# Patient Record
Sex: FEMALE | Race: WHITE | NOT HISPANIC OR LATINO | Employment: UNEMPLOYED | URBAN - NONMETROPOLITAN AREA
[De-identification: names, ages, dates, MRNs, and addresses within clinical notes are randomized per-mention and may not be internally consistent; named-entity substitution may affect disease eponyms.]

---

## 2018-03-21 ENCOUNTER — OFFICE VISIT CONVERTED (OUTPATIENT)
Dept: FAMILY MEDICINE CLINIC | Age: 33
End: 2018-03-21
Attending: NURSE PRACTITIONER

## 2018-08-24 ENCOUNTER — OFFICE VISIT CONVERTED (OUTPATIENT)
Dept: FAMILY MEDICINE CLINIC | Age: 33
End: 2018-08-24
Attending: NURSE PRACTITIONER

## 2018-10-01 ENCOUNTER — OFFICE VISIT CONVERTED (OUTPATIENT)
Dept: FAMILY MEDICINE CLINIC | Age: 33
End: 2018-10-01
Attending: NURSE PRACTITIONER

## 2019-01-18 ENCOUNTER — HOSPITAL ENCOUNTER (OUTPATIENT)
Dept: OTHER | Facility: HOSPITAL | Age: 34
Discharge: HOME | End: 2019-01-18
Attending: NURSE PRACTITIONER

## 2019-01-18 ENCOUNTER — OFFICE VISIT CONVERTED (OUTPATIENT)
Dept: FAMILY MEDICINE CLINIC | Age: 34
End: 2019-01-18
Attending: NURSE PRACTITIONER

## 2019-01-18 LAB
ALBUMIN SERPL-MCNC: 4.3 G/DL (ref 3.5–5)
ALBUMIN/GLOB SERPL: 1.4 {RATIO} (ref 1.4–2.6)
ALP SERPL-CCNC: 71 U/L (ref 42–98)
ALT SERPL-CCNC: 13 U/L (ref 10–40)
ANION GAP SERPL CALC-SCNC: 17 MMOL/L (ref 8–19)
APPEARANCE UR: ABNORMAL
AST SERPL-CCNC: 13 U/L (ref 15–50)
BACTERIA UR CULT: NORMAL
BACTERIA UR QL AUTO: ABNORMAL
BASOPHILS # BLD MANUAL: 0.03 10*3/UL (ref 0–0.2)
BASOPHILS NFR BLD MANUAL: 0.5 % (ref 0–3)
BILIRUB SERPL-MCNC: 0.37 MG/DL (ref 0.2–1.3)
BILIRUB UR QL: NEGATIVE
BUN SERPL-MCNC: 9 MG/DL (ref 5–25)
BUN/CREAT SERPL: 13 {RATIO} (ref 6–20)
CALCIUM SERPL-MCNC: 9.4 MG/DL (ref 8.7–10.4)
CASTS URNS QL MICRO: ABNORMAL /[LPF]
CHLORIDE SERPL-SCNC: 101 MMOL/L (ref 99–111)
CHOLEST SERPL-MCNC: 138 MG/DL (ref 107–200)
CHOLEST/HDLC SERPL: 4.3 {RATIO} (ref 3–6)
COLOR UR: YELLOW
CONV CO2: 24 MMOL/L (ref 22–32)
CONV LEUKOCYTE ESTERASE: ABNORMAL
CONV TOTAL PROTEIN: 7.3 G/DL (ref 6.3–8.2)
CONV UROBILINOGEN IN URINE BY AUTOMATED TEST STRIP: 0.2 {EHRLICHU}/DL (ref 0.1–1)
CREAT UR-MCNC: 0.72 MG/DL (ref 0.5–0.9)
DEPRECATED RDW RBC AUTO: 41.4 FL
EOSINOPHIL # BLD MANUAL: 0.09 10*3/UL (ref 0–0.7)
EOSINOPHIL NFR BLD MANUAL: 1.5 % (ref 0–7)
EPI CELLS #/AREA URNS HPF: ABNORMAL /[HPF]
ERYTHROCYTE [DISTWIDTH] IN BLOOD BY AUTOMATED COUNT: 12.5 % (ref 11.5–14.5)
GFR SERPLBLD BASED ON 1.73 SQ M-ARVRAT: >60 ML/MIN/{1.73_M2}
GLOBULIN UR ELPH-MCNC: 3 G/DL (ref 2–3.5)
GLUCOSE 24H UR-MCNC: NEGATIVE MG/DL
GLUCOSE SERPL-MCNC: 86 MG/DL (ref 65–99)
GRANS (ABSOLUTE): 3.98 10*3/UL (ref 2–8)
GRANS: 64.4 % (ref 30–85)
HBA1C MFR BLD: 13.5 G/DL (ref 12–16)
HCT VFR BLD AUTO: 39.8 % (ref 37–47)
HDLC SERPL-MCNC: 32 MG/DL (ref 40–60)
HGB UR QL STRIP: NEGATIVE
IMM GRANULOCYTES # BLD: 0.01 10*3/UL (ref 0–0.54)
IMM GRANULOCYTES NFR BLD: 0.2 % (ref 0–0.43)
IRON SATN MFR SERPL: 29 % (ref 20–55)
IRON SERPL-MCNC: 92 UG/DL (ref 60–170)
KETONES UR QL STRIP: NEGATIVE MG/DL
LDLC SERPL CALC-MCNC: 67 MG/DL (ref 70–100)
LYMPHOCYTES # BLD MANUAL: 1.68 10*3/UL (ref 1–5)
LYMPHOCYTES NFR BLD MANUAL: 6.2 % (ref 3–10)
MCH RBC QN AUTO: 30.5 PG (ref 27–31)
MCHC RBC AUTO-ENTMCNC: 33.9 G/DL (ref 33–37)
MCV RBC AUTO: 89.8 FL (ref 81–99)
MONOCYTES # BLD AUTO: 0.38 10*3/UL (ref 0.2–1.2)
MUCOUS THREADS URNS QL MICRO: ABNORMAL
NITRITE UR-MCNC: NEGATIVE MG/ML
OSMOLALITY SERPL CALC.SUM OF ELEC: 284 MOSM/KG (ref 273–304)
PH UR STRIP.AUTO: 5 [PH] (ref 5–8)
PLATELET # BLD AUTO: 251 10*3/UL (ref 130–400)
PMV BLD AUTO: 9.6 FL (ref 7.4–10.4)
POTASSIUM SERPL-SCNC: 4.1 MMOL/L (ref 3.5–5.3)
PROT UR-MCNC: NEGATIVE MG/DL
RBC # BLD AUTO: 4.43 10*6/UL (ref 4.2–5.4)
RBC # BLD AUTO: ABNORMAL /[HPF]
SODIUM SERPL-SCNC: 138 MMOL/L (ref 135–147)
SP GR UR STRIP: >=1.03 (ref 1–1.03)
SPECIMEN SOURCE: ABNORMAL
TIBC SERPL-MCNC: 316 UG/DL (ref 245–450)
TRANSFERRIN SERPL-MCNC: 221 MG/DL (ref 250–380)
TRIGL SERPL-MCNC: 194 MG/DL (ref 40–150)
UNIDENT CRYS URNS QL MICRO: ABNORMAL /[HPF]
VARIANT LYMPHS NFR BLD MANUAL: 27.2 % (ref 20–45)
VLDLC SERPL-MCNC: 39 MG/DL (ref 5–37)
WBC # BLD AUTO: 6.17 10*3/UL (ref 4.8–10.8)
WBC #/AREA URNS HPF: ABNORMAL /[HPF]

## 2019-01-20 LAB — BACTERIA UR CULT: NORMAL

## 2019-01-21 LAB — FERRITIN SERPL-MCNC: 60 NG/ML (ref 10–200)

## 2019-03-06 ENCOUNTER — OFFICE VISIT CONVERTED (OUTPATIENT)
Dept: FAMILY MEDICINE CLINIC | Age: 34
End: 2019-03-06
Attending: NURSE PRACTITIONER

## 2019-06-25 ENCOUNTER — OFFICE VISIT CONVERTED (OUTPATIENT)
Dept: FAMILY MEDICINE CLINIC | Age: 34
End: 2019-06-25
Attending: NURSE PRACTITIONER

## 2019-06-25 ENCOUNTER — HOSPITAL ENCOUNTER (OUTPATIENT)
Dept: OTHER | Facility: HOSPITAL | Age: 34
Discharge: HOME | End: 2019-06-25
Attending: NURSE PRACTITIONER

## 2019-06-27 LAB
ALP SERPL-CCNC: 84 U/L (ref 42–98)
ASO AB SERPL-ACNC: 239 [IU]/ML (ref 0–200)
CALCIUM SERPL-MCNC: 10 MG/DL (ref 8.7–10.4)
CONV ANTI NUCLEAR ANTIBODY WITH REFLEX: NEGATIVE
CONV RHEUMATOID FACTOR IGM: <10 [IU]/ML (ref 0–14)
CRP SERPL-MCNC: 0.3 MG/L (ref 0–5)
ERYTHROCYTE [SEDIMENTATION RATE] IN BLOOD: 13 MM/H (ref 0–20)
PHOSPHATE SERPL-MCNC: 4.2 MG/DL (ref 2.4–4.5)
URATE SERPL-MCNC: 3.4 MG/DL (ref 2.5–6.2)

## 2019-07-01 ENCOUNTER — HOSPITAL ENCOUNTER (OUTPATIENT)
Dept: OTHER | Facility: HOSPITAL | Age: 34
Discharge: HOME | End: 2019-07-01
Attending: INTERNAL MEDICINE

## 2019-07-01 LAB
ANION GAP SERPL CALC-SCNC: 18 MMOL/L (ref 8–19)
BUN SERPL-MCNC: 12 MG/DL (ref 5–25)
BUN/CREAT SERPL: 17 {RATIO} (ref 6–20)
CALCIUM SERPL-MCNC: 9.1 MG/DL (ref 8.7–10.4)
CHLORIDE SERPL-SCNC: 102 MMOL/L (ref 99–111)
CONV CO2: 25 MMOL/L (ref 22–32)
CORTIS SERPL-MCNC: 19.2 UG/DL (ref 4.3–22.4)
CREAT UR-MCNC: 0.69 MG/DL (ref 0.5–0.9)
FSH SERPL-ACNC: 6.5 M[IU]/ML
GFR SERPLBLD BASED ON 1.73 SQ M-ARVRAT: >60 ML/MIN/{1.73_M2}
GLUCOSE SERPL-MCNC: 84 MG/DL (ref 65–99)
LH SERPL-ACNC: 12.7 M[IU]/ML
OSMOLALITY SERPL CALC.SUM OF ELEC: 291 MOSM/KG (ref 273–304)
POTASSIUM SERPL-SCNC: 4.1 MMOL/L (ref 3.5–5.3)
PROLACTIN SERPL-MCNC: 16.48 NG/ML
SODIUM SERPL-SCNC: 141 MMOL/L (ref 135–147)
T4 FREE SERPL-MCNC: 1.2 NG/DL (ref 0.9–1.8)
TSH SERPL-ACNC: 3.54 M[IU]/L (ref 0.27–4.2)

## 2019-07-03 LAB — IGF-I SERPL-MCNC: 243 NG/ML (ref 73–243)

## 2019-09-20 ENCOUNTER — HOSPITAL ENCOUNTER (OUTPATIENT)
Dept: OTHER | Facility: HOSPITAL | Age: 34
Discharge: HOME | End: 2019-09-20
Attending: NURSE PRACTITIONER

## 2019-09-20 ENCOUNTER — OFFICE VISIT CONVERTED (OUTPATIENT)
Dept: FAMILY MEDICINE CLINIC | Age: 34
End: 2019-09-20
Attending: NURSE PRACTITIONER

## 2019-09-20 LAB
ERYTHROCYTE [DISTWIDTH] IN BLOOD BY AUTOMATED COUNT: 12 % (ref 11.5–14.5)
HBA1C MFR BLD: 12.6 G/DL (ref 12–16)
HCT VFR BLD AUTO: 37.6 % (ref 37–47)
MCH RBC QN AUTO: 29.9 PG (ref 27–31)
MCHC RBC AUTO-ENTMCNC: 33.5 G/DL (ref 33–37)
MCV RBC AUTO: 89.1 FL (ref 81–99)
PLATELET # BLD AUTO: 239 10*3/UL (ref 130–400)
PMV BLD AUTO: 9.7 FL (ref 7.4–10.4)
RBC # BLD AUTO: 4.22 10*6/UL (ref 4.2–5.4)
WBC # BLD AUTO: 5.93 10*3/UL (ref 4.8–10.8)

## 2019-10-09 ENCOUNTER — OFFICE VISIT CONVERTED (OUTPATIENT)
Dept: FAMILY MEDICINE CLINIC | Age: 34
End: 2019-10-09
Attending: FAMILY MEDICINE

## 2019-10-29 ENCOUNTER — OFFICE VISIT CONVERTED (OUTPATIENT)
Dept: NEUROLOGY | Facility: CLINIC | Age: 34
End: 2019-10-29
Attending: PSYCHIATRY & NEUROLOGY

## 2019-11-14 ENCOUNTER — OFFICE VISIT CONVERTED (OUTPATIENT)
Dept: FAMILY MEDICINE CLINIC | Age: 34
End: 2019-11-14
Attending: FAMILY MEDICINE

## 2019-12-16 ENCOUNTER — OFFICE VISIT CONVERTED (OUTPATIENT)
Dept: FAMILY MEDICINE CLINIC | Age: 34
End: 2019-12-16
Attending: FAMILY MEDICINE

## 2020-02-18 ENCOUNTER — OFFICE VISIT CONVERTED (OUTPATIENT)
Dept: FAMILY MEDICINE CLINIC | Age: 35
End: 2020-02-18
Attending: FAMILY MEDICINE

## 2020-04-02 ENCOUNTER — OFFICE VISIT CONVERTED (OUTPATIENT)
Dept: FAMILY MEDICINE CLINIC | Age: 35
End: 2020-04-02
Attending: FAMILY MEDICINE

## 2020-04-08 ENCOUNTER — TELEMEDICINE CONVERTED (OUTPATIENT)
Dept: GASTROENTEROLOGY | Facility: CLINIC | Age: 35
End: 2020-04-08
Attending: PHYSICIAN ASSISTANT

## 2020-04-13 ENCOUNTER — HOSPITAL ENCOUNTER (OUTPATIENT)
Dept: GASTROENTEROLOGY | Facility: HOSPITAL | Age: 35
Setting detail: HOSPITAL OUTPATIENT SURGERY
Discharge: HOME | End: 2020-04-13
Attending: INTERNAL MEDICINE

## 2020-04-13 LAB
BASOPHILS # BLD AUTO: 0.04 10*3/UL (ref 0–0.2)
BASOPHILS NFR BLD AUTO: 0.9 % (ref 0–3)
CONV ABS IMM GRAN: 0.01 10*3/UL (ref 0–0.2)
CONV IMMATURE GRAN: 0.2 % (ref 0–1.8)
DEPRECATED RDW RBC AUTO: 39.9 FL (ref 36.4–46.3)
EOSINOPHIL # BLD AUTO: 0.1 10*3/UL (ref 0–0.7)
EOSINOPHIL # BLD AUTO: 2.2 % (ref 0–7)
ERYTHROCYTE [DISTWIDTH] IN BLOOD BY AUTOMATED COUNT: 12.1 % (ref 11.7–14.4)
HCT VFR BLD AUTO: 37.3 % (ref 37–47)
HGB BLD-MCNC: 12.5 G/DL (ref 12–16)
LYMPHOCYTES # BLD AUTO: 1.46 10*3/UL (ref 1–5)
LYMPHOCYTES NFR BLD AUTO: 32 % (ref 20–45)
MCH RBC QN AUTO: 30.2 PG (ref 27–31)
MCHC RBC AUTO-ENTMCNC: 33.5 G/DL (ref 33–37)
MCV RBC AUTO: 90.1 FL (ref 81–99)
MONOCYTES # BLD AUTO: 0.36 10*3/UL (ref 0.2–1.2)
MONOCYTES NFR BLD AUTO: 7.9 % (ref 3–10)
NEUTROPHILS # BLD AUTO: 2.59 10*3/UL (ref 2–8)
NEUTROPHILS NFR BLD AUTO: 56.8 % (ref 30–85)
NRBC CBCN: 0 % (ref 0–0.7)
PLATELET # BLD AUTO: 218 10*3/UL (ref 130–400)
PMV BLD AUTO: 9.8 FL (ref 9.4–12.3)
RBC # BLD AUTO: 4.14 10*6/UL (ref 4.2–5.4)
WBC # BLD AUTO: 4.56 10*3/UL (ref 4.8–10.8)

## 2020-04-14 ENCOUNTER — OFFICE VISIT CONVERTED (OUTPATIENT)
Dept: FAMILY MEDICINE CLINIC | Age: 35
End: 2020-04-14
Attending: FAMILY MEDICINE

## 2020-04-28 ENCOUNTER — OFFICE VISIT CONVERTED (OUTPATIENT)
Dept: FAMILY MEDICINE CLINIC | Age: 35
End: 2020-04-28
Attending: FAMILY MEDICINE

## 2020-08-11 ENCOUNTER — OFFICE VISIT CONVERTED (OUTPATIENT)
Dept: FAMILY MEDICINE CLINIC | Age: 35
End: 2020-08-11
Attending: FAMILY MEDICINE

## 2020-08-11 ENCOUNTER — HOSPITAL ENCOUNTER (OUTPATIENT)
Dept: OTHER | Facility: HOSPITAL | Age: 35
Discharge: HOME | End: 2020-08-11
Attending: FAMILY MEDICINE

## 2020-08-13 LAB — BACTERIA UR CULT: NORMAL

## 2020-09-04 ENCOUNTER — OFFICE VISIT CONVERTED (OUTPATIENT)
Dept: GASTROENTEROLOGY | Facility: CLINIC | Age: 35
End: 2020-09-04
Attending: NURSE PRACTITIONER

## 2020-09-04 ENCOUNTER — CONVERSION ENCOUNTER (OUTPATIENT)
Dept: OTHER | Facility: HOSPITAL | Age: 35
End: 2020-09-04

## 2020-09-11 ENCOUNTER — OFFICE VISIT CONVERTED (OUTPATIENT)
Dept: FAMILY MEDICINE CLINIC | Age: 35
End: 2020-09-11
Attending: FAMILY MEDICINE

## 2020-09-18 ENCOUNTER — HOSPITAL ENCOUNTER (OUTPATIENT)
Dept: OTHER | Facility: HOSPITAL | Age: 35
Discharge: HOME | End: 2020-09-18

## 2020-12-08 ENCOUNTER — OFFICE VISIT CONVERTED (OUTPATIENT)
Dept: FAMILY MEDICINE CLINIC | Age: 35
End: 2020-12-08
Attending: FAMILY MEDICINE

## 2020-12-10 ENCOUNTER — HOSPITAL ENCOUNTER (OUTPATIENT)
Dept: OTHER | Facility: HOSPITAL | Age: 35
Discharge: HOME | End: 2020-12-10
Attending: FAMILY MEDICINE

## 2020-12-11 LAB
25(OH)D3 SERPL-MCNC: 26.4 NG/ML (ref 30–100)
ALBUMIN SERPL-MCNC: 4.4 G/DL (ref 3.5–5)
ALBUMIN/GLOB SERPL: 1.6 {RATIO} (ref 1.4–2.6)
ALP SERPL-CCNC: 76 U/L (ref 42–98)
ALT SERPL-CCNC: 19 U/L (ref 10–40)
ANION GAP SERPL CALC-SCNC: 15 MMOL/L (ref 8–19)
AST SERPL-CCNC: 17 U/L (ref 15–50)
BASOPHILS # BLD MANUAL: 0.04 10*3/UL (ref 0–0.2)
BASOPHILS NFR BLD MANUAL: 0.5 % (ref 0–3)
BILIRUB SERPL-MCNC: 0.29 MG/DL (ref 0.2–1.3)
BUN SERPL-MCNC: 13 MG/DL (ref 5–25)
BUN/CREAT SERPL: 21 {RATIO} (ref 6–20)
CALCIUM SERPL-MCNC: 9.6 MG/DL (ref 8.7–10.4)
CHLORIDE SERPL-SCNC: 101 MMOL/L (ref 99–111)
CONV CO2: 25 MMOL/L (ref 22–32)
CONV TOTAL PROTEIN: 7.1 G/DL (ref 6.3–8.2)
CREAT UR-MCNC: 0.63 MG/DL (ref 0.5–0.9)
DEPRECATED RDW RBC AUTO: 41.6 FL
EOSINOPHIL # BLD MANUAL: 0.08 10*3/UL (ref 0–0.7)
EOSINOPHIL NFR BLD MANUAL: 1.1 % (ref 0–7)
ERYTHROCYTE [DISTWIDTH] IN BLOOD BY AUTOMATED COUNT: 12.8 % (ref 11.5–14.5)
GFR SERPLBLD BASED ON 1.73 SQ M-ARVRAT: >60 ML/MIN/{1.73_M2}
GLOBULIN UR ELPH-MCNC: 2.7 G/DL (ref 2–3.5)
GLUCOSE SERPL-MCNC: 87 MG/DL (ref 65–99)
GRANS (ABSOLUTE): 5.02 10*3/UL (ref 2–8)
GRANS: 66.5 % (ref 30–85)
HBA1C MFR BLD: 13.4 G/DL (ref 12–16)
HCT VFR BLD AUTO: 39.1 % (ref 37–47)
IMM GRANULOCYTES # BLD: 0.01 10*3/UL (ref 0–0.54)
IMM GRANULOCYTES NFR BLD: 0.1 % (ref 0–0.43)
LYMPHOCYTES # BLD MANUAL: 1.92 10*3/UL (ref 1–5)
LYMPHOCYTES NFR BLD MANUAL: 6.4 % (ref 3–10)
MCH RBC QN AUTO: 30.2 PG (ref 27–31)
MCHC RBC AUTO-ENTMCNC: 34.3 G/DL (ref 33–37)
MCV RBC AUTO: 88.3 FL (ref 81–99)
MONOCYTES # BLD AUTO: 0.48 10*3/UL (ref 0.2–1.2)
OSMOLALITY SERPL CALC.SUM OF ELEC: 281 MOSM/KG (ref 273–304)
PLATELET # BLD AUTO: 276 10*3/UL (ref 130–400)
PMV BLD AUTO: 9.1 FL (ref 7.4–10.4)
POTASSIUM SERPL-SCNC: 4.6 MMOL/L (ref 3.5–5.3)
RBC # BLD AUTO: 4.43 10*6/UL (ref 4.2–5.4)
SODIUM SERPL-SCNC: 136 MMOL/L (ref 135–147)
TSH SERPL-ACNC: 1.41 M[IU]/L (ref 0.27–4.2)
VARIANT LYMPHS NFR BLD MANUAL: 25.4 % (ref 20–45)
VIT B12 SERPL-MCNC: 395 PG/ML (ref 211–911)
WBC # BLD AUTO: 7.55 10*3/UL (ref 4.8–10.8)

## 2020-12-18 ENCOUNTER — TELEMEDICINE CONVERTED (OUTPATIENT)
Dept: GASTROENTEROLOGY | Facility: CLINIC | Age: 35
End: 2020-12-18
Attending: NURSE PRACTITIONER

## 2021-02-26 ENCOUNTER — OFFICE VISIT CONVERTED (OUTPATIENT)
Dept: FAMILY MEDICINE CLINIC | Age: 36
End: 2021-02-26
Attending: FAMILY MEDICINE

## 2021-03-01 ENCOUNTER — HOSPITAL ENCOUNTER (OUTPATIENT)
Dept: OTHER | Facility: HOSPITAL | Age: 36
Discharge: HOME | End: 2021-03-01
Attending: FAMILY MEDICINE

## 2021-03-01 LAB
25(OH)D3 SERPL-MCNC: 27.2 NG/ML (ref 30–100)
CORTIS AM PEAK SERPL-MCNC: 15.7 UG/DL (ref 6–18.4)

## 2021-05-12 NOTE — PROGRESS NOTES
Quick Note      Patient Name: Gin Alexandra   Patient ID: [id number]   Sex: Female   YOB: 1985    Primary Care Provider: Prakash Rivera MD   Referring Provider: Prakash Rivera MD    Visit Date: April 8, 2020    Provider: Nabil Gonzalez PA-C   Location: [facility]   Location Address: [address] [place] [address]  [id number]   Location Phone: [phone]          History Of Present Illness  TELEHEALTH VISIT  Chief Complaint: Blood in stool   Gin Alexandra is a 34 year old female who is presenting for evaluation via telehealth visit. Verbal consent obtained before beginning visit.   Provider spent 12 minutes with the patient during telehealth visit.   The following staff were present during this visit: Dean Becerra MA   Past Medical History/Overview of Patient Symptoms     34-year-old female agrees to a telephone visit for blood in her stool, constipation, and generalized abdominal pain.  The pain is described as severe after she eats in the upper area, but also in the lower area.  She also has a bowel movement once a week and with blood always in her stool for the last 3 months.  She has never had a colonoscopy or EGD.  She also reports 3 episodes of melena.           Assessment  · Blood in stool     578.1/K92.1  · Abdominal pain     789.00/R10.9  · Melena     578.1/K92.1  · Constipation     564.00/K59.00      Plan  · Orders  o CBC with Auto Diff [facility] (17687) - 578.1/K92.1, 789.00/R10.9, 564.00/K59.00 - 04/08/2020  o Consent for Colonoscopy with Possible Biopsy - Possible risks/complications, benefits, and alternatives to surgical or invasive procedure have been explained to patient and/or legal guardian. -Patient has been evaluated and can tolerate anesthesia and/or sedation. Risks, benefits, and alternatives to anesthesia and sedation have been explained to patient and/or legal guardian. (16142) - 578.1/K92.1, 789.00/R10.9, 564.00/K59.00 - 04/08/2020  o Consent for  Esophagogastroduodenoscopy (EGD) with biopsy - Possible risks/complications, benefits, and alternatives to surgical or invasive procedure have been explained to patient and/or legal guardian. - Patient has been evaluated and can tolerate anesthesia and/or sedation. Risks, benefits, and alternatives to anesthesia and sedation have been explained to patient and/or legal guardian. (94367) - 789.00/R10.9, 578.1/K92.1 - 04/08/2020  · Medications  o Medications have been Reconciled  o Transition of Care or Provider Policy  · Instructions  o Plan Of Care:   o Chronic conditions reviewed and taken into consideration for today's treatment plan.  o Patient instructed to seek medical attention urgently for new or worsening symptoms.  o Patient was educated/instructed on their diagnosis, treatment and medications prior to discharge from the clinic today.  o 34-year-old female with constipation, melena, blood in stool, and abdominal pain. Given the symptoms at outside these is elective procedures therefore I will schedule her for an EGD/colonoscopy. I will add a trial of Linzess 72 mcg daily. She has tried her mom's Linzess 290 mcg, but this causes diarrhea.            Electronically Signed by: FERNANDO Wheeler-AMANDA -Author on April 8, 2020 09:28:40 AM  Electronically Co-signed by: Kumar Turner MD -Reviewer on April 9, 2020 04:21:08 PM

## 2021-05-13 NOTE — PROGRESS NOTES
Progress Note      Patient Name: Gin Alexandra   Patient ID: [id number]   Sex: Female   YOB: 1985    Primary Care Provider: Prakash Rivera MD   Referring Provider: Prakash Rivera MD    Visit Date: September 4, 2020    Provider: LUCIA Willis   Location: [facility] [place] [place] [facility]   Location Address: [address] [place] [address]  [address] [place] [address]  [id number]          Chief Complaint  · Follow up of EGD/Colonoscopy      History Of Present Illness     34-year-old female, with a history of fibromyalgia, returns for follow-up after having an EGD/colonoscopy on 04/13/2020 by Dr. Turner for melena, epigastric pain, and constipation.  EGD/colonoscopy was unremarkable.  Stomach antrum biopsy revealed chronic inactive gastritis.  She is taking Linzess 72 mcg.  She reports Linzess worked at first, but is not as effective as it once was.  She reports she was having a daily bowel movement, but states that she is having bowel movements less frequently.  She reports that she does not take the Linzess, she will go anywhere from 10 to 14 days between bowel movements.  She reports that Linzess is very expensive for her.  She was given a trial of Bentyl to see if that would help her abdominal pain.  She reports the Bentyl does help ease her pain.  She reports she still has right upper quadrant and left upper quadrant abdominal pain.  The pain is described as sharp and is intermittent.  The pain only lasts for a few seconds.  She does not take the dicyclomine, the pain will return.  She reports she had a cholecystectomy when she was around 20 or 21 years old.  She is taking omeprazole 40 mg, which is controlling her reflux.       Past Medical History  Anxiety; GERD         Past Surgical History  Cholecystectomy; Colonoscopy; EGD; LEEP; Tubal ligation         Medication List  buspirone 5 mg oral tablet; Claritin 10 mg oral tablet; Cymbalta 60 mg oral capsule,delayed release(/EC); diclofenac sodium 75 mg  "oral tablet,delayed release (DR/EC); omeprazole 40 mg oral capsule,delayed release(DR/EC); phentermine 15 mg oral capsule; tramadol 50 mg oral tablet         Allergy List  Codiene       Allergies Reconciled  Family Medical History  No family history of colorectal cancer         Social History  Tobacco (Never)         Review of Systems  · Constitutional  o Denies  o : chills, fever  · Cardiovascular  o Denies  o : chest pain  · Respiratory  o Denies  o : shortness of breath  · Gastrointestinal  o Denies  o : nausea, vomiting, dysphagia  · Endocrine  o Denies  o : weight gain, weight loss      Vitals  Date Time BP Position Site L\R Cuff Size HR RR TEMP (F) WT  HT  BMI kg/m2 BSA m2 O2 Sat HC       09/04/2020 12:03 /77 Sitting    83 - R 16  182lbs 0oz 5'  2\" 33.29 1.9 99 %          Physical Examination  · Constitutional  o Appearance  o : Healthy-appearing, awake and alert in no acute distress  · Head and Face  o Head  o : Normocephalic with no worriesome skin lesions  · Eyes  o Vision  o :   § Visual Fields  § : eyes move symmetrical in all directions  o Sclerae  o : sclerae anicteric  · Neck  o Inspection/Palpation  o : Trachea is midline, no adenopathy  · Respiratory  o Respiratory Effort  o : Breathing is unlabored.  o Inspection of Chest  o : normal appearance  o Auscultation of Lungs  o : Chest is clear to auscultation bilaterally.  · Cardiovascular  o Heart  o :   § Auscultation of Heart  § : no murmurs, rubs, or gallops, RRR  o Peripheral Vascular System  o :   § Extremities  § : no cyanosis, clubbing or edema;   · Gastrointestinal  o Abdominal Examination  o : Abdomen is soft, nontender to palpation, with normal active bowel sounds, no appreciable hepatosplenomegaly.          Assessment  · Abdominal Pain, RUQ     789.01/R10.11  · Abdominal Pain, LUQ     789.02/R10.12  · Irritable bowel syndrome with constipation     564.1/K58.1      Plan  · Medications  o Trulance 3 mg oral tablet   SIG: take 1 tablet (3 " mg) by oral route once daily for 30 days   DISP: (30) tablets with 3 refills  Prescribed on 09/04/2020     o Linzess 72 mcg oral capsule   SIG: take 1 capsule by oral route once daily on an empty stomach at least 30 minutes before 1st meal   DISP: (30) capsules with 5 refills  Discontinued on 09/04/2020     · Instructions  o 34-year-old female returns for follow-up after having EGD/colonoscopy in April by Dr. Turner. I have discussed the results of her procedure. I have recommended to continue to take the dicyclomine to see if it helps with the abdominal pain. If the dicyclomine does not improve the abdominal pain, I will consider an abdominal ultrasound. She reports Linzess is not as effective as it once was, so I sent in a prescription of Trulance 3 mg for her to try. I am going to have her follow-up in 3 months time.            Electronically Signed by: LUCIA Willis -Author on September 4, 2020 12:24:18 PM  Electronically Co-signed by: Kumar Turner MD -Reviewer on September 8, 2020 08:49:44 AM

## 2021-05-14 VITALS
HEART RATE: 83 BPM | BODY MASS INDEX: 33.49 KG/M2 | DIASTOLIC BLOOD PRESSURE: 77 MMHG | WEIGHT: 182 LBS | RESPIRATION RATE: 16 BRPM | SYSTOLIC BLOOD PRESSURE: 116 MMHG | OXYGEN SATURATION: 99 % | HEIGHT: 62 IN

## 2021-05-14 NOTE — PROGRESS NOTES
Progress Note      Patient Name: Gin Alexandra   Patient ID: [id number]   Sex: Female   YOB: 1985    Primary Care Provider: Prakash Rivera MD   Referring Provider: Prakash Rivera MD    Visit Date: December 18, 2020    Provider: LUCIA Willis   [facility]   Location Address: [address] [place] [address]  [address]  [place], [place]  [id number]          Chief Complaint  · IBS-C      History Of Present Illness  Video Conferencing Visit  Gin Alexandra is a 35 year old /White female who is presenting for evaluation via video conferencing via [facility]. Verbal consent obtained before beginning visit.   The following staff were present during this visit: Lilia Hernandez MA      35-year-old female with a history of right upper quadrant and left upper quadrant pain and IBS-C returns for follow-up.  At last office visit, she reported that Linzess was not as effective as it used to be and it was too expensive for her, so she was given a trial of Trulance 3 mg to see if that would help improve her symptoms.  She was unable to  the medication because she did not have the money for it.  She would report to be sent in again, so she can try the medication.  She still having right upper quadrant and left upper quadrant pain.  The pain is intermittent and she has not found anything to trigger these pain attacks.  She does report that it is tender to touch in those regions.  When the pain occurs, it is described as crushing.  The pain tends to occur early in the morning.  The pain is relieved with a heating pad.  She does have occasional nausea.  She does not vomit.  She had a cholecystectomy when she was 21 years old.  Review of her EGD/colonoscopy 04/2020 by Dr. Turner was unremarkable.         Past Medical History  Anxiety; GERD; IBS (irritable bowel syndrome)         Past Surgical History  Cholecystectomy; Colonoscopy; EGD; LEEP; Tubal ligation         Medication  List  buspirone 5 mg oral tablet; Claritin 10 mg oral tablet; Cymbalta 60 mg oral capsule,delayed release(DR/EC); diclofenac sodium 75 mg oral tablet,delayed release (DR/EC); omeprazole 40 mg oral capsule,delayed release(DR/EC); phentermine 37.5 mg oral tablet; tramadol 50 mg oral tablet; Trulance 3 mg oral tablet         Allergy List  Codiene       Allergies Reconciled  Family Medical History  No family history of colorectal cancer         Social History  Tobacco (Never)         Review of Systems  · Constitutional  o Denies  o : chills, fever  · Cardiovascular  o Denies  o : chest pain  · Respiratory  o Denies  o : shortness of breath  · Gastrointestinal  o Admits  o : nausea  o Denies  o : vomiting, dysphagia  · Endocrine  o Denies  o : weight gain, weight loss      Physical Examination  · Constitutional  o Appearance  o : Healthy-appearing, awake and alert in no acute distress  · Head and Face  o Head  o : Normocephalic with no worriesome skin lesions  · Eyes  o Vision  o :   § Visual Fields  § : eyes move symmetrical in all directions  o Sclerae  o : sclerae anicteric  · Neck  o Inspection/Palpation  o : Trachea is midline, no adenopathy  · Respiratory  o Respiratory Effort  o : Breathing is unlabored.  o Inspection of Chest  o : normal appearance          Assessment  · Abdominal Pain, RUQ     789.01/R10.11  · Abdominal Pain, LUQ     789.02/R10.12  · Irritable bowel syndrome with constipation     564.1/K58.1  · Nausea     787.02/R11.0      Plan  · Medications  o dicyclomine 20 mg oral tablet   SIG: take 1 tablet (20 mg) by oral route 3 times per day for 30 days   DISP: (90) Tablet with 3 refills  Prescribed on 12/18/2020     o Trulance 3 mg oral tablet   SIG: take 1 tablet (3 mg) by oral route once daily for 30 days   DISP: (30) Tablet with 3 refills  Refilled on 12/18/2020     o Medications have been Reconciled  o Transition of Care or Provider Policy  · Instructions  o 35-year-old female agrees to [employer]  visit for the evaluation of constipation and right upper quadrant and left upper quadrant pain. She was unable to get her Trulance filled, so resent the prescription to see if that will help improve her bowel movements. I have also sent in a prescription dicyclomine 20 mg 3 times daily as needed to see if that will help improve her abdominal pain. I have recommended to the patient that she only take dicyclomine as needed, as it can exacerbate constipation. The patient reports that the pain is not bothering her, but I have informed the patient that if her pain does worsen, we can order a CT abdomen/pelvis with labs to further investigate. She is going to follow-up in 3 months.            Electronically Signed by: LUCIA Willis -Author on December 18, 2020 10:30:37 AM

## 2021-05-18 NOTE — PROGRESS NOTES
Gni Alexandra 1985     Preventive Medicine Services    Visit Date: Fri, Jan 18, 2019 12:57 pm    Provider: Alexia Mendez N.P. (Assistant: Yonatan Peraza)    Location: [facility]        Electronically signed by Alexia Mendez N.P. on  01/19/2019 11:52:11 PM                             SUBJECTIVE:        CC:     Ms. Alexandra is a 33 year old White female.  well check and follow up; (NOT TAKING MECLIZINE OR NAPROXEN)         HPI:         Health checkup noted.  She cannot recall when she last had a physical exam.  Her last menstrual period was 12/18/18.  Her current method of contraception is a tubal ligation.  She performs breast self-exams monthly.    Her last Pap smear was in 10/17 and was normal.   She has never had a mammogram. Preventative Health updated today.  She is current with her influenza immunization.  Ms. Alexandra has had an abnormal Pap smear in the past.  Tobacco: Past history of cigarette smoking, but has quit.          PHQ-9 Depression Screening: Completed form scanned and in chart; Total Score 13 Alcohol Consumption Screening: Completed form scanned and in chart; Total Score 0     ROS:     CONSTITUTIONAL:  Positive for fatigue.   Negative for chills or fever.      CARDIOVASCULAR:  Negative for chest pain and pedal edema.      RESPIRATORY:  Negative for recent cough and dyspnea.      GASTROINTESTINAL:  Negative for abdominal pain, constipation, diarrhea, heartburn, nausea and vomiting.      GENITOURINARY:  Negative for dysuria and change in urine stream.      MUSCULOSKELETAL:  Negative for arthralgias and myalgias.      INTEGUMENTARY/BREAST:  Negative for pruritis and rash.      NEUROLOGICAL:  Negative for dizziness, fainting, headaches and paresthesias.      ENDOCRINE:  Negative for hair loss, polydipsia and polyphagia.      ALLERGIC/IMMUNOLOGIC:  Negative for seasonal allergies.      PSYCHIATRIC:  Negative for anxiety, depression and suicidal thoughts.          PMH/FMH/SH:     Last  Reviewed on 1/18/2019 01:07 PM by Alexia Mendez    Past Medical History:             PAST MEDICAL HISTORY     UNREMARKABLE Hospitalizations: Never         GYNECOLOGICAL HISTORY:    No pregnancy-related problems.    Problems with menstrual cycles include irregular frequency/duration.      Menarche occurred at age 14.    Last Pap was 10/2017  [facility] (last pap normal); (+) hx of abnormal Pap ( cervical dysplasia; she has undergone LEEP )    Has never had a mammogram.          Surgical History:         Cholecystectomy: at age age 21; details/complications?;      LEEP procedure 2012;         Family History:     Unknown         Social History:     Occupation:. Homemaker     Marital Status:      Children: 2 children and 2 step-children         Tobacco/Alcohol/Supplements:     Last Reviewed on 1/18/2019 01:07 PM by Alexia Mendez    Tobacco: She has a past history of cigarette smoking; quit date:  feb 2016.  Non-drinker     Caffeine:  She admits to consuming caffeine via soda.          Substance Abuse History:     Last Reviewed on 1/18/2019 01:07 PM by Alexia Mendez    None         Mental Health History:     Last Reviewed on 1/18/2019 01:07 PM by Alexia Mendez        mild depression         Communicable Diseases (eg STDs):     Last Reviewed on 1/18/2019 01:07 PM by Alexia Mendez    Reportable health conditions; NEGATIVE             Current Problems:     Last Reviewed on 1/18/2019 01:07 PM by Alexia Mendez    Fatigue     Sinus headaches     Anxiety with depression     Screening for depression         Immunizations:     Fluzone Quadrivalent (3+ years) 9/25/2018         Allergies:     Last Reviewed on 1/18/2019 01:07 PM by Alexia Mendez    Codeine Sulfate:        Current Medications:     Last Reviewed on 1/18/2019 01:07 PM by Alexia Mendez    Buspirone HCl 5mg Tablet one tablet q hs prn anxiety     Citalopram Hydrobromide 40mg Tablet 1 po qd         OBJECTIVE:         Vitals:         Historical:     10/01/2018  Wt:   174lbs    03/21/2018  Wt:   185.1lbs        Current: 1/18/2019 1:03:23 PM    Ht:  5 ft, 3 in;  Wt: 179.4 lbs;  BMI: 31.8    T: 98.9 F (oral);  BP: 106/73 mm Hg (right arm, sitting);  P: 74 bpm (right arm (BP Cuff), sitting);  sCr: 0.58 mg/dL;  GFR: 147.74        Exams:     PHYSICAL EXAM:     GENERAL: vital signs recorded - well developed, well nourished;  no apparent distress;     EYES: extraocular movements intact; PERRL;     E/N/T: EARS: external auditory canal normal;  bilateral TMs are normal;  NOSE:  normal nasal mucosa, septum, turbinates, and sinuses; OROPHARYNX:  normal mucosa, dentition, gingiva, and posterior pharynx;     NECK: range of motion is normal;     RESPIRATORY: normal appearance and symmetric expansion of chest wall; normal respiratory rate and pattern with no distress; normal breath sounds with no rales, rhonchi, wheezes or rubs;     CARDIOVASCULAR: normal rate; rhythm is regular;  no edema;     GASTROINTESTINAL: nontender; normal bowel sounds; no organomegaly;     LYMPHATIC: no enlargement of cervical or facial nodes; no supraclavicular nodes;     MUSCULOSKELETAL: normal gait; normal range of motion of all major muscle groups; no limb or joint pain with range of motion;     NEUROLOGIC: mental status: alert and oriented x 3;     PSYCHIATRIC: appropriate affect and demeanor; normal speech pattern; normal thought and perception;         ASSESSMENT           V70.0   Z00.00  Health checkup              DDx:     V79.0   Z13.89  Screening for depression              DDx:     780.79   R53.83  Fatigue              DDx:     300.4   F41.3   F34.9  Anxiety with depression              DDx:         ORDERS:         Meds Prescribed:       Refill of: Buspirone HCl 5mg Tablet one tablet q hs prn anxiety  #30 (Thirty) tablet(s) Refills: 5       Refill of: Citalopram Hydrobromide 40mg Tablet 1 po qd  #30 (Thirty) tablet(s) Refills: 5         Lab Orders:        [id number]  [id number] [facility] CBC without diff  (Send-Out)         [id number]  [id number] [facility] Comp Metabolic Panel (14)  (Send-Out)         40251  [id number] [facility] Lipid Panel (Excludes LDL/HDL ratio)  (Send-Out)         [id number]  [id number] [facility] Urinalysis complete, automated, with micro  (Send-Out)         [id number]  [id number] [facility] Iron and TIBC  (Send-Out)           Other Orders:         Negative EtOH screen  (In-House)           Calculated BMI above the upper parameter and a follow-up plan was documented in the medical record  (In-House)                   PLAN:          Health checkup     LABORATORY:  Labs ordered to be performed today at [facility] include CBC W/O DIFF.  CMP Lipid panel Urine with micro MIPS Vaccines Flu and Pneumonia updated in Shot record Negative alcohol screen     BMI Elevated - Follow-Up Plan: She was provided education on weight loss strategies           Orders:       [id number]  [id number] [facility] CBC without diff  (Send-Out)         [id number]  [id number] [facility] Comp Metabolic Panel (14)  (Send-Out)         49331  [id number] [facility] Lipid Panel (Excludes LDL/HDL ratio)  (Send-Out)         03057  [id number] [facility] Urinalysis complete, automated, with micro  (Send-Out)           Negative EtOH screen  (In-House)           Calculated BMI above the upper parameter and a follow-up plan was documented in the medical record  (In-House)            Screening for depression     MIPS PHQ-9 Depression Screening Completed form scanned and in chart; Total Score 13          Fatigue     LABORATORY:  Labs ordered to be performed today at [facility] include Iron, TIBC.            Orders:       [id number]  [id number] [facility] Iron and TIBC  (Send-Out)            Anxiety with depression           Prescriptions:       Refill of: Buspirone HCl 5mg Tablet one tablet q hs prn anxiety  #30 (Thirty) tablet(s) Refills: 5       Refill of: Citalopram Hydrobromide 40mg Tablet 1 po qd  #30 (Thirty) tablet(s) Refills: 5             CHARGE CAPTURE            **Please note: ICD descriptions below are intended for billing purposes only and may not represent clinical diagnoses**        Primary Diagnosis:         V70.0 Health checkup            Z00.00    Encounter for general adult medical examination without abnormal findings              Orders:          [id number]   Preventive medicine, established patient, age 18-39 years  (In-House)                Negative EtOH screen  (In-House)                Calculated BMI above the upper parameter and a follow-up plan was documented in the medical record  (In-House)           V79.0 Screening for depression            Z13.89    Encounter for screening for other disorder              Orders:          51249 -25  Office/outpatient visit; established patient, level 4  (In-House)           780.79 Fatigue            R53.83    Other fatigue    300.4 Anxiety with depression            F41.3    Other mixed anxiety disorders           F34.9    Persistent mood [affective] disorder, unspecified

## 2021-05-18 NOTE — PROGRESS NOTES
"Gin Alexandra  1985     Office/Outpatient Visit    Visit Date: Tue, Apr 14, 2020 01:09 pm    Provider: Prakash Rivera MD (Assistant: Spurling, Sarah C, MA)    Location: [facility]        Electronically signed by Prakash Rivera MD on  04/14/2020 01:43:49 PM                             Subjective:        CC: Ms. Alexandra is a 34 year old White female.  This is a follow-up visit.  routine follow up.; .    TELEMEDICINE VISIT:    - Patient consented to this telemedicine visit. Consent obtained by Sarah Spurling, MA and Prakash Rivera MD.    - Persons present during the telemedicine consultation include:  Patient, Dr. Rivera    - This visit is being conducted via telephone.            HPI:       Pertaining to anxiety, patient reports that her symptoms have been relatively stable but are persistent nonetheless. At last visit, Celexa was discontinued and Cymbalta 60 mg daily was started in hopes that it could treat both her anxiety and some chronic pain associated with her diagnosis of fibromyalgia.  She says she has found Cymbalta helpful but that it is difficult to tell just how helpful given the stress of the current coronavirus pandemic.  She says she feels like she be doing a lot better if she can leave her house on a regular basis and stay active. She says her mood is stable.  No SI or HI.      At last visit, patient was started on omeprazole 40 mg daily for complaints of heartburn and reflux.  She says that this medication has helped with the symptoms while her other gastrointestinal symptoms such as bloating, cramping and frequent constipation have recurred/persisted.  She was previously referred to gastroenterology and yesterday had both endoscopy and colonoscopy performed.  She says that she was told both of the scopes \"looked normal.\"  Pathology is pending.  She denies nausea, vomiting, melena or hematochezia.  No fever or chills.  She says her gastroenterologist provided her with Bentyl for her " intermittent abdominal cramping.      Pertaining to fibromyalgia, patient reports that she seems to have more energy on Cymbalta than she previously had.  However, she still notes that several days a week she will feel fatigued and diffusely sore.  She continues to follow with rheumatology on an as-needed basis.  She is trying to stay active, despite intermittent muscle soreness and fatigue, but notes that this is very difficult when she has to stay in her home as much as we are required to do with the current pandemic.    ROS:     CONSTITUTIONAL:  Positive for fatigue.   Negative for chills or fever.      CARDIOVASCULAR:  Negative for chest pain, dizziness, palpitations and edema.      RESPIRATORY:  Negative for dyspnea and cough.      GASTROINTESTINAL:  Positive for acid reflux symptoms, heartburn and nausea.   Negative for abdominal pain, diarrhea or vomiting.      MUSCULOSKELETAL:  Positive for arthralgias and (diffuse) back pain ( chronic ).      INTEGUMENTARY/BREAST:  Negative for rash.      NEUROLOGICAL:  Positive for headaches, paresthesias and weakness.      ENDOCRINE:  Negative for hair loss, heat/cold intolerance, polydipsia, and polyphagia.      PSYCHIATRIC:  Positive for anxiety.   Negative for depression, sleep disturbance or suicidal thoughts.          Past Medical History / Family History / Social History:         Last Reviewed on 4/14/2020 01:43 PM by Prakash Rivera    Past Medical History:             PAST MEDICAL HISTORY     UNREMARKABLE Hospitalizations: Never         GYNECOLOGICAL HISTORY:    No pregnancy-related problems.    Problems with menstrual cycles include irregular frequency/duration.      Menarche occurred at age 14.    Last Pap was 10/2017  [facility] (last pap normal); (+) hx of abnormal Pap ( cervical dysplasia; she has undergone LEEP )    Has never had a mammogram.          Surgical History:         Cholecystectomy: at age age 21; details/complications?;     LEEP  procedure 2012;         Family History:     Unknown         Social History:     Occupation:. Homemaker     Marital Status:      Children: 2 children and 2 step-children         Tobacco/Alcohol/Supplements:     Last Reviewed on 4/14/2020 01:43 PM by Prakash Rivera    Tobacco: She has a past history of cigarette smoking; quit date:  feb 2016.  Non-drinker     Caffeine:  She admits to consuming caffeine via soda.          Substance Abuse History:     Last Reviewed on 4/14/2020 01:43 PM by Prakash Rivera    None         Mental Health History:     Last Reviewed on 4/14/2020 01:43 PM by Prakash Rivera        mild depression         Communicable Diseases (eg STDs):     Last Reviewed on 4/14/2020 01:43 PM by Prakash Rivera    Reportable health conditions; NEGATIVE         Current Problems:     Last Reviewed on 4/14/2020 01:43 PM by Prakash Rivera    Other mixed anxiety disorders    Constipation, unspecified    Chronic fatigue, unspecified    Carpal tunnel syndrome, bilateral upper limbs    Gastro-esophageal reflux disease without esophagitis    Fibromyalgia    Other retention of urine    Dyspnea, unspecified        Immunizations:     influenza, injectable, quadrivalent, preservative free 11/14/2019    Fluzone Quadrivalent (3+ years) 9/25/2018        Allergies:     Last Reviewed on 4/14/2020 01:43 PM by Prakash Rivera    Codeine Sulfate:          Current Medications:     Last Reviewed on 4/14/2020 01:43 PM by Prakash Rivera    traMADol  [PRN ]    diclofenac sodium     busPIRone 5 mg oral tablet [one tablet  TID]    omeprazole 40 mg oral capsule,delayed release (enteric coated) [take 1 capsule (40 mg) by oral route daily ]    DULoxetine 60 mg oral capsule,delayed release (enteric coated) [take 1 capsule (60 mg) by oral route once daily]    Ventolin HFA 90 mcg/actuation Inhalation HFA Aerosol Inhaler [Inhale 2 puff(s) by mouth 4 times a day as needed]    montelukast 10 mg oral tablet [take 1 tablet (10 mg) by oral route once  daily in the evening]        Assessment:         F41.3   Other mixed anxiety disorders       K21.9   Gastro-esophageal reflux disease without esophagitis       M79.7   Fibromyalgia           Plan:         Other mixed anxiety disorders- Stable.  Continue Cymbalta 60 mg daily and BuSpar 5 mg TID.    Telehealth: Verbal consent obtained for visit to occur via phone call; Total time spent was 14 minutes; 03617--Jmutjoonk E/M 11-20 minutes         Gastro-esophageal reflux disease without esophagitis- Improved reflux.  Persistent abdominal cramping and frequent constipation.  Recent scopes, both upper and lower, are reportedly unremarkable. Concern for IBS but as already established, will defer further management to GI. Continue omeprazole 40 mg daily.        Fibromyalgia- Continue to follow with rheumatology as directed.  Continue Cymbalta 60 mg daily and diclofenac as needed. Counseled patient on importance of regular physical activity.            Charge Capture:         Primary Diagnosis:     F41.3  Other mixed anxiety disorders           Orders:      73683  Phys/QHP telephone evaluation 11-20 minutes  (In-House)              K21.9  Gastro-esophageal reflux disease without esophagitis     M79.7  Fibromyalgia

## 2021-05-18 NOTE — PROGRESS NOTES
Gin Alexandra  1985     Office/Outpatient Visit    Visit Date: Tue, Dec 8, 2020 04:38 pm    Provider: Prakash Rivera MD (Assistant: Katherine Sanchez MA)    Location: [facility]        Electronically signed by Prakash Rivera MD on  02/25/2021 09:53:11 AM                             Subjective:        CC: Ms. Alexandra is a 35 year old White female.  sleeping between 15-20 hours a day since october, headache for 3 weeks; pt states she sint taking diclofenac, montelukast, and is out of phentermine         HPI:       Gin presents clinic today for complaints of fatigue.  She has baseline fatigue due to her history of fibromyalgia but she says that this is exceedingly worse.  She says that she has felt just off for the last 2 to 3 weeks.  She would sleep all day if she could.  She says that on days when she has no pressing obligations it is not abnormal for her to so sleep 15 to 20 hours.  She feels constantly tired and rundown.  She denies any fever or chills.  No recent changes in weight.  No dizziness, chest pain, shortness of breath, edema or palpitations.    ROS:     CONSTITUTIONAL:  Positive for fatigue and unintentional weight gain.   Negative for chills or fever.      EYES:  Negative for blurred vision.      CARDIOVASCULAR:  Negative for chest pain, dizziness, palpitations and edema.      RESPIRATORY:  Negative for dyspnea and cough.      GASTROINTESTINAL:  Positive for abdominal pain, acid reflux symptoms, constipation, diarrhea, heartburn and nausea.   Negative for hematochezia, melena or vomiting.      MUSCULOSKELETAL:  Positive for arthralgias and (diffuse) back pain ( chronic ).      INTEGUMENTARY/BREAST:  Negative for rash.      NEUROLOGICAL:  Positive for headaches, paresthesias and weakness.      ENDOCRINE:  Negative for hair loss, heat/cold intolerance, polydipsia, and polyphagia.      PSYCHIATRIC:  Positive for anxiety and irritability.   Negative for depression, sleep disturbance or  suicidal thoughts.          Past Medical History / Family History / Social History:         Last Reviewed on 2/25/2021 09:52 AM by Prakash Rivera    Past Medical History:             PAST MEDICAL HISTORY     Hospitalizations: Never     Pituitary tumor     Irritable Bowel Syndrome         GYNECOLOGICAL HISTORY:    No pregnancy-related problems.    Problems with menstrual cycles include irregular frequency/duration.      Menarche occurred at age 14.    Last Pap was 10/2017  [facility] (last pap normal); (+) hx of abnormal Pap ( cervical dysplasia; she has undergone LEEP )    Has never had a mammogram.      fibromyalgia    anxiety         Surgical History:         Cholecystectomy: at age age 21; details/complications?;     LEEP procedure 2012;         Family History:     Unknown         Social History:     Occupation:. Homemaker     Marital Status:      Children: 2 children and 2 step-children         Tobacco/Alcohol/Supplements:     Last Reviewed on 2/25/2021 09:52 AM by Prakash Rivera    Tobacco: She has a past history of cigarette smoking; quit date:  feb 2016.  Non-drinker     Caffeine:  She admits to consuming caffeine via soda.          Substance Abuse History:     Last Reviewed on 2/25/2021 09:52 AM by Prakash Rivera    None         Mental Health History:     Last Reviewed on 2/25/2021 09:52 AM by Prakash Rivera        mild depression         Communicable Diseases (eg STDs):     Last Reviewed on 2/25/2021 09:52 AM by Prakash Rivera    Reportable health conditions; NEGATIVE         Current Problems:     Last Reviewed on 2/25/2021 09:52 AM by Prakash Rivera    Other mixed anxiety disorders    Constipation, unspecified    Chronic fatigue, unspecified    Carpal tunnel syndrome, bilateral upper limbs    Gastro-esophageal reflux disease without esophagitis    Fibromyalgia    Other retention of urine    Dyspnea, unspecified    Pain in right hip    Pain in thoracic spine    Dysuria    Benign neoplasm of  pituitary gland    Obesity, unspecified    Headache    Encounter for screening for depression    Encounter for immunization    Other fatigue        Immunizations:     influenza, injectable, quadrivalent, preservative free 11/14/2019    Fluzone Quadrivalent (3+ years) 9/25/2018        Allergies:     Last Reviewed on 2/25/2021 09:52 AM by Prakash Rivera    Codeine Sulfate:          Current Medications:     Last Reviewed on 2/25/2021 09:52 AM by Prakash Rivera    diclofenac sodium     linaCLOtide 72 mcg oral capsule [take 1 capsule (72 mcg) ;sprinkle entire contents on small amount applesauce; take immediately by oral route once daily on an empty stomachat least 30 minutes before 1st meal of the day]    omeprazole 40 mg oral capsule,delayed release (enteric coated) [take 1 capsule (40 mg) by oral route daily ]    montelukast 10 mg oral tablet [take 1 tablet (10 mg) by oral route once daily in the evening]    Ventolin HFA 90 mcg/actuation Inhalation HFA Aerosol Inhaler [Inhale 2 puff(s) by mouth 4 times a day as needed]    traMADol 50 mg oral tablet [Take 1 tablet every 4 hours as needed for pain]    SUMAtriptan succinate 50 mg oral tablet [take 1 tablet (50 mg) by oral route after onset of migraine; may repeat after 2 hours if headache returns, not to exceed 200mg in 24hrs]    busPIRone 10 mg oral tablet [TAKE 1 TABLET(10 MG) BY MOUTH TWICE DAILY]    DULoxetine 60 mg oral capsule,delayed release (enteric coated) [TAKE 1 CAPSULE(60 MG) BY MOUTH EVERY DAY]    phentermine 37.5 mg oral capsule [TAKE 1 CAPSULE(37.5 MG) BY MOUTH EVERY DAY BEFORE BREAKFAST]        Objective:        Vitals:         Current: 12/8/2020 4:43:59 PM    Ht:  5 ft, 3 in;  Wt: 175.6 lbs;  BMI: 31.1T: 97.8 F (temporal);  BP: 119/80 mm Hg (left arm, sitting);  P: 84 bpm (left arm (BP Cuff), sitting);  sCr: 0.72 mg/dL;  GFR: 115.82        Exams:     PHYSICAL EXAM:     GENERAL: vital signs recorded - well developed, well nourished;  no apparent distress;     " EYES: conjunctiva and cornea are normal;     RESPIRATORY: Clear to auscultation bilaterally; no rales (\"crackles\") present; no rhonchi; no wheezes;     CARDIOVASCULAR: normal rate; rhythm is regular;  No murmurs. clicks, gallops or rubs appreciated; no edema;     GASTROINTESTINAL: mild diffuse tenderness;  no guarding;  no rebound tenderness;  normal bowel sounds; no organomegaly; no masses;     BREAST/INTEGUMENT: No significant rashes, lesions or suspicious moles within limits of examination;     MUSCULOSKELETAL: No tenderness to palpation of thoracic spine.;     NEUROLOGIC: Grossly intact; mental status: alert and oriented x 3;     PSYCHIATRIC: appropriate affect and demeanor; normal speech pattern; Normal behavior;         Procedures:     Encounter for immunization    1. Patient experienced no reaction.              Assessment:         R53.83   Other fatigue       Z23   Encounter for immunization           ORDERS:         Lab Orders:       01275  FFAT - [facility] CBC, CMP, TSH, B12 levels FATIGUE PANEL  (Send-Out)            69010  VITD - [facility] Vitamin D, 25 Hydroxy  (Send-Out)              Procedures Ordered:       [id number]  Immunization administration; one vaccine  (In-House)              Other Orders:       [id number]  Influenza virus vaccine, quadrivalent, split virus, preservative free 3 years of age & older  (In-House)                      Plan:         Other fatigue- Differential diagnosis of fatigue is very broad.  Basic lab work-up ordered for initial evaluation including CBC, CMP, TSH, B12 and vitamin D.  It is certainly possible that this is just a flare of her fibromyalgia but will rule out other causes first.  Continue current fibromyalgia regimen.    LABORATORY:  Labs ordered to be performed today include Female Fatigue Panel (CBC, CMP, TSH, B12) and Vitamin D.            Orders:       23965  FFAT - [facility] CBC, CMP, TSH, B12 levels FATIGUE PANEL  (Send-Out)            45435  VITD - [facility] Vitamin D, 25 Hydroxy  " (Send-Out)              Encounter for immunization- Flu shot given.           Immunizations:       08036  Immunization administration; one vaccine  (In-House)            09565  Influenza virus vaccine, quadrivalent, split virus, preservative free 3 years of age & older  (In-House)                Dose (ml): 0.5  Site: left deltoid  Route: intramuscular  Administered by: Melani Cruz          : [employer]  Lot #: [id number]  Exp: 06/30/2021          NDC: [address]-11            Charge Capture:         Primary Diagnosis:     R53.83  Other fatigue           Orders:      62223  Office/outpatient visit; established patient, level 3  (In-House)              Z23  Encounter for immunization           Orders:      01403  Immunization administration; one vaccine  (In-House)            11113  Influenza virus vaccine, quadrivalent, split virus, preservative free 3 years of age & older  (In-House)

## 2021-05-18 NOTE — PROGRESS NOTES
Gin Alexandra 1985     Office/Outpatient Visit    Visit Date: Wed, Oct 9, 2019 11:21 am    Provider: Prakash Rivera MD (Assistant: Sarah Spurling, MA)    Location: [facility]        Electronically signed by Prakash Rivera MD on  10/09/2019 05:54:24 PM                             SUBJECTIVE:        CC:     Ms. Alexandra is a 33 year old White female.  Pt is here for hand pain and other issues.;         HPI:         PHQ-9 Depression Screening: Completed form scanned and in chart; Total Score 17     Patient complains of bilateral hand and wrist pain that is been going on for approximately 2 months and has been getting progressively worse. She notes that she has significant pain with forceful , flexion and extension of both her wrist and elbows. She endorses intermittent numbness and tingling of her hands bilaterally.  She also feels as though the strength in her hands and wrists has decreased. She has no prior history of carpal tunnel or previous injury/trauma to her wrists, hands or elbows. He has been taking over-the-counter anti-inflammatories without much relief.  She is also tried wearing wrist braces without much improvement as well.  She notes that she works at [employer] and is constantly required to use repetitive flexion extension movements of her hands and elbows to keep up with her job.     Notes that for the last week she has had stiffness in her neck.  It is painful when she attempts to flex her neck or turn her head side to side.  The neck pain does not radiate down the arms.  No prior history of trauma/injury to her neck.  No known degenerative changes.  She does have numbness and tingling of her bilateral hands as noted above. She says she feels as though her neck muscles are tight.  She endorses intermittent headache.     ROS:     CONSTITUTIONAL:  Negative for chills and fever.      EYES:  Negative for blurred vision.      CARDIOVASCULAR:  Negative for chest pain, dizziness, palpitations  and edema.      RESPIRATORY:  Negative for dyspnea and cough.      GASTROINTESTINAL:  Negative for abdominal pain, diarrhea, nausea and vomiting.      MUSCULOSKELETAL:  Positive for Bilateral hand and wrist pain and Neck pain.      NEUROLOGICAL:  Positive for headaches, paresthesias and weakness.          PMH/FMH/SH:     Last Reviewed on 10/09/2019 05:53 PM by Prakash Rivera    Past Medical History:             PAST MEDICAL HISTORY     UNREMARKABLE Hospitalizations: Never         GYNECOLOGICAL HISTORY:    No pregnancy-related problems.    Problems with menstrual cycles include irregular frequency/duration.      Menarche occurred at age 14.    Last Pap was 10/2017  [facility] (last pap normal); (+) hx of abnormal Pap ( cervical dysplasia; she has undergone LEEP )    Has never had a mammogram.          Surgical History:         Cholecystectomy: at age age 21; details/complications?;      LEEP procedure 2012;         Family History:     Unknown         Social History:     Occupation:. Homemaker     Marital Status:      Children: 2 children and 2 step-children         Tobacco/Alcohol/Supplements:     Last Reviewed on 10/09/2019 05:53 PM by Prakash Rivera    Tobacco: She has a past history of cigarette smoking; quit date:  feb 2016.  Non-drinker     Caffeine:  She admits to consuming caffeine via soda.          Substance Abuse History:     Last Reviewed on 10/09/2019 05:53 PM by Prakash Rivera    None         Mental Health History:     Last Reviewed on 10/09/2019 05:53 PM by Prakash Rivera        mild depression         Communicable Diseases (eg STDs):     Last Reviewed on 10/09/2019 05:53 PM by Prakash Rivera    Reportable health conditions; NEGATIVE             Current Problems:     Last Reviewed on 10/09/2019 05:53 PM by Prakash Rivera    Constipation     Cerebral apoplexy(pituitary)     Fatigue     Anxiety with depression     Neck pain     Hand pain     Screening for depression     Bloody stool      "Abdominal pain, other specified site         Immunizations:     Fluzone Quadrivalent (3+ years) 9/25/2018         Allergies:     Last Reviewed on 10/09/2019 05:53 PM by Prakash Rivera    Codeine Sulfate:        Current Medications:     Last Reviewed on 10/09/2019 05:53 PM by Prakash Rivera    Buspirone HCl 5mg Tablet one tablet q hs prn anxiety     Citalopram Hydrobromide 40mg Tablet 1 po qd         OBJECTIVE:        Vitals:         Current: 10/9/2019 11:24:43 AM    Ht:  5 ft, 3 in;  Wt: 180.4 lbs;  BMI: 32.0    T: 98.4 F (oral);  BP: 103/63 mm Hg (right arm, sitting);  P: 74 bpm (right arm (BP Cuff), sitting);  sCr: 0.72 mg/dL;  GFR: 119.29        Exams:     PHYSICAL EXAM:     GENERAL: vital signs recorded - well developed, well nourished;  no apparent distress;     EYES: conjunctiva and cornea are normal;     NECK: range of motion is normal; Cervical paraspinal muscle spasm noted as well as periscapular muscle spasm;     RESPIRATORY: Clear to auscultation bilaterally; no rales (\"crackles\") present; no rhonchi; no wheezes;     CARDIOVASCULAR: normal rate; rhythm is regular;  No murmurs. clicks, gallops or rubs appreciated; no edema;     BREAST/INTEGUMENT: No significant rashes, lesions or suspicious moles within limits of examination;     MUSCULOSKELETAL: No tenderness to palpation of hands and wrists bilaterally; 5 out of 5 strength of movements that hand, wrist and elbow joints bilaterally; palpation over the medial epicondyles produces paresthesias of the hands; Phalen's test positive; Tinel's test negative bilaterally     NEUROLOGIC: Grossly intact; mental status: alert and oriented x 3;     PSYCHIATRIC: appropriate affect and demeanor; normal speech pattern; Normal behavior;         ASSESSMENT           729.5   M79.641   M79.642  Hand pain              DDx: - Ulnar nerve entrapment vs carpal tunnel vs cervical radiculopathy     723.1   M54.2  Neck pain - Likely 2/2 to muscle spasm              DDx:     V79.0   " Z13.89  Screening for depression - Positive screen in the setting of known depression              DDx:         ORDERS:         Meds Prescribed:       Cyclobenzaprine HCl 10mg Tablet 1 tab tid PRN  #30 (Thirty) tablet(s) Refills: 0         Radiology/Test Orders:       [id number]  Needle electromyography, one extremity with or without related paraspinal areas  (Send-Out)           Other Orders:         Depression screen positive and follow up plan documented  (In-House)                   PLAN:          Hand pain - Given the long-standing symptoms, will consider further work-up.  There are components of both ulnar and median nerve symptoms.  As such, we will order an EMG to further evaluate. Pain control, advised patient that she can use Tylenol up to 1000 mg 3 times a day and ibuprofen up to 800 mg 3 times a day.  Continue use wrist wrist brace at work as needed. Avoid activities that place direct pressure on the elbow. Letter was provided to her employer specifying that she should be placed on restricted duty avoiding activities that require repetitive flexion extension of the wrists and elbows. If symptoms persist, will consider orthopedic referral.         TESTS/PROCEDURES:  Will proceed with an EMG and nerve conduction study to be performed/scheduled now.            Orders:       [id number]  Needle electromyography, one extremity with or without related paraspinal areas  (Send-Out)            Neck pain - Control as above.  We will also start Flexeril 10 mg 3 times daily as needed for muscle spasm.           Prescriptions:       Cyclobenzaprine HCl 10mg Tablet 1 tab tid PRN  #30 (Thirty) tablet(s) Refills: 0          Screening for depression     MIPS PHQ-9 Depression Screening: Completed form scanned and in chart; Total Score 17 Positive Depression Screen: Stable on medications. No suicidal ideation.            Orders:         Depression screen positive and follow up plan documented  (In-House)                CHARGE CAPTURE           **Please note: ICD descriptions below are intended for billing purposes only and may not represent clinical diagnoses**        Primary Diagnosis:         729.5 Hand pain            M79.641    Pain in right hand           M79.642    Pain in left hand              Orders:          [id number]   Office/outpatient visit; established patient, level 4  (In-House)           723.1 Neck pain            M54.2    Cervicalgia    V79.0 Screening for depression            Z13.89    Encounter for screening for other disorder              Orders:             Depression screen positive and follow up plan documented  (In-House)

## 2021-05-18 NOTE — PROGRESS NOTES
Janae Alexandra 1985     Office/Outpatient Visit    Visit Date: Wed, Mar 6, 2019 08:54 am    Provider: Alexia Mendez N.P. (Assistant: Sarah Spurling, MA)    Location: [facility]        Electronically signed by Alexia Mendez N.P. on  03/06/2019 02:24:15 PM                             SUBJECTIVE:        CC:     Ms. Alexandra is a 33 year old White female.  This is a follow-up visit.  Right arm is going numb, and her leg. She is still very tired all the time as well. Per patient, her brain isn't working.;         HPI:         Fatigue noted.  Patient describes for the last few months.  This has been a constant problem.  Sleep quality: She has problems initiating sleep, occasionally.  She averages most night s at least 4-5 up to 9 hours of sleep per night.  Patient states that she is not depressed now, but has been in the past.  Associated symptoms include confusion, dizziness, headaches, muscle weakness, myalgia and paresthesia in the right side hand and right arm as well as right leg;  intermittent eye pain (about one time per month) severe when occurs (evaluated by Opthamology - normal)  has been feeling like a cold sensation going up the back of her neck.  She denies nausea or restless legs.  Current affective symptoms include anhedonia.  Medical history is pertinent for depression.  History is negative for hypothyroidism.  Ms. Alexandra denies significant social or occupational stressors.  Other details: janae has been coming in for the past 6 months or more regarding this fatigue - was treated with antidepressants, antianxiety medication which help with those symptoms.  She was having some sleep problems, of which she reports has been resolved.  She is here today due to the severity of her fatigue.  She is having some urinary retention at times - feeling as if she is unable to empty her bladder.  She is concerned, that due to several other family members in her family having MS, this may be what  she has going on.          Concerning anxiety with depression, she has suggestive symptoms but does not currently carry an official diagnosis of anxiety disorder.  feels like her anxiety and depression is better but her concentration is still a problem - but does not feel down/ anxious most of the time     Karina reports that she is having intermittent numbness and tingling in her right arm and right leg.  She does not have a specific period of time or body position  that this occurs  - typically lasts a few minutes; It is mostly in her lower right arm as well as her lower right leg     ROS:     CONSTITUTIONAL:  Positive for fatigue ( severe ).   Negative for chills or fever.      CARDIOVASCULAR:  Negative for chest pain and pedal edema.      RESPIRATORY:  Negative for recent cough and dyspnea.      GENITOURINARY:  Positive for urinary hesitancy.      MUSCULOSKELETAL:  Positive for aunt with MS.      NEUROLOGICAL:  Positive for headaches ( feeling like eyes hurt then HA develop ), paresthesia ( right upper extremity; right lower extremity ) and 'feeling foggy' in her brain.   Negative for dizziness or fainting.      ENDOCRINE:  Positive for temperature intolerances ( cold intolerance ).   Negative for hair loss, polydipsia or polyphagia.      PSYCHIATRIC:  Positive for casey depression ( (controlled on medication) ) and difficulty concentrating.   Negative for anxiety, crying spells, improved or suicidal thoughts.          PMH/FMH/SH:     Last Reviewed on 3/06/2019 09:07 AM by Alexia Mendez    Past Medical History:             PAST MEDICAL HISTORY     UNREMARKABLE Hospitalizations: Never         GYNECOLOGICAL HISTORY:    No pregnancy-related problems.    Problems with menstrual cycles include irregular frequency/duration.      Menarche occurred at age 14.    [facility] Pap was 10/2017  [facility] (last pap normal); (+) hx of abnormal Pap ( cervical dysplasia; she has undergone LEEP )    Has never had a  mammogram.          Surgical History:         Cholecystectomy: at age age 21; details/complications?;      LEEP procedure 2012;         Family History:     Unknown         Social History:     Occupation:. Homemaker     Marital Status:      Children: 2 children and 2 step-children         Tobacco/Alcohol/Supplements:     Last Reviewed on 3/06/2019 08:54 AM by Spurling, Sarah C    Tobacco: She has a past history of cigarette smoking; quit date:  feb 2016.  Non-drinker     Caffeine:  She admits to consuming caffeine via soda.          Substance Abuse History:     Last Reviewed on 1/18/2019 01:07 PM by Alexia Mendez    None         Mental Health History:     Last Reviewed on 1/18/2019 01:07 PM by Alexia Mendez        mild depression         Communicable Diseases (eg STDs):     Last Reviewed on 1/18/2019 01:07 PM by Alexia Mendez    Reportable health conditions; NEGATIVE             Current Problems:     Last Reviewed on 3/06/2019 09:07 AM by Alexia Mendez    Urinary hesitancy     Fatigue     Sinus headaches     Anxiety with depression     Arm pain     Screening for depression         Immunizations:     Fluzone Quadrivalent (3+ years) 9/25/2018         Allergies:     Last Reviewed on 3/06/2019 08:54 AM by Spurling, Sarah C    Codeine Sulfate:        Current Medications:     Last Reviewed on 3/06/2019 09:07 AM by Alexia Mendez    Buspirone HCl 5mg Tablet one tablet q hs prn anxiety     Citalopram Hydrobromide 40mg Tablet 1 po qd         OBJECTIVE:        Vitals:         Current: 3/6/2019 9:01:17 AM    Ht:  5 ft, 3 in;  Wt: 186 lbs;  BMI: 32.9    T: 98.5 F (oral);  BP: 108/60 mm Hg (right arm, sitting);  P: 73 bpm (right arm (BP Cuff), sitting);  sCr: 0.72 mg/dL;  GFR: 120.85        Exams:     PHYSICAL EXAM:     GENERAL: vital signs recorded - well developed, well nourished;  no apparent distress;     EYES: extraocular movements intact; PERRL;  PERRL, EOMI     E/N/T: EARS: external auditory canal  normal;  bilateral TMs are normal;  OROPHARYNX:  normal mucosa, dentition, gingiva, and posterior pharynx;     NECK: range of motion is normal;     RESPIRATORY: normal appearance and symmetric expansion of chest wall; normal respiratory rate and pattern with no distress; normal breath sounds with no rales, rhonchi, wheezes or rubs;     CARDIOVASCULAR: normal rate; rhythm is regular;  no edema;     LYMPHATIC: no enlargement of cervical or facial nodes; no supraclavicular nodes;     MUSCULOSKELETAL: normal gait; normal range of motion of all major muscle groups; no limb or joint pain with range of motion;     NEUROLOGIC: mental status: alert and oriented x 3; Reflexes: knee jerks: 2+;  Coordination/Cerebellar: negative Romberg;  negative pronator drift;     PSYCHIATRIC: appropriate affect and demeanor; normal speech pattern; normal thought and perception;         Lab/Test Results:             Iron, Serum:  92 (ug/dL) (01/18/2019),     Iron, TIBC:  316 (ug/dl) (01/18/2019),     Transferrin:  221.00 (mg/dL) (01/18/2019),     WBC count:  6.17 (K/ul) (01/18/2019),     Urine CX:  No Uropathogens Isolated. (01/18/2019),     Hematocrit:  39.8 (%) (01/18/2019),     Hemoglobin:  13.50 (gm/dl) (01/18/2019),     TSH:  2.100 (uIU/mL) (11/06/2017),     Glucose, Serum:  86 (mg/dL) (01/18/2019),     BUN:  9 (mg/dl) (01/18/2019),     Creatinine, Serum:  0.72 (mg/dl) (01/18/2019),     BUN/Creatinine Ratio:  13 (Ratio) (01/18/2019),     Glom Filt Rate, Est:  >60 (ml/min/1.73m2) (01/18/2019),     Sodium, Serum:  138 (mmol/L) (01/18/2019),     Potassium, Serum:  4.1 (mmol/L) (01/18/2019),     Chloride, Serum:  101 (mmol/L) (01/18/2019),     Carbon Dioxide, Total:  24 (mmol/L) (01/18/2019),     Osmolality, Serum:  284 (mOsm/kg) (01/18/2019),     Protein, Total:  7.3 (g/dl) (01/18/2019),     Albumin, Serum:  4.3 (g/dl) (01/18/2019),     A/G Ratio:  1.4 (RATIO) (01/18/2019),     Calcium, Total:  9.4 (mg/dl) (01/18/2019),     Alkaline  Phosphatase, Serum:  71 (U/L) (01/18/2019),     ALT (SGPT):  13 (U/L) (01/18/2019),     AST (SGOT):  13 (U/L) (01/18/2019),     Bilirubin, Total:  0.37 (mg/dL) (01/18/2019),     Anion gap:  17 (mmol/l) (01/18/2019),     Globulin, Total:  3.0 (g/dl) (01/18/2019),             ASSESSMENT           780.79   R53.83  Fatigue              DDx:     300.4   F41.3   F34.9  Anxiety with depression              DDx:     729.5   M79.601  Arm pain              DDx:     788.62   R39.19  Urinary hesitancy              DDx:         PLAN:          Fatigue discussed possibility of fibromyalgia, stress, depression as causation  - Recommended a sleep study as well.  She is concerned that she has several members of her family that have had MS and would like to have an evaluation.  I do not see much in the way of concerning symptoms today, however, we will send her for further evaluation from Neuro. Instructed that there is no definitive  test to confirm this diagnosis           Patient Education Handouts:       Fibromyalgia           Anxiety with depression continue on current medication as prescribed          Arm pain this may be some anxiety vs nerve compression from unknown source - she is again concerned for possible MS so sending for evaluation  - will have her follow up after the appt          Urinary hesitancy feeling like she is having difficulty starting stream - UA was normal at last check - sending to neuro for her concerns for MS             CHARGE CAPTURE           **Please note: ICD descriptions below are intended for billing purposes only and may not represent clinical diagnoses**        Primary Diagnosis:         780.79 Fatigue            R53.83    Other fatigue              Orders:          [id number]   Office/outpatient visit; established patient, level 3  (In-House)           300.4 Anxiety with depression            F41.3    Other mixed anxiety disorders           F34.9    Persistent mood [affective] disorder, unspecified     729.5 Arm pain            M79.601    Pain in right arm    788.62 Urinary hesitancy            R39.19    Other difficulties with micturition

## 2021-05-18 NOTE — PROGRESS NOTES
Gin Alexandra  1985     Office/Outpatient Visit    Visit Date: Fri, Sep 11, 2020 03:18 pm    Provider: Prakash Rivera MD (Assistant: Clara Tafoya MA)    Location: [facility]        Electronically signed by Prakash Rviera MD on  09/15/2020 06:08:33 PM                             Subjective:        CC: Ms. Alexandra is a 34 year old White female.  This is a follow-up visit.  one month;         HPI:           PHQ-9 Depression Screening: Completed form scanned and in chart; Total Score 14       Gin presents clinic today for follow-up of several complaints.         First, she endorses intermittent but regular headaches.  She describes these headaches as holo-cranial and alternating between a dull ache and throbbing sensation.  She denies blurry vision or other neurologic complaints in association.  She has tried over-the-counter medications as well as Tramadol and diclofenac without improvement.        Second, at recent visit, Gin was started on phentermine to help with weight loss.  She says this is somewhat decreased her appetite but she has not noticed too drastic of a change.  She denies any side effects related to the medication.  She would like to increase the dose if possible.        Third, Gin reports that her constipation is stable.  She continues to have intermittent symptoms and uses MiraLAX daily.  She was previously on Linzess but this turned out to be too expensive.  She follows with GI regularly.  She has had a recent work-up including EGD and colonoscopy that was unremarkable.  She also says that her GERD is stable.  Current regimen includes omeprazole 40 mg daily.  She denies dysphasia, melena or hematochezia.        Fourth, Gin has a prior history of benign neoplasm of the pituitary.  She had previously followed with endocrinology with this but does not have a good relationship with that provider and requests referral to a different provider today.  She cannot  "remember when her last MRI was.        Fifth, Gin has continued to have suprapubic pain and frequent dysuria.  Recent UA was unremarkable.  She denies flank pain, fever or chills.  No history of kidney stones.  She had previously seen a urologist but has not been back to 1 \"in a while.\"        Finally, Gin has a longstanding history of fatigue and fibromyalgia currently, her symptoms are stable.  Her regimen includes diclofenac daily, Cymbalta and tramadol 50 mg sparingly as needed.  She has been evaluated by rheumatology in the past and work-up was largely unremarkable.  She did not find gabapentin helpful    ROS:     CONSTITUTIONAL:  Positive for fatigue and unintentional weight gain.   Negative for chills or fever.      EYES:  Negative for blurred vision.      CARDIOVASCULAR:  Negative for chest pain, dizziness, palpitations and edema.      RESPIRATORY:  Negative for dyspnea and cough.      GASTROINTESTINAL:  Positive for abdominal pain, acid reflux symptoms, constipation, diarrhea, heartburn and nausea.   Negative for hematochezia, melena or vomiting.      GENITOURINARY:  Positive for dysuria.   Negative for hematuria.      MUSCULOSKELETAL:  Positive for arthralgias and (diffuse) back pain ( chronic ).      INTEGUMENTARY/BREAST:  Negative for rash.      NEUROLOGICAL:  Positive for headaches, paresthesias and weakness.      ENDOCRINE:  Negative for hair loss, heat/cold intolerance, polydipsia, and polyphagia.      PSYCHIATRIC:  Positive for anxiety and irritability.   Negative for depression, sleep disturbance or suicidal thoughts.          Past Medical History / Family History / Social History:         Last Reviewed on 9/15/2020 06:08 PM by Prakash Rivera    Past Medical History:             PAST MEDICAL HISTORY     Hospitalizations: Never     Pituitary tumor     Irritable Bowel Syndrome         GYNECOLOGICAL HISTORY:    No pregnancy-related problems.    Problems with menstrual cycles include irregular " frequency/duration.      Menarche occurred at age 14.    Last Pap was 10/2017  [facility] (last pap normal); (+) hx of abnormal Pap ( cervical dysplasia; she has undergone LEEP )    Has never had a mammogram.      fibromyalgia    anxiety         Surgical History:         Cholecystectomy: at age age 21; details/complications?;     LEEP procedure 2012;         Family History:     Unknown         Social History:     Occupation:. Homemaker     Marital Status:      Children: 2 children and 2 step-children         Tobacco/Alcohol/Supplements:     Last Reviewed on 9/15/2020 06:08 PM by Prakash Rivera    Tobacco: She has a past history of cigarette smoking; quit date:  feb 2016.  Non-drinker     Caffeine:  She admits to consuming caffeine via soda.          Substance Abuse History:     Last Reviewed on 9/15/2020 06:08 PM by Prakash Rivera    None         Mental Health History:     Last Reviewed on 9/15/2020 06:08 PM by Prakash Rivera        mild depression         Communicable Diseases (eg STDs):     Last Reviewed on 9/15/2020 06:08 PM by Prakash Rivera    Reportable health conditions; NEGATIVE         Current Problems:     Last Reviewed on 9/15/2020 06:08 PM by Prakash Rivera    Other mixed anxiety disorders    Constipation, unspecified    Chronic fatigue, unspecified    Carpal tunnel syndrome, bilateral upper limbs    Gastro-esophageal reflux disease without esophagitis    Fibromyalgia    Other retention of urine    Dyspnea, unspecified    Pain in right hip    Pain in thoracic spine    Dysuria    Benign neoplasm of pituitary gland    Obesity, unspecified    Headache    Encounter for screening for depression        Immunizations:     influenza, injectable, quadrivalent, preservative free 11/14/2019    Fluzone Quadrivalent (3+ years) 9/25/2018        Allergies:     Last Reviewed on 9/15/2020 06:08 PM by Prakash Rivera    Codeine Sulfate:          Current Medications:     Last Reviewed on 9/15/2020 06:08 PM  "by Prakash Rivera    diclofenac sodium     linaCLOtide 72 mcg oral capsule [take 1 capsule (72 mcg) ;sprinkle entire contents on small amount applesauce; take immediately by oral route once daily on an empty stomachat least 30 minutes before 1st meal of the day]    busPIRone 10 mg oral tablet [take 1 tablet (10 mg) by oral route 2 times per day]    omeprazole 40 mg oral capsule,delayed release (enteric coated) [take 1 capsule (40 mg) by oral route daily ]    DULoxetine 60 mg oral capsule,delayed release (enteric coated) [TAKE 1 CAPSULE(60 MG) BY MOUTH EVERY DAY]    montelukast 10 mg oral tablet [take 1 tablet (10 mg) by oral route once daily in the evening]    Ventolin HFA 90 mcg/actuation Inhalation HFA Aerosol Inhaler [Inhale 2 puff(s) by mouth 4 times a day as needed]    traMADol 50 mg oral tablet [Take 1 tablet every 4 hours as needed for pain]    phentermine 15 mg oral capsule [take 1 capsule (15 mg) by oral route once daily before breakfast]        Objective:        Vitals:         Current: 9/11/2020 3:22:17 PM    Ht:  5 ft, 3 in;  Wt: 184 lbs;  BMI: 32.6T: 97.3 F (oral);  BP: 112/60 mm Hg (left arm, sitting);  P: 86 bpm (left arm (BP Cuff), sitting);  sCr: 0.72 mg/dL;  GFR: 119.22        Exams:     PHYSICAL EXAM:     GENERAL: vital signs recorded - well developed, well nourished;  no apparent distress;     EYES: conjunctiva and cornea are normal;     RESPIRATORY: Clear to auscultation bilaterally; no rales (\"crackles\") present; no rhonchi; no wheezes;     CARDIOVASCULAR: normal rate; rhythm is regular;  No murmurs. clicks, gallops or rubs appreciated; no edema;     GASTROINTESTINAL: mild diffuse tenderness;  no guarding;  no rebound tenderness;  normal bowel sounds; no organomegaly; no masses;     BREAST/INTEGUMENT: No significant rashes, lesions or suspicious moles within limits of examination;     MUSCULOSKELETAL: No tenderness to palpation of thoracic spine.;     NEUROLOGIC: Grossly intact; mental status: " alert and oriented x 3;     PSYCHIATRIC: appropriate affect and demeanor; normal speech pattern; Normal behavior;         Lab/Test Results:         Urine temperature: confirmed (09/11/2020),     All urine drug screen levels confirmed negative: yes (09/11/2020),     Date and time of last pill: tramadol 2-3 days ago, phentermine 9- at 0800/kh (09/11/2020),     Performed by: tls (09/11/2020),     Collection Time: 1622 (09/11/2020),             Assessment:         R51   Headache       E66.9   Obesity, unspecified       K59.00   Constipation, unspecified       K21.9   Gastro-esophageal reflux disease without esophagitis       R53.82   Chronic fatigue, unspecified       M79.7   Fibromyalgia       R30.0   Dysuria       D35.2   Benign neoplasm of pituitary gland       Z13.31   Encounter for screening for depression           ORDERS:         Meds Prescribed:       [Refilled] phentermine 30 mg oral capsule [take 1 capsule (30 mg) by oral route once daily before breakfast], #30 (thirty) capsules, Refills: 0 (zero)       [New Rx] SUMAtriptan succinate 50 mg oral tablet [take 1 tablet (50 mg) by oral route after onset of migraine; may repeat after 2 hours if headache returns, not to exceed 200mg in 24hrs], #9 (nine) tablets, Refills: 0 (zero)         Lab Orders:       [id number]  Drug test prsmv qual dir optical obs per day  (In-House)              Procedures Ordered:       REFER  Referral to Specialist or Other Facility  (Send-Out)            REFER  Referral to Specialist or Other Facility  (Send-Out)              Other Orders:         Depression screen positive and follow up plan documented  (In-House)                      Plan:         Headache- Migraine versus tension.  Will start Imitrex.  If no improvement, will consider neurology referral          Prescriptions:       [New Rx] SUMAtriptan succinate 50 mg oral tablet [take 1 tablet (50 mg) by oral route after onset of migraine; may repeat after 2 hours if headache  returns, not to exceed 200mg in 24hrs], #9 (nine) tablets, Refills: 0 (zero)         Obesity, unspecified- Will increase phentermine to 30 mg daily.  Patient advised on risks versus benefits of therapy.  She was also advised that duration of therapy will be for the shortest possible, preferably 3 months. UDS ordered today.    LABORATORY:  Labs ordered to be performed today include Drug screen.  Controlled substance documentation: Earl reviewed; drug screen performed and appropriate; consent is reviewed and signed and on the chart.  She is aware of risk of addiction on this medication, understands that she will need to follow up for a review every 3 months and her medications will be adjusted or decreased as deemed appropriate at each visit.  No history of drug or alcohol abuse.  No concerns about diversion or abuse. She denies side effects related to the medication.  She is aware that she may be called in for pill counts.  The dosing of this medication will be reviewed on a regular basis and reduced if possible..  Ongoing use of a controlled substance is necessary for this patient to have a normal quality of life           Prescriptions:       [Refilled] phentermine 30 mg oral capsule [take 1 capsule (30 mg) by oral route once daily before breakfast], #30 (thirty) capsules, Refills: 0 (zero)           Orders:       [id number]  Drug test prsmv qual dir optical obs per day  (In-House)              Constipation, unspecified- Stable but persistent.  Continue MiraLAX daily. Continue omeprazole 40 mg daily.  Continue to follow with GI as directed.        Gastro-esophageal reflux disease without esophagitis- see above        Chronic fatigue, unspecified- Stable but persistent.  Continue diclofenac, Cymbalta 60 mg daily and tramadol 50 mg daily sparingly as needed.         Fibromyalgia- See above        Dysuria- Unclear etiology.  Concern for interstitial cystitis.  Urology referral placed        REFERRALS:  Referral initiated  to a urologist.            Orders:       REFER  Referral to Specialist or Other Facility  (Send-Out)              Benign neoplasm of pituitary gland- Benign. Will refer to a new endocrinology provider at patient request.        REFERRALS:  Referral initiated to an endocrinologist.            Orders:       REFER  Referral to Specialist or Other Facility  (Send-Out)              Encounter for screening for depression    MIPS PHQ-9 Depression Screening: Completed form scanned and in chart; Total Score 14 Positive Depression Screen: Stable on medications. No suicidal ideation.            Orders:         Depression screen positive and follow up plan documented  (In-House)                  Charge Capture:         Primary Diagnosis:     R51  Headache           Orders:      [id number]  Office/outpatient visit; established patient, level 4  (In-House)              E66.9  Obesity, unspecified           Orders:      [id number]  Drug test prsmv qual dir optical obs per day  (In-House)              K59.00  Constipation, unspecified     K21.9  Gastro-esophageal reflux disease without esophagitis     R53.82  Chronic fatigue, unspecified     M79.7  Fibromyalgia     R30.0  Dysuria     D35.2  Benign neoplasm of pituitary gland     Z13.31  Encounter for screening for depression           Orders:        Depression screen positive and follow up plan documented  (In-House)

## 2021-05-18 NOTE — PROGRESS NOTES
Gin Alexandra 1985     Office/Outpatient Visit    Visit Date: Fri, Sep 20, 2019 03:05 pm    Provider: Alexia Mendez N.P. (Assistant: Katherine Sanchez MA)    Location: [facility]        Electronically signed by Alexia Mendez N.P. on  09/20/2019 04:56:12 PM                             SUBJECTIVE:        CC:     Ms. Alexandra is a 33 year old White female.  blood in stool, lower back and abdominal pain after she eats;         HPI:         Abdominal pain, other specified site noted.  This is located primarily in the left lower quadrant and right lower quadrant.  There is some radiation to the back.  It began 2 weeks ago.  The onset of pain occurred pain happened after eating - BM only one time per week and has been having  blood in stool - and noticed some clots mixed in wiht her stool.  She characterizes it as aching and cramping.  It is of mild intensity.  She estimates that the frequency of pain is first episode of blood, abdominal pain has been off and on for months (maybe even years).  The typical duration is the majority of the day.  Associated symptoms include change in bowel habits and constipation.  She denies anorexia, melena or diarrhea.          Dx with hand pain; today's visit is for evaluation of bilateral.  It is fairly generalized.  The pain radiates to the elbow.  She describes it as burning.  The initial onset of discomfort was months ago.  Other details: worse at night- wakes her up.      ROS:     CONSTITUTIONAL:  Negative for chills, fatigue, fever, and weight change.      EYES:  Negative for blurred vision.      CARDIOVASCULAR:  Negative for chest pain, orthopnea, paroxysmal nocturnal dyspnea and pedal edema.      RESPIRATORY:  Negative for dyspnea.      GASTROINTESTINAL:  Positive for abdominal pain and hematochezia.   Negative for constipation, diarrhea, melena, nausea or vomiting.      MUSCULOSKELETAL:  Positive for back pain.      NEUROLOGICAL:  Negative for dizziness,  headaches, paresthesias, and weakness.      PSYCHIATRIC:  Negative for anxiety, depression, and sleep disturbances.          PMH/FMH/SH:     Last Reviewed on 3/06/2019 09:07 AM by Alexia Mendez    Past Medical History:             PAST MEDICAL HISTORY     UNREMARKABLE Hospitalizations: Never         GYNECOLOGICAL HISTORY:    No pregnancy-related problems.    Problems with menstrual cycles include irregular frequency/duration.      Menarche occurred at age 14.    [facility] (+) hx of abnormal Pap ( cervical dysplasia; she has undergone LEEP )    Has never had a mammogram.          Surgical History:         Cholecystectomy: at age age 21; details/complications?;      LEEP procedure 2012;         Family History:     Unknown         Social History:     Occupation:. Homemaker     Marital Status:      Children: 2 children and 2 step-children         Tobacco/Alcohol/Supplements:     Last Reviewed on 9/20/2019 03:06 PM by Katherine Sanchez    Tobacco: She has a past history of cigarette smoking; quit date:  feb 2016.  Non-drinker     Caffeine:  She admits to consuming caffeine via soda.          Substance Abuse History:     Last Reviewed on 1/18/2019 01:07 PM by Alexia Mendez    None         Mental Health History:     Last Reviewed on 1/18/2019 01:07 PM by Alexia Mendez        mild depression         Communicable Diseases (eg STDs):     Last Reviewed on 1/18/2019 01:07 PM by Alexia Mendez    Reportable health conditions; NEGATIVE             Current Problems:     Last Reviewed on 6/25/2019 12:35 PM by Alexia Mendez    Constipation     Cerebral apoplexy(pituitary)     Fatigue     Anxiety with depression     Bloody stool     Hand pain     Abdominal pain, other specified site     Screening for depression         Immunizations:     Fluzone Quadrivalent (3+ years) 9/25/2018         Allergies:     Last Reviewed on 9/20/2019 03:06 PM by Katherine Sanchez     Codeine Sulfate:        Current Medications:     Last Reviewed on 9/20/2019 03:06 PM by Katherine Sanchez    Buspirone HCl 5mg Tablet one tablet q hs prn anxiety     Citalopram Hydrobromide 40mg Tablet 1 po qd         OBJECTIVE:        Vitals:         Historical:     06/25/2019  Wt:   189.2lbs        Current: 9/20/2019 3:18:49 PM    Ht:  5 ft, 3 in;  Wt: 179.2 lbs;  BMI: 31.7    T: 99.1 F (oral);  BP: 111/56 mm Hg (left arm, sitting);  P: 76 bpm (left arm (BP Cuff), sitting);  sCr: 0.72 mg/dL;  GFR: 118.95        Exams:     PHYSICAL EXAM:     GENERAL: vital signs recorded - well developed, well nourished;  no apparent distress;     NECK: range of motion is normal; thyroid is non-palpable;     RESPIRATORY: normal respiratory rate and pattern with no distress; normal breath sounds with no rales, rhonchi, wheezes or rubs;     CARDIOVASCULAR: normal rate; rhythm is regular;  no systolic murmur; no edema;     GASTROINTESTINAL: moderate LLQ and RLQ pain;  hypoactive bowel sounds;     MUSCULOSKELETAL: positive phalen sign     NEUROLOGICAL:  cranial nerves, motor and sensory function, reflexes, gait and coordination are all intact;     PSYCHIATRIC:  appropriate affect and demeanor; normal speech pattern; grossly normal memory;         Lab/Test Results:             WBC count:  5.93 (K/ul) (09/20/2019),     RBC count:  4.22 (M/ul) (09/20/2019),     Hemoglobin:  12.60 (gm/dl) (09/20/2019),     Hematocrit:  37.6 (%) (09/20/2019),     MCV:  89.1 (fl) (09/20/2019),     MCH:  29.9 (pcg) (09/20/2019),     MCHC:  33.5 (g/dL) (09/20/2019),     RDW:  12.0 (%) (09/20/2019),     Platelets:  239.00 (K/ul) (09/20/2019),     MPV:  9.7 (fl) (09/20/2019),             ASSESSMENT:           789.09   R10.814   R10.813  Abdominal pain, other specified site              DDx:     729.5   M79.641   M79.642  Hand pain              DDx:     578.1   K92.2  Bloody stool              DDx:     564.01   K59.00  Constipation              DDx:         ORDERS:          Lab Orders:       [id number]  BDCB2 - [facility] CBC w/o diff  (Send-Out; Stat)           Other Orders:       [id number]  CT Abdomen and Pelvis with IV Contrast; prefer oral prep  (Send-Out)                   PLAN:          Abdominal pain, other specified site will check labs, if elevation in WBC, will start antibioitics.  Insturcted that if the bleeding worsens over the weekend, will need to go to ER;  CT if persisist and may need a referral for colonoscopy     LABORATORY:  Labs ordered to be performed today include CBC W/O DIFF.            Orders:       [id number]  BDCB2 - [facility] CBC w/o diff  (Send-Out; Stat)            Hand pain recommend that she wear night time wrist brace to help with symptoms.  If persists, will need to send for EMG/NCS to evaluation for possible carpal tunnel          Bloody stool will send for CT abdomen and pelvis - r/o colitis - may need referral to GI/general surgery for colonscopy         FOLLOW-UP TESTING #1:    RADIOLOGY:  I have ordered Test to be ordered CT of CT abdomen and pelvis with contrast; oral prep to be done today.            Orders:       [id number]  CT Abdomen and Pelvis with IV Contrast; prefer oral prep  (Send-Out)            Constipation High fiber diet - recommend Miralax daily to twice daily and regulate based on BM - the blood in stool may be due to her chronic constipation - ?hemorrhoids             CHARGE CAPTURE:           Primary Diagnosis:     789.09 Abdominal pain, other specified site            R10.814    Left lower quadrant abdominal tenderness           R10.813    Right lower quadrant abdominal tenderness              Orders:          [id number]   Office/outpatient visit; established patient, level 4  (In-House)           729.5 Hand pain            M79.641    Pain in right hand           M79.642    Pain in left hand    578.1 Bloody stool            K92.2    Gastrointestinal hemorrhage, unspecified    564.01 Constipation            K59.00    Constipation, unspecified

## 2021-05-18 NOTE — PROGRESS NOTES
Gin Alexandra 1985     Office/Outpatient Visit    Visit Date: Mon, Oct 1, 2018 10:51 am    Provider: Alexia Mendez N.P. (Assistant: Sarah Spurling, MA)    Location: [facility]        Electronically signed by Alexia Mendez N.P. on  10/02/2018 09:35:58 AM                             SUBJECTIVE:        CC:     Ms. Alexandra is a 32 year old White female.  This is a follow-up visit.  Seen at ER ([facility]) 2 weeks ago, and is here for depression, urinary hesitancy;         HPI:         Patient presents with anxiety with depression.  This is a routine follow-up.  The diagnosis of depression was made 10 to 12 months ago.  Presently, Ms. Aelxandra admits to fleeting thoughts of suicide ( she denies a suicide plan and is able to contract with me ).  takes about noon - not as efefective as when in the beginning.  she feels that much is from life stressors of 4 children and having a job now             Concerning dizziness, this first began approximately 2 weeks ago.  Was recently in ER and disgnosed with bilateral ruptured TM - given meclizine  - helps but the dizziness is still  there - currently taking loratadine     started about 2015  after having son appt with gyno sometime next month.  Has discussed with them, but no follow up - no complaints of trauma during pregnancy.  Denies any dysuria, positive for dyspareunia     ROS:     CONSTITUTIONAL:  Negative for chills, fatigue and fever.      CARDIOVASCULAR:  Negative for chest pain and pedal edema.      RESPIRATORY:  Negative for recent cough and dyspnea.      GENITOURINARY:  Positive for dyspareunia, frequent UTI's and difficulty starting urine stream.      MUSCULOSKELETAL:  Negative for arthralgias and myalgias.      INTEGUMENTARY/BREAST:  Negative for pruritis and rash.      NEUROLOGICAL:  Negative for dizziness, fainting, headaches and paresthesias.      ENDOCRINE:  Negative for hair loss, polydipsia and polyphagia.      ALLERGIC/IMMUNOLOGIC:  Negative  for seasonal allergies.      PSYCHIATRIC:  Positive for crying spells, depression, feelings of stress, insomnia; difficulty falling asleep and staying asleep and suicidal thoughts ( fleeting thoughts, no plan ).   Negative for anxiety.          PMH/FMH/SH:     Last Reviewed on 8/24/2018 10:20 AM by Sandy Garcia    Past Medical History:             PAST MEDICAL HISTORY     UNREMARKABLE Hospitalizations: Never         GYNECOLOGICAL HISTORY:    No pregnancy-related problems.    Problems with menstrual cycles include irregular frequency/duration.      Menarche occurred at age 14.    Last Pap was 10/2017  [facility] (last pap normal); (+) hx of abnormal Pap ( cervical dysplasia; she has undergone LEEP )    Has never had a mammogram.          Surgical History:         Cholecystectomy: at age age 21; details/complications?;      LEEP procedure 2012;         Family History:     Unknown         Social History:     Occupation: [employer]     Marital Status:      Children: 2 children and 2 step-children         Tobacco/Alcohol/Supplements:     Last Reviewed on 10/01/2018 10:51 AM by Spurling, Sarah C    Tobacco: She has a past history of cigarette smoking; quit date:  feb 2016.  Non-drinker     Caffeine:  She admits to consuming caffeine via soda.          Substance Abuse History:     Last Reviewed on 8/24/2018 10:20 AM by Sandy Garcia    None         Mental Health History:     Last Reviewed on 8/24/2018 10:20 AM by Sandy Garcia        mild depression         Communicable Diseases (eg STDs):     Last Reviewed on 8/24/2018 10:20 AM by Sandy Garcia            Current Problems:     Last Reviewed on 8/24/2018 10:20 AM by Sandy Garcia    Urinary hesitancy     Dizziness     Sinus headaches     Anxiety with depression     Fatigue         Immunizations:     None        Allergies:     Last Reviewed on 10/01/2018 10:51 AM by Spurling, Sarah C    Codeine Sulfate:        Current  Medications:     Last Reviewed on 10/01/2018 10:55 AM by Spurling, Sarah C    Fluticasone Propionate 50mcg/1actuation Nasal Spray 1 spray each nostil daily     Celexa 40mg Tablet 1 tab daily     Meclizine HCl 12.5mg Tablet 1 tab 2x a day     Naproxen 250mg Tablet 1 tablet 1 or 2 times a day         OBJECTIVE:        Vitals:         Current: 10/1/2018 10:57:18 AM    Ht:  5 ft, 3 in;  Wt: 174 lbs;  BMI: 30.8    BP: 120/65 mm Hg (left arm, sitting);  P: 70 bpm (left arm (BP Cuff), sitting);  sCr: 0.58 mg/dL;  GFR: 147.14        Exams:     PHYSICAL EXAM:     GENERAL: vital signs recorded - well developed, well nourished;  no apparent distress;     E/N/T: EARS: external auditory canal normal;  both TMs are scarred;  NOSE:  normal nasal mucosa, septum, turbinates, and sinuses; OROPHARYNX: posterior pharynx shows cobblestone appearance and drainage present;     NECK: range of motion is normal;     RESPIRATORY: normal appearance and symmetric expansion of chest wall; normal respiratory rate and pattern with no distress; normal breath sounds with no rales, rhonchi, wheezes or rubs;     CARDIOVASCULAR: normal rate; rhythm is regular;  no edema;     LYMPHATIC: no enlargement of cervical or facial nodes; no supraclavicular nodes;     MUSCULOSKELETAL: normal gait; normal range of motion of all major muscle groups; no limb or joint pain with range of motion;     NEUROLOGIC: mental status: alert and oriented x 3; GROSSLY INTACT     PSYCHIATRIC: affect/demeanor: depressed, flat;  normal speech pattern; normal thought and perception;         Lab/Test Results:             Glucose, Urine:  Neg (10/01/2018),     Bilirubin, urine:  Negative (10/01/2018),     Ketones, Urine Strip:  Negative (10/01/2018),     Specific Gravity, urine:  1.030 (10/01/2018),     Blood in Urine:  negative (10/01/2018),     pH, urine:  5.5 (10/01/2018),     Protein Urine QL:  negative (10/01/2018),     Urobilinogen, urine:  1.0 E.U./dL (10/01/2018),      Nitrite, Urine:  Negative (10/01/2018),     Leukoctyes, urine:  Negative (10/01/2018),     Appearance:  Clear (10/01/2018),     collection source:  Clean-catch (10/01/2018),     Color:  Yellow (10/01/2018),     Performed by::  SCS (10/01/2018),             ASSESSMENT           300.4   F41.3   F34.9  Anxiety with depression              DDx:     780.4   R42  Dizziness              DDx:     788.64   R39.11  Urinary hesitancy              DDx:         ORDERS:         Meds Prescribed:       Refill of: Celexa (Citalopram Hydrobromide) 40mg Tablet 1bid  #60 (Sixty) tablet(s) Refills: 2         Lab Orders:       [id number]  [facility] Urine Culture  (Send-Out)         [id number]  Urinalysis, automated, without microscopy  (In-House)                   PLAN:          Anxiety with depression will increase the celexa to BID - we may determine that she needs an alternative or additional medication - possible referral to [facility]  follow up 4 weeks           Prescriptions:       Refill of: Celexa (Citalopram Hydrobromide) 40mg Tablet 1bid  #60 (Sixty) tablet(s) Refills: 2          Dizziness continue allergy medication           Urinary hesitancy Recommend that she discuss this with GYN - she may need referral to Urologist     LABORATORY:  Labs ordered to be performed today include Urine culture and UA dip in office no micro.            Orders:       [id number]  [facility] Urine Culture  (Send-Out)         87625  Urinalysis, automated, without microscopy  (In-House)               CHARGE CAPTURE           **Please note: ICD descriptions below are intended for billing purposes only and may not represent clinical diagnoses**        Primary Diagnosis:         300.4 Anxiety with depression            F41.3    Other mixed anxiety disorders           F34.9    Persistent mood [affective] disorder, unspecified              Orders:          [id number]   Office/outpatient visit; established patient, level 4  (In-House)           780.4 Dizziness            R42     Dizziness and giddiness    788.64 Urinary hesitancy            R39.11    Hesitancy of micturition              Orders:          [id number]   Urinalysis, automated, without microscopy  (In-House)

## 2021-05-18 NOTE — PROGRESS NOTES
Gin Alexandra  1985     Office/Outpatient Visit    Visit Date: Tue, Apr 28, 2020 02:07 pm    Provider: Prakash Rivera MD (Assistant: Katherine Sanchez MA)    Location: [facility]        Electronically signed by Prakash Rivera MD on  04/28/2020 03:41:10 PM                             Subjective:        CC: Ms. Alexandra is a 34 year old White female.  pain in back, right hip/leg; [phone] .    .    TELEMEDICINE VISIT:    - Patient consented to this telemedicine visit. Consent obtained by Katherine Sanchez MA and Prakash Rivera MD.    - Persons present during the telemedicine consultation include:  Patient, Dr. Rivera    - This visit is being conducted via [employer]  with audio and video.        HPI:       Patient is being evaluated today for complaints of right hip and leg pain.  She says she has had issues with her hip for several years.  However, over the last several days her pain has acutely worsened.  The pain is fairly constant and radiates towards her right groin and thigh. She denies weakness.  She does endorse paresthesias of all 4 extremities but this is been present for several years as well.  She denies any recent trauma or known prior history of trauma or injury to the joint.  No swelling or bruising.  Of note, patient has fibromyalgia and was recently evaluated by rheumatology.  During the work-up by rheumatologist, hip x-ray was performed which showed early osteoarthritis of the right hip.  Her current fibromyalgia regimen includes diclofenac and Cymbalta.  She notes that she has been out of her Cymbalta for approximately 1 week due to issues with her mail order pharmacy not getting her the medication on time.  Additionally, she notes that her orthopedic surgeon has recently given her gabapentin for continued pain in her wrist status post carpal tunnel release.  She also notes that he has given her tramadol in the past which she has left over and has been using sparingly for  uncontrolled pain. She says her fibromyalgia pain, which is diffuse in all 4 extremities, seems to have flared and she says that this is related in time to when she ran out of her Cymbalta supply.    ROS:     CONSTITUTIONAL:  Positive for fatigue.   Negative for chills or fever.      CARDIOVASCULAR:  Negative for chest pain, dizziness, palpitations and edema.      RESPIRATORY:  Negative for dyspnea and cough.      GASTROINTESTINAL:  Positive for acid reflux symptoms, heartburn and nausea.   Negative for abdominal pain, diarrhea or vomiting.      MUSCULOSKELETAL:  Positive for arthralgias, (diffuse) back pain ( chronic ) and right hip pain.      INTEGUMENTARY/BREAST:  Negative for rash.      NEUROLOGICAL:  Positive for headaches, paresthesias and weakness.      ENDOCRINE:  Negative for hair loss, heat/cold intolerance, polydipsia, and polyphagia.      PSYCHIATRIC:  Positive for anxiety.   Negative for depression, sleep disturbance or suicidal thoughts.          Past Medical History / Family History / Social History:         Last Reviewed on 4/28/2020 03:40 PM by Prakash Rivera    Past Medical History:             PAST MEDICAL HISTORY     UNREMARKABLE Hospitalizations: Never         GYNECOLOGICAL HISTORY:    No pregnancy-related problems.    Problems with menstrual cycles include irregular frequency/duration.      Menarche occurred at age 14.    Last Pap was 10/2017  [facility] (last pap normal); (+) hx of abnormal Pap ( cervical dysplasia; she has undergone LEEP )    Has never had a mammogram.          Surgical History:         Cholecystectomy: at age age 21; details/complications?;     LEEP procedure 2012;         Family History:     Unknown         Social History:     Occupation:. Homemaker     Marital Status:      Children: 2 children and 2 step-children         Tobacco/Alcohol/Supplements:     Last Reviewed on 4/28/2020 03:40 PM by Prakash Rivera    Tobacco: She has a past history of  cigarette smoking; quit date:  feb 2016.  Non-drinker     Caffeine:  She admits to consuming caffeine via soda.          Substance Abuse History:     Last Reviewed on 4/28/2020 03:40 PM by Prakash Rivera    None         Mental Health History:     Last Reviewed on 4/28/2020 03:40 PM by Prakash Rivera        mild depression         Communicable Diseases (eg STDs):     Last Reviewed on 4/28/2020 03:40 PM by Prakash Rivera    Reportable health conditions; NEGATIVE         Current Problems:     Last Reviewed on 4/28/2020 03:40 PM by Prakash Rivera    Other mixed anxiety disorders    Constipation, unspecified    Chronic fatigue, unspecified    Carpal tunnel syndrome, bilateral upper limbs    Gastro-esophageal reflux disease without esophagitis    Other retention of urine    Fibromyalgia    Dyspnea, unspecified    Pain in right hip        Immunizations:     influenza, injectable, quadrivalent, preservative free 11/14/2019    Fluzone Quadrivalent (3+ years) 9/25/2018        Allergies:     Last Reviewed on 4/28/2020 03:40 PM by Prakash Rivera    Codeine Sulfate:          Current Medications:     Last Reviewed on 4/28/2020 03:40 PM by Prakash Rivera    traMADol  [PRN ]    diclofenac sodium     busPIRone 5 mg oral tablet [one tablet  TID]    omeprazole 40 mg oral capsule,delayed release (enteric coated) [take 1 capsule (40 mg) by oral route daily ]    DULoxetine 60 mg oral capsule,delayed release (enteric coated) [take 1 capsule (60 mg) by oral route once daily]    montelukast 10 mg oral tablet [take 1 tablet (10 mg) by oral route once daily in the evening]    Ventolin HFA 90 mcg/actuation Inhalation HFA Aerosol Inhaler [Inhale 2 puff(s) by mouth 4 times a day as needed]    gabapentin 300 mg oral capsule [take 1 capsule (300 mg) by oral route 3 times per day]    methylPREDNISolone 4 mg oral tablet [as directed ]        Objective:        Exams:     PHYSICAL EXAM:     GENERAL: vital signs recorded - well developed, well nourished;  no  apparent distress;     EYES: conjunctiva and cornea are normal;     RESPIRATORY: normal respiratory rate and pattern with no distress;     BREAST/INTEGUMENT: No significant rashes, lesions or suspicious moles within limits of examination/encounter;     NEUROLOGIC: Grossly intact; mental status: alert and oriented x 3;     PSYCHIATRIC: appropriate affect and demeanor; normal speech pattern; Normal behavior;         Assessment:         M25.551   Pain in right hip       M79.7   Fibromyalgia           ORDERS:         Radiology/Test Orders:       [id number]  Right MRI, any joint of lower extremity w/o contrast  (Send-Out)                      Plan:         Pain in right hip- Differential diagnosis includes osteoarthritis versus impingement versus labral tear versus dislocation versus fracture.  Most likely explanation is an acute flare of osteoarthritis exacerbated by fibromyalgia pain.  Previous x-ray shows osteoarthritic changes but given persistence of symptoms will order an MRI.  Patient is already established with an orthopedic surgeon for carpal tunnel and has endorsed that she will call him to see if he can evaluate her hip pain as well.  Pain control as per fibromyalgia regimen below.        RADIOLOGY:  I have ordered Hip MRI Right HIP MRI w/o contrast to be done today.            Orders:       [id number]  Right MRI, any joint of lower extremity w/o contrast  (Send-Out)              FibromyalgiaContinue to follow with rheumatology as directed.  Restart Cymbalta 60 mg daily when supplies are availably (in the next 1-2 days). Continue diclofenac as needed. Advised patient that it is appropriate to use tramadol left over from carpal tunnel procedure for breakthrough pain.  Continue recently prescribed gabapentin. Counseled patient on importance of regular physical activity.                Charge Capture:         Primary Diagnosis:     M25.551  Pain in right hip           Orders:      [id number]  Office/outpatient visit;  established patient, level 3  (In-House)              M79.7  Fibromyalgia

## 2021-05-18 NOTE — PROGRESS NOTES
Gin Alexandra  1985     Office/Outpatient Visit    Visit Date: Tue, Feb 18, 2020 02:17 pm    Provider: Prakash Rivera MD (Assistant: Katherine Sanchez MA)    Location: [facility]        Electronically signed by Prakash Rivera MD on  03/03/2020 06:52:48 PM                             Subjective:        CC: Ms. Alexandra is a 34 year old White female.  This is a follow-up visit.  back pain, having trouble going to the bathroom ,nausea; PT STATES SHE ISNT TAKING TRAMADOL         HPI:       Pertaining to anxiety, Patient is currently on Celexa 40 mg daily.  She says that this is helpful but she still has residual anxiety symptoms.  She finds her self worrying about her health quite frequently.  She said she would be willing to try Cymbalta as this could treat both her anxiety and some of her chronic pain with her diagnosis of fibromyalgia. She says her mood is stable.  No SI or HI.      Patient was recently diagnosed with fibromyalgia by rheumatology.  She had been tried on gabapentin and tramadol for symptoms in the past and did not find either of these useful.  She continues to have diffuse pain and fatigue.      Patient reports that over the last several weeks she has had a sensation of incomplete emptying of her bladder after urination.  She also notes low back pressure and intermittent nausea.  She denies dysuria or hematuria.  No flank pain.  No fever or chills.  She does not have a history of recurrent UTIs or kidney stones.      Finally, patient reports that she has been experiencing significant heartburn and acid reflux.  She has tried over-the-counter medications without improvement of her symptoms.  She denies dysphasia, abdominal pain, vomiting, melena or hematochezia.    ROS:     CONSTITUTIONAL:  Positive for fatigue.   Negative for chills or fever.      CARDIOVASCULAR:  Negative for chest pain, dizziness, palpitations and edema.      RESPIRATORY:  Negative for dyspnea and cough.       GASTROINTESTINAL:  Positive for acid reflux symptoms, heartburn and nausea.   Negative for abdominal pain, diarrhea or vomiting.      GENITOURINARY:  Positive for urinary retention.   Negative for dysuria, hematuria, frequent UTI's or urinary incontinence.      MUSCULOSKELETAL:  Positive for arthralgias and (diffuse) back pain ( chronic ).      INTEGUMENTARY/BREAST:  Negative for rash.      NEUROLOGICAL:  Positive for headaches, paresthesias and weakness.      ENDOCRINE:  Negative for hair loss, heat/cold intolerance, polydipsia, and polyphagia.      PSYCHIATRIC:  Positive for anxiety.   Negative for depression, sleep disturbance or suicidal thoughts.          Past Medical History / Family History / Social History:         Last Reviewed on 3/03/2020 06:52 PM by Prakash Rivera    Past Medical History:             PAST MEDICAL HISTORY     UNREMARKABLE Hospitalizations: Never         GYNECOLOGICAL HISTORY:    No pregnancy-related problems.    Problems with menstrual cycles include irregular frequency/duration.      Menarche occurred at age 14.    Last Pap was 10/2017  [facility]  [place] (last pap normal); (+) hx of abnormal Pap ( cervical dysplasia; she has undergone LEEP )    Has never had a mammogram.          Surgical History:         Cholecystectomy: at age age 21; details/complications?;     LEEP procedure 2012;         Family History:     Unknown         Social History:     Occupation:. Homemaker     Marital Status:      Children: 2 children and 2 step-children         Tobacco/Alcohol/Supplements:     Last Reviewed on 3/03/2020 06:52 PM by Prakash Rivera    Tobacco: She has a past history of cigarette smoking; quit date:  feb 2016.  Non-drinker     Caffeine:  She admits to consuming caffeine via soda.          Substance Abuse History:     Last Reviewed on 3/03/2020 06:52 PM by Prakash Rivera    None         Mental Health History:     Last Reviewed on 3/03/2020 06:52 PM by Prakash Rivera        mild  "depression         Communicable Diseases (eg STDs):     Last Reviewed on 3/03/2020 06:52 PM by Prakash Rivera    Reportable health conditions; NEGATIVE         Current Problems:     Last Reviewed on 3/03/2020 06:52 PM by Prakash Rivera    Other mixed anxiety disorders    Cerebral apoplexy(pituitary)    Constipation, unspecified    Chronic fatigue, unspecified    Carpal tunnel syndrome, bilateral upper limbs    Gastro-esophageal reflux disease without esophagitis    Fibromyalgia    Other retention of urine        Immunizations:     influenza, injectable, quadrivalent, preservative free 11/14/2019    Fluzone Quadrivalent (3+ years) 9/25/2018        Allergies:     Last Reviewed on 3/03/2020 06:52 PM by Prakash Rivera    Codeine Sulfate:          Current Medications:     Last Reviewed on 3/03/2020 06:52 PM by Prakash Rivera    traMADol  [PRN ]    busPIRone 5 mg oral tablet [one tablet q hs prn anxiety]    diclofenac sodium         Objective:        Vitals:         Current: 2/18/2020 2:24:58 PM    Ht:  5 ft, 3 in;  Wt: 191.6 lbs;  BMI: 33.9T: 98.9 F (oral);  BP: 105/76 mm Hg (left arm, sitting);  P: 75 bpm (left arm (BP Cuff), sitting);  sCr: 0.72 mg/dL;  GFR: 121.29        Exams:     PHYSICAL EXAM:     GENERAL: vital signs recorded - well developed, well nourished;  no apparent distress;     EYES: conjunctiva and cornea are normal;     RESPIRATORY: Clear to auscultation bilaterally; no rales (\"crackles\") present; no rhonchi; no wheezes;     CARDIOVASCULAR: normal rate; rhythm is regular;  No murmurs. clicks, gallops or rubs appreciated; no edema;     BREAST/INTEGUMENT: No significant rashes, lesions or suspicious moles within limits of examination;     NEUROLOGIC: Grossly intact; mental status: alert and oriented x 3;     PSYCHIATRIC: appropriate affect and demeanor; normal speech pattern; Normal behavior;         Lab/Test Results:         Glucose, Urine: Neg (02/18/2020),     Bilirubin, urine: Negative (02/18/2020),     " Ketones, Urine Strip: Negative (02/18/2020),     Specific Gravity, urine: 1.030 (02/18/2020),     Blood in Urine: negative (02/18/2020),     pH, urine: 6.0 (02/18/2020),     Protein Urine QL: negative (02/18/2020),     Urobilinogen, urine: 1.0 E.U./dL (02/18/2020),     Nitrite, Urine: Negative (02/18/2020),     Leukoctyes, urine: Negative (02/18/2020),     Appearance: Clear (02/18/2020),     collection source: Clean-catch (02/18/2020),     Color: Yellow (02/18/2020),     Performed by:: maggie (02/18/2020),             Assessment:         F41.3   Other mixed anxiety disorders       M79.7   Fibromyalgia       R33.8   Other retention of urine       K21.9   Gastro-esophageal reflux disease without esophagitis           ORDERS:         Meds Prescribed:       [New Rx] omeprazole 40 mg oral capsule,delayed release (enteric coated) [take 1 capsule (40 mg) by oral route daily ], #30 (thirty) capsules, Refills: 1 (one)       [New Rx] DULoxetine 30 mg oral capsule,delayed release (enteric coated) [take 1 capsule (30 mg) by oral route daily for 2 weeks then increase to 2 capsules daily], #45 (forty five) capsules, Refills: 1 (one)         Lab Orders:       [id number]  Urinalysis, automated, without microscopy  (In-House)              Procedures Ordered:       REFER  Referral to Specialist or Other Facility  (Send-Out)                      Plan:         Other mixed anxiety disorders- Not controlled.  Discontinue Celexa.  Start Cymbalta.  Continue BuSpar 5 mg nightly as needed.  Return to clinic in 4 weeks.          Prescriptions:       [New Rx] DULoxetine 30 mg oral capsule,delayed release (enteric coated) [take 1 capsule (30 mg) by oral route daily for 2 weeks then increase to 2 capsules daily], #45 (forty five) capsules, Refills: 1 (one)         Fibromyalgia- Not at goal.  Continue to follow with rheumatology as directed.  Start Cymbalta as above. Counseled patient on importance of regular physical activity.        Other retention of  urine- Unclear etiology.  UA unremarkable in office today.  Referral placed to urology.    LABORATORY:  Labs ordered to be performed today include UA dip in office no micro.      REFERRALS:  Referral initiated to a urologist ( [facility] ).            Orders:       [id number]  Urinalysis, automated, without microscopy  (In-House)            REFER  Referral to Specialist or Other Facility  (Send-Out)              Gastro-esophageal reflux disease without esophagitis- New diagnosis. Start omeprazole 40 mg daily.          Prescriptions:       [New Rx] omeprazole 40 mg oral capsule,delayed release (enteric coated) [take 1 capsule (40 mg) by oral route daily ], #30 (thirty) capsules, Refills: 1 (one)             Charge Capture:         Primary Diagnosis:     F41.3  Other mixed anxiety disorders           Orders:      [id number]  Office/outpatient visit; established patient, level 4  (In-House)              M79.7  Fibromyalgia     R33.8  Other retention of urine           Orders:      [id number]  Urinalysis, automated, without microscopy  (In-House)              K21.9  Gastro-esophageal reflux disease without esophagitis

## 2021-05-18 NOTE — PROGRESS NOTES
Gin Alexandra 1985     Office/Outpatient Visit    Visit Date: Wed, Mar 21, 2018 02:54 pm    Provider: Alexia Mendez N.P. (Assistant: Clara Tafoya MA)    Location: [facility]        Electronically signed by Alexia Mendez N.P. on  03/24/2018 01:17:51 AM                             SUBJECTIVE:        CC:     Ms. Alexandra is a 32 year old White female.  She presents with pain in upper abdomen when moving; discuss raising celexa.          HPI:         Patient presents with anxiety with depression.  Her anxiety disorder was originally diagnosed many years ago.  currently taking celexa         Abdominal pain, unspecified details; this is located primarily in the left upper quadrant and right upper quadrant.  It began 1 week ago.  The onset of pain occurred with exertion.      ROS:     CONSTITUTIONAL:  Negative for chills, fatigue, fever, and weight change.      CARDIOVASCULAR:  Negative for chest pain, orthopnea, paroxysmal nocturnal dyspnea and pedal edema.      RESPIRATORY:  Negative for dyspnea.      GASTROINTESTINAL:  Positive for abdominal pain ( RUQ; LUQ ).   Negative for constipation, diarrhea, nausea or vomiting.      NEUROLOGICAL:  Negative for dizziness, headaches, paresthesias, and weakness.      PSYCHIATRIC:  Negative for anxiety, depression, and sleep disturbances.          PMH/FMH/SH:     Last Reviewed on 12/01/2014 12:06 PM by Cassy Munoz    Past Medical History:             PAST MEDICAL HISTORY     UNREMARKABLE Hospitalizations: Never         GYNECOLOGICAL HISTORY:    No pregnancy-related problems.    Problems with menstrual cycles include irregular frequency/duration.      Menarche occurred at age 14.    Last Pap was 10/2017  [facility]  [place] (last pap normal); (+) hx of abnormal Pap ( cervical dysplasia; she has undergone LEEP )    Has never had a mammogram.          Surgical History:         Cholecystectomy: at age age 21; details/complications?;      LEEP  procedure 2012;         Family History:     Unknown         Social History:     Occupation: Homemaker     Marital Status:      Children: 2 children and 2 step-children         Tobacco/Alcohol/Supplements:     Last Reviewed on 12/18/2017 04:22 PM by Jolanta Dawson    Tobacco: She has a past history of cigarette smoking; quit date:  feb 2016.  Non-drinker     Caffeine:  She admits to consuming caffeine via soda.          Substance Abuse History:     Last Reviewed on 10/24/2014 11:31 AM by Cassy Munoz         Mental Health History:     Last Reviewed on 10/24/2014 11:31 AM by Cassy Munoz        mild depression             Current Problems:     Sinus headaches     Anxiety with depression     Fatigue         Immunizations:     None        Allergies:     Last Reviewed on 3/21/2018 03:00 PM by Clara Tafoya    Codeine Sulfate:        Current Medications:     Last Reviewed on 3/21/2018 03:00 PM by Clara Tafoya        OBJECTIVE:        Vitals:         Current: 3/21/2018 2:59:30 PM    Ht:  5 ft, 3 in;  Wt: 185.1 lbs;  BMI: 32.8    T: 97.8 F (oral);  BP: 120/76 mm Hg (right arm, sitting);  P: 79 bpm (finger clip, sitting);  sCr: 0.58 mg/dL;  GFR: 151.06        Exams:     PHYSICAL EXAM:     GENERAL: vital signs recorded - well developed, well nourished;  no apparent distress;     NECK: range of motion is normal; thyroid is non-palpable;     RESPIRATORY: normal respiratory rate and pattern with no distress; normal breath sounds with no rales, rhonchi, wheezes or rubs;     CARDIOVASCULAR: normal rate; rhythm is regular;  no systolic murmur; no edema;     GASTROINTESTINAL: mild LUQ and RUQ tenderness;  normal bowel sounds;     MUSCULOSKELETAL: normal gait; normal range of motion of all major muscle groups;     NEUROLOGICAL:  cranial nerves, motor and sensory function, reflexes, gait and coordination are all intact;     PSYCHIATRIC:  appropriate affect and demeanor; normal speech  pattern; grossly normal memory;         ASSESSMENT:           300.4   F41.3   F34.9  Anxiety with depression              DDx:     789.00   E13.39  Abdominal pain, unspecified              DDx:         ORDERS:         Meds Prescribed:       Refill of: Celexa (Citalopram Hydrobromide) 40mg Tablet 1 tab daily  #30 (Thirty) tablet(s) Refills: 2                 PLAN:          Anxiety with depression         RECOMMENDATIONS given include: continue current dose, no change in Rx - follow up 6 months.            Prescriptions:       Refill of: Celexa (Citalopram Hydrobromide) 40mg Tablet 1 tab daily  #30 (Thirty) tablet(s) Refills: 2          Abdominal pain, unspecified         RECOMMENDATIONS given include: I feel that this is more musculoskelatal related as it is tender when flexing her abdominal muscles -and with palpation ; I have instrcuted her to monitor heat applications as well as any other changes -monitor at home for changes in ADL to see if reason so tender;.              Patient Recommendations:        For  Anxiety with depression:     I also recommend continue current dose, no change in Rx - follow up 6 months.          For  Abdominal pain, unspecified:     I also recommend I feel that this is more musculoskelatal related as it is tender when flexing her abdominal muscles -and with palpation ; I have instrcuted her to monitor heat applications as well as any other changes -monitor at home for changes in ADL to see if reason so tender;.              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

## 2021-05-18 NOTE — PROGRESS NOTES
Gin Alexandra  1985     Office/Outpatient Visit    Visit Date: Thu, Apr 2, 2020 02:49 pm    Provider: Lee Henriquez MD (Assistant: Summer Savage, )    Location: [facility]        Electronically signed by Lee Henriquez MD on  04/03/2020 05:34:29 PM                             Subjective:        CC: shortness of breath - the visit was started on Facebook Messenger and then transitioned to in person visit    HPI:       Gin is concerned about some increased shortness of breath over the last few days.  She has had headache and some chest pain with deep breath.  She thinks she noticed something different on Monday of this week when she would feel 'winded' with exertion.  She says that she was cleaning the house.  She is not running significant fever.  She had a low grade temperature to 99.3 degrees yesterday.  She does have a dry cough.  She does have some mid chest pain.  She is not aware of particular exposure of which she is aware.  She has been pretty much staying at home other than grocery trips.  She does not work outside the home.      ROS:         E/N/T:  Negative for diminished hearing and nasal congestion.      CARDIOVASCULAR:  Negative for palpitations.      RESPIRATORY:  Negative for recent cough and dyspnea.      INTEGUMENTARY/BREAST:  Negative for atypical mole(s) and rash.          Past Medical History / Family History / Social History:         Last Reviewed on 4/02/2020 03:11 PM by Lee Hneriquez    Past Medical History:             PAST MEDICAL HISTORY     UNREMARKABLE Hospitalizations: Never         GYNECOLOGICAL HISTORY:    No pregnancy-related problems.    Problems with menstrual cycles include irregular frequency/duration.      Menarche occurred at age 14.    Last Pap was 10/2017  [facility]  [place] (last pap normal); (+) hx of abnormal Pap ( cervical dysplasia; she has undergone LEEP )    Has never had a mammogram.          Surgical History:          Cholecystectomy: at age age 21; details/complications?;     LEEP procedure 2012;         Family History:     Unknown         Social History:     Occupation:. Homemaker     Marital Status:      Children: 2 children and 2 step-children         Tobacco/Alcohol/Supplements:     Last Reviewed on 4/02/2020 03:11 PM by Lee Henriquez    Tobacco: She has a past history of cigarette smoking; quit date:  feb 2016.  Non-drinker     Caffeine:  She admits to consuming caffeine via soda.          Substance Abuse History:     Last Reviewed on 4/02/2020 03:11 PM by Lee Henriquez    None         Mental Health History:     Last Reviewed on 4/02/2020 03:11 PM by Lee Henriquez        mild depression         Communicable Diseases (eg STDs):     Last Reviewed on 4/02/2020 03:11 PM by eLe Henriquez    Reportable health conditions; NEGATIVE         Current Problems:     Last Reviewed on 4/02/2020 03:11 PM by Lee Henriquez    Other mixed anxiety disorders    Constipation, unspecified    Chronic fatigue, unspecified    Carpal tunnel syndrome, bilateral upper limbs    Gastro-esophageal reflux disease without esophagitis    Fibromyalgia    Other retention of urine        Immunizations:     influenza, injectable, quadrivalent, preservative free 11/14/2019    Fluzone Quadrivalent (3+ years) 9/25/2018        Allergies:     Last Reviewed on 4/02/2020 03:11 PM by Lee Henriquez    Codeine Sulfate:          Current Medications:     Last Reviewed on 4/02/2020 03:11 PM by Lee Henriquez    traMADol  [PRN ]    diclofenac sodium     busPIRone 5 mg oral tablet [one tablet  TID]    omeprazole 40 mg oral capsule,delayed release (enteric coated) [take 1 capsule (40 mg) by oral route daily ]    DULoxetine 60 mg oral capsule,delayed release (enteric coated) [take 1 capsule (60 mg) by oral route once daily]        Objective:        Vitals:         Current: 4/2/2020 3:56:43 PM    Ht:  5 ft,  3 in;  Wt: 185.9 lbs;  BMI: 32.9T: 98.5 F (oral);  BP: 106/70 mm Hg (left arm, sitting);  P: 88 bpm (left arm (BP Cuff), sitting);  sCr: 0.72 mg/dL;  GFR: 119.74O2 Sat: 98 % (room air)        Exams:     PHYSICAL EXAM:     GENERAL: vital signs recorded - well developed, well nourished;  no apparent distress;     NECK: range of motion is normal; thyroid is non-palpable;     RESPIRATORY: normal respiratory rate and pattern with no distress; normal breath sounds with no rales, rhonchi, wheezes or rubs;     CARDIOVASCULAR: normal rate; rhythm is regular;  no systolic murmur; no edema;     GASTROINTESTINAL: nontender; normal bowel sounds; no organomegaly;     LYMPHATIC: no enlargement of cervical or facial nodes; no supraclavicular nodes;     BREAST/INTEGUMENT: SKIN: no significant rashes or lesions; no suspicious moles;         Lab/Test Results:         Influenza A and B: Negative (04/02/2020),     Performed by:: AS (04/02/2020),             Assessment:         R06.00   Dyspnea, unspecified           ORDERS:         Meds Prescribed:       [New Rx] montelukast 10 mg oral tablet [take 1 tablet (10 mg) by oral route once daily in the evening], #30 (thirty) tablets, Refills: 0 (zero)       [New Rx] Ventolin HFA 90 mcg/actuation Inhalation HFA Aerosol Inhaler [Inhale 2 puff(s) by mouth 4 times a day as needed], #1 (one) applicator, Refills: 2 (two)         Lab Orders:       [id number]  Infectious agent antigen detection by immunoassay; Influenza  (In-House)            [id number]  Infectious agent antigen detection by immunoassay; Influenza  (In-House)              Other Orders:       [id number]  Noninvasive ear or pulse oximetry for oxygen saturation; single determination  (In-House)                      Plan:         Dyspnea, unspecified    LABORATORY:  Labs ordered to be performed today include Flu A&B Flu A Flu B.      TESTS/PROCEDURES:  Will proceed with Pulse Oximetry (O2 SAT) to be performed/scheduled now.      RECOMMENDATIONS given  include: Her exam is reassuring.  She looks well and her lungs are basically clear.  We will cover her with some Singulair for the near term.  I have also prescribed an inhaler for her to use as needed if she feels short of breath.  We will see how things progress over the next few days.  If she does not see improvement, then I would consider chest X-ray and/or labs as a precaution.  No other changes for now..            Prescriptions:       [New Rx] montelukast 10 mg oral tablet [take 1 tablet (10 mg) by oral route once daily in the evening], #30 (thirty) tablets, Refills: 0 (zero)       [New Rx] Ventolin HFA 90 mcg/actuation Inhalation HFA Aerosol Inhaler [Inhale 2 puff(s) by mouth 4 times a day as needed], #1 (one) applicator, Refills: 2 (two)           Orders:       [id number]  Infectious agent antigen detection by immunoassay; Influenza  (In-House)            64015  Infectious agent antigen detection by immunoassay; Influenza  (In-House)            62976  Noninvasive ear or pulse oximetry for oxygen saturation; single determination  (In-House)                  Charge Capture:         Primary Diagnosis:     R06.00  Dyspnea, unspecified           Orders:      [id number]  Office/outpatient visit; established patient, level 3  (In-House)            92388  Infectious agent antigen detection by immunoassay; Influenza  (In-House)            01638  Infectious agent antigen detection by immunoassay; Influenza  (In-House)            46488  Noninvasive ear or pulse oximetry for oxygen saturation; single determination  (In-House)

## 2021-05-18 NOTE — PROGRESS NOTES
Gin Alexandra  1985     Office/Outpatient Visit    Visit Date: Fri, Feb 26, 2021 04:04 pm    Provider: Prakash Rivera MD (Assistant: Alina Jones MA)    Location: [facility]        Electronically signed by Prakash Rivera MD on  05/22/2021 03:13:08 PM                             Subjective:        CC: Ms. Alexandra is a 35 year old White female.  This is a follow-up visit.  fatigue.;         HPI:       Gin presents clinic today for evaluation of extreme fatigue.  She is complained about this multiple times in the past but it is gotten worse.  She says all she wants to do is sleep.  Her , who is with her in clinic today, says when she is sleeping she will often jerk violently while she is asleep.  She does not snore.  No nighttime apneas.  She does have a history of vitamin D deficiency as well as fibromyalgia. She would sleep all day if she could.  She says that on days when she has no pressing obligations it is not abnormal for her to so sleep 15 to 20 hours.  She feels constantly tired and rundown.  She denies any fever or chills.  No recent changes in weight.  No dizziness, chest pain, shortness of breath, edema or palpitations.    ROS:     CONSTITUTIONAL:  Positive for fatigue and unintentional weight gain.   Negative for chills or fever.      EYES:  Negative for blurred vision.      CARDIOVASCULAR:  Negative for chest pain, dizziness, palpitations and edema.      RESPIRATORY:  Negative for dyspnea and cough.      GASTROINTESTINAL:  Positive for abdominal pain, acid reflux symptoms, constipation, diarrhea, heartburn and nausea.   Negative for hematochezia, melena or vomiting.      MUSCULOSKELETAL:  Positive for arthralgias and (diffuse) back pain ( chronic ).      INTEGUMENTARY/BREAST:  Negative for rash.      NEUROLOGICAL:  Positive for headaches, paresthesias and weakness.      ENDOCRINE:  Negative for hair loss, heat/cold intolerance, polydipsia, and polyphagia.       PSYCHIATRIC:  Positive for anxiety, hypersomnia and irritability.   Negative for depression or suicidal thoughts.          Past Medical History / Family History / Social History:         Last Reviewed on 5/22/2021 03:12 PM by Prakash Rivera    Past Medical History:             PAST MEDICAL HISTORY     Hospitalizations: Never     Pituitary tumor     Irritable Bowel Syndrome         GYNECOLOGICAL HISTORY:    No pregnancy-related problems.    Problems with menstrual cycles include irregular frequency/duration.      Menarche occurred at age 14.    Last Pap was 10/2017  [facility] (last pap normal); (+) hx of abnormal Pap ( cervical dysplasia; she has undergone LEEP )    Has never had a mammogram.      fibromyalgia    anxiety         Surgical History:         Cholecystectomy: at age age 21; details/complications?;     LEEP procedure 2012;         Family History:     Unknown         Social History:     Occupation:. Homemaker     Marital Status:      Children: 2 children and 2 step-children         Tobacco/Alcohol/Supplements:     Last Reviewed on 5/22/2021 03:12 PM by Prakash Rivera    Tobacco: She has a past history of cigarette smoking; quit date:  feb 2016.  Non-drinker     Caffeine:  She admits to consuming caffeine via soda.          Substance Abuse History:     Last Reviewed on 5/22/2021 03:12 PM by Prakash Rivera    None         Mental Health History:     Last Reviewed on 5/22/2021 03:12 PM by Prakash Rivera        mild depression         Communicable Diseases (eg STDs):     Last Reviewed on 5/22/2021 03:12 PM by Prakash Rivera    Reportable health conditions; NEGATIVE         Current Problems:     Last Reviewed on 5/22/2021 03:12 PM by Prakash Rivera    Other mixed anxiety disorders    Constipation, unspecified    Chronic fatigue, unspecified    Carpal tunnel syndrome, bilateral upper limbs    Other retention of urine    Gastro-esophageal reflux disease without esophagitis    Fibromyalgia     Dyspnea, unspecified    Pain in right hip    Pain in thoracic spine    Dysuria    Benign neoplasm of pituitary gland    Obesity, unspecified    Encounter for screening for depression    Headache    Encounter for immunization    Other fatigue    Vitamin D deficiency, unspecified    Hypersomnia, unspecified        Immunizations:     influenza, injectable, quadrivalent, preservative free 11/14/2019    influenza, injectable, quadrivalent, preservative free (FLUZONE QUAD 7460-8745) 12/8/2020    Fluzone Quadrivalent (3+ years) 9/25/2018        Allergies:     Last Reviewed on 5/22/2021 03:12 PM by Prakash Rivera    Codeine Sulfate:          Current Medications:     Last Reviewed on 5/22/2021 03:12 PM by Prakash Rivera    diclofenac sodium     linaCLOtide 72 mcg oral capsule [take 1 capsule (72 mcg) ;sprinkle entire contents on small amount applesauce; take immediately by oral route once daily on an empty stomachat least 30 minutes before 1st meal of the day]    busPIRone 10 mg oral tablet [TAKE 1 TABLET(10 MG) BY MOUTH TWICE DAILY]    omeprazole 40 mg oral capsule,delayed release (enteric coated) [take 1 capsule (40 mg) by oral route daily ]    Ventolin HFA 90 mcg/actuation Inhalation HFA Aerosol Inhaler [Inhale 2 puff(s) by mouth 4 times a day as needed]    traMADol 50 mg oral tablet [Take 1 tablet every 4 hours as needed for pain]    SUMAtriptan succinate 50 mg oral tablet [take 1 tablet (50 mg) by oral route after onset of migraine; may repeat after 2 hours if headache returns, not to exceed 200mg in 24hrs]    DULoxetine 60 mg oral capsule,delayed release (enteric coated) [TAKE 1 CAPSULE(60 MG) BY MOUTH EVERY DAY]    montelukast 10 mg oral tablet [take 1 tablet (10 mg) by oral route once daily in the evening]    Trulance 3 mg oral tablet        Objective:        Vitals:         Current: 2/26/2021 4:10:11 PM    Ht:  5 ft, 3 in;  Wt: 180.8 lbs;  BMI: 32.0T: 97 F (temporal);  BP: 112/85 mm Hg (right arm, sitting);  P: 83 bpm  "(right arm (BP Cuff), sitting);  sCr: 0.63 mg/dL;  GFR: 134.01        Exams:     PHYSICAL EXAM:     GENERAL: vital signs recorded - well developed, well nourished;  no apparent distress;     EYES: conjunctiva and cornea are normal;     RESPIRATORY: Clear to auscultation bilaterally; no rales (\"crackles\") present; no rhonchi; no wheezes;     CARDIOVASCULAR: normal rate; rhythm is regular;  No murmurs. clicks, gallops or rubs appreciated; no edema;     GASTROINTESTINAL: mild diffuse tenderness;  no guarding;  no rebound tenderness;  normal bowel sounds; no organomegaly; no masses;     BREAST/INTEGUMENT: No significant rashes, lesions or suspicious moles within limits of examination;     MUSCULOSKELETAL: No tenderness to palpation of thoracic spine.;     NEUROLOGIC: Grossly intact; mental status: alert and oriented x 3;     PSYCHIATRIC: appropriate affect and demeanor; normal speech pattern; Normal behavior;         Assessment:         G47.10   Hypersomnia, unspecified       R53.83   Other fatigue       E55.9   Vitamin D deficiency, unspecified           ORDERS:         Lab Orders:       [id number]  Vitamin D, 25-Hydroxy  (Send-Out)            36075  [facility]ORT[facility] - [facility] Total Cortisol AM only  (Send-Out)              Procedures Ordered:       REFER  Referral to Specialist or Other Facility  (Send-Out)                      Plan:         Hypersomnia, unspecified- Unclear etiology. A.m. cortisol ordered to evaluate for adrenal insufficiency.  Repeat vitamin D level drawn.  Sleep study ordered to evaluate hypersomnia as well as \"jerks\" occurring during sleep.        REFERRALS:  Referral initiated to [facility].            Orders:       REFER  Referral to Specialist or Other Facility  (Send-Out)              Other fatigue- see above          Orders:       [id number]  Vitamin D, 25-Hydroxy  (Send-Out)            09924  [facility] - [facility] Total Cortisol AM only  (Send-Out)              Vitamin D deficiency, unspecified- see above            " Charge Capture:         Primary Diagnosis:     G47.10  Hypersomnia, unspecified           Orders:      [id number]  Office/outpatient visit; established patient, level 4  (In-House)              R53.83  Other fatigue     E55.9  Vitamin D deficiency, unspecified

## 2021-05-18 NOTE — PROGRESS NOTES
Gin AlexandraJacinto 1985     Office/Outpatient Visit    Visit Date: Tue, Jun 25, 2019 11:54 am    Provider: Alexia Mendez N.P. (Assistant: Sarah Spurling, MA)    Location: [facility]        Electronically signed by Alexia Mendez N.P. on  06/25/2019 12:36:53 PM                             SUBJECTIVE:        CC:     Ms. Alexandra is a 33 year old White female.  needing a referral rhuematologist.;         HPI:         Ms. Alexandra complains of fatigue.  This has been a problem for several months.  recently went to Endocrinology as well as neurology - diagnosed with Pituitary apoplexy - uncertain if this is causing the symptoms or hormonal  - She  has not gotten the labs drawn from endo as of yet but does have orders for those when she is fasting.  She reports that the symptoms seem to be progressing to the left side.          PHQ-9 Depression Screening: Completed form scanned and in chart; Total Score 12 Alcohol Consumption Screening: Completed form scanned and in chart; Total Score 1     ROS:     CONSTITUTIONAL:  Negative for chills, fatigue, fever, and weight change.      EYES:  Positive for blurred vision ( wears glasses but has gotten worse voer the past months ).      CARDIOVASCULAR:  Negative for pedal edema.      RESPIRATORY:  Negative for recent cough, dyspnea and pleuritic chest pain.      MUSCULOSKELETAL:  Positive for arthralgias and limb pain ( right arm/leg but has moved to the left recently ).   Negative for joint stiffness.      INTEGUMENTARY/BREAST:  Positive for rash developed a strange rash after recent sunburn - but has since went away.   Negative for extremely dry skin.      NEUROLOGICAL:  Negative for dizziness, headaches, paresthesias, and weakness.      PSYCHIATRIC:  Negative for anxiety, depression, and sleep disturbances.          PMH/FMH/SH:     Last Reviewed on 3/06/2019 09:07 AM by Alexia Mendez    Past Medical History:             PAST MEDICAL HISTORY     UNREMARKABLE  Hospitalizations: Never         GYNECOLOGICAL HISTORY:    No pregnancy-related problems.    Problems with menstrual cycles include irregular frequency/duration.      Menarche occurred at age 14.    Last Pap was 10/2017  [facility] (last pap normal); (+) hx of abnormal Pap ( cervical dysplasia; she has undergone LEEP )    Has never had a mammogram.          Surgical History:         Cholecystectomy: at age age 21; details/complications?;      LEEP procedure 2012;         Family History:     Unknown         Social History:     Occupation:. Homemaker     Marital Status:      Children: 2 children and 2 step-children         Tobacco/Alcohol/Supplements:     Last Reviewed on 6/25/2019 12:01 PM by Spurling, Sarah C    Tobacco: She has a past history of cigarette smoking; quit date:  feb 2016.  Non-drinker     Caffeine:  She admits to consuming caffeine via soda.          Substance Abuse History:     Last Reviewed on 1/18/2019 01:07 PM by Alexia Mendez    None         Mental Health History:     Last Reviewed on 1/18/2019 01:07 PM by Alexia Mendez        mild depression         Communicable Diseases (eg STDs):     Last Reviewed on 1/18/2019 01:07 PM by Alexia Mendez    Reportable health conditions; NEGATIVE             Current Problems:     Last Reviewed on 3/06/2019 09:07 AM by Alexia Mendez    Cerebral apoplexy(pituitary)     Fatigue     Anxiety with depression     Screening for depression         Immunizations:     Fluzone Quadrivalent (3+ years) 9/25/2018         Allergies:     Last Reviewed on 6/25/2019 12:01 PM by Spurling, Sarah C    Codeine Sulfate:        Current Medications:     Last Reviewed on 6/25/2019 12:04 PM by Spurling, Sarah C    Buspirone HCl 5mg Tablet one tablet q hs prn anxiety     Citalopram Hydrobromide 40mg Tablet 1 po qd         OBJECTIVE:        Vitals:         Current: 6/25/2019 12:06:31 PM    Ht:  5 ft, 3 in;  Wt: 189.2 lbs;  BMI: 33.5    T: 98.4 F (oral);   BP: 111/60 mm Hg (left arm, sitting);  P: 77 bpm (left arm (BP Cuff), sitting);  sCr: 0.72 mg/dL;  GFR: 121.73        Exams:     PHYSICAL EXAM:     GENERAL: vital signs recorded - well developed, well nourished;  no apparent distress;     NECK: range of motion is normal; thyroid is non-palpable;     RESPIRATORY: normal respiratory rate and pattern with no distress; normal breath sounds with no rales, rhonchi, wheezes or rubs;     CARDIOVASCULAR: normal rate; rhythm is regular;  no systolic murmur; no edema;     BREAST/INTEGUMENT: SKIN: no significant rashes or lesions; no suspicious moles;     MUSCULOSKELETAL: normal gait; normal range of motion of all major muscle groups; pain with range of motion in: hands;     NEUROLOGICAL:  cranial nerves, motor and sensory function, reflexes, gait and coordination are all intact;     PSYCHIATRIC:  appropriate affect and demeanor; normal speech pattern; grossly normal memory;         ASSESSMENT:           780.79   R53.83  Fatigue              DDx:     V79.0   Z13.89  Screening for depression              DDx:         ORDERS:         Lab Orders:       [id number]  [facility] Arthritis Profile  (Send-Out)           Procedures Ordered:       REFER  Referral to Specialist or Other Facility  (Send-Out)           Other Orders:         Depression screen negative  (In-House)           Negative EtOH screen  (In-House)                   PLAN:          Fatigue will refer to rheumatology  - will obtain labs from her endocrinolgist Suzanne when they are available - obtain note from their office as well     LABORATORY:  Labs ordered to be performed today include Arthritis Profile.      REFERRALS:  Referral initiated to a rheumatologist.            Orders:       [id number]  [facility] Arthritis Profile  (Send-Out)         REFER  Referral to Specialist or Other Facility  (Send-Out)            Screening for depression     MIPS Positive Depression Screen but after further evaluation the patient  does not have a diagnosis of depression.  Negative alcohol screen           Orders:         Depression screen negative  (In-House)           Negative EtOH screen  (In-House)               Patient Recommendations:        For  Fatigue:     I also recommend ^.              CHARGE CAPTURE:           Primary Diagnosis:     780.79 Fatigue            R53.83    Other fatigue              Orders:          [id number]   Office/outpatient visit; established patient, level 3  (In-House)           V79.0 Screening for depression            Z13.89    Encounter for screening for other disorder              Orders:             Depression screen negative  (In-House)                Negative EtOH screen  (In-House)

## 2021-05-18 NOTE — PROGRESS NOTES
"Gin Alexandra  1985     Office/Outpatient Visit    Visit Date: Tue, Aug 11, 2020 01:46 pm    Provider: Prakash Rivera MD (Assistant: Elizabeth Newberry MA)    Location: [facility]        Electronically signed by Prakash Rivera MD on  08/11/2020 07:32:36 PM                             Subjective:        CC: (PT IS NOT TAKING GABAPENTIN)Ms. Alexandra is a 34 year old White female.  She presents with headaches, dizziness, back pain, IBS, sound sensitivity, pain in abdomin, discuss phentermine, residual functional capacity.          HPI:       Gin presents to clinic today for follow-up.  She has several complaints she would like to address.  First, she reports that she is currently in a \"flare\" of her fibromyalgia.  This happens periodically, usually without warning.  Her current fibromyalgia regimen includes diclofenac and Cymbalta. She takes tramadol 50 mg sparingly as needed.  She has been evaluated by rheumatology in the past and work-up was largely unremarkable. She tried gabapentin in the past but did not find this helpful.  Secondly today, Gin wanted to discuss alternating constipation and diarrhea with mild abdominal pain.  She has been evaluated by GI recently with both endoscopy and colonoscopy being unremarkable.  Her current regimen includes omeprazole 40 mg daily. GI had prescribed her Linzess but she says that the 72 mg dose is not helpful.  She wonders if increasing the dose might be helpful.  She has never tried MiraLAX. Third, Gin indicates that she has been having issues with irritability.  She says whenever her children make any noise that is above conversational level she will become quickly angered.  She says she does not necessarily feel overwhelmed but she does feel guilty that this occurs.  Otherwise, she says her mood is stable. In addition to Cymbalta 60 mg daily noted above, Gin also takes BuSpar 5 mg 3 times daily.  She denies SI or HI. Fourth, Gin endorses pain " below her bellybutton and frequent dysuria.  She denies any hematuria.  She denies flank pain or fever or chills.  No history of kidney stones. Fifth, Gin complains of thoracic back pain.  She says that this is been present chronically for several years but she feels as though it is worsened.  She is not sure if it is related to something she did to her back or if it is related to her fibromyalgia. Finally, Gin says she has filed for disability due to the difficulty her fibromyalgia brings with keeping employment.  She says she has good periods where she can work with no problems but never knows when she is going to go through a flare that basically incapacitates her.  She is asking if I be willing to fill out a functional residual capacity form.    ROS:     CONSTITUTIONAL:  Positive for fatigue.   Negative for chills or fever.      CARDIOVASCULAR:  Negative for chest pain, dizziness, palpitations and edema.      RESPIRATORY:  Negative for dyspnea and cough.      GASTROINTESTINAL:  Positive for abdominal pain, acid reflux symptoms, constipation, diarrhea, heartburn and nausea.   Negative for hematochezia, melena or vomiting.      GENITOURINARY:  Positive for dysuria.   Negative for hematuria.      MUSCULOSKELETAL:  Positive for arthralgias and (diffuse) back pain ( chronic ).      INTEGUMENTARY/BREAST:  Negative for rash.      NEUROLOGICAL:  Positive for headaches, paresthesias and weakness.      ENDOCRINE:  Negative for hair loss, heat/cold intolerance, polydipsia, and polyphagia.      PSYCHIATRIC:  Positive for anxiety and irritability.   Negative for depression, sleep disturbance or suicidal thoughts.          Past Medical History / Family History / Social History:         Last Reviewed on 8/11/2020 07:32 PM by Prakash Rivera    Past Medical History:             PAST MEDICAL HISTORY     UNREMARKABLE Hospitalizations: Never         GYNECOLOGICAL HISTORY:    No pregnancy-related problems.    Problems with  menstrual cycles include irregular frequency/duration.      Menarche occurred at age 14.    Last Pap was 10/2017  [facility]  [facility]winder (last pap normal); (+) hx of abnormal Pap ( cervical dysplasia; she has undergone LEEP )    Has never had a mammogram.          Surgical History:         Cholecystectomy: at age age 21; details/complications?;     LEEP procedure 2012;         Family History:     Unknown         Social History:     Occupation:. Homemaker     Marital Status:      Children: 2 children and 2 step-children         Tobacco/Alcohol/Supplements:     Last Reviewed on 8/11/2020 07:32 PM by Prakash Rivera    Tobacco: She has a past history of cigarette smoking; quit date:  feb 2016.  Non-drinker     Caffeine:  She admits to consuming caffeine via soda.          Substance Abuse History:     Last Reviewed on 8/11/2020 07:32 PM by Prakash Rivera    None         Mental Health History:     Last Reviewed on 8/11/2020 07:32 PM by Prakash Rivera        mild depression         Communicable Diseases (eg STDs):     Last Reviewed on 8/11/2020 07:32 PM by Prakash Rivera    Reportable health conditions; NEGATIVE         Current Problems:     Last Reviewed on 8/11/2020 07:32 PM by Prakash Rivera    Other mixed anxiety disorders    Constipation, unspecified    Chronic fatigue, unspecified    Carpal tunnel syndrome, bilateral upper limbs    Gastro-esophageal reflux disease without esophagitis    Other retention of urine    Fibromyalgia    Dyspnea, unspecified    Pain in right hip    Pain in thoracic spine    Dysuria        Immunizations:     influenza, injectable, quadrivalent, preservative free 11/14/2019    Fluzone Quadrivalent (3+ years) 9/25/2018        Allergies:     Last Reviewed on 8/11/2020 07:32 PM by Prakash Rivera    Codeine Sulfate:          Current Medications:     Last Reviewed on 8/11/2020 07:32 PM by Prakash Rivera    diclofenac sodium     omeprazole 40 mg oral capsule,delayed release (enteric coated)  "[take 1 capsule (40 mg) by oral route daily ]    DULoxetine 60 mg oral capsule,delayed release (enteric coated) [take 1 capsule (60 mg) by oral route once daily]    Ventolin HFA 90 mcg/actuation Inhalation HFA Aerosol Inhaler [Inhale 2 puff(s) by mouth 4 times a day as needed]    montelukast 10 mg oral tablet [take 1 tablet (10 mg) by oral route once daily in the evening]    traMADol 50 mg oral tablet [Take 1 tablet every 4 hours as needed for pain]    linaCLOtide 72 mcg oral capsule [take 1 capsule (72 mcg) ;sprinkle entire contents on small amount applesauce; take immediately by oral route once daily on an empty stomachat least 30 minutes before 1st meal of the day]    busPIRone 10 mg oral tablet [take 1 tablet (10 mg) by oral route 2 times per day]        Objective:        Vitals:         Current: 8/11/2020 1:49:54 PM    Ht:  5 ft, 3 in;  Wt: 185.2 lbs;  BMI: 32.8T: 98.2 F (oral);  BP: 117/69 mm Hg (left arm, sitting);  P: 97 bpm (left arm (BP Cuff), sitting);  sCr: 0.72 mg/dL;  GFR: 119.55        Exams:     PHYSICAL EXAM:     GENERAL: vital signs recorded - well developed, well nourished;  no apparent distress;     EYES: conjunctiva and cornea are normal;     RESPIRATORY: Clear to auscultation bilaterally; no rales (\"crackles\") present; no rhonchi; no wheezes;     CARDIOVASCULAR: normal rate; rhythm is regular;  No murmurs. clicks, gallops or rubs appreciated; no edema;     GASTROINTESTINAL: mild diffuse tenderness;  no guarding;  no rebound tenderness;  normal bowel sounds; no organomegaly; no masses;     BREAST/INTEGUMENT: No significant rashes, lesions or suspicious moles within limits of examination;     MUSCULOSKELETAL: No tenderness to palpation of thoracic spine.;     NEUROLOGIC: Grossly intact; mental status: alert and oriented x 3;     PSYCHIATRIC: appropriate affect and demeanor; normal speech pattern; Normal behavior;         Lab/Test Results:         Glucose, Urine: Neg (08/11/2020),     Bilirubin, " urine: Negative (08/11/2020),     Ketones, Urine Strip: Negative (08/11/2020),     Specific Gravity, urine: 1.030 (08/11/2020),     Blood in Urine: trace (08/11/2020),     pH, urine: 6.0 (08/11/2020),     Protein Urine QL: negative (08/11/2020),     Urobilinogen, urine: 0.2 E.U./dL (08/11/2020),     Nitrite, Urine: Negative (08/11/2020),     Leukoctyes, urine: Trace (08/11/2020),     Appearance: Clear (08/11/2020),     collection source: Clean-catch (08/11/2020),     Color: Yellow (08/11/2020),     Performed by:: al (08/11/2020),             Assessment:         M54.6   Pain in thoracic spine       R30.0   Dysuria       F41.3   Other mixed anxiety disorders       R53.82   Chronic fatigue, unspecified       K21.9   Gastro-esophageal reflux disease without esophagitis       M79.7   Fibromyalgia           ORDERS:         Radiology/Test Orders:       [id number]  Radiologic examination, spine; thoracic, three views  (Send-Out)              Lab Orders:       [id number]  Urinalysis, automated, without microscopy  (In-House)            [id number]  Culture, bacterial; with isolation and presumptive identification of each isolate, urine  (Send-Out)                      Plan:         Pain in thoracic spine- Gin has a very lengthy constellation of symptoms.  I do believe her widespread pain is secondary to for fibromyalgia.  Furthermore, I believe her alternating constipation and diarrhea are due to IBS as she has had negative EGD and colonoscopy.  It makes sense that both of these conditions are currently flared as it seems as though she has anxiety that is not completely controlled.  I believe this is manifesting with her irritability to her kids making loud noises. We discussed several options including increasing Cymbalta to 90 mg daily,, increasing BuSpar and starting amitriptyline.  She has indicated that she would like to increase BuSpar and we will up her dose to 10 mg twice daily. She will continue diclofenac, Cymbalta 60 mg daily  and tramadol 50 mg use sparingly as needed unchanged.  Her suprapubic pain along with dysuria, could be consistent with interstitial cystitis. This, to, seems to be Associated with other diagnoses of exclusion like fibromyalgia and IBS.  UA in the office today showed trace blood and trace leukocytes.  We will send for culture.  If negative and symptoms persist, we will consider referral to urology. For further treatment of her IBS, she has declined increasing Linzess 145 mcg daily today.  She would like to talk about this with her GI first.  I have advised that she start MiraLAX daily in the meantime. For her thoracic back pain, she already takes tramadol sparingly as needed.  I have advised her to continue this.  We will order an x-ray today for further evaluation. She would like to avoid physical therapy. Finally, I have advised that I would be willing to fill out a functional residual capacity form for her.  However, I have warned her that I will be honest in my assessment and that I am not hopeful that she will be approved for disability.        RADIOLOGY:  I have ordered Thoracic Spine to be done today.            Orders:       [id number]  Radiologic examination, spine; thoracic, three views  (Send-Out)              Dysuria- See above          Orders:       [id number]  Urinalysis, automated, without microscopy  (In-House)            [id number]  Culture, bacterial; with isolation and presumptive identification of each isolate, urine  (Send-Out)              Other mixed anxiety disorders- - See above        Chronic fatigue, unspecified- See above        Gastro-esophageal reflux disease without esophagitis- See above        Fibromyalgia- See above            Charge Capture:         Primary Diagnosis:     M54.6  Pain in thoracic spine           Orders:      [id number]  Office/outpatient visit; established patient, level 4  (In-House)              R30.0  Dysuria           Orders:      [id number]  Urinalysis, automated, without microscopy   (In-House)              F41.3  Other mixed anxiety disorders     R53.82  Chronic fatigue, unspecified     K21.9  Gastro-esophageal reflux disease without esophagitis     M79.7  Fibromyalgia

## 2021-05-18 NOTE — PROGRESS NOTES
Gin Alexandra  1985     Office/Outpatient Visit    Visit Date: Mon, Dec 16, 2019 08:31 am    Provider: Prakash Rivera MD (Assistant: Katherine Sanchez MA)    Location: [facility]        Electronically signed by Prakash Rivera MD on  12/16/2019 09:22:30 AM                             Subjective:        CC: Ms. Alexandra is a 34 year old White female.  This is a follow-up visit.  check up; RAN OUT OF GABAPENTIN         HPI:       Patient was last seen in our clinic on 11/14/2019 with complaints of longstanding fatigue and diffuse pain.  Her symptoms have been present for approximately 3 years with her fatigue progressively worsening over that timeframe.  She had previously been referred to rheumatology who had ordered a lab work-up including LETICIA, ESR, CRP, CK, aldolase, rheumatoid factor, CCP, B12, vitamin D, TSH, celiac panel and a hip x-ray.  This entire work-up was unremarkable except for a mildly elevated sed rate of 25 rheumatology's last note indicated they have a high suspicion of fibromyalgia.  Her follow-up with them is coming up in January. At her last visit with us, we started gabapentin to her regimen for the possibility of fibromyalgia.  She says that this has been unhelpful. Her fatigue and diffuse pain have persisted unchanged. She also notes that she has been having headaches that feel like a pressure on top of her head approximately 2 times a week.  These headaches are associated with photophobia but she denies blurry vision, photophobia, nausea or vomiting.      Patient has previously been diagnosed with carpal tunnel syndrome via EMG.  She underwent carpal tunnel release of her right wrist with Dr. Anthony on 12/3/2019.  She has a follow-up with him in 2 days.  She says that they are likely going to perform a carpal tunnel release on her left wrist as well. She has been taking ibuprofen 800 mg 3 times daily as needed postoperatively for pain control.    ROS:     CONSTITUTIONAL:  Positive  for fatigue.   Negative for chills or fever.      EYES:  Negative for blurred vision.      CARDIOVASCULAR:  Negative for chest pain, dizziness, palpitations and edema.      RESPIRATORY:  Negative for dyspnea and cough.      GASTROINTESTINAL:  Negative for abdominal pain, diarrhea, nausea and vomiting.      MUSCULOSKELETAL:  Positive for arthralgias, (diffuse) back pain ( chronic ) and Bilateral hand and wrist pain.      NEUROLOGICAL:  Positive for headaches, paresthesias and weakness.      ENDOCRINE:  Negative for hair loss, heat/cold intolerance, polydipsia, and polyphagia.      PSYCHIATRIC:  Negative for anxiety, depression, sleep disturbance and suicidal thoughts.          Past Medical History / Family History / Social History:         Last Reviewed on 12/16/2019 09:22 AM by Prakash Rivera    Past Medical History:             PAST MEDICAL HISTORY     UNREMARKABLE Hospitalizations: Never         GYNECOLOGICAL HISTORY:    No pregnancy-related problems.    Problems with menstrual cycles include irregular frequency/duration.      Menarche occurred at age 14.    Last Pap was 10/2017  [facility]  [facility] (last pap normal); (+) hx of abnormal Pap ( cervical dysplasia; she has undergone LEEP )    Has never had a mammogram.          Surgical History:         Cholecystectomy: at age age 21; details/complications?;     LEEP procedure 2012;         Family History:     Unknown         Social History:     Occupation:. Homemaker     Marital Status:      Children: 2 children and 2 step-children         Tobacco/Alcohol/Supplements:     Last Reviewed on 12/16/2019 09:22 AM by Prakash Rivera    Tobacco: She has a past history of cigarette smoking; quit date:  feb 2016.  Non-drinker     Caffeine:  She admits to consuming caffeine via soda.          Substance Abuse History:     Last Reviewed on 12/16/2019 09:22 AM by Prakash Rivera    None         Mental Health History:     Last Reviewed on 12/16/2019 09:22 AM by Miguel  "Prakash        mild depression         Communicable Diseases (eg STDs):     Last Reviewed on 12/16/2019 09:22 AM by Prakash Rivera    Reportable health conditions; NEGATIVE         Current Problems:     Last Reviewed on 12/16/2019 09:22 AM by Prakash Rivera    Persistent mood [affective] disorder, unspecified    Other mixed anxiety disorders    Cerebral apoplexy(pituitary)    Constipation, unspecified    Chronic fatigue, unspecified    Carpal tunnel syndrome, bilateral upper limbs        Immunizations:     influenza, injectable, quadrivalent, preservative free 11/14/2019    Fluzone Quadrivalent (3+ years) 9/25/2018        Allergies:     Last Reviewed on 12/16/2019 09:22 AM by Prakash Rivera    Codeine Sulfate:          Current Medications:     Last Reviewed on 12/16/2019 09:22 AM by Prakash Rivera    Vitamin D2  [once a day]    busPIRone 5 mg oral tablet [one tablet q hs prn anxiety]    citalopram 40 mg oral tablet [1 po qd]    gabapentin 300 mg oral capsule [take 1 capsule (300 mg) by oral route 3 times per day]    traMADol  [PRN ]        Objective:        Vitals:         Current: 12/16/2019 8:36:42 AM    Ht:  5 ft, 3 in;  Wt: 183.8 lbs;  BMI: 32.6T: 98.3 F (oral);  BP: 97/70 mm Hg (right arm, sitting);  P: 70 bpm (right arm (BP Cuff), sitting);  sCr: 0.72 mg/dL;  GFR: 119.16        Exams:     PHYSICAL EXAM:     GENERAL: vital signs recorded - well developed, well nourished;  no apparent distress;     EYES: conjunctiva and cornea are normal;     NECK: range of motion is normal; Cervical paraspinal muscle spasm noted as well as periscapular muscle spasm;     RESPIRATORY: Clear to auscultation bilaterally; no rales (\"crackles\") present; no rhonchi; no wheezes;     CARDIOVASCULAR: normal rate; rhythm is regular;  No murmurs. clicks, gallops or rubs appreciated; no edema;     BREAST/INTEGUMENT: No significant rashes, lesions or suspicious moles within limits of examination;     MUSCULOSKELETAL: Right wrist brace in place; " when removed, surgical wound from recent carpal tunnel release is noted to be healing well Widespread muscular and joint tenderness to palpation without effusion, erythema or deformity     NEUROLOGIC: Grossly intact; mental status: alert and oriented x 3;     PSYCHIATRIC: appropriate affect and demeanor; normal speech pattern; Normal behavior;         Assessment:         R53.82   Chronic fatigue, unspecified       G56.03   Carpal tunnel syndrome, bilateral upper limbs           Plan:         Chronic fatigue, unspecified- Persistent.  Working diagnosis of fibromyalgia but the full extent of autoimmune disease has not yet been ruled out.  Will discontinue gabapentin as this was not efficacious.  Continue to follow with rheumatology as directed.  Continue BuSpar and Celexa.  For further pain control, she can take ibuprofen 800 mg 3 times daily as needed and Tylenol 1000 mg 3 times daily as needed.        Carpal tunnel syndrome, bilateral upper limbs- S/p right carpal tunnel release on 12/3/2019 with Dr. Anthony.  Continue to follow with orthopedics as directed.  Continue Tylenol and/or ibuprofen as needed for pain control.            Charge Capture:         Primary Diagnosis:     R53.82  Chronic fatigue, unspecified           Orders:      [id number]  Office/outpatient visit; established patient, level 4  (In-House)              G56.03  Carpal tunnel syndrome, bilateral upper limbs

## 2021-05-18 NOTE — PROGRESS NOTES
"Gin Alexandra  1985     Office/Outpatient Visit    Visit Date: Thu, Nov 14, 2019 02:26 pm    Provider: Prakash Rivera MD (Assistant: Spurling, Sarah C, MA)    Location: [facility]        Electronically signed by Prakash Rivera MD on  12/04/2019 07:04:55 PM                             Subjective:        CC: Ms. Alexandra is a 34 year old White female.  Issues w fatigue. She said it has been going on for years.;         HPI:       Patient complains of longstanding fatigue and diffuse pain.  She says her symptoms started approximately 3 years ago.  She says her fatigue has been persistently worsening over that time span.  She says all she wants to do a sleep and that her fatigue is so severe that if she can lie in bed all day she would.  As far as her pain, she notes that she \"hurts everywhere.\"  Some of the body parts she list is painful include her hands, wrists, shoulders, back, thighs, hips and neck.  She was recently referred to a rheumatologist for the symptoms. Allergy ordered a lab work-up including LETICIA, ESR, CRP, C, aldolase, rheumatoid factor, CCP, B12, vitamin D, TSH, celiac panel and a hip x-ray.  The entire work-up was unremarkable except for a mildly elevated sed rate of 25.  Rheumatology's official impression was that they had a high index of suspicion for fibromyalgia. They plan to follow-up with her in 2 weeks.       Prior visit, the patient was referred for an EMG given paresthesias and pain in both the ulnar and median nerve distributions.  EMG was positive for carpal tunnel syndrome in the right hand.  She has since been referred to a hand surgeon but is yet to have this appointment.  She says that her pain has continued unchanged.    ROS:     CONSTITUTIONAL:  Positive for fatigue.   Negative for chills or fever.      EYES:  Negative for blurred vision.      CARDIOVASCULAR:  Negative for chest pain, dizziness, palpitations and edema.      RESPIRATORY:  Negative for dyspnea and " cough.      GASTROINTESTINAL:  Negative for abdominal pain, diarrhea, nausea and vomiting.      MUSCULOSKELETAL:  Positive for Bilateral hand and wrist pain.      NEUROLOGICAL:  Positive for headaches, paresthesias and weakness.      ENDOCRINE:  Negative for hair loss, heat/cold intolerance, polydipsia, and polyphagia.      PSYCHIATRIC:  Negative for anxiety, depression, sleep disturbance and suicidal thoughts.          Past Medical History / Family History / Social History:         Last Reviewed on 12/04/2019 07:04 PM by Prakash Rivera    Past Medical History:             PAST MEDICAL HISTORY     UNREMARKABLE Hospitalizations: Never         GYNECOLOGICAL HISTORY:    No pregnancy-related problems.    Problems with menstrual cycles include irregular frequency/duration.      Menarche occurred at age 14.    Last Pap was 10/2017  [facility] (last pap normal); (+) hx of abnormal Pap ( cervical dysplasia; she has undergone LEEP )    Has never had a mammogram.          Surgical History:         Cholecystectomy: at age age 21; details/complications?;     LEEP procedure 2012;         Family History:     Unknown         Social History:     Occupation:. Homemaker     Marital Status:      Children: 2 children and 2 step-children         Tobacco/Alcohol/Supplements:     Last Reviewed on 12/04/2019 07:04 PM by Prakash Rivera    Tobacco: She has a past history of cigarette smoking; quit date:  feb 2016.  Non-drinker     Caffeine:  She admits to consuming caffeine via soda.          Substance Abuse History:     Last Reviewed on 12/04/2019 07:04 PM by Prakash Rivera    None         Mental Health History:     Last Reviewed on 12/04/2019 07:04 PM by Prakash Rivera        mild depression         Communicable Diseases (eg STDs):     Last Reviewed on 12/04/2019 07:04 PM by Prakash Rivera    Reportable health conditions; NEGATIVE         Current Problems:     Last Reviewed on 12/04/2019 07:04 PM by Prakash Rivera     "Persistent mood [affective] disorder, unspecified    Other mixed anxiety disorders    Anxiety with depression    Other fatigue    Cerebral apoplexy(pituitary)    Constipation, unspecified    Cervicalgia    Pain in right hand    Pain in left hand    Hand pain    Chronic fatigue, unspecified    Carpal tunnel syndrome, bilateral upper limbs        Immunizations:     Fluzone Quadrivalent (3+ years) 9/25/2018    influenza, injectable, quadrivalent, preservative free 11/14/2019        Allergies:     Last Reviewed on 12/04/2019 07:04 PM by Prakash Rivera    Codeine Sulfate:          Current Medications:     Last Reviewed on 12/04/2019 07:04 PM by Prakash Rivera    busPIRone 5 mg oral tablet [one tablet q hs prn anxiety]    citalopram 40 mg oral tablet [1 po qd]    cyclobenzaprine 10 mg oral tablet [1 tab tid PRN]    Vitamin D2  [once a day]        Objective:        Vitals:         Current: 11/14/2019 2:31:12 PM    Ht:  5 ft, 3 in;  Wt: 183.4 lbs;  BMI: 32.5T: 98.4 F (oral);  BP: 121/64 mm Hg (right arm, sitting);  P: 92 bpm (right arm (BP Cuff), sitting);  sCr: 0.72 mg/dL;  GFR: 119.05        Exams:     PHYSICAL EXAM:     GENERAL: vital signs recorded - well developed, well nourished;  no apparent distress;     EYES: conjunctiva and cornea are normal;     NECK: range of motion is normal; Cervical paraspinal muscle spasm noted as well as periscapular muscle spasm;     RESPIRATORY: Clear to auscultation bilaterally; no rales (\"crackles\") present; no rhonchi; no wheezes;     CARDIOVASCULAR: normal rate; rhythm is regular;  No murmurs. clicks, gallops or rubs appreciated; no edema;     BREAST/INTEGUMENT: No significant rashes, lesions or suspicious moles within limits of examination;     MUSCULOSKELETAL: Widespread muscular and joint tenderness to palpation without effusion, erythema or deformity     NEUROLOGIC: Grossly intact; mental status: alert and oriented x 3;     PSYCHIATRIC: appropriate affect and demeanor; normal speech " pattern; Normal behavior;         Assessment:         R53.82   Chronic fatigue, unspecified       G56.03   Carpal tunnel syndrome, bilateral upper limbs           ORDERS:         Meds Prescribed:       [New Rx] gabapentin 300 mg oral capsule [take 1 capsule (300 mg) by oral route 3 times per day], #30 (thirty) capsules, Refills: 0 (zero)                 Plan:         Chronic fatigue, unspecified- Will start gabapentin 300 mg TID prn as this would be potential treatment for both fibromyalgia and the nerve pain associated with her carpal tunnel. Continue buspar and celexa. Continue to follow with Rheumatology as directed.          Prescriptions:       [New Rx] gabapentin 300 mg oral capsule [take 1 capsule (300 mg) by oral route 3 times per day], #30 (thirty) capsules, Refills: 0 (zero)         Carpal tunnel syndrome, bilateral upper limbs- Demonstrated on recent EMG. Patient has been referred to hand surgeon but has yet to have this appt. Start gabapentin as above. Continue tylenol and/or ibuprofen as needed.            Charge Capture:         Primary Diagnosis:     R53.82  Chronic fatigue, unspecified           Orders:      22613  Office/outpatient visit; established patient, level 4  (In-House)              G56.03  Carpal tunnel syndrome, bilateral upper limbs

## 2021-07-01 VITALS
HEIGHT: 63 IN | SYSTOLIC BLOOD PRESSURE: 106 MMHG | WEIGHT: 179.4 LBS | HEART RATE: 74 BPM | TEMPERATURE: 98.9 F | DIASTOLIC BLOOD PRESSURE: 73 MMHG | BODY MASS INDEX: 31.79 KG/M2

## 2021-07-01 VITALS
WEIGHT: 174.2 LBS | HEIGHT: 63 IN | BODY MASS INDEX: 30.87 KG/M2 | HEART RATE: 73 BPM | DIASTOLIC BLOOD PRESSURE: 57 MMHG | SYSTOLIC BLOOD PRESSURE: 111 MMHG | TEMPERATURE: 98.4 F

## 2021-07-01 VITALS
SYSTOLIC BLOOD PRESSURE: 121 MMHG | WEIGHT: 183.4 LBS | HEART RATE: 92 BPM | DIASTOLIC BLOOD PRESSURE: 64 MMHG | HEIGHT: 63 IN | TEMPERATURE: 98.4 F | BODY MASS INDEX: 32.5 KG/M2

## 2021-07-01 VITALS
WEIGHT: 186 LBS | HEART RATE: 73 BPM | HEIGHT: 63 IN | BODY MASS INDEX: 32.96 KG/M2 | SYSTOLIC BLOOD PRESSURE: 108 MMHG | DIASTOLIC BLOOD PRESSURE: 60 MMHG | TEMPERATURE: 98.5 F

## 2021-07-01 VITALS
DIASTOLIC BLOOD PRESSURE: 56 MMHG | WEIGHT: 179.2 LBS | SYSTOLIC BLOOD PRESSURE: 111 MMHG | HEART RATE: 76 BPM | HEIGHT: 63 IN | BODY MASS INDEX: 31.75 KG/M2 | TEMPERATURE: 99.1 F

## 2021-07-01 VITALS
HEART RATE: 70 BPM | BODY MASS INDEX: 30.83 KG/M2 | HEIGHT: 63 IN | SYSTOLIC BLOOD PRESSURE: 120 MMHG | DIASTOLIC BLOOD PRESSURE: 65 MMHG | WEIGHT: 174 LBS

## 2021-07-01 VITALS
BODY MASS INDEX: 31.96 KG/M2 | TEMPERATURE: 98.4 F | SYSTOLIC BLOOD PRESSURE: 103 MMHG | HEART RATE: 74 BPM | HEIGHT: 63 IN | WEIGHT: 180.4 LBS | DIASTOLIC BLOOD PRESSURE: 63 MMHG

## 2021-07-01 VITALS
HEIGHT: 63 IN | SYSTOLIC BLOOD PRESSURE: 111 MMHG | DIASTOLIC BLOOD PRESSURE: 60 MMHG | WEIGHT: 189.2 LBS | HEART RATE: 77 BPM | BODY MASS INDEX: 33.52 KG/M2 | TEMPERATURE: 98.4 F

## 2021-07-01 VITALS
HEART RATE: 70 BPM | HEIGHT: 63 IN | DIASTOLIC BLOOD PRESSURE: 70 MMHG | WEIGHT: 183.8 LBS | BODY MASS INDEX: 32.57 KG/M2 | SYSTOLIC BLOOD PRESSURE: 97 MMHG | TEMPERATURE: 98.3 F

## 2021-07-01 VITALS
DIASTOLIC BLOOD PRESSURE: 76 MMHG | WEIGHT: 185.1 LBS | BODY MASS INDEX: 32.8 KG/M2 | SYSTOLIC BLOOD PRESSURE: 120 MMHG | TEMPERATURE: 97.8 F | HEART RATE: 79 BPM | HEIGHT: 63 IN

## 2021-07-02 VITALS
DIASTOLIC BLOOD PRESSURE: 60 MMHG | BODY MASS INDEX: 32.6 KG/M2 | TEMPERATURE: 97.3 F | WEIGHT: 184 LBS | HEIGHT: 63 IN | HEART RATE: 86 BPM | SYSTOLIC BLOOD PRESSURE: 112 MMHG

## 2021-07-02 VITALS
HEART RATE: 75 BPM | SYSTOLIC BLOOD PRESSURE: 105 MMHG | TEMPERATURE: 98.9 F | DIASTOLIC BLOOD PRESSURE: 76 MMHG | HEIGHT: 63 IN | WEIGHT: 191.6 LBS | BODY MASS INDEX: 33.95 KG/M2

## 2021-07-02 VITALS
BODY MASS INDEX: 32.04 KG/M2 | HEART RATE: 83 BPM | HEIGHT: 63 IN | DIASTOLIC BLOOD PRESSURE: 85 MMHG | SYSTOLIC BLOOD PRESSURE: 112 MMHG | TEMPERATURE: 97 F | WEIGHT: 180.8 LBS

## 2021-07-02 VITALS
HEIGHT: 63 IN | DIASTOLIC BLOOD PRESSURE: 70 MMHG | HEART RATE: 88 BPM | WEIGHT: 185.9 LBS | BODY MASS INDEX: 32.94 KG/M2 | SYSTOLIC BLOOD PRESSURE: 106 MMHG | OXYGEN SATURATION: 98 % | TEMPERATURE: 98.5 F

## 2021-07-02 VITALS
DIASTOLIC BLOOD PRESSURE: 80 MMHG | SYSTOLIC BLOOD PRESSURE: 119 MMHG | BODY MASS INDEX: 31.11 KG/M2 | HEART RATE: 84 BPM | HEIGHT: 63 IN | WEIGHT: 175.6 LBS | TEMPERATURE: 97.8 F

## 2021-07-02 VITALS
HEART RATE: 97 BPM | SYSTOLIC BLOOD PRESSURE: 117 MMHG | HEIGHT: 63 IN | WEIGHT: 185.2 LBS | TEMPERATURE: 98.2 F | DIASTOLIC BLOOD PRESSURE: 69 MMHG | BODY MASS INDEX: 32.82 KG/M2

## 2021-07-08 ENCOUNTER — DOCUMENTATION (OUTPATIENT)
Dept: FAMILY MEDICINE CLINIC | Age: 36
End: 2021-07-08

## 2021-07-08 NOTE — PROGRESS NOTES
PA for saxenda sent via covermymeds. Awaiting response.     *Looks like approved in 4/2021 notes on EMDs

## 2021-07-29 RX ORDER — IBUPROFEN 800 MG/1
800 TABLET ORAL EVERY 6 HOURS PRN
COMMUNITY
End: 2021-07-29 | Stop reason: SDUPTHER

## 2021-07-29 RX ORDER — IBUPROFEN 800 MG/1
800 TABLET ORAL EVERY 6 HOURS PRN
Qty: 90 TABLET | Refills: 1 | Status: SHIPPED | OUTPATIENT
Start: 2021-07-29 | End: 2022-03-16 | Stop reason: SDUPTHER

## 2021-08-26 RX ORDER — BUSPIRONE HYDROCHLORIDE 5 MG/1
2 TABLET ORAL 3 TIMES DAILY
COMMUNITY
End: 2021-08-26 | Stop reason: SDUPTHER

## 2021-08-26 RX ORDER — BUSPIRONE HYDROCHLORIDE 5 MG/1
10 TABLET ORAL 2 TIMES DAILY
Qty: 180 TABLET | Refills: 1 | Status: SHIPPED | OUTPATIENT
Start: 2021-08-26 | End: 2021-11-11 | Stop reason: SDUPTHER

## 2021-09-20 RX ORDER — DULOXETIN HYDROCHLORIDE 60 MG/1
1 CAPSULE, DELAYED RELEASE ORAL DAILY
COMMUNITY
End: 2021-09-20

## 2021-09-20 RX ORDER — DULOXETIN HYDROCHLORIDE 60 MG/1
CAPSULE, DELAYED RELEASE ORAL
Qty: 90 CAPSULE | Refills: 0 | Status: SHIPPED | OUTPATIENT
Start: 2021-09-20 | End: 2021-11-11 | Stop reason: SDUPTHER

## 2021-11-11 ENCOUNTER — OFFICE VISIT (OUTPATIENT)
Dept: FAMILY MEDICINE CLINIC | Age: 36
End: 2021-11-11

## 2021-11-11 VITALS
BODY MASS INDEX: 34.8 KG/M2 | WEIGHT: 196.4 LBS | HEART RATE: 77 BPM | SYSTOLIC BLOOD PRESSURE: 124 MMHG | HEIGHT: 63 IN | DIASTOLIC BLOOD PRESSURE: 66 MMHG | TEMPERATURE: 99.2 F

## 2021-11-11 DIAGNOSIS — G89.29 CHRONIC MIDLINE THORACIC BACK PAIN: ICD-10-CM

## 2021-11-11 DIAGNOSIS — M54.6 CHRONIC MIDLINE THORACIC BACK PAIN: ICD-10-CM

## 2021-11-11 DIAGNOSIS — F41.9 ANXIETY: ICD-10-CM

## 2021-11-11 DIAGNOSIS — R10.10 PAIN OF UPPER ABDOMEN: ICD-10-CM

## 2021-11-11 DIAGNOSIS — E78.5 HYPERLIPIDEMIA, UNSPECIFIED HYPERLIPIDEMIA TYPE: ICD-10-CM

## 2021-11-11 DIAGNOSIS — Z23 IMMUNIZATION DUE: Primary | ICD-10-CM

## 2021-11-11 DIAGNOSIS — M79.7 FIBROMYALGIA: ICD-10-CM

## 2021-11-11 DIAGNOSIS — G44.221 CHRONIC TENSION-TYPE HEADACHE, INTRACTABLE: ICD-10-CM

## 2021-11-11 DIAGNOSIS — R53.83 FATIGUE, UNSPECIFIED TYPE: ICD-10-CM

## 2021-11-11 DIAGNOSIS — Z11.59 ENCOUNTER FOR SCREENING FOR OTHER VIRAL DISEASES: ICD-10-CM

## 2021-11-11 PROBLEM — K58.9 IBS (IRRITABLE BOWEL SYNDROME): Status: ACTIVE | Noted: 2021-11-11

## 2021-11-11 PROBLEM — E66.811 CLASS 1 OBESITY DUE TO EXCESS CALORIES WITHOUT SERIOUS COMORBIDITY WITH BODY MASS INDEX (BMI) OF 34.0 TO 34.9 IN ADULT: Status: ACTIVE | Noted: 2021-11-11

## 2021-11-11 PROBLEM — E66.09 CLASS 1 OBESITY DUE TO EXCESS CALORIES WITHOUT SERIOUS COMORBIDITY WITH BODY MASS INDEX (BMI) OF 34.0 TO 34.9 IN ADULT: Status: ACTIVE | Noted: 2021-11-11

## 2021-11-11 PROCEDURE — 90686 IIV4 VACC NO PRSV 0.5 ML IM: CPT | Performed by: NURSE PRACTITIONER

## 2021-11-11 PROCEDURE — 99214 OFFICE O/P EST MOD 30 MIN: CPT | Performed by: NURSE PRACTITIONER

## 2021-11-11 PROCEDURE — 90471 IMMUNIZATION ADMIN: CPT | Performed by: NURSE PRACTITIONER

## 2021-11-11 RX ORDER — TIZANIDINE 4 MG/1
4 TABLET ORAL EVERY 6 HOURS PRN
Qty: 30 TABLET | Refills: 0 | Status: SHIPPED | OUTPATIENT
Start: 2021-11-11 | End: 2021-12-23

## 2021-11-11 RX ORDER — TIZANIDINE 4 MG/1
4 TABLET ORAL EVERY 6 HOURS PRN
Qty: 30 TABLET | Refills: 0 | Status: SHIPPED | OUTPATIENT
Start: 2021-11-11 | End: 2021-11-11

## 2021-11-11 RX ORDER — BUSPIRONE HYDROCHLORIDE 10 MG/1
20 TABLET ORAL DAILY
Qty: 180 TABLET | Refills: 1 | Status: SHIPPED | OUTPATIENT
Start: 2021-11-11 | End: 2022-02-25

## 2021-11-11 RX ORDER — DULOXETIN HYDROCHLORIDE 60 MG/1
60 CAPSULE, DELAYED RELEASE ORAL DAILY
Qty: 90 CAPSULE | Refills: 1 | Status: SHIPPED | OUTPATIENT
Start: 2021-11-11 | End: 2022-02-08 | Stop reason: SDUPTHER

## 2021-11-11 NOTE — PROGRESS NOTES
"Chief Complaint  Back Pain, Headache, and Fibromyalgia    Subjective          Gin Alexandra presents to [facility] wishing to establish care.  Patient was previously seeing Dr. Rivera.  She is taking Cymbalta for fibromyalgia.  BuSpar for anxiety/depression.  She is complaining of thoracic back pain.  Once this pain occurs she states that she will have abdominal pain as well.  She also feels as if she is bloated.  Patient has had a cholecystectomy.  She has a CT of abdomen pelvis in chart from 2017 which showed mild splenomegaly.  Thoracic x-ray from 2019 was negative for acute findings. degnerative changes were noted.  No injury since.  Patient complaining of frequent headaches that start at the base of skull and wraps around to the back of eyes.  She is having at least 2-3 every week.  Denies stress or anxiety.  Patient is needing refills of BuSpar and Cymbalta.  Medical, surgical, family and social histories reviewed and updated in chart.          Objective   Vital Signs:   /66 (BP Location: Left arm, Patient Position: Sitting, Cuff Size: Large Adult)   Pulse 77   Temp 99.2 °F (37.3 °C) (Oral)   Ht 160 cm (62.99\")   Wt 89.1 kg (196 lb 6.4 oz)   BMI 34.80 kg/m²     Physical Exam  Vitals reviewed.   Constitutional:       General: She is not in acute distress.     Appearance: Normal appearance. She is well-developed. She is obese.   HENT:      Head: Normocephalic and atraumatic.   Eyes:      Conjunctiva/sclera: Conjunctivae normal.      Pupils: Pupils are equal, round, and reactive to light.   Cardiovascular:      Rate and Rhythm: Normal rate and regular rhythm.      Heart sounds: Normal heart sounds. No murmur heard.      Pulmonary:      Effort: Pulmonary effort is normal. No respiratory distress.      Breath sounds: Normal breath sounds. No wheezing, rhonchi or rales.   Abdominal:      General: Bowel sounds are normal. There is no distension.      Palpations: Abdomen is " soft. There is no mass.      Tenderness: There is abdominal tenderness. There is no right CVA tenderness, left CVA tenderness, guarding or rebound.      Hernia: No hernia is present.      Comments: Mild RLQ tenderness   Musculoskeletal:         General: No swelling, tenderness, deformity or signs of injury. Normal range of motion.      Cervical back: Full passive range of motion without pain.      Right lower leg: No edema.      Left lower leg: No edema.   Skin:     General: Skin is warm and dry.   Neurological:      Mental Status: She is alert and oriented to person, place, and time.   Psychiatric:         Mood and Affect: Mood and affect normal.         Behavior: Behavior normal.         Thought Content: Thought content normal.         Judgment: Judgment normal.          Result Review :   The following data was reviewed by: LUCIA Quiroz on 11/11/2021:  Telemedicine Converted with Eleni Zepeda APRN (12/18/2020)           Assessment and Plan    Diagnoses and all orders for this visit:    1. Immunization due (Primary)  -     FluLaval/Fluarix/Fluzone >6 Months ([id number])    2. Chronic midline thoracic back pain  Comments:  Xray reviewed from 2019. degenerative changes. No injury since. Will eval and tx abd pain as I think they are connected.     3. Chronic tension-type headache, intractable  Comments:  Potential side effects of medication discussed.  If she is to try Zanaflex as needed.  Ibuprofen/Tylenol for discomfort.  Orders:  -     tiZANidine (Zanaflex) 4 MG tablet; Take 1 tablet by mouth Every 6 (Six) Hours As Needed for Muscle Spasms.  Dispense: 30 tablet; Refill: 0    4. Pain of upper abdomen  Comments:  Reviewed GI notes.  Patient did not have follow-up.  GI note stated to do CT if pain had not resolved.  Will order notify her of results.  Orders:  -     CT Abdomen Pelvis With & Without Contrast; Future  -     CBC Auto Differential; Future  -     Comprehensive Metabolic Panel;  Future    5. Fatigue, unspecified type  Comments:  Will notify patient of results.  Sleep hygiene discussed.  Follow-up with sleep physician within the month  Orders:  -     Vitamin D 25 Hydroxy; Future  -     Vitamin B12; Future  -     TSH; Future    6. Encounter for screening for other viral diseases  Comments:  will notify pt of result  Orders:  -     Hepatitis C Antibody; Future    7. Anxiety  Comments:  Continue BuSpar and Cymbalta.  Orders:  -     busPIRone (BUSPAR) 10 MG tablet; Take 2 tablets by mouth Daily.  Dispense: 180 tablet; Refill: 1    8. Fibromyalgia  Comments:  Continue to take Cymbalta as prescribed.  Orders:  -     DULoxetine (CYMBALTA) 60 MG capsule; Take 1 capsule by mouth Daily.  Dispense: 90 capsule; Refill: 1    9. Hyperlipidemia, unspecified hyperlipidemia type  Comments:  Will notify patient of results.  Orders:  -     Lipid Panel; Future    Other orders  -     Discontinue: tiZANidine (Zanaflex) 4 MG tablet; Take 1 tablet by mouth Every 6 (Six) Hours As Needed for Muscle Spasms.  Dispense: 30 tablet; Refill: 0    Patient to notify office with any acute concerns or issues.  Patient verbalizes understanding, agrees with plan of care and has no further questions upon discharge.    Please note that portions of this note were completed with a voice recognition program.    Follow Up    Return in about 6 months (around 5/11/2022) for Annual physical.  Patient was given instructions and counseling regarding her condition or for health maintenance advice. Please see specific information pulled into the AVS if appropriate.

## 2021-11-15 ENCOUNTER — LAB (OUTPATIENT)
Dept: LAB | Facility: HOSPITAL | Age: 36
End: 2021-11-15

## 2021-11-15 DIAGNOSIS — E78.5 HYPERLIPIDEMIA, UNSPECIFIED HYPERLIPIDEMIA TYPE: ICD-10-CM

## 2021-11-15 DIAGNOSIS — Z11.59 ENCOUNTER FOR SCREENING FOR OTHER VIRAL DISEASES: ICD-10-CM

## 2021-11-15 DIAGNOSIS — R10.10 PAIN OF UPPER ABDOMEN: ICD-10-CM

## 2021-11-15 DIAGNOSIS — R53.83 FATIGUE, UNSPECIFIED TYPE: ICD-10-CM

## 2021-11-15 LAB
25(OH)D3 SERPL-MCNC: 31.1 NG/ML (ref 30–100)
ALBUMIN SERPL-MCNC: 4.4 G/DL (ref 3.5–5.2)
ALBUMIN/GLOB SERPL: 1.8 G/DL
ALP SERPL-CCNC: 87 U/L (ref 39–117)
ALT SERPL W P-5'-P-CCNC: 23 U/L (ref 1–33)
ANION GAP SERPL CALCULATED.3IONS-SCNC: 6.4 MMOL/L (ref 5–15)
AST SERPL-CCNC: 14 U/L (ref 1–32)
BASOPHILS # BLD AUTO: 0.05 10*3/MM3 (ref 0–0.2)
BASOPHILS NFR BLD AUTO: 0.9 % (ref 0–1.5)
BILIRUB SERPL-MCNC: 0.2 MG/DL (ref 0–1.2)
BUN SERPL-MCNC: 10 MG/DL (ref 6–20)
BUN/CREAT SERPL: 12.8 (ref 7–25)
CALCIUM SPEC-SCNC: 9.4 MG/DL (ref 8.6–10.5)
CHLORIDE SERPL-SCNC: 103 MMOL/L (ref 98–107)
CHOLEST SERPL-MCNC: 184 MG/DL (ref 0–200)
CO2 SERPL-SCNC: 28.6 MMOL/L (ref 22–29)
CREAT SERPL-MCNC: 0.78 MG/DL (ref 0.57–1)
DEPRECATED RDW RBC AUTO: 40.7 FL (ref 37–54)
EOSINOPHIL # BLD AUTO: 0.12 10*3/MM3 (ref 0–0.4)
EOSINOPHIL NFR BLD AUTO: 2.2 % (ref 0.3–6.2)
ERYTHROCYTE [DISTWIDTH] IN BLOOD BY AUTOMATED COUNT: 12.5 % (ref 12.3–15.4)
GFR SERPL CREATININE-BSD FRML MDRD: 85 ML/MIN/1.73
GLOBULIN UR ELPH-MCNC: 2.5 GM/DL
GLUCOSE SERPL-MCNC: 101 MG/DL (ref 65–99)
HCT VFR BLD AUTO: 37.7 % (ref 34–46.6)
HCV AB SER DONR QL: NORMAL
HDLC SERPL-MCNC: 32 MG/DL (ref 40–60)
HGB BLD-MCNC: 13 G/DL (ref 12–15.9)
IMM GRANULOCYTES # BLD AUTO: 0.01 10*3/MM3 (ref 0–0.05)
IMM GRANULOCYTES NFR BLD AUTO: 0.2 % (ref 0–0.5)
LDLC SERPL CALC-MCNC: 106 MG/DL (ref 0–100)
LDLC/HDLC SERPL: 3.1 {RATIO}
LYMPHOCYTES # BLD AUTO: 1.76 10*3/MM3 (ref 0.7–3.1)
LYMPHOCYTES NFR BLD AUTO: 32.3 % (ref 19.6–45.3)
MCH RBC QN AUTO: 30.2 PG (ref 26.6–33)
MCHC RBC AUTO-ENTMCNC: 34.5 G/DL (ref 31.5–35.7)
MCV RBC AUTO: 87.7 FL (ref 79–97)
MONOCYTES # BLD AUTO: 0.4 10*3/MM3 (ref 0.1–0.9)
MONOCYTES NFR BLD AUTO: 7.3 % (ref 5–12)
NEUTROPHILS NFR BLD AUTO: 3.11 10*3/MM3 (ref 1.7–7)
NEUTROPHILS NFR BLD AUTO: 57.1 % (ref 42.7–76)
PLATELET # BLD AUTO: 263 10*3/MM3 (ref 140–450)
PMV BLD AUTO: 9.3 FL (ref 6–12)
POTASSIUM SERPL-SCNC: 4.2 MMOL/L (ref 3.5–5.2)
PROT SERPL-MCNC: 6.9 G/DL (ref 6–8.5)
RBC # BLD AUTO: 4.3 10*6/MM3 (ref 3.77–5.28)
SODIUM SERPL-SCNC: 138 MMOL/L (ref 136–145)
TRIGL SERPL-MCNC: 264 MG/DL (ref 0–150)
TSH SERPL DL<=0.05 MIU/L-ACNC: 2.14 UIU/ML (ref 0.27–4.2)
VIT B12 BLD-MCNC: 306 PG/ML (ref 211–946)
VLDLC SERPL-MCNC: 46 MG/DL (ref 5–40)
WBC # BLD AUTO: 5.45 10*3/MM3 (ref 3.4–10.8)

## 2021-11-15 PROCEDURE — 82607 VITAMIN B-12: CPT

## 2021-11-15 PROCEDURE — 86803 HEPATITIS C AB TEST: CPT

## 2021-11-15 PROCEDURE — 85025 COMPLETE CBC W/AUTO DIFF WBC: CPT

## 2021-11-15 PROCEDURE — 82306 VITAMIN D 25 HYDROXY: CPT

## 2021-11-15 PROCEDURE — 80061 LIPID PANEL: CPT

## 2021-11-15 PROCEDURE — 36415 COLL VENOUS BLD VENIPUNCTURE: CPT

## 2021-11-15 PROCEDURE — 80053 COMPREHEN METABOLIC PANEL: CPT

## 2021-11-15 PROCEDURE — 84443 ASSAY THYROID STIM HORMONE: CPT

## 2021-11-17 ENCOUNTER — HOSPITAL ENCOUNTER (OUTPATIENT)
Dept: CT IMAGING | Facility: HOSPITAL | Age: 36
Discharge: HOME | End: 2021-11-17
Admitting: NURSE PRACTITIONER

## 2021-11-17 DIAGNOSIS — R10.10 PAIN OF UPPER ABDOMEN: ICD-10-CM

## 2021-11-17 PROCEDURE — 0 IOPAMIDOL PER 1 ML: Performed by: NURSE PRACTITIONER

## 2021-11-17 PROCEDURE — 74177 CT ABD & PELVIS W/CONTRAST: CPT

## 2021-11-17 RX ADMIN — IOPAMIDOL 100 ML: 755 INJECTION, SOLUTION INTRAVENOUS at 14:01

## 2021-11-23 RX ORDER — ONDANSETRON 4 MG/1
4 TABLET, ORALLY DISINTEGRATING ORAL EVERY 8 HOURS PRN
Qty: 30 TABLET | Refills: 0 | Status: SHIPPED | OUTPATIENT
Start: 2021-11-23 | End: 2022-04-01

## 2021-11-23 RX ORDER — SUMATRIPTAN 50 MG/1
TABLET, FILM COATED ORAL
Qty: 9 TABLET | Refills: 1 | Status: SHIPPED | OUTPATIENT
Start: 2021-11-23 | End: 2021-11-23

## 2021-11-23 RX ORDER — SUMATRIPTAN 50 MG/1
TABLET, FILM COATED ORAL
Qty: 9 TABLET | Refills: 1 | Status: SHIPPED | OUTPATIENT
Start: 2021-11-23 | End: 2021-11-29

## 2021-11-29 RX ORDER — RIMEGEPANT SULFATE 75 MG/75MG
1 TABLET, ORALLY DISINTEGRATING ORAL DAILY PRN
Qty: 30 TABLET | Refills: 0 | Status: SHIPPED | OUTPATIENT
Start: 2021-11-29 | End: 2022-02-25

## 2021-12-01 DIAGNOSIS — G44.221 CHRONIC TENSION-TYPE HEADACHE, INTRACTABLE: Primary | ICD-10-CM

## 2021-12-02 ENCOUNTER — TELEPHONE (OUTPATIENT)
Dept: FAMILY MEDICINE CLINIC | Age: 36
End: 2021-12-02

## 2021-12-02 DIAGNOSIS — Z98.51 TUBAL LIGATION STATUS: Primary | ICD-10-CM

## 2021-12-02 NOTE — TELEPHONE ENCOUNTER
Pt called stating Dr Cavazos's office would not let her schedule an appt with them unless she had a referral from us.  DX-Dislodged tubal ligation clip. Please sign referral.   86 male with a PMH of ESRD on dialysis via tessio catheter T/TH/S, DM,  HTN, CAD s/p CABG, AICD, HFpEF, NSVT s/p Single Chamber AICD ([employer]), chronic suprapubic catheter / urine retention from neurogenic bladder and BPH, orthostatic hypotension, CKD IV (BL ~3.2), presents to the ED from dialysis for multiple falls x 2 weeks, and dialysis doctor stating pt syncopized and was not acting his normal self. pt admits a week ago he tripped and fell forward hitting the front of his head. pt walks with walker but does not use it at home. Pt states he feels tired. Pt denies chest pain, sob, headache, dizziness, visual changes, abdominal pain, back pain, n/v/d, numbness, tingling, fever, chills, coughing or urinary symptoms.  Nephro: Emmett Lynn 86 male with a PMH of ESRD on dialysis via tessio catheter T/TH/S, DM,  HTN, CAD s/p CABG, AICD, HFpEF, NSVT s/p Single   Chamber AICD ([employer]), chronic suprapubic catheter / urine retention from neurogenic bladder and BPH, orthostatic   hypotension, CKD V, presents to the ED from dialysis for recurrent falls. He was referred to ED by his kidney doctor for recurrent falls and   generalized weakness.  Patient admits a week ago he tripped and fell forward hitting the front of his head. Since then he has been having multiple falls.  He usually walks with walker but does not use it at home anymore due to generalized weakness.  Pt denies chest pain, sob, headache, dizziness, visual changes, abdominal pain, back pain, n/v/d, numbness, tingling, fever, chills,   coughing or urinary symptoms.  Nephro: Emmett Lynn. 86 male with a PMH of ESRD on dialysis via tessio catheter T/TH/S, DM,  HTN, CAD s/p CABG, AICD, HFpEF, NSVT s/p Single   Chamber AICD ([employer]), chronic suprapubic catheter / urine retention from neurogenic bladder and BPH, orthostatic   hypotension, CKD V, presents to the ED from dialysis for recurrent falls. He was referred to ED by his kidney doctor for recurrent falls and   generalized weakness.  Patient admits a week ago he tripped and fell forward hitting the front of his head. Since then he has been having multiple falls.  He usually walks with walker but does not use it at home anymore due to generalized weakness.  Pt denies chest pain, sob, headache, dizziness, visual changes, abdominal pain, back pain, n/v/d, numbness, tingling, fever, chills,   coughing or urinary symptoms.

## 2021-12-08 ENCOUNTER — TELEPHONE (OUTPATIENT)
Dept: FAMILY MEDICINE CLINIC | Age: 36
End: 2021-12-08

## 2021-12-13 RX ORDER — ALBUTEROL SULFATE 90 UG/1
2 AEROSOL, METERED RESPIRATORY (INHALATION) EVERY 4 HOURS PRN
Qty: 18 G | Refills: 2 | Status: SHIPPED | OUTPATIENT
Start: 2021-12-13 | End: 2022-12-27 | Stop reason: SDUPTHER

## 2021-12-13 RX ORDER — ALBUTEROL SULFATE 90 UG/1
2 AEROSOL, METERED RESPIRATORY (INHALATION) EVERY 4 HOURS PRN
COMMUNITY
End: 2021-12-13 | Stop reason: SDUPTHER

## 2021-12-13 NOTE — TELEPHONE ENCOUNTER
Caller: Ibrahima Gin SHITAL    Relationship: Self    Best call back number: [phone]    Requested Prescriptions:      ALBUTEROL INHALER 18  PUFFS     Pharmacy where request should be sent: [facility][id number] - [place], [place] - [address] AT [facility] [address]  - [phone]  - [phone] FX     Additional details provided by patient: OUT OF MEDICATION     Does the patient have less than a 3 day supply:  [x] Yes  [] No    Sal Vital Rep   12/13/21 12:44 EST

## 2021-12-21 DIAGNOSIS — G44.221 CHRONIC TENSION-TYPE HEADACHE, INTRACTABLE: ICD-10-CM

## 2021-12-23 RX ORDER — TIZANIDINE 4 MG/1
TABLET ORAL
Qty: 30 TABLET | Refills: 0 | Status: SHIPPED | OUTPATIENT
Start: 2021-12-23 | End: 2022-03-15

## 2022-01-08 NOTE — PROGRESS NOTES
Gin Alexandra 1985     Office/Outpatient Visit    Visit Date: Fri, Aug 24, 2018 10:06 am    Provider: Sandy Garcia N.P. (Assistant: Adriana Mckeon MA)    Location: [facility]        Electronically signed by Sandy Garcia N.P. on  08/24/2018 10:51:30 AM                             SUBJECTIVE:        CC:     Ms. Alexandra is a 32 year old White female.  presents today due to congestion, ear pain, sore throat and drainage X 2 days.          HPI:         Ms. Alexandra presents with acute sinusitis, unspecified.  This has been a problem for the past 3 days.  This is an acute problem without chronic or recurrent episodes.  Her primary symptoms include facial pressure, fever, headache, nasal congestion, post-nasal drip and rhinorrhea.  She denies chest congestion or cough.  She has already tried an antihistamine and nasal decongestant spray.  Pertinent medical history is unremarkable.      ROS:     CONSTITUTIONAL:  Positive for chills and fever ( to 101 degrees ).      E/N/T:  Positive for nasal congestion, frequent rhinorrhea, sinus pressure and teeth hurt and has foul nasal smell.   Negative for sore throat.      CARDIOVASCULAR:  Negative for chest pain, orthopnea, paroxysmal nocturnal dyspnea and pedal edema.      RESPIRATORY:  Negative for recent cough and dyspnea.      GASTROINTESTINAL:  Negative for abdominal pain, constipation, diarrhea, nausea and vomiting.      PSYCHIATRIC:  Negative for anxiety, depression, and sleep disturbances.          PMH/FMH/SH:     Last Reviewed on 8/24/2018 10:20 AM by Sandy Garcia    Past Medical History:             PAST MEDICAL HISTORY     UNREMARKABLE Hospitalizations: Never         GYNECOLOGICAL HISTORY:    No pregnancy-related problems.    Problems with menstrual cycles include irregular frequency/duration.      Menarche occurred at age 14.    Last Pap was 10/2017  [facility] (last pap normal); (+) hx of abnormal Pap ( cervical  dysplasia; she has undergone LEEP )    Has never had a mammogram.          Surgical History:         Cholecystectomy: at age age 21; details/complications?;      LEEP procedure 2012;         Family History:     Unknown         Social History:     Occupation: Homemaker     Marital Status:      Children: 2 children and 2 step-children         Tobacco/Alcohol/Supplements:     Last Reviewed on 8/24/2018 10:20 AM by Sandy Garcia    Tobacco: She has a past history of cigarette smoking; quit date:  feb 2016.  Non-drinker     Caffeine:  She admits to consuming caffeine via soda.              Current Problems:     Last Reviewed on 8/24/2018 10:20 AM by Sandy Garcia    Sinus headaches     Anxiety with depression     Fatigue         Immunizations:     None        Allergies:     Last Reviewed on 8/24/2018 10:20 AM by Sandy Garcia    Codeine Sulfate:        Current Medications:     Last Reviewed on 8/24/2018 10:20 AM by Sandy Garcia    Celexa 40mg Tablet 1 tab daily         OBJECTIVE:        Vitals:         Current: 8/24/2018 10:11:49 AM    Ht:  5 ft, 3 in;  Wt: 174.2 lbs;  BMI: 30.9    T: 98.4 F (oral);  BP: 111/57 mm Hg (right arm, sitting);  P: 73 bpm (finger clip, sitting);  sCr: 0.58 mg/dL;  GFR: 147.21        Exams:     PHYSICAL EXAM:     GENERAL: vital signs recorded - well developed, well nourished;  no apparent distress;     E/N/T: NOSE: bilateral maxillary, right frontal, and gumline tenderness sinus tenderness present;     RESPIRATORY: normal respiratory rate and pattern with no distress; normal breath sounds with no rales, rhonchi, wheezes or rubs;     CARDIOVASCULAR: normal rate; rhythm is regular;  no systolic murmur; no edema;     GASTROINTESTINAL: nontender; normal bowel sounds; no organomegaly;     PSYCHIATRIC:  appropriate affect and demeanor; normal speech pattern; grossly normal memory;         ASSESSMENT:           461.9   J01.90  Acute sinusitis, unspecified              DDx:          ORDERS:         Meds Prescribed:       Augmentin (Amoxicillin/Clavulanate) 875mg/125mg Tablet 1 po bid with food  #20 (Twenty) tablet(s) Refills: 0       Guaifenesin 200mg Tablet take 2 tablets twice daily  #40 (Forty) tablet(s) Refills: 0       Fluticasone Propionate 50mcg/1actuation Nasal Spray 1 spray each nostil daily  #1 (One) gm Refills: 2                 PLAN:          Acute sinusitis, unspecified         FOLLOW-UP: Advised to call if there is no improvement Follow-up by phone if no improvement in 1 week..   Schedule follow-up appointments on a p.r.n. basis..            Prescriptions:       Augmentin (Amoxicillin/Clavulanate) 875mg/125mg Tablet 1 po bid with food  #20 (Twenty) tablet(s) Refills: 0       Guaifenesin 200mg Tablet take 2 tablets twice daily  #40 (Forty) tablet(s) Refills: 0       Fluticasone Propionate 50mcg/1actuation Nasal Spray 1 spray each nostil daily  #1 (One) gm Refills: 2           Patient Education Handouts:       Sinus Infection (Sinusitis)              Patient Recommendations:        For  Acute sinusitis, unspecified:     Follow-up by phone if no improvement in 1 week. Schedule follow-up appointments as needed.                APPOINTMENT INFORMATION:        Monday Tuesday Wednesday Thursday Friday Saturday Sunday            Time:___________________AM  PM   Date:_____________________             CHARGE CAPTURE:           Primary Diagnosis:     461.9 Acute sinusitis, unspecified            J01.90    Acute sinusitis, unspecified              Orders:          56268   Office/outpatient visit; established patient, level 3  (In-House)                   Attending

## 2022-02-07 ENCOUNTER — PATIENT MESSAGE (OUTPATIENT)
Dept: FAMILY MEDICINE CLINIC | Age: 37
End: 2022-02-07

## 2022-02-08 DIAGNOSIS — M79.7 FIBROMYALGIA: ICD-10-CM

## 2022-02-08 RX ORDER — DULOXETIN HYDROCHLORIDE 60 MG/1
60 CAPSULE, DELAYED RELEASE ORAL DAILY
Qty: 90 CAPSULE | Refills: 1 | Status: SHIPPED | OUTPATIENT
Start: 2022-02-08 | End: 2022-10-11 | Stop reason: SDUPTHER

## 2022-02-08 NOTE — TELEPHONE ENCOUNTER
Caller: Gin Alexandra    Relationship: Self    Best call back number: [phone]    Requested Prescriptions:   Requested Prescriptions     Pending Prescriptions Disp Refills   • DULoxetine (CYMBALTA) 60 MG capsule 90 capsule 1     Sig: Take 1 capsule by mouth Daily.        Pharmacy where request should be sent: [facility] #[id number] - [place], [place] - [address] AT [facility] OF [address] & [address] - [phone]  - [phone] FX     Additional details provided by patient: PT NEEDING SHORT TERM SUPPLY AS SHE IS WAITING FOR MAIL ORDER TO COME IN AND SHE IS OUT. SHE IS REQUESTING 5 DAYS TO BE SENT TO HER LOCAL PHARMACY    Does the patient have less than a 3 day supply:  [x] Yes  [] No    Sal Brothers Rep   02/08/22 10:39 EST

## 2022-02-10 NOTE — TELEPHONE ENCOUNTER
From: Gin Alexandra  To: LUCIA Quiroz  Sent: 2/7/2022 10:41 PM EST  Subject: Short term supply    I lost my bottle of Cymbalta. I've got a [facility] bottle coming [facility] but don't have any now. I've been without for about 4 days. Is there any way I can get a short term supply sent to [facility] here in [place].

## 2022-02-22 ENCOUNTER — TELEPHONE (OUTPATIENT)
Dept: FAMILY MEDICINE CLINIC | Age: 37
End: 2022-02-22

## 2022-02-22 NOTE — TELEPHONE ENCOUNTER
Caller: Gin Alexandra    Relationship: Self    Best call back number: [phone] OKAY TO LEAVE MESSAGE ON PHONE    What medication are you requesting: WEGOVY      If a prescription is needed, what is your preferred pharmacy and phone number: [facility] #[id number] - [place], [place] - [address] AT [facility] [address] &  - [phone]  - [phone] FX     Additional notes:PATIENT STATES THAT SHE WOULD LIKE TO TRY THIS MEDICATION FOR WEIGHT LOSS. PLEASE CALL PATIENT AND ADVISE.

## 2022-02-25 ENCOUNTER — OFFICE VISIT (OUTPATIENT)
Dept: FAMILY MEDICINE CLINIC | Age: 37
End: 2022-02-25

## 2022-02-25 VITALS
WEIGHT: 202.2 LBS | SYSTOLIC BLOOD PRESSURE: 115 MMHG | HEART RATE: 80 BPM | BODY MASS INDEX: 35.83 KG/M2 | TEMPERATURE: 99.4 F | HEIGHT: 63 IN | DIASTOLIC BLOOD PRESSURE: 80 MMHG

## 2022-02-25 DIAGNOSIS — E66.09 CLASS 2 OBESITY DUE TO EXCESS CALORIES WITHOUT SERIOUS COMORBIDITY WITH BODY MASS INDEX (BMI) OF 35.0 TO 35.9 IN ADULT: Primary | ICD-10-CM

## 2022-02-25 DIAGNOSIS — K58.2 IRRITABLE BOWEL SYNDROME WITH BOTH CONSTIPATION AND DIARRHEA: ICD-10-CM

## 2022-02-25 PROBLEM — E66.811 CLASS 1 OBESITY DUE TO EXCESS CALORIES WITHOUT SERIOUS COMORBIDITY WITH BODY MASS INDEX (BMI) OF 34.0 TO 34.9 IN ADULT: Status: RESOLVED | Noted: 2021-11-11 | Resolved: 2022-02-25

## 2022-02-25 PROCEDURE — 99213 OFFICE O/P EST LOW 20 MIN: CPT | Performed by: NURSE PRACTITIONER

## 2022-02-25 NOTE — PROGRESS NOTES
"Chief Complaint  Weight Loss (wants to discuss wegovy)    Subjective          Gin Alexandra presents to [facility]      The patient presents today to discuss possible treatment with Wegovy for weight loss. She has previously tried Saxenda which caused nausea, vomiting, diarrhea, and stomach pain. She has also been on phentermine in the past which she did well with aside from rapid heart rate initially. The patient has decreased her soda intake and increased her water intake. She does not limit her diet.     Physical activity is difficult for her because it produces pain during and after. She was evaluated by a rheumatologist who diagnosed her with fibromyalgia. She states she is always in some pain and for the 4 to 5 days following physical activity she has pain and her legs feel heavy.     The patient denies a history of pancreatitis or thyroid cancer. She denies a family history of thyroid cancer.     The patient continues to experience chronic back pain followed by bloating, nausea, and vomiting. Zofran does help with the nausea. She has been evaluated by gastroenterology in the past, but her symptoms are now different. The patient does have a history of irritable bowel syndrome with constipation and diarrhea.       Objective   Vital Signs:   /80 (BP Location: Left arm, Patient Position: Sitting, Cuff Size: Large Adult)   Pulse 80   Temp 99.4 °F (37.4 °C) (Oral)   Ht 160 cm (62.99\")   Wt 91.7 kg (202 lb 3.2 oz)   BMI 35.83 kg/m²       Physical Exam  Vitals reviewed.   Constitutional:       General: She is not in acute distress.     Appearance: Normal appearance. She is well-developed. She is obese. She is not ill-appearing.   HENT:      Head: Normocephalic and atraumatic.   Eyes:      Conjunctiva/sclera: Conjunctivae normal.      Pupils: Pupils are equal, round, and reactive to light.   Cardiovascular:      Rate and Rhythm: Normal rate and regular rhythm.      Heart " sounds: Normal heart sounds. No murmur heard.      Pulmonary:      Effort: Pulmonary effort is normal. No respiratory distress.      Breath sounds: Normal breath sounds.   Abdominal:      General: Bowel sounds are normal. There is no distension.      Palpations: Abdomen is soft.      Tenderness: There is no abdominal tenderness.      Hernia: No hernia is present.   Skin:     General: Skin is warm and dry.   Neurological:      Mental Status: She is alert and oriented to person, place, and time.   Psychiatric:         Mood and Affect: Mood and affect normal.         Behavior: Behavior normal.         Thought Content: Thought content normal.         Judgment: Judgment normal.              Result Review :                Assessment and Plan    Diagnoses and all orders for this visit:    1. Class 2 obesity due to excess calories without serious comorbidity with body mass index (BMI) of 35.0 to 35.9 in adult (Primary)  Assessment & Plan:  A prescription for Wegovy will be sent once she has GI clearance. Saxenda and phentermine have been tried previously. She was also encouraged to track her diet, eat less processed food, try to limit food intake prior to bed, and start to exercise routinely      2. Irritable bowel syndrome with both constipation and diarrhea  -     Ambulatory Referral to Gastroenterology    Patient to notify office with any acute concerns or issues.  Patient verbalizes understanding, agrees with plan of care and has no further questions upon discharge.   Please note that portions of this note were completed with a voice recognition program.      Follow Up    Return in about 4 weeks (around 3/25/2022) for Recheck.  Patient was given instructions and counseling regarding her condition or for health maintenance advice. Please see specific information pulled into the AVS if appropriate.       This note has been created using Dragon Ambient eXperience and was completed in the EHR by Fabienne Edwards.  Rekha  LUCIA Kim

## 2022-02-28 PROBLEM — E66.812 CLASS 2 OBESITY DUE TO EXCESS CALORIES WITHOUT SERIOUS COMORBIDITY WITH BODY MASS INDEX (BMI) OF 35.0 TO 35.9 IN ADULT: Status: ACTIVE | Noted: 2021-11-11

## 2022-02-28 NOTE — ASSESSMENT & PLAN NOTE
A prescription for Wegovy will be sent once she has GI clearance. Saxenda and phentermine have been tried previously. She was also encouraged to track her diet, eat less processed food, try to limit food intake prior to bed, and start to exercise routinely

## 2022-03-15 DIAGNOSIS — G44.221 CHRONIC TENSION-TYPE HEADACHE, INTRACTABLE: ICD-10-CM

## 2022-03-15 RX ORDER — TIZANIDINE 4 MG/1
TABLET ORAL
Qty: 30 TABLET | Refills: 1 | Status: SHIPPED | OUTPATIENT
Start: 2022-03-15 | End: 2022-07-07

## 2022-03-17 RX ORDER — IBUPROFEN 800 MG/1
800 TABLET ORAL EVERY 6 HOURS PRN
Qty: 90 TABLET | Refills: 1 | Status: SHIPPED | OUTPATIENT
Start: 2022-03-17 | End: 2023-03-10 | Stop reason: SDUPTHER

## 2022-03-30 ENCOUNTER — PATIENT MESSAGE (OUTPATIENT)
Dept: FAMILY MEDICINE CLINIC | Age: 37
End: 2022-03-30

## 2022-04-01 ENCOUNTER — PREP FOR SURGERY (OUTPATIENT)
Dept: OTHER | Facility: HOSPITAL | Age: 37
End: 2022-04-01

## 2022-04-01 ENCOUNTER — OFFICE VISIT (OUTPATIENT)
Dept: GASTROENTEROLOGY | Facility: CLINIC | Age: 37
End: 2022-04-01

## 2022-04-01 VITALS
BODY MASS INDEX: 35.68 KG/M2 | SYSTOLIC BLOOD PRESSURE: 118 MMHG | DIASTOLIC BLOOD PRESSURE: 73 MMHG | HEIGHT: 63 IN | HEART RATE: 71 BPM | OXYGEN SATURATION: 97 % | WEIGHT: 201.4 LBS

## 2022-04-01 DIAGNOSIS — R11.0 NAUSEA: ICD-10-CM

## 2022-04-01 DIAGNOSIS — R10.13 EPIGASTRIC PAIN: Primary | ICD-10-CM

## 2022-04-01 DIAGNOSIS — K58.1 IRRITABLE BOWEL SYNDROME WITH CONSTIPATION: ICD-10-CM

## 2022-04-01 DIAGNOSIS — K21.9 GASTROESOPHAGEAL REFLUX DISEASE, UNSPECIFIED WHETHER ESOPHAGITIS PRESENT: ICD-10-CM

## 2022-04-01 DIAGNOSIS — R11.2 NAUSEA AND VOMITING, UNSPECIFIED VOMITING TYPE: ICD-10-CM

## 2022-04-01 PROCEDURE — 99214 OFFICE O/P EST MOD 30 MIN: CPT | Performed by: NURSE PRACTITIONER

## 2022-04-01 RX ORDER — ONDANSETRON 4 MG/1
4 TABLET, ORALLY DISINTEGRATING ORAL EVERY 8 HOURS PRN
Qty: 30 TABLET | Refills: 0 | Status: SHIPPED | OUTPATIENT
Start: 2022-04-01 | End: 2022-12-27

## 2022-04-01 RX ORDER — PANTOPRAZOLE SODIUM 40 MG/1
40 TABLET, DELAYED RELEASE ORAL DAILY
Qty: 30 TABLET | Refills: 5 | Status: SHIPPED | OUTPATIENT
Start: 2022-04-01 | End: 2022-11-14

## 2022-04-01 RX ORDER — POLYETHYLENE GLYCOL 3350 17 G/17G
17 POWDER, FOR SOLUTION ORAL DAILY
Qty: 578 G | Refills: 2 | Status: SHIPPED | OUTPATIENT
Start: 2022-04-01 | End: 2023-02-22

## 2022-04-01 NOTE — PATIENT INSTRUCTIONS
Food Choices for Gastroesophageal Reflux Disease, Adult  When you have gastroesophageal reflux disease (GERD), the foods you eat and your eating habits are very important. Choosing the right foods can help ease your discomfort. Think about working with a food expert (dietitian) to help you make good choices.  What are tips for following this plan?  Reading food labels  Look for foods that are low in saturated fat. Foods that may help with your symptoms include:  Foods that have less than 5% of daily value (DV) of fat.  Foods that have 0 grams of trans fat.  Cooking  Do not woods your food.  Cook your food by baking, steaming, grilling, or broiling. These are all methods that do not need a lot of fat for cooking.  To add flavor, try to use herbs that are low in spice and acidity.  Meal planning    Choose healthy foods that are low in fat, such as:  Fruits and vegetables.  Whole grains.  Low-fat dairy products.  Lean meats, fish, and poultry.  Eat small meals often instead of eating 3 large meals each day. Eat your meals slowly in a place where you are relaxed. Avoid bending over or lying down until 2-3 hours after eating.  Limit high-fat foods such as fatty meats or fried foods.  Limit your intake of fatty foods, such as oils, butter, and shortening.  Avoid the following as told by your doctor:  Foods that cause symptoms. These may be different for different people. Keep a food diary to keep track of foods that cause symptoms.  Alcohol.  Drinking a lot of liquid with meals.  Eating meals during the 2-3 hours before bed.    Lifestyle  Stay at a healthy weight. Ask your doctor what weight is healthy for you. If you need to lose weight, work with your doctor to do so safely.  Exercise for at least 30 minutes on 5 or more days each week, or as told by your doctor.  Wear loose-fitting clothes.  Do not smoke or use any products that contain nicotine or tobacco. If you need help quitting, ask your doctor.  Sleep with the head  of your bed higher than your feet. Use a wedge under the mattress or blocks under the bed frame to raise the head of the bed.  Chew sugar-free gum after meals.  What foods should eat?    Eat a healthy, well-balanced diet of fruits, vegetables, whole grains, low-fat dairy products, lean meats, fish, and poultry. Each person is different.  Foods that may cause symptoms in one person may not cause any symptoms in another person. Work with your doctor to find foods that are safe for you.  The items listed above may not be a complete list of what you can eat and drink. Contact a food expert for more options.  What foods should I avoid?  Limiting some of these foods may help in managing the symptoms of GERD. Everyone is different. Talk with a food expert or your doctor to help you find the exact foods to avoid, if any.  Fruits  Any fruits prepared with added fat. Any fruits that cause symptoms. For some people, this may include citrus fruits, such as oranges, grapefruit, pineapple, and lori.  Vegetables  Deep-fried vegetables. French fries. Any vegetables prepared with added fat. Any vegetables that cause symptoms. For some people, this may include tomatoes and tomato products, chili peppers, onions and garlic, and horseradish.  Grains  Pastries or quick breads with added fat.  Meats and other proteins  High-fat meats, such as fatty beef or pork, hot dogs, ribs, ham, sausage, salami, and cardenas. Fried meat or protein, including fried fish and fried chicken. Nuts and nut butters, in large amounts.  Dairy  Whole milk and chocolate milk. Sour cream. Cream. Ice cream. Cream cheese. Milkshakes.  Fats and oils  Butter. Margarine. Shortening. Ghee.  Beverages  Coffee and tea, with or without caffeine. Carbonated beverages. Sodas. Energy drinks. Fruit juice made with acidic fruits, such as orange or grapefruit. Tomato juice. Alcoholic drinks.  Sweets and desserts  Chocolate and cocoa. Donuts.  Seasonings and condiments  Pepper.  Peppermint and spearmint. Added salt. Any condiments, herbs, or seasonings that cause symptoms. For some people, this may include ruano, hot sauce, or vinegar-based salad dressings.  The items listed above may not be a complete list of what you should not eat and drink. Contact a food expert for more options.  Questions to ask your doctor  Diet and lifestyle changes are often the first steps that are taken to manage symptoms of GERD. If diet and lifestyle changes do not help, talk with your doctor about taking medicines.  Where to find more information  [employer]: aboutgerd.org  Summary  When you have GERD, food and lifestyle choices are very important in easing your symptoms.  Eat small meals often instead of 3 large meals a day. Eat your meals slowly and in a place where you are relaxed.  Avoid bending over or lying down until 2-3 hours after eating.  Limit high-fat foods such as fatty meats or fried foods.  This information is not intended to replace advice given to you by your health care provider. Make sure you discuss any questions you have with your health care provider.  Document Revised: 06/28/2021 Document Reviewed: 06/28/2021  Elsevier Patient Education © 2021 [employer]

## 2022-04-01 NOTE — TELEPHONE ENCOUNTER
From: Gin Alexandra  To: LUCIA Quiroz  Sent: 3/30/2022 7:22 PM EDT  Subject: Zofran    [facility]?

## 2022-05-23 ENCOUNTER — OFFICE VISIT (OUTPATIENT)
Dept: FAMILY MEDICINE CLINIC | Age: 37
End: 2022-05-23

## 2022-05-23 VITALS
BODY MASS INDEX: 35.83 KG/M2 | HEART RATE: 82 BPM | SYSTOLIC BLOOD PRESSURE: 127 MMHG | WEIGHT: 202.2 LBS | DIASTOLIC BLOOD PRESSURE: 83 MMHG | TEMPERATURE: 98.9 F | HEIGHT: 63 IN

## 2022-05-23 DIAGNOSIS — M54.42 ACUTE MIDLINE LOW BACK PAIN WITH LEFT-SIDED SCIATICA: Primary | ICD-10-CM

## 2022-05-23 PROCEDURE — 99213 OFFICE O/P EST LOW 20 MIN: CPT | Performed by: NURSE PRACTITIONER

## 2022-05-23 NOTE — PROGRESS NOTES
"Chief Complaint  Back Pain (Started in middle of back and into buttocks, but now more on left side x 3 weeks)    Subjective          Gin Alexandra presents to [facility]    The patient presents to [facility] with reports she began to experience pain just above the intergluteal cleft prior to 05/08/2022. She states she had sharp pain when she moved a certain way and did not think more about it; however, it has gotten progressively worse. She states that it started off in that area and now it has gone over to the left side and in her coccyx area. She denies tenderness or pain to touch or radiation of pain in her leg, but certain movements do cause her pain. She denies any drainage, abscesses, or anything that resembles ingrown hair in that area. The patient states that it hurts whenever she stands up, sits down, lays down, turns over, or bends over. She denies pain in her hip and states that she has been taking tizanidine, but it does not help. She states that she has taken ibuprofen, which does not seem to help. The patient reports that she has been in the hot tub, but it does not help much. She states that she has dealt with sciatic pain going down her right leg in the past, but it never lasted this long.        Objective   Vital Signs:   /83 (BP Location: Left arm, Patient Position: Sitting, Cuff Size: Adult)   Pulse 82   Temp 98.9 °F (37.2 °C) (Oral)   Ht 160 cm (62.99\")   Wt 91.7 kg (202 lb 3.2 oz)   BMI 35.83 kg/m²       Physical Exam  Constitutional:       Appearance: She is well-developed.   Neck:      Thyroid: No thyromegaly.   Cardiovascular:      Rate and Rhythm: Normal rate and regular rhythm.      Heart sounds: Normal heart sounds.   Pulmonary:      Effort: Pulmonary effort is normal.      Breath sounds: Normal breath sounds.   Musculoskeletal:      Cervical back: Normal range of motion and neck supple.      Comments: Pain in the lower back " just above the intergluteal cleft, which is radiating to the left side.   Lymphadenopathy:      Cervical: No cervical adenopathy.   Skin:     General: Skin is warm and dry.   Psychiatric:         Behavior: Behavior normal.         Thought Content: Thought content normal.         Judgment: Judgment normal.              Result Review :                Assessment and Plan    Diagnoses and all orders for this visit:    1. Acute midline low back pain with left-sided sciatica (Primary)  -     Ambulatory Referral to Physical Therapy Evaluate and treat (lower back/buttock pain)    Other orders  -     methylPREDNISolone (MEDROL) 4 MG dose pack; Take as directed on package instructions.  Dispense: 21 tablet; Refill: 0          -  Continue heat and ice as needed. Tylenol as needed.         -  If symptoms persist she will notify me and an x-ray will be performed.    Patient to notify office with any acute concerns or issues. Patient verbalizes understanding, agrees with plan of care and has no further questions upon discharge.    Please note that portions of this note were completed with a voice recognition program.    Transcribed from ambient dictation for LUCIA Quiroz by Annette Almonte.  05/23/22   17:24 EDT    Follow Up    Return if symptoms worsen or fail to improve.  Patient was given instructions and counseling regarding her condition or for health maintenance advice. Please see specific information pulled into the AVS if appropriate.       This note has been created using Dragon Ambient eXperience and was completed in the EHR by  LUCIA Quiroz     Transcribed from ambient dictation for LUCIA Quiroz by Annette Almonte.  05/23/22   17:24 EDT    Patient verbalized consent to the visit recording.

## 2022-05-24 RX ORDER — METHYLPREDNISOLONE 4 MG/1
TABLET ORAL
Qty: 21 TABLET | Refills: 0 | Status: SHIPPED | OUTPATIENT
Start: 2022-05-24 | End: 2022-06-28

## 2022-06-13 ENCOUNTER — ANESTHESIA EVENT (OUTPATIENT)
Dept: GASTROENTEROLOGY | Facility: HOSPITAL | Age: 37
End: 2022-06-13

## 2022-06-13 ENCOUNTER — HOSPITAL ENCOUNTER (OUTPATIENT)
Facility: HOSPITAL | Age: 37
Setting detail: HOSPITAL OUTPATIENT SURGERY
Discharge: HOME | End: 2022-06-13
Attending: INTERNAL MEDICINE | Admitting: INTERNAL MEDICINE

## 2022-06-13 ENCOUNTER — ANESTHESIA (OUTPATIENT)
Dept: GASTROENTEROLOGY | Facility: HOSPITAL | Age: 37
End: 2022-06-13

## 2022-06-13 VITALS
HEART RATE: 77 BPM | OXYGEN SATURATION: 99 % | WEIGHT: 202.38 LBS | DIASTOLIC BLOOD PRESSURE: 82 MMHG | BODY MASS INDEX: 35.86 KG/M2 | SYSTOLIC BLOOD PRESSURE: 117 MMHG | TEMPERATURE: 96.9 F | RESPIRATION RATE: 15 BRPM

## 2022-06-13 DIAGNOSIS — K21.9 GASTROESOPHAGEAL REFLUX DISEASE, UNSPECIFIED WHETHER ESOPHAGITIS PRESENT: ICD-10-CM

## 2022-06-13 DIAGNOSIS — R10.13 EPIGASTRIC PAIN: ICD-10-CM

## 2022-06-13 DIAGNOSIS — R11.2 NAUSEA AND VOMITING, UNSPECIFIED VOMITING TYPE: ICD-10-CM

## 2022-06-13 DIAGNOSIS — R11.0 NAUSEA: ICD-10-CM

## 2022-06-13 PROCEDURE — 43239 EGD BIOPSY SINGLE/MULTIPLE: CPT | Performed by: INTERNAL MEDICINE

## 2022-06-13 PROCEDURE — 88305 TISSUE EXAM BY PATHOLOGIST: CPT | Performed by: INTERNAL MEDICINE

## 2022-06-13 PROCEDURE — 25010000002 PROPOFOL 10 MG/ML EMULSION: Performed by: NURSE ANESTHETIST, CERTIFIED REGISTERED

## 2022-06-13 RX ORDER — SODIUM CHLORIDE, SODIUM LACTATE, POTASSIUM CHLORIDE, CALCIUM CHLORIDE 600; 310; 30; 20 MG/100ML; MG/100ML; MG/100ML; MG/100ML
30 INJECTION, SOLUTION INTRAVENOUS CONTINUOUS
Status: DISCONTINUED | OUTPATIENT
Start: 2022-06-13 | End: 2022-06-13 | Stop reason: HOSPADM

## 2022-06-13 RX ORDER — LIDOCAINE HYDROCHLORIDE 20 MG/ML
INJECTION, SOLUTION EPIDURAL; INFILTRATION; INTRACAUDAL; PERINEURAL AS NEEDED
Status: DISCONTINUED | OUTPATIENT
Start: 2022-06-13 | End: 2022-06-13 | Stop reason: SURG

## 2022-06-13 RX ORDER — PROPOFOL 10 MG/ML
VIAL (ML) INTRAVENOUS AS NEEDED
Status: DISCONTINUED | OUTPATIENT
Start: 2022-06-13 | End: 2022-06-13 | Stop reason: SURG

## 2022-06-13 RX ADMIN — PROPOFOL 50 MG: 10 INJECTION, EMULSION INTRAVENOUS at 14:05

## 2022-06-13 RX ADMIN — LIDOCAINE HYDROCHLORIDE 100 MG: 20 INJECTION, SOLUTION EPIDURAL; INFILTRATION; INTRACAUDAL; PERINEURAL at 14:09

## 2022-06-13 RX ADMIN — PROPOFOL 50 MG: 10 INJECTION, EMULSION INTRAVENOUS at 14:09

## 2022-06-13 RX ADMIN — PROPOFOL 50 MG: 10 INJECTION, EMULSION INTRAVENOUS at 14:06

## 2022-06-13 RX ADMIN — PROPOFOL 200 MCG/KG/MIN: 10 INJECTION, EMULSION INTRAVENOUS at 14:05

## 2022-06-13 RX ADMIN — PROPOFOL 50 MG: 10 INJECTION, EMULSION INTRAVENOUS at 14:08

## 2022-06-13 RX ADMIN — SODIUM CHLORIDE, POTASSIUM CHLORIDE, SODIUM LACTATE AND CALCIUM CHLORIDE 30 ML/HR: 600; 310; 30; 20 INJECTION, SOLUTION INTRAVENOUS at 13:02

## 2022-06-13 NOTE — H&P
Pre Procedure History & Physical    Chief Complaint:   Nausea  Vomiting  gerd  epig pain    Subjective     HPI:   As above    Past Medical History:   Past Medical History:   Diagnosis Date   • Allergic 1/1/1995   • Allergies     CODEINE SULFATE   MODERATE   • Anxiety    • Benign neoplasm of pituitary gland (HCC)    • Carpal tunnel syndrome, bilateral upper limbs    • Cholelithiasis 2005    Gall bladder was removed   • Chronic fatigue, unspecified    • Constipation, unspecified    • Depression     Started when i was 14   • Dyspnea, unspecified    • Dysuria    • Fibromyalgia    • Fibromyalgia    • Fibromyalgia, primary 2019   • GERD without esophagitis    • Headache    • Hypersomnia, unspecified    • IBS (irritable bowel syndrome)     diarrhea and constipation   • Obesity, unspecified    • Other fatigue    • Other mixed anxiety disorders    • Other retention of urine    • Pain in right hip    • Pituitary tumor    • Vitamin D deficiency, unspecified        Past Surgical History:  Past Surgical History:   Procedure Laterality Date   • CARPAL TUNNEL RELEASE     • CHOLECYSTECTOMY      AT AGE 21   DETAILS/COMPLICATIONS?   • LUPIS  2012   • TUBAL ABDOMINAL LIGATION         Family History:  Family History   Problem Relation Age of Onset   • Ovarian cancer Maternal Grandmother    • Cancer Maternal Grandmother    • Heart attack Maternal Grandfather    • Stroke Maternal Grandfather    • Heart disease Maternal Grandfather    • Lung cancer Paternal Grandfather    • Asthma Paternal Grandfather    • Cancer Paternal Grandfather    • COPD Paternal Grandfather    • Vision loss Paternal Grandfather    • Arthritis Mother    • Depression Mother    • Hyperlipidemia Mother    • Cancer Maternal Aunt    • COPD Father    • Depression Father    • Heart disease Father    • Hyperlipidemia Father    • COPD Paternal Grandmother    • Depression Paternal Grandmother    • Heart disease Maternal Uncle    • Hyperlipidemia Brother        Social  History:   reports that she quit smoking about 7 years ago. Her smoking use included cigarettes. She has a 30.00 pack-year smoking history. She has never used smokeless tobacco. She reports previous alcohol use. She reports that she does not use drugs.    Medications:   Medications Prior to Admission   Medication Sig Dispense Refill Last Dose   • albuterol sulfate  (90 Base) MCG/ACT inhaler Inhale 2 puffs Every 4 (Four) Hours As Needed for Shortness of Air. 18 g 2 6/12/2022 at Unknown time   • DULoxetine (CYMBALTA) 60 MG capsule Take 1 capsule by mouth Daily. 90 capsule 1 6/12/2022 at Unknown time   • ibuprofen (ADVIL,MOTRIN) 800 MG tablet Take 1 tablet by mouth Every 6 (Six) Hours As Needed for Mild Pain . 90 tablet 1 6/12/2022 at Unknown time   • methylPREDNISolone (MEDROL) 4 MG dose pack Take as directed on package instructions. 21 tablet 0 Past Month at Unknown time   • ondansetron ODT (Zofran ODT) 4 MG disintegrating tablet Place 1 tablet on the tongue Every 8 (Eight) Hours As Needed for Nausea or Vomiting. 30 tablet 0 Past Month at Unknown time   • pantoprazole (PROTONIX) 40 MG EC tablet Take 1 tablet by mouth Daily. 30 tablet 5 Past Week at Unknown time   • polyethylene glycol (MIRALAX) 17 GM/SCOOP powder Take 17 g by mouth Daily. 578 g 2 Past Week at Unknown time   • tiZANidine (ZANAFLEX) 4 MG tablet TAKE 1 TABLET EVERY 6 HOURS AS NEEDED FOR MUSCLE SPASMS 30 tablet 1 Past Week at Unknown time       Allergies:  Codeine        Objective     Blood pressure 117/82, pulse 72, temperature 98 °F (36.7 °C), temperature source Temporal, resp. rate 18, weight 91.8 kg (202 lb 6.1 oz), SpO2 97 %.    Physical Exam   Constitutional: Pt is oriented to person, place, and time and well-developed, well-nourished, and in no distress.   Mouth/Throat: Oropharynx is clear and moist.   Neck: Normal range of motion.   Cardiovascular: Normal rate, regular rhythm and normal heart sounds.    Pulmonary/Chest: Effort normal and  breath sounds normal.   Abdominal: Soft. Nontender  Skin: Skin is warm and dry.   Psychiatric: Mood, memory, affect and judgment normal.     Assessment & Plan     Diagnosis:  epig pain  Nausea  Vomiting  gerd    Anticipated Surgical Procedure:  egd      The risks, benefits, and alternatives of this procedure have been discussed with the patient or the responsible party- the patient understands and agrees to proceed.

## 2022-06-13 NOTE — ANESTHESIA PREPROCEDURE EVALUATION
Anesthesia Evaluation     Patient summary reviewed and Nursing notes reviewed   no history of anesthetic complications:  NPO Solid Status: > 8 hours  NPO Liquid Status: > 2 hours           Airway   Mallampati: II  TM distance: >3 FB  Neck ROM: full  No difficulty expected  Dental      Pulmonary - normal exam    breath sounds clear to auscultation  (+) a smoker Former,   Cardiovascular - normal exam  Exercise tolerance: good (4-7 METS)    Rhythm: regular  Rate: normal    (+) hyperlipidemia,       Neuro/Psych  (+) headaches, numbness, psychiatric history Anxiety and Depression,    GI/Hepatic/Renal/Endo    (+) obesity,  GERD,      Musculoskeletal     (+) myalgias,   Abdominal    Substance History - negative use     OB/GYN negative ob/gyn ROS         Other - negative ROS                       Anesthesia Plan    ASA 2     general     (Patient understands anesthesia not responsible for dental damage.)  intravenous induction     Anesthetic plan, risks, benefits, and alternatives have been provided, discussed and informed consent has been obtained with: patient.  Use of blood products discussed with patient .   Plan discussed with CRNA.        CODE STATUS:

## 2022-06-13 NOTE — ANESTHESIA POSTPROCEDURE EVALUATION
Patient: Gin Alexandra    Procedure Summary     Date: 06/13/22 Room / Location: [facility] ENDOSCOPY 2 / [facility] ENDOSCOPY    Anesthesia Start: 1402 Anesthesia Stop: 1417    Procedure: ESOPHAGOGASTRODUODENOSCOPY WITH BIOPSIES (N/A ) Diagnosis:       Epigastric pain      Gastroesophageal reflux disease, unspecified whether esophagitis present      Nausea      Nausea and vomiting, unspecified vomiting type      (Epigastric pain [R10.13])      (Gastroesophageal reflux disease, unspecified whether esophagitis present [K21.9])      (Nausea [R11.0])      (Nausea and vomiting, unspecified vomiting type [R11.2])    Surgeons: Kumar Turner MD Provider: Monico Lamb MD    Anesthesia Type: general ASA Status: 2          Anesthesia Type: general    Vitals  Vitals Value Taken Time   /82 06/13/22 1432   Temp 36.1 °C (96.9 °F) 06/13/22 1432   Pulse 77 06/13/22 1432   Resp 15 06/13/22 1427   SpO2 99 % 06/13/22 1432           Post Anesthesia Care and Evaluation    Patient location during evaluation: bedside  Patient participation: complete - patient participated  Level of consciousness: awake  Pain management: adequate    Airway patency: patent  Anesthetic complications: No anesthetic complications  PONV Status: none  Cardiovascular status: acceptable and stable  Respiratory status: acceptable  Hydration status: acceptable    Comments: An Anesthesiologist personally participated in the most demanding procedures (including induction and emergence if applicable) in the anesthesia plan, monitored the course of anesthesia administration at frequent intervals and remained physically present and available for immediate diagnosis and treatment of emergencies.

## 2022-06-14 ENCOUNTER — PATIENT MESSAGE (OUTPATIENT)
Dept: FAMILY MEDICINE CLINIC | Age: 37
End: 2022-06-14

## 2022-06-15 LAB
CYTO UR: NORMAL
LAB AP CASE REPORT: NORMAL
LAB AP CLINICAL INFORMATION: NORMAL
PATH REPORT.FINAL DX SPEC: NORMAL
PATH REPORT.GROSS SPEC: NORMAL

## 2022-06-20 ENCOUNTER — PATIENT MESSAGE (OUTPATIENT)
Dept: FAMILY MEDICINE CLINIC | Age: 37
End: 2022-06-20

## 2022-06-20 DIAGNOSIS — G43.809 OTHER MIGRAINE WITHOUT STATUS MIGRAINOSUS, NOT INTRACTABLE: Primary | ICD-10-CM

## 2022-06-20 NOTE — TELEPHONE ENCOUNTER
From: Gin Alexandra  To: LUCIA Quiroz  Sent: 6/14/2022 12:08 AM EDT  Subject: Wegovy     Jacek Da Silva    You had said in a previous appt that when I was ready I could reach out for a prescription for wegovy. I should be good to start now if you're still willing to send it in.    Thanks  Gin

## 2022-06-21 RX ORDER — RIMEGEPANT SULFATE 75 MG/75MG
75 TABLET, ORALLY DISINTEGRATING ORAL DAILY PRN
Qty: 30 TABLET | Refills: 0 | Status: SHIPPED | OUTPATIENT
Start: 2022-06-21 | End: 2022-06-28

## 2022-06-21 NOTE — TELEPHONE ENCOUNTER
From: Gin Alexandra  To: LUCIA Quiroz  Sent: 6/20/2022 2:38 PM EDT  Subject: Nurtec    Sorry to bother you again. Apparently I waited too long to get the free trial nurtec. Is there any way you could send it in again to [email or web address].    Thanks,  Gin

## 2022-06-22 ENCOUNTER — TELEPHONE (OUTPATIENT)
Dept: FAMILY MEDICINE CLINIC | Age: 37
End: 2022-06-22

## 2022-06-23 NOTE — TELEPHONE ENCOUNTER
Pharmacy Name: [facility]     Pharmacy representative name:PATTI    Pharmacy representative phone number: [phone]    What medication are you calling in regards to: WEGOVY     What question does the pharmacy have: [facility]TI STATE:  THIS MEDICATION IS NOT AVAILABLE FROM  IN THE 0.25, 0.5, AND 1 MG ARE ALL CURRENTLY UNAVAILABLE     Who is the provider that prescribed the medication: COLIN VERDIN    Additional notes: REFERENCE NUMBER [id number]

## 2022-06-23 NOTE — TELEPHONE ENCOUNTER
Unfortunately since phentermine is a controlled substance and can have a lot of adverse reactions, she will need to come into office to discuss in person and for a urine drug screen and a controlled substance agreement.  Please schedule her an appointment. ferny Gamez

## 2022-06-28 ENCOUNTER — OFFICE VISIT (OUTPATIENT)
Dept: FAMILY MEDICINE CLINIC | Age: 37
End: 2022-06-28

## 2022-06-28 VITALS
WEIGHT: 206 LBS | TEMPERATURE: 98.6 F | HEART RATE: 82 BPM | DIASTOLIC BLOOD PRESSURE: 77 MMHG | HEIGHT: 63 IN | SYSTOLIC BLOOD PRESSURE: 121 MMHG | BODY MASS INDEX: 36.5 KG/M2

## 2022-06-28 DIAGNOSIS — G44.221 CHRONIC TENSION-TYPE HEADACHE, INTRACTABLE: ICD-10-CM

## 2022-06-28 DIAGNOSIS — M79.601 BILATERAL ARM PAIN: ICD-10-CM

## 2022-06-28 DIAGNOSIS — M79.602 BILATERAL ARM PAIN: ICD-10-CM

## 2022-06-28 DIAGNOSIS — E66.09 CLASS 2 OBESITY DUE TO EXCESS CALORIES WITHOUT SERIOUS COMORBIDITY WITH BODY MASS INDEX (BMI) OF 36.0 TO 36.9 IN ADULT: Primary | ICD-10-CM

## 2022-06-28 DIAGNOSIS — R53.82 CHRONIC FATIGUE: ICD-10-CM

## 2022-06-28 PROCEDURE — 99214 OFFICE O/P EST MOD 30 MIN: CPT | Performed by: NURSE PRACTITIONER

## 2022-06-28 NOTE — PROGRESS NOTES
"Chief Complaint  Weight Loss and Arm Pain (Bilateral arm and hand pain x 4 months)    Subjective          Gin Alexandra presents to [facility] for weight loss follow-up.  Was prescribed Wegovy and it was approved from insurance point however medication at that dose was on backorder.  Patient initially made appointment to discuss potentially starting phentermine, but now she is wishing to try Saxenda again.  She has tried this medication in the past but she became too nauseous.  She states she was not aware that you could decrease back down to previous dose and slowly work up to the higher doses.  Patient states her weakness is as far as diet is portion control throughout the day.  She also states that she would like to eat healthier but family is very picky and they are not financially set to have 2 different meals.    Patient also has acute complaint of bilateral arm to wrist pain.  She had a carpal tunnel surgery on bilateral hands in 2019 with a revision on her left and 2021.  Ortho surgeon was Dr. Arechiga.  No recent injury.    Patient complaining of chronic fatigue.  She states that she feels as if she gets enough sleep and has been tested for sleep apnea (negative).  She does not have frequent nightmares.  She does at times wake up and feels paralyzed.  She is also having frequent tension headaches.  She states she had a referral to neurology in December but they had set her up with Dr. Hernandez.  She does not want to see him and informed the referral department about this.  She did not hear anything else.  She was not able to get Nurtec due to cost.  She has tried Imitrex in the past but it was not very beneficial.      Objective   Vital Signs:   Vitals:    06/28/22 1157   BP: 121/77   BP Location: Left arm   Patient Position: Sitting   Cuff Size: Large Adult   Pulse: 82   Temp: 98.6 °F (37 °C)   TempSrc: Oral   Weight: 93.4 kg (206 lb)   Height: 160 cm (62.99\")       Physical " Exam  Vitals reviewed.   Constitutional:       General: She is not in acute distress.     Appearance: Normal appearance. She is well-developed.   HENT:      Head: Normocephalic and atraumatic.   Eyes:      Conjunctiva/sclera: Conjunctivae normal.      Pupils: Pupils are equal, round, and reactive to light.   Cardiovascular:      Rate and Rhythm: Normal rate and regular rhythm.      Heart sounds: Normal heart sounds. No murmur heard.  Pulmonary:      Effort: Pulmonary effort is normal. No respiratory distress.      Breath sounds: Normal breath sounds.   Musculoskeletal:         General: No tenderness or deformity. Normal range of motion.   Skin:     General: Skin is warm and dry.   Neurological:      Mental Status: She is alert and oriented to person, place, and time.   Psychiatric:         Mood and Affect: Mood and affect normal.         Behavior: Behavior normal.         Thought Content: Thought content normal.         Judgment: Judgment normal.          Result Review :              Assessment and Plan    Diagnoses and all orders for this visit:    1. Class 2 obesity due to excess calories without serious comorbidity with body mass index (BMI) of 36.0 to 36.9 in adult (Primary)  Comments:  Restarting patient on Saxenda.  Potential side effects medication discussed.  No history of thyroid cancer in family or personal history.  Assessment & Plan:  Patient's (Body mass index is 36.5 kg/m².) indicates that they are obese (BMI >30) with health conditions that include none . Weight is unchanged. BMI is is above average; BMI management plan is completed. We discussed portion control, increasing exercise and joining a fitness center or start home based exercise program and starting Saxenda.  Declines dietary referral at this time.    Orders:  -     Liraglutide (SAXENDA) 18 MG/3ML injection pen; Inject 0.6mg under skin daily for week one, THEN 1.2mg daily for week two, THEN 1.8mg daily for week three, then 2.4mg daily for  week four.  Dispense: 3 pen; Refill: 0    2. Bilateral arm pain  Comments:  Sending to physical therapy for nerve conduction studies.  We will send to Ortho of patient's choice if positive.  Orders:  -     Ambulatory Referral to Physical Therapy (nerve conduction study of BUE. )    3. Chronic tension-type headache, intractable  Comments:  Sending to sleep neuro.  Continue Tylenol/ibuprofen as needed.  Increase water intake.  Orders:  -     Ambulatory Referral to Sleep Medicine    4. Chronic fatigue  Comments:  Sending to Dr. Manning, sleep medicine.  Orders:  -     Ambulatory Referral to Sleep Medicine  Patient to notify office with any acute concerns or issues.  Patient verbalizes understanding, agrees with plan of care and has no further questions upon discharge.    Please note that portions of this note were completed with a voice recognition program.    Follow Up    Return in about 3 months (around 9/28/2022) for Recheck.  Patient was given instructions and counseling regarding her condition or for health maintenance advice. Please see specific information pulled into the AVS if appropriate.

## 2022-06-28 NOTE — ASSESSMENT & PLAN NOTE
Patient's (Body mass index is 36.5 kg/m².) indicates that they are obese (BMI >30) with health conditions that include none . Weight is unchanged. BMI is is above average; BMI management plan is completed. We discussed portion control, increasing exercise and joining a fitness center or start home based exercise program and starting Saxenda.  Declines dietary referral at this time.

## 2022-07-01 ENCOUNTER — PRIOR AUTHORIZATION (OUTPATIENT)
Dept: FAMILY MEDICINE CLINIC | Age: 37
End: 2022-07-01

## 2022-07-01 NOTE — TELEPHONE ENCOUNTER
PA sent to plan via CoverMyMeds for Kirill    Gil: [id number]    Form  [employer] Electronic PA Form (2017 NCPDP

## 2022-07-02 ENCOUNTER — PATIENT MESSAGE (OUTPATIENT)
Dept: FAMILY MEDICINE CLINIC | Age: 37
End: 2022-07-02

## 2022-07-02 DIAGNOSIS — R53.83 FATIGUE, UNSPECIFIED TYPE: Primary | ICD-10-CM

## 2022-07-02 DIAGNOSIS — G44.221 CHRONIC TENSION-TYPE HEADACHE, INTRACTABLE: ICD-10-CM

## 2022-07-04 DIAGNOSIS — E66.09 CLASS 2 OBESITY DUE TO EXCESS CALORIES WITHOUT SERIOUS COMORBIDITY WITH BODY MASS INDEX (BMI) OF 36.0 TO 36.9 IN ADULT: ICD-10-CM

## 2022-07-05 RX ORDER — LIRAGLUTIDE 6 MG/ML
INJECTION, SOLUTION SUBCUTANEOUS
Qty: 9 ML | OUTPATIENT
Start: 2022-07-05

## 2022-07-05 NOTE — TELEPHONE ENCOUNTER
From: Gin Alexandra  To: LUCIA Quiroz  Sent: 7/2/2022 9:18 AM EDT  Subject: Neuro and sleep referral     Jacek Da Silva     At my last appointment you were setting me up with what I thought was a neuro/sleep dr that may be able to help the sleep and migraine issues all in one. I've gotten set up with the sleep Dr, but got a call to set up a neuro appointment. I'm just confused as to what dr I'm supposed to see.    Thanks,  Gin

## 2022-07-07 ENCOUNTER — TREATMENT (OUTPATIENT)
Dept: PHYSICAL THERAPY | Facility: CLINIC | Age: 37
End: 2022-07-07

## 2022-07-07 ENCOUNTER — TELEPHONE (OUTPATIENT)
Dept: FAMILY MEDICINE CLINIC | Age: 37
End: 2022-07-07

## 2022-07-07 DIAGNOSIS — R20.2 NUMBNESS AND TINGLING IN BOTH HANDS: ICD-10-CM

## 2022-07-07 DIAGNOSIS — R29.898 WEAKNESS OF BACK: ICD-10-CM

## 2022-07-07 DIAGNOSIS — M79.602 BILATERAL ARM PAIN: Primary | ICD-10-CM

## 2022-07-07 DIAGNOSIS — R20.0 NUMBNESS AND TINGLING IN BOTH HANDS: ICD-10-CM

## 2022-07-07 DIAGNOSIS — M79.601 BILATERAL ARM PAIN: Primary | ICD-10-CM

## 2022-07-07 DIAGNOSIS — M54.40 BILATERAL LOW BACK PAIN WITH SCIATICA, SCIATICA LATERALITY UNSPECIFIED, UNSPECIFIED CHRONICITY: ICD-10-CM

## 2022-07-07 DIAGNOSIS — M54.42 ACUTE MIDLINE LOW BACK PAIN WITH LEFT-SIDED SCIATICA: ICD-10-CM

## 2022-07-07 DIAGNOSIS — G44.221 CHRONIC TENSION-TYPE HEADACHE, INTRACTABLE: ICD-10-CM

## 2022-07-07 PROCEDURE — 97162 PT EVAL MOD COMPLEX 30 MIN: CPT | Performed by: PHYSICAL THERAPIST

## 2022-07-07 PROCEDURE — 97110 THERAPEUTIC EXERCISES: CPT | Performed by: PHYSICAL THERAPIST

## 2022-07-07 RX ORDER — TIZANIDINE 4 MG/1
TABLET ORAL
Qty: 30 TABLET | Refills: 1 | Status: SHIPPED | OUTPATIENT
Start: 2022-07-07 | End: 2022-10-10

## 2022-07-07 NOTE — TELEPHONE ENCOUNTER
[facility] is the only PT place in [facility] that do NCS.  Referrals please set pt up with [facility].

## 2022-07-07 NOTE — TELEPHONE ENCOUNTER
Patient was seen by PT upstairs today and was informed that they do not do the nerve conduction studies here. She is asking what her next steps would be to have this completed? She states that she does have a hand specialist, Dr. Manish Anthony.

## 2022-07-07 NOTE — PROGRESS NOTES
Physical Therapy Initial Evaluation and Plan of Care      Patient: Gin Alexandra   : 1985  Diagnosis/ICD-10 Code:  Bilateral arm pain [M79.601, M79.602]  Referring practitioner: LUCIA Quiroz  Date of Initial Visit: 2022  Today's Date: 2022  Patient seen for 1 sessions         Visit Diagnoses:    ICD-10-CM ICD-9-CM   1. Bilateral arm pain  M79.601 729.5    M79.602    2. Numbness and tingling in both hands  R20.0 782.0    R20.2    3. Acute midline low back pain with left-sided sciatica  M54.42 724.2     724.3   4. Weakness of back  R29.898 724.8   5. Bilateral low back pain with sciatica, sciatica laterality unspecified, unspecified chronicity  M54.40 724.3       Subjective Evaluation    History of Present Illness  Mechanism of injury: Gin said that she went to see PCP for a twinge in her buttcrack.  Over the next 3 weeks, she was getting sharp, shooting pains across her lower back, down the L leg.  That has gotten better, but now she is getting it down the R leg, not as severe as the L.  The pain in her back is sharp and stabbing, but then shooting and stabbing pains down the RLE, brief.  She also has been having issues with her mid back.  She has seen the doctor ruled out GI issues.  She is having pressure in her back, making her feel sick and dizzy. She saw PCP, referred her for PT, no imaging was taken.  No injury to recall.     8/10 pain level at worst  3/10 pain level every other day    She has had three surgeries for carpal tunnel in B hands.  She didn't feel the surgery did very well with her L hand.  Here recently, she has been having bad pains shoot into the middle palm in her L hand.  She feels like shooting pains from the L elbow, funny bone like pain.  Her fingertips have been numb since the L hand surgery. She is getting pain in the palm and the first three fingers on the R hand, intermittent pain.  She is getting constant tightness in the first three fingers, feels almost  swollen, even though they're not. She is R handed.  She is dropping more things, affecting her ADLs.  PCP mentioned to patient about nerve conduction study.     8/10 pain level at worst  No other pain day to day    Pain  Relieving factors: heat  Exacerbated by: using hands more - cooking, ADLs.    Social Support  Lives in: one-story house  Lives with: spouse and young children    Hand dominance: right    Patient Goals  Patient goals for therapy: decreased pain, increased motion, improved balance, increased strength, independence with ADLs/IADLs and return to sport/leisure activities             Objective          Active Range of Motion     Left Wrist   Normal active range of motion    Right Wrist   Normal active range of motion    Additional Active Range of Motion Details  Normal Elbow and shoulder AROM WFL all directions    Tenderness along the thenar eminences B    Radicular symptoms in fingers 1/2/3 B hands and palm.  On the R hand, fingers feel cramping and swollen.   LUMBAR  Flexion: to mid lumbar pain, mid shins  Extension: to 5 degrees,  Mid lumbar across pain  R lateral flexion:  to mid thigh, L lumbar pain  L lateral flexion:  to mid thigh, R lumbar pain  R rotation:  to 10 degrees, tightness and lumbar pain  L rotation:  to 15 degrees, tightness and lumbar pain    Strength/Myotome Testing     Left Wrist/Hand   Wrist extension: 4-  Wrist flexion: 4  Radial deviation: 4-  Ulnar deviation: 4-     (2nd hand position)   Left  strength (2nd hand position) 72 lbs    Right Wrist/Hand   Wrist extension: 4-  Wrist flexion: 4-  Radial deviation: 4-  Ulnar deviation: 4-     (2nd hand position)   Right  strength (2nd hand position) 70 lbs    Left Hip   Planes of Motion   Flexion: 4  Abduction: 4-  Adduction: 4-    Right Hip   Planes of Motion   Flexion: 4-  Abduction: 4-  Adduction: 4-    Tests     Left Wrist/Hand   Positive Finkelstein's.     Right Wrist/Hand   Positive Finkelstein's.            Assessment & Plan     Assessment  Impairments: abnormal coordination, abnormal or restricted ROM, activity intolerance, impaired physical strength, lacks appropriate home exercise program and pain with function  Functional Limitations: carrying objects, lifting, sleeping, walking, pulling, pushing, uncomfortable because of pain, standing and unable to perform repetitive tasks  Assessment details: Pt presents with limitations, noted below, that impede her ability to open jars, heavy household chores, carry shopping bags, wash her back, cut her food, ADLs, social activities, standing periods of time, lifting heavy objects, and sleeping. The skills of a therapist will be required to safely and effectively implement the following treatment plan to restore maximal level of function.        Goals  Plan Goals: LOW BACK PROBLEMS:    1. The patient complains of low back pain.  LTG 1: 12 weeks:  The patient will report a pain rating of 1/10 or better in order to improve tolerance to activities of daily living and improve sleep quality.  STATUS:  New  STG 1a: 4 weeks:  The patient will report a pain rating of 3/10 or better.  STATUS:  New  TREATMENT:  Therapeutic exercises, manual therapy, aquatic therapy, home exercise instruction, and modalities as needed for pain to include:  electrical stimulation, moist heat, ice, ultrasound, and diathermy.      2. The patient demonstrates weakness of the bilateral hip.  LTG 2: 12 weeks:  The patient will demonstrate 4+ /5 strength for bilateral hip flexion, abduction, and extension in order to improve hip stability.  STATUS:  New  STG 2a: 4 weeks:  The patient will demonstrate 4 /5 strength for bilateral hip flexion, abduction, and extension.  STATUS:  New  STG2b:  4 weeks:  The patient will be independent with home exercises.     STATUS:  New  TREATMENT: Therapeutic exercises, manual therapy, aquatic therapy, home exercise instruction, and modalities as needed for pain to include:   electrical stimulation, moist heat, ice, ultrasound, and diathermy.    3. Mobility: Walking/Moving Around Functional Limitation    LTG 3: 12 weeks:  The patient will demonstrate limitation by achieving a score of 6/50 on the JOSH.  STATUS:  New  STG 3 a: 4 weeks:  The patient will demonstrate limitation by achieving a score of 10/50 on the JOSH.    STATUS:  New  TREATMENT:  Manual therapy, therapeutic exercise, home exercise instruction, and modalities as needed.       4. The patient has limited strength of the bilateral wrist.  LTG 2: 12 weeks:  The patient will demonstrate 4+/5 strength for bilateral wrist flexion, extension, ulnar deviation, radial deviation and forearm pronation and supination in order to demonstrate improved wrist stability.    STATUS:  New   STG 4a: 4 weeks:  The patient will demonstrate 4/5 strength for bilateral wrist flexion, extension, ulnar deviation, radial deviation and forearm pronation and supination.    STATUS:  New   TREATMENT: Manual therapy, therapeutic exercise, home exercise instruction, and modalities as needed to include: electrical stimulation, ultrasound, moist heat, and ice.    5. The patient complains of pain to the bilateral wrist   LTG 5: 12 weeks:  The patient will report a pain rating of 1/10 or better in order to improve sleep quality and tolerance to performance of activities of daily living.    STATUS:  New   STG 5a: 6 weeks:  The patient will report a pain rating of 2/10 or better.     STATUS:  New   TREATMENT: Manual therapy, therapeutic exercise, home exercise instruction, and modalities as needed to include: electrical stimulation, ultrasound, moist heat, and ice.        Plan  Therapy options: will be seen for skilled therapy services  Planned modality interventions: cryotherapy, thermotherapy (hydrocollator packs), dry needling, TENS, traction and ultrasound  Planned therapy interventions: abdominal trunk stabilization, manual therapy, flexibility, functional  ROM exercises, gait training, home exercise program, therapeutic activities, stretching, strengthening, spinal/joint mobilization, soft tissue mobilization, postural training, neuromuscular re-education, ADL retraining, balance/weight-bearing training and body mechanics training  Frequency: 2x week  Duration in weeks: 12  Treatment plan discussed with: patient  Plan details: Review HEP, update as needed.    Progress with lower back/core strength, trunk control/postural awareness, increased stamina, decreased tightness, improved ROM/flexibility, education as needed.    Improve radicular symptoms in the B hands and elbow, median and ulnar nn glides, postural awareness,  strength/wrist strength, ADLs, functional tasks.             History # of Personal Factors and/or Comorbidities: HIGH (3+)  Examination of Body System(s): # of elements: HIGH (4+)  Clinical Presentation: STABLE   Clinical Decision Making: MODERATE    Timed:  Manual Therapy:         mins  06744;  Therapeutic Exercise:    14     mins  40128;     Neuromuscular Wilmer:        mins  34487;    Therapeutic Activity:          mins  52289;     Gait Training:           mins  37205;     Ultrasound:          mins  81148;    Canalith Repos           ___  mins  21544      Untimed:  Electrical Stimulation:         mins  99708 ( );  Mechanical Traction:         mins  35755;   Dry Needling:                     mins self pay  Low Eval:                           mins  51790;  Medium Eval:               39      mins  62828;   High Eval:                          mins  29856       Timed Treatment:   14   mins   Total Treatment:     53   mins    PT SIGNATURE: Valery Nevarez PT, DPT  [place] License: [id number]  Electronically signed by Valery Nevarez PT, 07/07/22, 2:38 PM EDT      Initial Certification    Certification Period: 7/7/2022 thru 10/4/2022  I certify that the therapy services are furnished while this patient is under my care.  The services outlined above are required by  this patient, and will be reviewed every 90 days.     PHYSICIAN: Rekha Kim APRN  NPI: [id number]            PHYSICIAN PRINT NAME: ______________________________________________      PHYSICIAN SIGNATURE: ______________________________________________         DATE:________________________________        Please sign and return via fax to [phone].  Thank you, [facility].

## 2022-07-11 ENCOUNTER — TREATMENT (OUTPATIENT)
Dept: PHYSICAL THERAPY | Facility: CLINIC | Age: 37
End: 2022-07-11

## 2022-07-11 DIAGNOSIS — M79.602 BILATERAL ARM PAIN: Primary | ICD-10-CM

## 2022-07-11 DIAGNOSIS — M54.40 BILATERAL LOW BACK PAIN WITH SCIATICA, SCIATICA LATERALITY UNSPECIFIED, UNSPECIFIED CHRONICITY: ICD-10-CM

## 2022-07-11 DIAGNOSIS — R20.2 NUMBNESS AND TINGLING IN BOTH HANDS: ICD-10-CM

## 2022-07-11 DIAGNOSIS — R29.898 WEAKNESS OF BACK: ICD-10-CM

## 2022-07-11 DIAGNOSIS — M54.42 ACUTE MIDLINE LOW BACK PAIN WITH LEFT-SIDED SCIATICA: ICD-10-CM

## 2022-07-11 DIAGNOSIS — R20.0 NUMBNESS AND TINGLING IN BOTH HANDS: ICD-10-CM

## 2022-07-11 DIAGNOSIS — M79.601 BILATERAL ARM PAIN: Primary | ICD-10-CM

## 2022-07-11 PROCEDURE — 97530 THERAPEUTIC ACTIVITIES: CPT | Performed by: PHYSICAL THERAPIST

## 2022-07-11 PROCEDURE — 97110 THERAPEUTIC EXERCISES: CPT | Performed by: PHYSICAL THERAPIST

## 2022-07-11 PROCEDURE — 97140 MANUAL THERAPY 1/> REGIONS: CPT | Performed by: PHYSICAL THERAPIST

## 2022-07-11 NOTE — PROGRESS NOTES
Physical Therapy Daily Treatment Note      Patient: Gin Alexandra   : 1985  Referring practitioner: LUCIA Quiroz  Date of Initial Visit: Type: THERAPY  Noted: 2022  Today's Date: 2022  Patient seen for 2 sessions           Subjective     Objective          Ambulation     Comments   Pt has tenderness that is palpable in the medial condyles and distal soft tissue of the upper arm B.      See Exercise, Manual, and Modality Logs for complete treatment.       Assessment & Plan     Assessment  Impairments: abnormal coordination, abnormal or restricted ROM, activity intolerance, impaired physical strength, lacks appropriate home exercise program and pain with function  Functional Limitations: carrying objects, lifting, sleeping, walking, pulling, pushing, uncomfortable because of pain, standing and unable to perform repetitive tasks  Assessment details: Pt required vc and demonstration of all exercises due to this being her first tx visit. Pt is tender in the medial epicondyles and tissue in the upper arm. There is a lot of adhesions felt with the IASTM in the epicondyle and elbow region. Some of the exercises caused pt to have symptoms in the hands and would have to stop and shake them off. On the L Pt pt reports symptoms up into the SHLD by the cervical region.    Goals  Plan Goals: LOW BACK PROBLEMS:    1. The patient complains of low back pain.  LTG 1: 12 weeks:  The patient will report a pain rating of 1/10 or better in order to improve tolerance to activities of daily living and improve sleep quality.  STATUS:  New  STG 1a: 4 weeks:  The patient will report a pain rating of 3/10 or better.  STATUS:  New  TREATMENT:  Therapeutic exercises, manual therapy, aquatic therapy, home exercise instruction, and modalities as needed for pain to include:  electrical stimulation, moist heat, ice, ultrasound, and diathermy.      2. The patient demonstrates weakness of the bilateral hip.  LTG 2: 12 weeks:   The patient will demonstrate 4+ /5 strength for bilateral hip flexion, abduction, and extension in order to improve hip stability.  STATUS:  New  STG 2a: 4 weeks:  The patient will demonstrate 4 /5 strength for bilateral hip flexion, abduction, and extension.  STATUS:  New  STG2b:  4 weeks:  The patient will be independent with home exercises.     STATUS:  New  TREATMENT: Therapeutic exercises, manual therapy, aquatic therapy, home exercise instruction, and modalities as needed for pain to include:  electrical stimulation, moist heat, ice, ultrasound, and diathermy.    3. Mobility: Walking/Moving Around Functional Limitation    LTG 3: 12 weeks:  The patient will demonstrate limitation by achieving a score of 6/50 on the JOSH.  STATUS:  New  STG 3 a: 4 weeks:  The patient will demonstrate limitation by achieving a score of 10/50 on the JOSH.    STATUS:  New  TREATMENT:  Manual therapy, therapeutic exercise, home exercise instruction, and modalities as needed.       4. The patient has limited strength of the bilateral wrist.  LTG 2: 12 weeks:  The patient will demonstrate 4+/5 strength for bilateral wrist flexion, extension, ulnar deviation, radial deviation and forearm pronation and supination in order to demonstrate improved wrist stability.    STATUS:  Progressing   STG 4a: 4 weeks:  The patient will demonstrate 4/5 strength for bilateral wrist flexion, extension, ulnar deviation, radial deviation and forearm pronation and supination.    STATUS:  Progressing   TREATMENT: Manual therapy, therapeutic exercise, home exercise instruction, and modalities as needed to include: electrical stimulation, ultrasound, moist heat, and ice.    5. The patient complains of pain to the bilateral wrist   LTG 5: 12 weeks:  The patient will report a pain rating of 1/10 or better in order to improve sleep quality and tolerance to performance of activities of daily living.    STATUS:  Progressing   STG 5a: 6 weeks:  The patient will  report a pain rating of 2/10 or better.     STATUS:  Progressing   TREATMENT: Manual therapy, therapeutic exercise, home exercise instruction, and modalities as needed to include: electrical stimulation, ultrasound, moist heat, and ice.        Plan  Therapy options: will be seen for skilled therapy services  Planned modality interventions: cryotherapy, thermotherapy (hydrocollator packs), dry needling, TENS, traction and ultrasound  Planned therapy interventions: abdominal trunk stabilization, manual therapy, flexibility, functional ROM exercises, gait training, home exercise program, therapeutic activities, stretching, strengthening, spinal/joint mobilization, soft tissue mobilization, postural training, neuromuscular re-education, ADL retraining, balance/weight-bearing training and body mechanics training  Frequency: 2x week  Duration in weeks: 12  Treatment plan discussed with: patient  Plan details: Cont with stretches and strengthening of the UE and hands to decrease tightness and increase function. Add manual to all for greater tissue movement.            Visit Diagnoses:    ICD-10-CM ICD-9-CM   1. Bilateral arm pain  M79.601 729.5    M79.602    2. Numbness and tingling in both hands  R20.0 782.0    R20.2    3. Acute midline low back pain with left-sided sciatica  M54.42 724.2     724.3   4. Weakness of back  R29.898 724.8   5. Bilateral low back pain with sciatica, sciatica laterality unspecified, unspecified chronicity  M54.40 724.3       Progress per Plan of Care    Timed:    Therapeutic Exercise:    13     mins  12781;  Manual Therapy:    8     mins  35959;     Neuromuscular Wilmer:       mins  80326;    Therapeutic Activity:     12     mins  27122;     Gait Training:           mins  95405;     Ultrasound:          mins  25171;      Untimed:  Electrical Stimulation:         mins  28224 ( );  Mechanical Traction:         mins  22624;     Timed Treatment:   33   mins   Total Treatment:     35    sindy Bucio, PTA  Physical Therapist Assistant

## 2022-07-18 ENCOUNTER — TELEPHONE (OUTPATIENT)
Dept: PHYSICAL THERAPY | Facility: CLINIC | Age: 37
End: 2022-07-18

## 2022-07-20 ENCOUNTER — TREATMENT (OUTPATIENT)
Dept: PHYSICAL THERAPY | Facility: CLINIC | Age: 37
End: 2022-07-20

## 2022-07-20 DIAGNOSIS — R20.0 NUMBNESS AND TINGLING IN BOTH HANDS: ICD-10-CM

## 2022-07-20 DIAGNOSIS — R29.898 WEAKNESS OF BACK: ICD-10-CM

## 2022-07-20 DIAGNOSIS — M54.40 BILATERAL LOW BACK PAIN WITH SCIATICA, SCIATICA LATERALITY UNSPECIFIED, UNSPECIFIED CHRONICITY: ICD-10-CM

## 2022-07-20 DIAGNOSIS — M79.601 BILATERAL ARM PAIN: Primary | ICD-10-CM

## 2022-07-20 DIAGNOSIS — M54.42 ACUTE MIDLINE LOW BACK PAIN WITH LEFT-SIDED SCIATICA: ICD-10-CM

## 2022-07-20 DIAGNOSIS — M79.602 BILATERAL ARM PAIN: Primary | ICD-10-CM

## 2022-07-20 DIAGNOSIS — R20.2 NUMBNESS AND TINGLING IN BOTH HANDS: ICD-10-CM

## 2022-07-20 PROCEDURE — 97140 MANUAL THERAPY 1/> REGIONS: CPT | Performed by: PHYSICAL THERAPIST

## 2022-07-20 PROCEDURE — 97110 THERAPEUTIC EXERCISES: CPT | Performed by: PHYSICAL THERAPIST

## 2022-07-20 NOTE — PROGRESS NOTES
"Physical Therapy Daily Treatment Note      Patient: Gin Alexandra   : 1985  Referring practitioner: LUCIA Quiroz  Date of Initial Visit: Type: THERAPY  Noted: 2022  Today's Date: 2022  Patient seen for 2 sessions           Subjective     Objective   See Exercise, Manual, and Modality Logs for complete treatment.       Assessment & Plan     Assessment  Impairments: abnormal coordination, abnormal or restricted ROM, activity intolerance, impaired physical strength, lacks appropriate home exercise program and pain with function  Functional Limitations: carrying objects, lifting, sleeping, walking, pulling, pushing, uncomfortable because of pain, standing and unable to perform repetitive tasks  Assessment details: There is a severe tightness to the L upper trap and then down along the ulnar nerve pathway there is soft tissue tightness throughout. There were several times with exercises on the L that the pt had \"electrical shocks\" that caused her to jump and UE become rigid for a second. Pt face would also flinch during this time.  Exercises were stopped after this occurring on more then one exercise. Manual was performed on the tightened regions with care to check in with pt as to not cause recurrence of symptoms.    Goals  Plan Goals: LOW BACK PROBLEMS:    1. The patient complains of low back pain.  LTG 1: 12 weeks:  The patient will report a pain rating of 1/10 or better in order to improve tolerance to activities of daily living and improve sleep quality.  STATUS:  New  STG 1a: 4 weeks:  The patient will report a pain rating of 3/10 or better.  STATUS:  New  TREATMENT:  Therapeutic exercises, manual therapy, aquatic therapy, home exercise instruction, and modalities as needed for pain to include:  electrical stimulation, moist heat, ice, ultrasound, and diathermy.      2. The patient demonstrates weakness of the bilateral hip.  LTG 2: 12 weeks:  The patient will demonstrate 4+ /5 strength " for bilateral hip flexion, abduction, and extension in order to improve hip stability.  STATUS:  New  STG 2a: 4 weeks:  The patient will demonstrate 4 /5 strength for bilateral hip flexion, abduction, and extension.  STATUS:  New  STG2b:  4 weeks:  The patient will be independent with home exercises.     STATUS:  New  TREATMENT: Therapeutic exercises, manual therapy, aquatic therapy, home exercise instruction, and modalities as needed for pain to include:  electrical stimulation, moist heat, ice, ultrasound, and diathermy.    3. Mobility: Walking/Moving Around Functional Limitation    LTG 3: 12 weeks:  The patient will demonstrate limitation by achieving a score of 6/50 on the JOSH.  STATUS:  New  STG 3 a: 4 weeks:  The patient will demonstrate limitation by achieving a score of 10/50 on the JOSH.    STATUS:  New  TREATMENT:  Manual therapy, therapeutic exercise, home exercise instruction, and modalities as needed.       4. The patient has limited strength of the bilateral wrist.  LTG 2: 12 weeks:  The patient will demonstrate 4+/5 strength for bilateral wrist flexion, extension, ulnar deviation, radial deviation and forearm pronation and supination in order to demonstrate improved wrist stability.    STATUS:  Progressing   STG 4a: 4 weeks:  The patient will demonstrate 4/5 strength for bilateral wrist flexion, extension, ulnar deviation, radial deviation and forearm pronation and supination.    STATUS:  Progressing   TREATMENT: Manual therapy, therapeutic exercise, home exercise instruction, and modalities as needed to include: electrical stimulation, ultrasound, moist heat, and ice.    5. The patient complains of pain to the bilateral wrist   LTG 5: 12 weeks:  The patient will report a pain rating of 1/10 or better in order to improve sleep quality and tolerance to performance of activities of daily living.    STATUS:  Progressing   STG 5a: 6 weeks:  The patient will report a pain rating of 2/10 or better.      STATUS:  Progressing   TREATMENT: Manual therapy, therapeutic exercise, home exercise instruction, and modalities as needed to include: electrical stimulation, ultrasound, moist heat, and ice.        Plan  Therapy options: will be seen for skilled therapy services  Planned modality interventions: cryotherapy, thermotherapy (hydrocollator packs), dry needling, TENS, traction and ultrasound  Planned therapy interventions: abdominal trunk stabilization, manual therapy, flexibility, functional ROM exercises, gait training, home exercise program, therapeutic activities, stretching, strengthening, spinal/joint mobilization, soft tissue mobilization, postural training, neuromuscular re-education, ADL retraining, balance/weight-bearing training and body mechanics training  Frequency: 2x week  Duration in weeks: 12  Treatment plan discussed with: patient  Plan details: Add stretches to tolerance and provide manual tx to tolerance. Ease into exercises to address the current diagnosis, taking care not to aggravate.            Visit Diagnoses:    ICD-10-CM ICD-9-CM   1. Bilateral arm pain  M79.601 729.5    M79.602    2. Numbness and tingling in both hands  R20.0 782.0    R20.2    3. Acute midline low back pain with left-sided sciatica  M54.42 724.2     724.3   4. Weakness of back  R29.898 724.8   5. Bilateral low back pain with sciatica, sciatica laterality unspecified, unspecified chronicity  M54.40 724.3       Progress per Plan of Care    Timed:    Therapeutic Exercise:    15     mins  01211;  Manual Therapy:    12     mins  85751;     Neuromuscular Wilmer:       mins  27657;    Therapeutic Activity:          mins  24332;     Gait Training:           mins  73368;     Ultrasound:          mins  37478;      Untimed:  Electrical Stimulation:         mins  11271 ( );  Mechanical Traction:         mins  35484;     Timed Treatment:   27   mins   Total Treatment:     30   mins      Claire Bucio PTA  Physical Therapist  Assistant

## 2022-07-25 ENCOUNTER — TREATMENT (OUTPATIENT)
Dept: PHYSICAL THERAPY | Facility: CLINIC | Age: 37
End: 2022-07-25

## 2022-07-25 ENCOUNTER — PATIENT MESSAGE (OUTPATIENT)
Dept: FAMILY MEDICINE CLINIC | Age: 37
End: 2022-07-25

## 2022-07-25 DIAGNOSIS — M79.601 BILATERAL ARM PAIN: Primary | ICD-10-CM

## 2022-07-25 DIAGNOSIS — R20.0 NUMBNESS AND TINGLING IN BOTH HANDS: ICD-10-CM

## 2022-07-25 DIAGNOSIS — M54.2 NECK PAIN: ICD-10-CM

## 2022-07-25 DIAGNOSIS — D18.09 HEMANGIOMA OF SPINE: ICD-10-CM

## 2022-07-25 DIAGNOSIS — M54.42 ACUTE MIDLINE LOW BACK PAIN WITH LEFT-SIDED SCIATICA: ICD-10-CM

## 2022-07-25 DIAGNOSIS — M79.602 BILATERAL ARM PAIN: Primary | ICD-10-CM

## 2022-07-25 DIAGNOSIS — R20.2 PARESTHESIA OF ARM: ICD-10-CM

## 2022-07-25 DIAGNOSIS — M54.40 BILATERAL LOW BACK PAIN WITH SCIATICA, SCIATICA LATERALITY UNSPECIFIED, UNSPECIFIED CHRONICITY: ICD-10-CM

## 2022-07-25 DIAGNOSIS — R20.2 NUMBNESS AND TINGLING IN BOTH HANDS: ICD-10-CM

## 2022-07-25 DIAGNOSIS — R29.898 WEAKNESS OF BACK: ICD-10-CM

## 2022-07-25 PROCEDURE — 97140 MANUAL THERAPY 1/> REGIONS: CPT | Performed by: PHYSICAL THERAPIST

## 2022-07-25 PROCEDURE — 97110 THERAPEUTIC EXERCISES: CPT | Performed by: PHYSICAL THERAPIST

## 2022-07-25 NOTE — PROGRESS NOTES
Physical Therapy Daily Treatment Note      Patient: Gin Alexandra   : 1985  Referring practitioner: LUCIA Quiroz  Date of Initial Visit: Type: THERAPY  Noted: 2022  Today's Date: 2022  Patient seen for 3 sessions           Subjective     Objective   See Exercise, Manual, and Modality Logs for complete treatment.       Assessment & Plan     Assessment  Impairments: abnormal coordination, abnormal or restricted ROM, activity intolerance, impaired physical strength, lacks appropriate home exercise program and pain with function  Functional Limitations: carrying objects, lifting, sleeping, walking, pulling, pushing, uncomfortable because of pain, standing and unable to perform repetitive tasks  Assessment details: Pt was unable to get through the full 4 minutes on the UBE. Pt R UE had shock in it today with the therabar. Pt had nerve involvement with some of the stretches and that was stopped. Pt to have a nerve conduction test tomorrow.  L SHLD sits lower then the R with postural assessment.    Goals  Plan Goals: LOW BACK PROBLEMS:    1. The patient complains of low back pain.  LTG 1: 12 weeks:  The patient will report a pain rating of 1/10 or better in order to improve tolerance to activities of daily living and improve sleep quality.  STATUS:  New  STG 1a: 4 weeks:  The patient will report a pain rating of 3/10 or better.  STATUS:  New  TREATMENT:  Therapeutic exercises, manual therapy, aquatic therapy, home exercise instruction, and modalities as needed for pain to include:  electrical stimulation, moist heat, ice, ultrasound, and diathermy.      2. The patient demonstrates weakness of the bilateral hip.  LTG 2: 12 weeks:  The patient will demonstrate 4+ /5 strength for bilateral hip flexion, abduction, and extension in order to improve hip stability.  STATUS:  New  STG 2a: 4 weeks:  The patient will demonstrate 4 /5 strength for bilateral hip flexion, abduction, and extension.  STATUS:   New  STG2b:  4 weeks:  The patient will be independent with home exercises.     STATUS:  New  TREATMENT: Therapeutic exercises, manual therapy, aquatic therapy, home exercise instruction, and modalities as needed for pain to include:  electrical stimulation, moist heat, ice, ultrasound, and diathermy.    3. Mobility: Walking/Moving Around Functional Limitation    LTG 3: 12 weeks:  The patient will demonstrate limitation by achieving a score of 6/50 on the JOSH.  STATUS:  New  STG 3 a: 4 weeks:  The patient will demonstrate limitation by achieving a score of 10/50 on the JOSH.    STATUS:  New  TREATMENT:  Manual therapy, therapeutic exercise, home exercise instruction, and modalities as needed.       4. The patient has limited strength of the bilateral wrist.  LTG 2: 12 weeks:  The patient will demonstrate 4+/5 strength for bilateral wrist flexion, extension, ulnar deviation, radial deviation and forearm pronation and supination in order to demonstrate improved wrist stability.    STATUS:  Progressing   STG 4a: 4 weeks:  The patient will demonstrate 4/5 strength for bilateral wrist flexion, extension, ulnar deviation, radial deviation and forearm pronation and supination.    STATUS:  Progressing   TREATMENT: Manual therapy, therapeutic exercise, home exercise instruction, and modalities as needed to include: electrical stimulation, ultrasound, moist heat, and ice.    5. The patient complains of pain to the bilateral wrist   LTG 5: 12 weeks:  The patient will report a pain rating of 1/10 or better in order to improve sleep quality and tolerance to performance of activities of daily living.    STATUS:  Progressing   STG 5a: 6 weeks:  The patient will report a pain rating of 2/10 or better.     STATUS:  Progressing   TREATMENT: Manual therapy, therapeutic exercise, home exercise instruction, and modalities as needed to include: electrical stimulation, ultrasound, moist heat, and ice.        Plan  Therapy options: will be  seen for skilled therapy services  Planned modality interventions: cryotherapy, thermotherapy (hydrocollator packs), dry needling, TENS, traction and ultrasound  Planned therapy interventions: abdominal trunk stabilization, manual therapy, flexibility, functional ROM exercises, gait training, home exercise program, therapeutic activities, stretching, strengthening, spinal/joint mobilization, soft tissue mobilization, postural training, neuromuscular re-education, ADL retraining, balance/weight-bearing training and body mechanics training  Frequency: 2x week  Duration in weeks: 12  Treatment plan discussed with: patient  Plan details: Add stretches to tolerance and provide manual tx to tolerance. Ease into exercises to address the current diagnosis, taking care not to aggravate.            Visit Diagnoses:    ICD-10-CM ICD-9-CM   1. Bilateral arm pain  M79.601 729.5    M79.602    2. Numbness and tingling in both hands  R20.0 782.0    R20.2    3. Acute midline low back pain with left-sided sciatica  M54.42 724.2     724.3   4. Weakness of back  R29.898 724.8   5. Bilateral low back pain with sciatica, sciatica laterality unspecified, unspecified chronicity  M54.40 724.3       Progress per Plan of Care    Timed:    Therapeutic Exercise:    14     mins  17088;  Manual Therapy:    11     mins  96043;     Neuromuscular Wilmer:       mins  11771;    Therapeutic Activity:          mins  33212;     Gait Training:           mins  99886;     Ultrasound:          mins  40640;      Untimed:  Electrical Stimulation:         mins  39399 ( );  Mechanical Traction:         mins  25291;     Timed Treatment:   25   mins   Total Treatment:     27   mins      Claire Bucio PTA  Physical Therapist Assistant

## 2022-07-26 ENCOUNTER — HOSPITAL ENCOUNTER (OUTPATIENT)
Dept: GENERAL RADIOLOGY | Facility: HOSPITAL | Age: 37
Discharge: HOME | End: 2022-07-26
Admitting: NURSE PRACTITIONER

## 2022-07-26 DIAGNOSIS — M79.601 BILATERAL ARM PAIN: ICD-10-CM

## 2022-07-26 DIAGNOSIS — R20.2 PARESTHESIA OF ARM: ICD-10-CM

## 2022-07-26 DIAGNOSIS — M79.602 BILATERAL ARM PAIN: ICD-10-CM

## 2022-07-26 PROCEDURE — 72040 X-RAY EXAM NECK SPINE 2-3 VW: CPT

## 2022-07-26 NOTE — TELEPHONE ENCOUNTER
From: Gin Alexandra  To: LUCIA Quiroz  Sent: 7/25/2022 4:07 PM EDT  Subject: PT arms    Jacek Da Silva,  So the physical therapist upstairs wanted me to reach out to you to see what you wanted to do. She's afraid that the stuff we're doing may be causing more harm. I'm not sure what needs to happen next.  Thanks  Gin

## 2022-07-27 ENCOUNTER — TELEPHONE (OUTPATIENT)
Dept: PHYSICAL THERAPY | Facility: OTHER | Age: 37
End: 2022-07-27

## 2022-08-01 ENCOUNTER — TREATMENT (OUTPATIENT)
Dept: PHYSICAL THERAPY | Facility: CLINIC | Age: 37
End: 2022-08-01

## 2022-08-01 DIAGNOSIS — M54.40 BILATERAL LOW BACK PAIN WITH SCIATICA, SCIATICA LATERALITY UNSPECIFIED, UNSPECIFIED CHRONICITY: ICD-10-CM

## 2022-08-01 DIAGNOSIS — R20.0 NUMBNESS AND TINGLING IN BOTH HANDS: ICD-10-CM

## 2022-08-01 DIAGNOSIS — R29.898 WEAKNESS OF BACK: ICD-10-CM

## 2022-08-01 DIAGNOSIS — R20.2 NUMBNESS AND TINGLING IN BOTH HANDS: ICD-10-CM

## 2022-08-01 DIAGNOSIS — M54.42 ACUTE MIDLINE LOW BACK PAIN WITH LEFT-SIDED SCIATICA: ICD-10-CM

## 2022-08-01 DIAGNOSIS — M79.601 BILATERAL ARM PAIN: Primary | ICD-10-CM

## 2022-08-01 DIAGNOSIS — M79.602 BILATERAL ARM PAIN: Primary | ICD-10-CM

## 2022-08-01 PROCEDURE — 97140 MANUAL THERAPY 1/> REGIONS: CPT | Performed by: PHYSICAL THERAPIST

## 2022-08-01 PROCEDURE — 97110 THERAPEUTIC EXERCISES: CPT | Performed by: PHYSICAL THERAPIST

## 2022-08-01 NOTE — PROGRESS NOTES
Physical Therapy Daily Treatment Note      Patient: Gin Alexandra   : 1985  Referring practitioner: LUCIA Quiroz  Date of Initial Visit: Type: THERAPY  Noted: 2022  Today's Date: 2022  Patient seen for 4 sessions           Subjective Evaluation    History of Present Illness    Subjective comment: 3/10       Objective           General Comments     Lumbar Comments  There is a R pelvic rotation noted at this tx visit.     See Exercise, Manual, and Modality Logs for complete treatment.       Assessment & Plan     Assessment  Impairments: abnormal coordination, abnormal or restricted ROM, activity intolerance, impaired physical strength, lacks appropriate home exercise program and pain with function  Functional Limitations: carrying objects, lifting, sleeping, walking, pulling, pushing, uncomfortable because of pain, standing and unable to perform repetitive tasks  Assessment details: UE therapy is being put on hold until pt has MRI and the focus will be shifted to the low back. Pt does have a R pelvic rotation and a L Piriformis that is tight.  Pt was educated on with model on the pelvis and the muscle attachments with their line of pull. Pt has a very reactive piriformis on the L with palpation which did not release fully.    Goals  Plan Goals: LOW BACK PROBLEMS:    1. The patient complains of low back pain.  LTG 1: 12 weeks:  The patient will report a pain rating of 1/10 or better in order to improve tolerance to activities of daily living and improve sleep quality.  STATUS:  New  STG 1a: 4 weeks:  The patient will report a pain rating of 3/10 or better.  STATUS:  New  TREATMENT:  Therapeutic exercises, manual therapy, aquatic therapy, home exercise instruction, and modalities as needed for pain to include:  electrical stimulation, moist heat, ice, ultrasound, and diathermy.      2. The patient demonstrates weakness of the bilateral hip.  LTG 2: 12 weeks:  The patient will demonstrate 4+ /5  strength for bilateral hip flexion, abduction, and extension in order to improve hip stability.  STATUS:  New  STG 2a: 4 weeks:  The patient will demonstrate 4 /5 strength for bilateral hip flexion, abduction, and extension.  STATUS:  New  STG2b:  4 weeks:  The patient will be independent with home exercises.     STATUS:  New  TREATMENT: Therapeutic exercises, manual therapy, aquatic therapy, home exercise instruction, and modalities as needed for pain to include:  electrical stimulation, moist heat, ice, ultrasound, and diathermy.    3. Mobility: Walking/Moving Around Functional Limitation    LTG 3: 12 weeks:  The patient will demonstrate limitation by achieving a score of 6/50 on the JOSH.  STATUS:  New  STG 3 a: 4 weeks:  The patient will demonstrate limitation by achieving a score of 10/50 on the JOSH.    STATUS:  New  TREATMENT:  Manual therapy, therapeutic exercise, home exercise instruction, and modalities as needed.       4. The patient has limited strength of the bilateral wrist.  LTG 2: 12 weeks:  The patient will demonstrate 4+/5 strength for bilateral wrist flexion, extension, ulnar deviation, radial deviation and forearm pronation and supination in order to demonstrate improved wrist stability.    STATUS:  Progressing   STG 4a: 4 weeks:  The patient will demonstrate 4/5 strength for bilateral wrist flexion, extension, ulnar deviation, radial deviation and forearm pronation and supination.    STATUS:  Progressing   TREATMENT: Manual therapy, therapeutic exercise, home exercise instruction, and modalities as needed to include: electrical stimulation, ultrasound, moist heat, and ice.    5. The patient complains of pain to the bilateral wrist   LTG 5: 12 weeks:  The patient will report a pain rating of 1/10 or better in order to improve sleep quality and tolerance to performance of activities of daily living.    STATUS:  Progressing   STG 5a: 6 weeks:  The patient will report a pain rating of 2/10 or better.      STATUS:  Progressing   TREATMENT: Manual therapy, therapeutic exercise, home exercise instruction, and modalities as needed to include: electrical stimulation, ultrasound, moist heat, and ice.        Plan  Therapy options: will be seen for skilled therapy services  Planned modality interventions: cryotherapy, thermotherapy (hydrocollator packs), dry needling, TENS, traction and ultrasound  Planned therapy interventions: abdominal trunk stabilization, manual therapy, flexibility, functional ROM exercises, gait training, home exercise program, therapeutic activities, stretching, strengthening, spinal/joint mobilization, soft tissue mobilization, postural training, neuromuscular re-education, ADL retraining, balance/weight-bearing training and body mechanics training  Frequency: 2x week  Duration in weeks: 12  Treatment plan discussed with: patient  Plan details: Cont to address tightness. Add pelvic stabilization exercises and manual for decreased muscle firing.            Visit Diagnoses:    ICD-10-CM ICD-9-CM   1. Bilateral arm pain  M79.601 729.5    M79.602    2. Numbness and tingling in both hands  R20.0 782.0    R20.2    3. Acute midline low back pain with left-sided sciatica  M54.42 724.2     724.3   4. Weakness of back  R29.898 724.8   5. Bilateral low back pain with sciatica, sciatica laterality unspecified, unspecified chronicity  M54.40 724.3       Progress per Plan of Care    Timed:    Therapeutic Exercise:    12     mins  79281;  Manual Therapy:    15     mins  30923;     Neuromuscular Wilmer:       mins  97044;    Therapeutic Activity:          mins  93539;     Gait Training:           mins  71892;     Ultrasound:          mins  14585;      Untimed:  Electrical Stimulation:         mins  24961 ( );  Mechanical Traction:         mins  00360;     Timed Treatment:   27   mins   Total Treatment:     30   mins      Claire Bucio PTA  Physical Therapist Assistant

## 2022-08-03 ENCOUNTER — TELEPHONE (OUTPATIENT)
Dept: PHYSICAL THERAPY | Facility: CLINIC | Age: 37
End: 2022-08-03

## 2022-08-08 ENCOUNTER — TREATMENT (OUTPATIENT)
Dept: PHYSICAL THERAPY | Facility: CLINIC | Age: 37
End: 2022-08-08

## 2022-08-08 DIAGNOSIS — R29.898 WEAKNESS OF BACK: ICD-10-CM

## 2022-08-08 DIAGNOSIS — M62.81 WEAKNESS OF TRUNK MUSCULATURE: ICD-10-CM

## 2022-08-08 DIAGNOSIS — M54.42 ACUTE MIDLINE LOW BACK PAIN WITH LEFT-SIDED SCIATICA: Primary | ICD-10-CM

## 2022-08-08 DIAGNOSIS — M54.16 RADICULOPATHY, LUMBAR REGION: ICD-10-CM

## 2022-08-08 DIAGNOSIS — R29.898 WEAKNESS OF LEFT LOWER EXTREMITY: ICD-10-CM

## 2022-08-08 PROCEDURE — 97112 NEUROMUSCULAR REEDUCATION: CPT | Performed by: PHYSICAL THERAPIST

## 2022-08-08 PROCEDURE — 97530 THERAPEUTIC ACTIVITIES: CPT | Performed by: PHYSICAL THERAPIST

## 2022-08-08 PROCEDURE — 97110 THERAPEUTIC EXERCISES: CPT | Performed by: PHYSICAL THERAPIST

## 2022-08-08 NOTE — PROGRESS NOTES
"Physical Therapy Progress Note/ Re-Assessment      Patient: Gin Alexandra   : 1985  Diagnosis/ICD-10 Code:  Acute midline low back pain with left-sided sciatica [M54.42]  Referring practitioner: LUCIA Quiroz  Date of Initial Visit: Type: THERAPY  Noted: 2022  Today's Date: 2022  Patient seen for 5 sessions        Subjective:   Visit Diagnoses:    ICD-10-CM ICD-9-CM   1. Acute midline low back pain with left-sided sciatica  M54.42 724.2     724.3   2. Weakness of back  R29.898 724.8   3. Weakness of left lower extremity  R29.898 729.89   4. Radiculopathy, lumbar region  M54.16 724.4   5. Weakness of trunk musculature  M62.81 728.87       Subjective Questionnaire: Oswestry: 17=34%    Clinical Progress: unchanged    Home Program Compliance: fair    Treatment has included: therapeutic exercise, neuromuscular re-education, manual therapy, therapeutic activity and education, HEP instruction      Subjective Evaluation    History of Present Illness  Mechanism of injury: Pt relates was instructed by PCP to discontinue therapy on arms/ upper body until has MRI, scheduled 22.    Relates tightness in lower back, B, with radiation of pain to buttock on L>R, ( side varies) and tingling in L foot. Relates L LE feels weak.     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     Relates has fibromyalgia, and is fatigued frequently.     Denies injury to LB.     States has not felt a difference with the therapy stretching , so far.  ( 4 visits since evaluation 22).         Objective          Ambulation     Comments   Pt indicated \"tightness and soreness\" in lower back, B at level of B SI, non TTP     Standing: pt demonstrates increased lumbar lordosis.    When flexing forward, no reversal of the lumbar lordosis, relative flexibility of the thoracic spine, hypermobility of knees to extension, flexion occurring from hips and thoracic spine. Flexion aprox 40 degrees.    When " instructed to flex knees, pt only able to flex aprox 30 degrees, (unable to use the knee hypermobility to get flexion).     B SB aprox 30 degrees, spinal mobility symmetrical, no c/o.     Seated knee extension : able to fully extend each knee and df, but L leg shakes with a hold > 10 sec.     MMT: L LE 4-, poor stability in hips, LEs    GAIT: min, if any, arm swing while walking          Assessment & Plan     Assessment    Assessment details: Pt demonstrates hypermobility, decreased stabilization of hips, LE and spine. Relative flexibility in thoracic spine, minimal mobility of lumbar spine.    Pt has c/o radiculopathy in L foot, L LE weakness, fatigue, does not get aerobic exercise during the day, and has just started a HEP, and due to external factors, has been inconsistent in therapy attendance.     Pt would benefit from skilled physical  therapy intervention to address the above mentioned deficits.         Barriers to therapy: inconsistency    Goals  Plan Goals: LOW BACK PROBLEMS:    1. The patient complains of low back pain.  LTG 1: 12 weeks:  The patient will report a pain rating of 1/10 or better in order to improve tolerance to activities of daily living and improve sleep quality.  STATUS:  PROGRESSING  STG 1a: 4 weeks:  The patient will report a pain rating of 3/10 or better.  STATUS:PROGRESSING      2. The patient demonstrates weakness of the bilateral hip.  LTG 2: 12 weeks:  The patient will demonstrate 4+ /5 strength for bilateral hip flexion, abduction, and extension in order to improve hip stability.  STATUS:  New  STG 2a: 4 weeks:  The patient will demonstrate 4 /5 strength for bilateral hip flexion, abduction, and extension.  STATUS:  PROGRESSING  STG2b:  4 weeks:  The patient will be independent with home exercises.   STATUS:  ONGOING      3. Mobility: Walking/Moving Around Functional Limitation    LTG 3: 12 weeks:  The patient will demonstrate limitation by achieving a score of 6/50 on the  JOSH.  STATUS:  New  STG 3 a: 4 weeks:  The patient will demonstrate limitation by achieving a score of 10/50 on the JOSH.    STATUS:  17=37% on 8/8/22  TREATMENT:  Manual therapy, therapeutic exercise, home exercise instruction, and modalities as needed.                Plan  Frequency: 2x week  Duration in weeks: 6  Plan details: Continue therapy as planned:   -education,   -progress L LE strength  -progress trunk stabilization,   -increase lumbar flexion,   -progress hip strength and stabilization,   -initiate aerobic exercise program,   -progress HEP       Other therapeutic activities and/or exercises will be prescribed as indicated.    Progress toward previous goals: Partially Met     Recommendations: Continue as planned    Timeframe: 1 month    Prognosis to achieve goals: fair      Timed:  Manual Therapy:         mins  27051;  Therapeutic Exercise:    8     mins  32451;   Therapeutic Activity:     16     mins  86381;         Neuromuscular Wilmer:    14    mins  52685;    Gait Training:           mins  92793;     Ultrasound:          mins  27026;      Un-timed:   Electrical Stimulation          mins 66974 (MC )  Traction          mins 86195    Timed Treatment:  38    mins   Total Treatment:     45   mins      PT Signature: Arlin Benites, PT  [place] License: [id number]            This document has been electronically signed by Arlin Benites, PT on August 8, 2022 17:36 EDT    PHYSICIAN: Rekha Kim APRN  NPI: [id number]

## 2022-08-22 ENCOUNTER — HOSPITAL ENCOUNTER (OUTPATIENT)
Dept: MRI IMAGING | Facility: HOSPITAL | Age: 37
Discharge: HOME | End: 2022-08-22
Admitting: NURSE PRACTITIONER

## 2022-08-22 DIAGNOSIS — M79.601 BILATERAL ARM PAIN: ICD-10-CM

## 2022-08-22 DIAGNOSIS — R20.2 PARESTHESIA OF ARM: ICD-10-CM

## 2022-08-22 DIAGNOSIS — M79.602 BILATERAL ARM PAIN: ICD-10-CM

## 2022-08-22 DIAGNOSIS — M54.2 NECK PAIN: ICD-10-CM

## 2022-08-22 PROCEDURE — 72141 MRI NECK SPINE W/O DYE: CPT

## 2022-08-24 ENCOUNTER — TELEPHONE (OUTPATIENT)
Dept: FAMILY MEDICINE CLINIC | Age: 37
End: 2022-08-24

## 2022-09-09 ENCOUNTER — TRANSCRIBE ORDERS (OUTPATIENT)
Dept: ADMINISTRATIVE | Facility: HOSPITAL | Age: 37
End: 2022-09-09

## 2022-09-09 ENCOUNTER — LAB (OUTPATIENT)
Dept: LAB | Facility: HOSPITAL | Age: 37
End: 2022-09-09

## 2022-09-09 DIAGNOSIS — E78.5 HYPERLIPIDEMIA, UNSPECIFIED HYPERLIPIDEMIA TYPE: ICD-10-CM

## 2022-09-09 DIAGNOSIS — I51.9 MYXEDEMA HEART DISEASE: ICD-10-CM

## 2022-09-09 DIAGNOSIS — E55.9 VITAMIN D DEFICIENCY: ICD-10-CM

## 2022-09-09 DIAGNOSIS — R51.9 HEADACHE AROUND THE EYES: ICD-10-CM

## 2022-09-09 DIAGNOSIS — E03.9 MYXEDEMA HEART DISEASE: ICD-10-CM

## 2022-09-09 DIAGNOSIS — E78.5 HYPERLIPIDEMIA, UNSPECIFIED HYPERLIPIDEMIA TYPE: Primary | ICD-10-CM

## 2022-09-09 LAB
BASOPHILS # BLD AUTO: 0.04 10*3/MM3 (ref 0–0.2)
BASOPHILS NFR BLD AUTO: 0.6 % (ref 0–1.5)
DEPRECATED RDW RBC AUTO: 41.3 FL (ref 37–54)
EOSINOPHIL # BLD AUTO: 0.12 10*3/MM3 (ref 0–0.4)
EOSINOPHIL NFR BLD AUTO: 1.8 % (ref 0.3–6.2)
ERYTHROCYTE [DISTWIDTH] IN BLOOD BY AUTOMATED COUNT: 12.8 % (ref 12.3–15.4)
HCT VFR BLD AUTO: 40.4 % (ref 34–46.6)
HGB BLD-MCNC: 13.3 G/DL (ref 12–15.9)
IMM GRANULOCYTES # BLD AUTO: 0.01 10*3/MM3 (ref 0–0.05)
IMM GRANULOCYTES NFR BLD AUTO: 0.1 % (ref 0–0.5)
LYMPHOCYTES # BLD AUTO: 1.68 10*3/MM3 (ref 0.7–3.1)
LYMPHOCYTES NFR BLD AUTO: 24.6 % (ref 19.6–45.3)
MCH RBC QN AUTO: 29 PG (ref 26.6–33)
MCHC RBC AUTO-ENTMCNC: 32.9 G/DL (ref 31.5–35.7)
MCV RBC AUTO: 88 FL (ref 79–97)
MONOCYTES # BLD AUTO: 0.52 10*3/MM3 (ref 0.1–0.9)
MONOCYTES NFR BLD AUTO: 7.6 % (ref 5–12)
NEUTROPHILS NFR BLD AUTO: 4.47 10*3/MM3 (ref 1.7–7)
NEUTROPHILS NFR BLD AUTO: 65.3 % (ref 42.7–76)
PLATELET # BLD AUTO: 292 10*3/MM3 (ref 140–450)
PMV BLD AUTO: 9.3 FL (ref 6–12)
RBC # BLD AUTO: 4.59 10*6/MM3 (ref 3.77–5.28)
WBC NRBC COR # BLD: 6.84 10*3/MM3 (ref 3.4–10.8)

## 2022-09-09 PROCEDURE — 83825 ASSAY OF MERCURY: CPT

## 2022-09-09 PROCEDURE — 86618 LYME DISEASE ANTIBODY: CPT

## 2022-09-09 PROCEDURE — 84439 ASSAY OF FREE THYROXINE: CPT

## 2022-09-09 PROCEDURE — 82378 CARCINOEMBRYONIC ANTIGEN: CPT

## 2022-09-09 PROCEDURE — 84146 ASSAY OF PROLACTIN: CPT

## 2022-09-09 PROCEDURE — 82306 VITAMIN D 25 HYDROXY: CPT

## 2022-09-09 PROCEDURE — 80050 GENERAL HEALTH PANEL: CPT

## 2022-09-09 PROCEDURE — 36415 COLL VENOUS BLD VENIPUNCTURE: CPT

## 2022-09-09 PROCEDURE — 82607 VITAMIN B-12: CPT

## 2022-09-09 PROCEDURE — 82746 ASSAY OF FOLIC ACID SERUM: CPT

## 2022-09-09 PROCEDURE — 82175 ASSAY OF ARSENIC: CPT

## 2022-09-09 PROCEDURE — 86038 ANTINUCLEAR ANTIBODIES: CPT

## 2022-09-09 PROCEDURE — 83655 ASSAY OF LEAD: CPT

## 2022-09-10 LAB
25(OH)D3 SERPL-MCNC: 34.9 NG/ML (ref 30–100)
ALBUMIN SERPL-MCNC: 4.5 G/DL (ref 3.5–5.2)
ALBUMIN/GLOB SERPL: 1.8 G/DL
ALP SERPL-CCNC: 90 U/L (ref 39–117)
ALT SERPL W P-5'-P-CCNC: 36 U/L (ref 1–33)
ANION GAP SERPL CALCULATED.3IONS-SCNC: 11.7 MMOL/L (ref 5–15)
AST SERPL-CCNC: 22 U/L (ref 1–32)
BILIRUB SERPL-MCNC: 0.5 MG/DL (ref 0–1.2)
BUN SERPL-MCNC: 10 MG/DL (ref 6–20)
BUN/CREAT SERPL: 13.7 (ref 7–25)
CALCIUM SPEC-SCNC: 9.5 MG/DL (ref 8.6–10.5)
CEA SERPL-MCNC: 1.11 NG/ML
CHLORIDE SERPL-SCNC: 102 MMOL/L (ref 98–107)
CO2 SERPL-SCNC: 25.3 MMOL/L (ref 22–29)
CREAT SERPL-MCNC: 0.73 MG/DL (ref 0.57–1)
EGFRCR SERPLBLD CKD-EPI 2021: 110.8 ML/MIN/1.73
FOLATE SERPL-MCNC: 15.5 NG/ML (ref 4.78–24.2)
GLOBULIN UR ELPH-MCNC: 2.5 GM/DL
GLUCOSE SERPL-MCNC: 106 MG/DL (ref 65–99)
POTASSIUM SERPL-SCNC: 4.4 MMOL/L (ref 3.5–5.2)
PROLACTIN SERPL-MCNC: 12.1 NG/ML (ref 4.79–23.3)
PROT SERPL-MCNC: 7 G/DL (ref 6–8.5)
SODIUM SERPL-SCNC: 139 MMOL/L (ref 136–145)
T4 FREE SERPL-MCNC: 1.17 NG/DL (ref 0.93–1.7)
TSH SERPL DL<=0.05 MIU/L-ACNC: 1.87 UIU/ML (ref 0.27–4.2)
VIT B12 BLD-MCNC: 331 PG/ML (ref 211–946)

## 2022-09-11 LAB — B BURGDOR IGG+IGM SER QL IA: NEGATIVE

## 2022-09-13 LAB — ANA SER QL IF: NEGATIVE

## 2022-09-14 LAB
ARSENIC BLD-MCNC: 1 UG/L (ref 0–9)
LEAD BLDV-MCNC: <1 UG/DL (ref 0–4)
MERCURY BLD-MCNC: <1 UG/L (ref 0–14.9)

## 2022-09-16 ENCOUNTER — OFFICE VISIT (OUTPATIENT)
Dept: NEUROSURGERY | Facility: CLINIC | Age: 37
End: 2022-09-16

## 2022-09-16 DIAGNOSIS — M50.30 DDD (DEGENERATIVE DISC DISEASE), CERVICAL: Primary | ICD-10-CM

## 2022-09-16 DIAGNOSIS — M54.81 OCCIPITAL NEURALGIA OF LEFT SIDE: ICD-10-CM

## 2022-09-16 DIAGNOSIS — D18.09 HEMANGIOMA OF SPINE: ICD-10-CM

## 2022-09-16 DIAGNOSIS — M54.2 CERVICALGIA: ICD-10-CM

## 2022-09-16 PROCEDURE — 99204 OFFICE O/P NEW MOD 45 MIN: CPT | Performed by: PHYSICIAN ASSISTANT

## 2022-09-16 RX ORDER — LAMOTRIGINE 25 MG/1
25 TABLET ORAL 2 TIMES DAILY
COMMUNITY
Start: 2022-09-08 | End: 2022-11-14

## 2022-09-16 RX ORDER — RIMEGEPANT SULFATE 75 MG/75MG
TABLET, ORALLY DISINTEGRATING ORAL
COMMUNITY
Start: 2022-08-09

## 2022-09-16 NOTE — PROGRESS NOTES
"Chief Complaint  Neck Pain, Tingling (Left hand ), Numbness (Left hand ), and Arm Pain (Bilateral (left arm to palm of hand))    Subjective          Gin Alexandra who is a 36 y.o. year old female who presents to [facility] for Evaluation of the Spine.     The patient complains of pain located in the Cervical Spine.  Patients states the pain has been present for 3 years.  The pain came on gradually.  The pain scaled level is 5.  The pain does radiate. Dermatomes are located on left Cervical at: a non-specific dermatome and to the palm of the left hand.  The pain is Intermittent and described as aching and \"shooting\" in the arm.  The pain is worse at no particular time of day. Patient states nothing improves the pain.  Patient states nothing worsens the pain.    Associated Symptoms Include: Numbness and Tingling in both hands, left greater than right. She does report subjective weakness in both hands, left greater than right.  Conservative Interventions Include: NSAIDs that were ineffective., Muscle Relaxants that were not very effective. and x2 carpal tunnel release on left which was ineffective, x1 carpal tunnel release on the right which was effective.    Was this the result of an injury or accident?: No    History of Previous Spinal Surgery?: No     reports that she quit smoking about 7 years ago. Her smoking use included cigarettes. She has a 30.00 pack-year smoking history. She has never used smokeless tobacco.    Review of Systems   Musculoskeletal: Positive for neck pain and neck stiffness.   Neurological: Positive for weakness and numbness.        Objective   Vital Signs:   There were no vitals taken for this visit.      Physical Exam  Constitutional:       Appearance: Normal appearance. She is obese.   Pulmonary:      Effort: Pulmonary effort is normal.   Musculoskeletal:         General: Tenderness (right occipital area, midline cervical spine) present.      " Cervical back: Normal range of motion.      Comments: Hernandez's negative bilaterally,  Spurlings test to the left causes pressure in the right side of the neck,  No pain with left shoulder movement,  Tinel's testing positive at the right wrist.   Neurological:      General: No focal deficit present.      Mental Status: She is alert and oriented to person, place, and time.      Sensory: No sensory deficit.      Motor: No weakness.      Deep Tendon Reflexes: Reflexes normal.   Psychiatric:         Mood and Affect: Mood normal.         Behavior: Behavior normal.        Neurologic Exam     Mental Status   Oriented to person, place, and time.        Result Review     I have personally reviewed the MRI of cervical spine without contrast from 8/22/2022 which shows mild multilevel degenerative disc disease without significant central canal or foraminal stenosis.  There does appear to be hemangioma involving the posterior T1 vertebrae.     Assessment and Plan    Diagnoses and all orders for this visit:    1. DDD (degenerative disc disease), cervical (Primary)  -     Ambulatory Referral to Pain Management    2. Hemangioma of spine    3. Cervicalgia  -     Ambulatory Referral to Pain Management    4. Occipital neuralgia of left side  -     Ambulatory Referral to Pain Management    Her pain is in the neck and down the left arm to the palm of the left hand.    She does not have any significant nerve impingement in the cervical spine, so I do not expect surgery to be helpful to her.    She may consider a trial of CESB.    She does have complaints of recurrent headaches and has tenderness over the left occipital nerve, so she may have underlying occipital neuralgia and could benefit from a trial of occipital nerve blocks on the left.    She has tenderness in the left cervical area muscles. She may consider trigger point injections for this.    I will refer her to [facility] in [place] to consult for these treatments.    The patient was  counseled on basic recommendations for the reduction and prevention of back, neck, or spine pain in association with spinal disorders, including: cessation/avoidance of nicotine use, maintenance of a healthy BMI and weight, focusing on building/maintaining core strength through core exercise, and avoidance of activities which worsen the pain. The patient will monitor for changes in symptoms and notify our clinic of these changes as needed.    She will follow-up here PRN.    Follow Up   Return if symptoms worsen or fail to improve.  Patient was given instructions and counseling regarding her condition or for health maintenance advice. Please see specific information pulled into the AVS if appropriate.

## 2022-09-19 ENCOUNTER — HOSPITAL ENCOUNTER (OUTPATIENT)
Dept: OTHER | Facility: HOSPITAL | Age: 37
Discharge: HOME | End: 2022-09-19

## 2022-09-26 ENCOUNTER — TELEPHONE (OUTPATIENT)
Dept: FAMILY MEDICINE CLINIC | Age: 37
End: 2022-09-26

## 2022-09-26 ENCOUNTER — OFFICE VISIT (OUTPATIENT)
Dept: FAMILY MEDICINE CLINIC | Age: 37
End: 2022-09-26

## 2022-09-26 VITALS
HEIGHT: 63 IN | TEMPERATURE: 98.7 F | BODY MASS INDEX: 34.48 KG/M2 | DIASTOLIC BLOOD PRESSURE: 86 MMHG | HEART RATE: 77 BPM | WEIGHT: 194.6 LBS | SYSTOLIC BLOOD PRESSURE: 115 MMHG

## 2022-09-26 DIAGNOSIS — R53.83 FATIGUE, UNSPECIFIED TYPE: Primary | ICD-10-CM

## 2022-09-26 DIAGNOSIS — M54.81 OCCIPITAL NEURALGIA, UNSPECIFIED LATERALITY: ICD-10-CM

## 2022-09-26 DIAGNOSIS — Z23 IMMUNIZATION DUE: Primary | ICD-10-CM

## 2022-09-26 DIAGNOSIS — E66.09 CLASS 1 OBESITY DUE TO EXCESS CALORIES WITH SERIOUS COMORBIDITY AND BODY MASS INDEX (BMI) OF 34.0 TO 34.9 IN ADULT: ICD-10-CM

## 2022-09-26 DIAGNOSIS — R53.83 FATIGUE, UNSPECIFIED TYPE: ICD-10-CM

## 2022-09-26 PROBLEM — E66.812 CLASS 2 OBESITY DUE TO EXCESS CALORIES WITHOUT SERIOUS COMORBIDITY WITH BODY MASS INDEX (BMI) OF 36.0 TO 36.9 IN ADULT: Status: RESOLVED | Noted: 2021-11-11 | Resolved: 2022-09-26

## 2022-09-26 PROBLEM — R11.0 NAUSEA: Status: RESOLVED | Noted: 2022-04-01 | Resolved: 2022-09-26

## 2022-09-26 PROCEDURE — 90686 IIV4 VACC NO PRSV 0.5 ML IM: CPT | Performed by: NURSE PRACTITIONER

## 2022-09-26 PROCEDURE — 99214 OFFICE O/P EST MOD 30 MIN: CPT | Performed by: NURSE PRACTITIONER

## 2022-09-26 PROCEDURE — 90471 IMMUNIZATION ADMIN: CPT | Performed by: NURSE PRACTITIONER

## 2022-09-26 NOTE — ASSESSMENT & PLAN NOTE
Patient's (Body mass index is 34.48 kg/m².) indicates that they are obese (BMI >30) with health conditions that include none . Weight is improving with lifestyle modifications. BMI is is above average; BMI management plan is completed. We discussed portion control and increasing exercise.  Patient is to continue weight loss efforts and starting routine exercise.  She states she may try to restart Saxenda as she has some leftover.  If she is able to tolerate she will notify office and follow-up will be scheduled.

## 2022-09-26 NOTE — PROGRESS NOTES
"Answers for HPI/ROS submitted by the patient on 9/25/2022  Please describe your symptoms.: Fatigue  Have you had these symptoms before?: Yes  How long have you been having these symptoms?: Greater than 2 weeks  What is the primary reason for your visit?: Other    Chief Complaint  Weight Loss (3 month f/u)    Subjective          Gin Alexandra presents to [facility]  For weight loss and fatigue follow-up.  Patient has lost 12 pounds since last visit.  Patient was unable to tolerate Saxenda.  Even at 0.6 mg dose she was not able to keep any food down.  She did say it slightly subsided and she tried to increase to the 1.2 mg dose.  Patient is no longer doing physical therapy for her arm and neck pain.  She has seen neurosurgeon.  They are sending her to pain management.  I have referred her to see neurology/sleep medicine with Dr. Martine Manning and she was scheduled with another MD in practice.  Patient states they did not really discuss her fatigue.    Objective   Vital Signs:   Vitals:    09/26/22 1312   BP: 115/86   BP Location: Left arm   Patient Position: Sitting   Cuff Size: Adult   Pulse: 77   Temp: 98.7 °F (37.1 °C)   TempSrc: Oral   Weight: 88.3 kg (194 lb 9.6 oz)   Height: 160 cm (62.99\")       Physical Exam  Vitals reviewed.   Constitutional:       General: She is not in acute distress.     Appearance: She is well-developed. She is obese.   HENT:      Head: Normocephalic and atraumatic.   Eyes:      Conjunctiva/sclera: Conjunctivae normal.      Pupils: Pupils are equal, round, and reactive to light.   Cardiovascular:      Rate and Rhythm: Normal rate and regular rhythm.      Heart sounds: Normal heart sounds. No murmur heard.  Pulmonary:      Effort: Pulmonary effort is normal. No respiratory distress.      Breath sounds: Normal breath sounds.   Skin:     General: Skin is warm and dry.   Neurological:      Mental Status: She is alert and oriented to person, place, and time. "   Psychiatric:         Mood and Affect: Mood and affect normal.         Behavior: Behavior normal.         Thought Content: Thought content normal.         Judgment: Judgment normal.          Result Review :              Assessment and Plan    Diagnoses and all orders for this visit:    1. Immunization due (Primary)  -     FluLaval/Fluzone >6 mos ([id number])    2. Class 1 obesity due to excess calories with serious comorbidity and body mass index (BMI) of 34.0 to 34.9 in adult  Assessment & Plan:  Patient's (Body mass index is 34.48 kg/m².) indicates that they are obese (BMI >30) with health conditions that include none . Weight is improving with lifestyle modifications. BMI is is above average; BMI management plan is completed. We discussed portion control and increasing exercise.  Patient is to continue weight loss efforts and starting routine exercise.  She states she may try to restart Saxenda as she has some leftover.  If she is able to tolerate she will notify office and follow-up will be scheduled.      3. Occipital neuralgia, unspecified laterality  Comments:  Follow-up with pain management as scheduled.    4. Fatigue, unspecified type  Comments:  Call neurology office and see if she can get in with Dr. Martine Manning who specializes in sleep medicine.  Patient to notify office with any acute concerns or issues.  Patient verbalizes understanding, agrees with plan of care and has no further questions upon discharge.    Please note that portions of this note were completed with a voice recognition program.    Follow Up    Return in about 3 months (around 12/26/2022) for Recheck.  Patient was given instructions and counseling regarding her condition or for health maintenance advice. Please see specific information pulled into the AVS if appropriate.

## 2022-10-03 NOTE — TELEPHONE ENCOUNTER
Please let pt know that I do not know any other neurologists that specialize in sleep medicine. She will have to have 2 separate referrals. Continue seeing current neuro and I will place referral for sleep medicine. ferny Gamez

## 2022-10-07 DIAGNOSIS — G44.221 CHRONIC TENSION-TYPE HEADACHE, INTRACTABLE: ICD-10-CM

## 2022-10-10 RX ORDER — TIZANIDINE 4 MG/1
TABLET ORAL
Qty: 30 TABLET | Refills: 0 | Status: SHIPPED | OUTPATIENT
Start: 2022-10-10 | End: 2022-11-14 | Stop reason: SDUPTHER

## 2022-10-11 ENCOUNTER — TRANSCRIBE ORDERS (OUTPATIENT)
Dept: ADMINISTRATIVE | Facility: HOSPITAL | Age: 37
End: 2022-10-11

## 2022-10-11 ENCOUNTER — PATIENT MESSAGE (OUTPATIENT)
Dept: FAMILY MEDICINE CLINIC | Age: 37
End: 2022-10-11

## 2022-10-11 ENCOUNTER — HOSPITAL ENCOUNTER (OUTPATIENT)
Dept: GENERAL RADIOLOGY | Facility: HOSPITAL | Age: 37
Discharge: HOME | End: 2022-10-11
Admitting: STUDENT IN AN ORGANIZED HEALTH CARE EDUCATION/TRAINING PROGRAM

## 2022-10-11 DIAGNOSIS — M54.16 LUMBAR RADICULOPATHY: ICD-10-CM

## 2022-10-11 DIAGNOSIS — M79.7 FIBROMYALGIA: ICD-10-CM

## 2022-10-11 DIAGNOSIS — M54.16 LUMBAR RADICULOPATHY: Primary | ICD-10-CM

## 2022-10-11 PROCEDURE — 72110 X-RAY EXAM L-2 SPINE 4/>VWS: CPT

## 2022-10-13 RX ORDER — DULOXETIN HYDROCHLORIDE 60 MG/1
60 CAPSULE, DELAYED RELEASE ORAL DAILY
Qty: 90 CAPSULE | Refills: 1 | Status: SHIPPED | OUTPATIENT
Start: 2022-10-13 | End: 2022-10-21 | Stop reason: SDUPTHER

## 2022-10-15 NOTE — TELEPHONE ENCOUNTER
From: Gin Alexandra  To: LUCIA Quiroz  Sent: 10/11/2022 3:47 PM EDT  Subject: Sleep medicine    Jacek Da Silva,    I got a call from [facility] sleep medicine for a referral. They don't have a neurologist that deals with that stuff. Should I go ahead and schedule with them or wait for a Neuro dr?

## 2022-10-21 DIAGNOSIS — M79.7 FIBROMYALGIA: ICD-10-CM

## 2022-10-21 RX ORDER — DULOXETIN HYDROCHLORIDE 60 MG/1
60 CAPSULE, DELAYED RELEASE ORAL DAILY
Qty: 90 CAPSULE | Refills: 1 | Status: SHIPPED | OUTPATIENT
Start: 2022-10-21

## 2022-11-14 ENCOUNTER — TELEMEDICINE (OUTPATIENT)
Dept: FAMILY MEDICINE CLINIC | Age: 37
End: 2022-11-14

## 2022-11-14 VITALS — HEIGHT: 63 IN | WEIGHT: 194 LBS | BODY MASS INDEX: 34.38 KG/M2

## 2022-11-14 DIAGNOSIS — M54.42 ACUTE MIDLINE LOW BACK PAIN WITH BILATERAL SCIATICA: ICD-10-CM

## 2022-11-14 DIAGNOSIS — M54.41 ACUTE MIDLINE LOW BACK PAIN WITH BILATERAL SCIATICA: ICD-10-CM

## 2022-11-14 PROCEDURE — 99213 OFFICE O/P EST LOW 20 MIN: CPT | Performed by: NURSE PRACTITIONER

## 2022-11-14 RX ORDER — TIZANIDINE 4 MG/1
4 TABLET ORAL EVERY 6 HOURS PRN
Qty: 30 TABLET | Refills: 1 | Status: SHIPPED | OUTPATIENT
Start: 2022-11-14 | End: 2023-01-20 | Stop reason: SDUPTHER

## 2022-11-14 NOTE — PROGRESS NOTES
"Chief Complaint  Back Pain (Low back pain x1 week Video Visit [phone])    Subjective          Gin Alexandra presents to [facility] via telehealth video c/o lower back pain with bilateral sciatica x 1 week. She has been taking ibuprofen and zanaflex for her symptoms. She states that is already starting to feel better. She is requesting refill of zanaflex. Declines steroids at this time.       Objective   Vital Signs:   Vitals:    11/14/22 1111   Weight: 88 kg (194 lb)   Height: 160 cm (63\")       Physical Exam  Vitals reviewed.   Constitutional:       General: She is not in acute distress.     Appearance: Normal appearance. She is well-developed.   Pulmonary:      Effort: Pulmonary effort is normal. No respiratory distress.   Neurological:      Mental Status: She is alert and oriented to person, place, and time.   Psychiatric:         Mood and Affect: Mood and affect normal.         Behavior: Behavior normal.         Thought Content: Thought content normal.         Judgment: Judgment normal.          Result Review :              Assessment and Plan    Diagnoses and all orders for this visit:    1. Acute midline low back pain with bilateral sciatica  Comments:  Potential side effects of medication discussed.  If she is to try Zanaflex as needed.  Ibuprofen/Tylenol for discomfort.  Orders:  -     tiZANidine (ZANAFLEX) 4 MG tablet; Take 1 tablet by mouth Every 6 (Six) Hours As Needed for Muscle Spasms.  Dispense: 30 tablet; Refill: 1    Patient to notify office with any acute concerns or issues.  Patient verbalizes understanding, agrees with plan of care and has no further questions upon discharge.    Please note that portions of this note were completed with a voice recognition program.    This was an audio and video enabled telemedicine encounter. You have chosen to receive care through a telephone visit. Do you consent to use a telephone visit for your medical care today? " Yes  Patient is at residence. Provider at office. Doximity used. Hermila Alfaro MA also present.    Follow Up    Return if symptoms worsen or fail to improve.  Patient was given instructions and counseling regarding her condition or for health maintenance advice. Please see specific information pulled into the AVS if appropriate.

## 2022-11-21 ENCOUNTER — TELEPHONE (OUTPATIENT)
Dept: FAMILY MEDICINE CLINIC | Age: 37
End: 2022-11-21

## 2022-11-21 NOTE — TELEPHONE ENCOUNTER
Caller: [facility]    Relationship: Self    Best call back number: [phone]    Who are you requesting to speak with (clinical staff, provider,  specific staff member): MEDICAL STAFF    What was the call regarding: PATIENTS SLEEP DOCTOR WOULD LIKE HER TO STOP TAKING TIZANIDINE AND DULOXETINE FOR TWO WEEKS TO RUN A TEST. SHE WOULD LIKE TO KNOW WHAT LUCIA VERDIN THINKS AND RECOMMENDS. PLEASE CALL PATIENT TO ADVISE.

## 2022-11-29 NOTE — TELEPHONE ENCOUNTER
I think it would be okay to stop the muscle relaxer, but not the duloxetine. You would have to wean off, would have withdrawals, increased depression and possibly increased pain. Thx, mv

## 2022-12-27 RX ORDER — ONDANSETRON 4 MG/1
4 TABLET, ORALLY DISINTEGRATING ORAL EVERY 8 HOURS PRN
Qty: 30 TABLET | Refills: 0 | Status: SHIPPED | OUTPATIENT
Start: 2022-12-27

## 2022-12-29 RX ORDER — ALBUTEROL SULFATE 90 UG/1
2 AEROSOL, METERED RESPIRATORY (INHALATION) EVERY 4 HOURS PRN
Qty: 54 G | Refills: 0 | Status: SHIPPED | OUTPATIENT
Start: 2022-12-29

## 2023-01-04 ENCOUNTER — TELEPHONE (OUTPATIENT)
Dept: FAMILY MEDICINE CLINIC | Age: 38
End: 2023-01-04
Payer: COMMERCIAL

## 2023-01-20 DIAGNOSIS — M54.42 ACUTE MIDLINE LOW BACK PAIN WITH BILATERAL SCIATICA: ICD-10-CM

## 2023-01-20 DIAGNOSIS — M54.41 ACUTE MIDLINE LOW BACK PAIN WITH BILATERAL SCIATICA: ICD-10-CM

## 2023-01-23 RX ORDER — TIZANIDINE 4 MG/1
4 TABLET ORAL EVERY 6 HOURS PRN
Qty: 30 TABLET | Refills: 1 | Status: SHIPPED | OUTPATIENT
Start: 2023-01-23

## 2023-01-27 NOTE — TELEPHONE ENCOUNTER
Caller: [facility] - [place], [place] - [address] [phone] [facility] [phone] FX    Relationship: Pharmacy    Best call back number: [phone]    What is the best time to reach you: ANY    Who are you requesting to speak with (clinical staff, provider,  specific staff member): CLINICAL STAFF    What was the call regarding: MAYDA FROM [employer] CALLED NEEDING CLARIFICATION ON THE PATIENTS WEGOVY DIRECTIONS:  REF: [id number]    Do you require a callback: YES   Pt axox4 presents to the ED c/o weakness/ sob/ difficulty breathing/ weakness. Pt has trach in place, recently covid (+). Hx HTN, COPD, lung CA. pt here with wife with same symptoms.

## 2023-02-22 ENCOUNTER — OFFICE VISIT (OUTPATIENT)
Dept: FAMILY MEDICINE CLINIC | Age: 38
End: 2023-02-22
Payer: COMMERCIAL

## 2023-02-22 ENCOUNTER — HOSPITAL ENCOUNTER (OUTPATIENT)
Dept: GENERAL RADIOLOGY | Facility: HOSPITAL | Age: 38
Discharge: HOME | End: 2023-02-22
Payer: COMMERCIAL

## 2023-02-22 VITALS
OXYGEN SATURATION: 98 % | SYSTOLIC BLOOD PRESSURE: 135 MMHG | TEMPERATURE: 98.8 F | DIASTOLIC BLOOD PRESSURE: 84 MMHG | BODY MASS INDEX: 35.79 KG/M2 | HEART RATE: 78 BPM | HEIGHT: 63 IN | WEIGHT: 202 LBS

## 2023-02-22 DIAGNOSIS — G89.29 CHRONIC PAIN IN LEFT FOOT: ICD-10-CM

## 2023-02-22 DIAGNOSIS — G89.29 CHRONIC PAIN OF LEFT ANKLE: ICD-10-CM

## 2023-02-22 DIAGNOSIS — M25.532 PAIN IN BOTH WRISTS: Primary | ICD-10-CM

## 2023-02-22 DIAGNOSIS — M25.531 PAIN IN BOTH WRISTS: Primary | ICD-10-CM

## 2023-02-22 DIAGNOSIS — R23.2 FACIAL FLUSHING: ICD-10-CM

## 2023-02-22 DIAGNOSIS — M25.572 CHRONIC PAIN OF LEFT ANKLE: ICD-10-CM

## 2023-02-22 DIAGNOSIS — M79.672 CHRONIC PAIN IN LEFT FOOT: ICD-10-CM

## 2023-02-22 PROCEDURE — 73610 X-RAY EXAM OF ANKLE: CPT

## 2023-02-22 PROCEDURE — 99213 OFFICE O/P EST LOW 20 MIN: CPT

## 2023-02-22 PROCEDURE — 73630 X-RAY EXAM OF FOOT: CPT

## 2023-02-22 NOTE — PROGRESS NOTES
Subjective     CHIEF COMPLAINT    Chief Complaint   Patient presents with   • Wrist Pain     Pain in bilateral wrist, left worse than right x 1 week. Left ankle pain x 1 yr.   Redness across her nose and cheeks today.      History of Present Illness  Patient is a 37-year-old female who presents to the clinic today with multiple complaints.    First she complains of left wrist pain. Reports she has had multiple surgeries to this wrist for carpal tunnel. Surgeries were performed by Dr. Manish Anthony. She has not seen him in 1-1.5 years. Reports she was told that she had nerve damage after the surgery. Over the last couple of weeks it has become more painful and tender. Denies any specific trauma, falls or injuries to it. Does report the pain has improved some. She also has aching and a stiff feeling to her right wrist. She has had a surgery on this wrist performed by Dr. Anthony as well.  Denies any numbness, tingling to bilateral wrists or hands. She has tried PT in the past    Additionally patient complains of left ankle pain. This has been present for about 1 year. Denies any specific injuries, trauma or falls to this ankle. She states it is intermittently painful when she puts weight on it or bends it a certain way. No numbness, tingling or weakness     She notes she felt that her cheeks were red today. She states this has happened interemittently as well for a while and she always thought it was related to sensitive skin. Denies any pain, itching or lesions. No known triggers     Review of Systems   Constitutional: Negative for chills and fever.   Musculoskeletal: Positive for arthralgias.   Skin: Positive for color change (Intermittent redness to face).   Neurological: Negative for weakness and numbness.       Allergies   Allergen Reactions   • Codeine Hives     Current Outpatient Medications on File Prior to Visit   Medication Sig Dispense Refill   • albuterol sulfate  (90 Base) MCG/ACT inhaler Inhale 2 puffs  "Every 4 (Four) Hours As Needed for Shortness of Air. 54 g 0   • DULoxetine (CYMBALTA) 60 MG capsule Take 1 capsule by mouth Daily. 90 capsule 1   • ibuprofen (ADVIL,MOTRIN) 800 MG tablet Take 1 tablet by mouth Every 6 (Six) Hours As Needed for Mild Pain . 90 tablet 1   • Nurtec 75 MG dispersible tablet TAKE ONE TABLET BY MOUTH EVERY DAY AS NEEDED FOR MIGRAINE     • ondansetron ODT (ZOFRAN-ODT) 4 MG disintegrating tablet Place 1 tablet on the tongue Every 8 (Eight) Hours As Needed for Nausea or Vomiting. 30 tablet 0   • tiZANidine (ZANAFLEX) 4 MG tablet Take 1 tablet by mouth Every 6 (Six) Hours As Needed for Muscle Spasms. 30 tablet 1     No current facility-administered medications on file prior to visit.     /84 (BP Location: Right arm, Patient Position: Sitting, Cuff Size: Adult)   Pulse 78   Temp 98.8 °F (37.1 °C) (Oral)   Ht 160 cm (62.99\")   Wt 91.6 kg (202 lb)   SpO2 98%   BMI 35.79 kg/m²       Objective     Physical Exam  Vitals and nursing note reviewed.   Constitutional:       General: She is not in acute distress.     Appearance: Normal appearance. She is not ill-appearing.   HENT:      Head:      Comments: No redness to cheeks noted.  Eyes:      Extraocular Movements: Extraocular movements intact.   Cardiovascular:      Rate and Rhythm: Normal rate and regular rhythm.      Pulses:           Radial pulses are 2+ on the right side and 2+ on the left side.        Dorsalis pedis pulses are 2+ on the left side.        Posterior tibial pulses are 2+ on the left side.      Heart sounds: Normal heart sounds. No murmur heard.  Pulmonary:      Effort: Pulmonary effort is normal. No respiratory distress.      Breath sounds: Normal breath sounds. No wheezing or rhonchi.   Musculoskeletal:      Right wrist: No swelling or deformity. Normal range of motion. Normal pulse.      Left wrist: Tenderness present. No swelling or deformity. Normal range of motion. Normal pulse.      Right hand: Normal.      Left " hand: Tenderness present.      Left ankle: No swelling or deformity. No tenderness. Normal range of motion.      Left foot: Normal.   Feet:      Left foot:      Skin integrity: Skin integrity normal.   Neurological:      General: No focal deficit present.      Mental Status: She is alert and oriented to person, place, and time.   Psychiatric:         Mood and Affect: Mood normal.         Behavior: Behavior normal.          XR Ankle 3+ View Left    Result Date: 2/22/2023  PROCEDURE: XR ANKLE 3+ VW LEFT  COMPARISON: None  INDICATIONS: CHRONIC MEDIAL LEFT ANKLE PAIN  FINDINGS:  No acute osseous abnormality noted.  The ankle mortise is intact.  Soft tissues appear unremarkable        1. No acute osseous abnormality      CLOVIS COOK MD       Electronically Signed and Approved By: CLOVIS COOK MD on 2/22/2023 at 13:29             XR Foot 3+ View Left    Result Date: 2/22/2023  PROCEDURE: XR FOOT 3+ VW LEFT  COMPARISON: None  INDICATIONS: CHRONIC MEDIAL/DORSAL LEFT FOOT PAIN  FINDINGS:  No acute fracture or dislocation noted.  No significant degenerative changes appreciated.  Soft tissues appear grossly unremarkable in appearance        1. No acute osseous abnormality.  No significant degenerative change.      CLOVIS COOK MD       Electronically Signed and Approved By: CLOVIS COOK MD on 2/22/2023 at 13:30                  Diagnoses and all orders for this visit:    1. Pain in both wrists (Primary)  -     Ambulatory Referral to Orthopedic Surgery    2. Chronic pain of left ankle  -     XR Ankle 3+ View Left; Future    3. Chronic pain in left foot  -     XR Foot 3+ View Left; Future    4. Facial flushing  Comments:  No redness to face noted on exam today. Monitor, follow up with PCP.       Will send referral to Dr. nAthony for evaluation of bilateral wrists given that patient has had surgeries to these wrists in the past. Will check x ray of left ankle and left foot to ensure no acute concerns. Can consider  referral to podiatry if needed.     Addendum: Xray of left ankle and foot show no acute findings. Patient to follow up with PCP.        Follow up:  Return if symptoms worsen or fail to improve.  Patient was given instructions and counseling regarding her condition or for health maintenance advice. Please see specific information pulled into the AVS if appropriate.

## 2023-03-20 ENCOUNTER — OFFICE VISIT (OUTPATIENT)
Dept: FAMILY MEDICINE CLINIC | Age: 38
End: 2023-03-20
Payer: COMMERCIAL

## 2023-03-20 VITALS
TEMPERATURE: 98.2 F | WEIGHT: 203.8 LBS | SYSTOLIC BLOOD PRESSURE: 114 MMHG | HEART RATE: 87 BPM | BODY MASS INDEX: 36.11 KG/M2 | HEIGHT: 63 IN | DIASTOLIC BLOOD PRESSURE: 79 MMHG | OXYGEN SATURATION: 97 %

## 2023-03-20 DIAGNOSIS — E66.09 CLASS 2 OBESITY DUE TO EXCESS CALORIES WITHOUT SERIOUS COMORBIDITY WITH BODY MASS INDEX (BMI) OF 36.0 TO 36.9 IN ADULT: Primary | ICD-10-CM

## 2023-03-20 PROBLEM — E66.811 CLASS 1 OBESITY DUE TO EXCESS CALORIES WITH SERIOUS COMORBIDITY AND BODY MASS INDEX (BMI) OF 34.0 TO 34.9 IN ADULT: Status: RESOLVED | Noted: 2021-11-11 | Resolved: 2023-03-20

## 2023-03-20 NOTE — PROGRESS NOTES
"Chief Complaint  Obesity (Discuss wegovy. )    Subjective          [facility], exercise. She has tried phentermine and saxenda in the past.  Has my fitness pal. Has not used it recently. Exercise includes yoga, playing soccer with daughter, walking dog.   She would also like referral to bariatric surgery.     Objective   Vital Signs:   Vitals:    03/20/23 1259   BP: 114/79   BP Location: Right arm   Patient Position: Sitting   Cuff Size: Adult   Pulse: 87   Temp: 98.2 °F (36.8 °C)   TempSrc: Oral   SpO2: 97%   Weight: 92.4 kg (203 lb 12.8 oz)   Height: 160 cm (62.99\")       Physical Exam  Vitals reviewed.   Constitutional:       General: She is not in acute distress.     Appearance: Normal appearance. She is well-developed.   HENT:      Head: Normocephalic and atraumatic.   Eyes:      Conjunctiva/sclera: Conjunctivae normal.      Pupils: Pupils are equal, round, and reactive to light.   Cardiovascular:      Rate and Rhythm: Normal rate and regular rhythm.      Heart sounds: Normal heart sounds. No murmur heard.  Pulmonary:      Effort: Pulmonary effort is normal. No respiratory distress.      Breath sounds: Normal breath sounds.   Skin:     General: Skin is warm and dry.   Neurological:      Mental Status: She is alert and oriented to person, place, and time.   Psychiatric:         Mood and Affect: Mood and affect normal.         Behavior: Behavior normal.         Thought Content: Thought content normal.         Judgment: Judgment normal.          Result Review :              Assessment and Plan    Diagnoses and all orders for this visit:    1. Class 2 obesity due to excess calories without serious comorbidity with body mass index (BMI) of 36.0 to 36.9 in adult (Primary)  Comments:  Track diet, routine exercise. referral initated. Potential s/e of med discussed. Denies fam/personal hx of thyroid and personal hx of pancreatitis. "   Orders:  -     Ambulatory Referral to Bariatric Surgery    Other orders  -     Semaglutide-Weight Management 0.25 MG/0.5ML solution auto-injector; Inject 0.25 mg under the skin into the appropriate area as directed Every 7 (Seven) Days for 28 days, THEN 0.5 mg Every 7 (Seven) Days for 28 days.  Dispense: 6 mL; Refill: 0    Pt v/u, agrees with plan of care and has no further questions after discharge.     Follow Up    Return in about 2 months (around 5/20/2023) for Annual physical.  Patient was given instructions and counseling regarding her condition or for health maintenance advice. Please see specific information pulled into the AVS if appropriate.

## 2023-03-21 ENCOUNTER — PRIOR AUTHORIZATION (OUTPATIENT)
Dept: FAMILY MEDICINE CLINIC | Age: 38
End: 2023-03-21
Payer: COMMERCIAL

## 2023-04-26 ENCOUNTER — PATIENT MESSAGE (OUTPATIENT)
Dept: FAMILY MEDICINE CLINIC | Age: 38
End: 2023-04-26
Payer: COMMERCIAL

## 2023-04-26 DIAGNOSIS — M79.7 FIBROMYALGIA: Primary | ICD-10-CM

## 2023-05-04 NOTE — TELEPHONE ENCOUNTER
From: Gin Alexandra  To: Rekha Kim  Sent: 4/26/2023 1:03 PM EDT  Subject: Wegovy and other    Hi Rekha,  Sooo the wegovy isn't going to work out. Just as we talked about, I got sick as a dog. I stopped taking it. Also I wanted to ask if it's possible that I could have lupus instead of/and/or the fibromyalgia. I'm having a flare of some stuff. Warm spots on my arms and legs that are painful and redness that comes and goes on my face. I know I probably sound paranoid...

## 2023-05-11 DIAGNOSIS — M54.42 ACUTE MIDLINE LOW BACK PAIN WITH BILATERAL SCIATICA: ICD-10-CM

## 2023-05-11 DIAGNOSIS — M54.41 ACUTE MIDLINE LOW BACK PAIN WITH BILATERAL SCIATICA: ICD-10-CM

## 2023-05-11 DIAGNOSIS — M79.7 FIBROMYALGIA: ICD-10-CM

## 2023-05-11 RX ORDER — TIZANIDINE 4 MG/1
TABLET ORAL
Qty: 30 TABLET | Refills: 1 | Status: SHIPPED | OUTPATIENT
Start: 2023-05-11

## 2023-05-11 RX ORDER — DULOXETIN HYDROCHLORIDE 60 MG/1
CAPSULE, DELAYED RELEASE ORAL
Qty: 90 CAPSULE | Refills: 1 | Status: SHIPPED | OUTPATIENT
Start: 2023-05-11

## 2023-05-19 ENCOUNTER — OFFICE VISIT (OUTPATIENT)
Dept: FAMILY MEDICINE CLINIC | Age: 38
End: 2023-05-19
Payer: COMMERCIAL

## 2023-05-19 VITALS
WEIGHT: 207.6 LBS | DIASTOLIC BLOOD PRESSURE: 86 MMHG | TEMPERATURE: 98.6 F | BODY MASS INDEX: 36.79 KG/M2 | SYSTOLIC BLOOD PRESSURE: 126 MMHG | OXYGEN SATURATION: 98 % | HEIGHT: 63 IN | HEART RATE: 86 BPM

## 2023-05-19 DIAGNOSIS — R13.12 OROPHARYNGEAL DYSPHAGIA: Primary | ICD-10-CM

## 2023-05-19 DIAGNOSIS — L53.9 ERYTHEMA OF FACE: ICD-10-CM

## 2023-05-19 PROCEDURE — 99213 OFFICE O/P EST LOW 20 MIN: CPT | Performed by: NURSE PRACTITIONER

## 2023-05-19 RX ORDER — ESOMEPRAZOLE MAGNESIUM 40 MG/1
40 CAPSULE, DELAYED RELEASE ORAL
Qty: 90 CAPSULE | Refills: 1 | Status: SHIPPED | OUTPATIENT
Start: 2023-05-19

## 2023-05-19 NOTE — PROGRESS NOTES
"Chief Complaint  Swallowing Problem (Feel like things are getting stuck in throat - food and beverage.  Has been going on for a long time per patient)    Subjective          Gin Alexandra presents to [facility] wishing to discuss some dysphagia.  Patient states she feels like food is getting stuck near her tonsils on the left side only.  She states when she drinks anything or even swallows her own saliva, she does get choked as well.  This has been going on for almost a year and has progressively gotten worse.  She has had an EGD that was normal last year.  She does state she has occasional heartburn.  She does not take any medication at this time.    She would also like to discuss the redness to her cheeks and nose.  She states this is becoming more persistent as well.  We had tested her for an autoimmune issue previously and everything was normal.  She states she was at a wedding not too long ago and had taken just 1 shot of alcohol and she became even more flushed and red.      Objective   Vital Signs:   Vitals:    05/19/23 0956   BP: 126/86   BP Location: Left arm   Patient Position: Sitting   Cuff Size: Adult   Pulse: 86   Temp: 98.6 °F (37 °C)   TempSrc: Oral   SpO2: 98%   Weight: 94.2 kg (207 lb 9.6 oz)   Height: 160 cm (63\")       Physical Exam  Vitals reviewed.   Constitutional:       General: She is not in acute distress.     Appearance: Normal appearance. She is well-developed.   HENT:      Head: Normocephalic and atraumatic.      Mouth/Throat:      Mouth: Mucous membranes are moist.      Pharynx: Posterior oropharyngeal erythema present. No oropharyngeal exudate.   Eyes:      Conjunctiva/sclera: Conjunctivae normal.      Pupils: Pupils are equal, round, and reactive to light.   Cardiovascular:      Rate and Rhythm: Normal rate and regular rhythm.      Heart sounds: Normal heart sounds. No murmur heard.  Pulmonary:      Effort: Pulmonary effort is normal. No respiratory " distress.      Breath sounds: Normal breath sounds.   Skin:     General: Skin is warm and dry.      Comments: Flushed bilateral cheeks   Neurological:      Mental Status: She is alert and oriented to person, place, and time.   Psychiatric:         Mood and Affect: Mood and affect normal.         Behavior: Behavior normal.         Thought Content: Thought content normal.         Judgment: Judgment normal.          Result Review :              Assessment and Plan    Diagnoses and all orders for this visit:    1. Oropharyngeal dysphagia (Primary)  Comments:  Will send to ENT for further evaluation.  EGD with gastro was normal.  Chew food thoroughly and take small bites. Start nexium.   Orders:  -     Ambulatory Referral to ENT (Otolaryngology)    2. Erythema of face  Comments:  sending to derm to r/o rosacea and for further evaluation.  Orders:  -     Cancel: Ambulatory Referral to Dermatology  -     Ambulatory Referral to Dermatology    Other orders  -     esomeprazole (nexIUM) 40 MG capsule; Take 1 capsule by mouth Every Morning Before Breakfast.  Dispense: 90 capsule; Refill: 1    Patient to notify office with any acute concerns or issues.  Patient verbalizes understanding, agrees with plan of care and has no further questions upon discharge.    Please note that portions of this note were completed with a voice recognition program.    Follow Up    Return if symptoms worsen or fail to improve.  Patient was given instructions and counseling regarding her condition or for health maintenance advice. Please see specific information pulled into the AVS if appropriate.

## 2023-07-22 DIAGNOSIS — M54.42 ACUTE MIDLINE LOW BACK PAIN WITH BILATERAL SCIATICA: ICD-10-CM

## 2023-07-22 DIAGNOSIS — M54.41 ACUTE MIDLINE LOW BACK PAIN WITH BILATERAL SCIATICA: ICD-10-CM

## 2023-07-24 RX ORDER — TIZANIDINE 4 MG/1
TABLET ORAL
Qty: 30 TABLET | Refills: 0 | Status: SHIPPED | OUTPATIENT
Start: 2023-07-24

## 2023-07-24 RX ORDER — IBUPROFEN 800 MG/1
TABLET ORAL
Qty: 30 TABLET | Refills: 0 | Status: SHIPPED | OUTPATIENT
Start: 2023-07-24

## 2023-09-18 DIAGNOSIS — M54.41 ACUTE MIDLINE LOW BACK PAIN WITH BILATERAL SCIATICA: ICD-10-CM

## 2023-09-18 DIAGNOSIS — M54.42 ACUTE MIDLINE LOW BACK PAIN WITH BILATERAL SCIATICA: ICD-10-CM

## 2023-09-18 RX ORDER — TIZANIDINE 4 MG/1
TABLET ORAL
Qty: 30 TABLET | Refills: 0 | Status: SHIPPED | OUTPATIENT
Start: 2023-09-18

## 2023-09-18 RX ORDER — IBUPROFEN 800 MG/1
TABLET ORAL
Qty: 30 TABLET | Refills: 0 | Status: SHIPPED | OUTPATIENT
Start: 2023-09-18

## 2023-09-20 RX ORDER — ESOMEPRAZOLE MAGNESIUM 40 MG/1
40 CAPSULE, DELAYED RELEASE ORAL
Qty: 90 CAPSULE | Refills: 0 | Status: SHIPPED | OUTPATIENT
Start: 2023-09-20

## 2023-09-28 ENCOUNTER — HOSPITAL ENCOUNTER (OUTPATIENT)
Dept: GENERAL RADIOLOGY | Facility: HOSPITAL | Age: 38
Discharge: HOME | End: 2023-09-28
Admitting: ANESTHESIOLOGY
Payer: COMMERCIAL

## 2023-09-28 ENCOUNTER — TRANSCRIBE ORDERS (OUTPATIENT)
Dept: ADMINISTRATIVE | Facility: HOSPITAL | Age: 38
End: 2023-09-28
Payer: COMMERCIAL

## 2023-09-28 DIAGNOSIS — G89.29 CHRONIC LOW BACK PAIN, UNSPECIFIED BACK PAIN LATERALITY, UNSPECIFIED WHETHER SCIATICA PRESENT: ICD-10-CM

## 2023-09-28 DIAGNOSIS — M54.50 CHRONIC LOW BACK PAIN, UNSPECIFIED BACK PAIN LATERALITY, UNSPECIFIED WHETHER SCIATICA PRESENT: Primary | ICD-10-CM

## 2023-09-28 DIAGNOSIS — M54.50 CHRONIC LOW BACK PAIN, UNSPECIFIED BACK PAIN LATERALITY, UNSPECIFIED WHETHER SCIATICA PRESENT: ICD-10-CM

## 2023-09-28 DIAGNOSIS — G89.29 CHRONIC LOW BACK PAIN, UNSPECIFIED BACK PAIN LATERALITY, UNSPECIFIED WHETHER SCIATICA PRESENT: Primary | ICD-10-CM

## 2023-09-28 PROCEDURE — 72110 X-RAY EXAM L-2 SPINE 4/>VWS: CPT

## 2023-10-13 ENCOUNTER — TELEPHONE (OUTPATIENT)
Dept: GASTROENTEROLOGY | Facility: CLINIC | Age: 38
End: 2023-10-13

## 2023-10-13 NOTE — TELEPHONE ENCOUNTER
Call placed to patient in regard to 10/16/23 rebekah-Loren MYERS will be out  of the office.   Left message on  for call back to reschedule  **Patient lives in [place] and may wish to be scheduled in [facility]

## 2023-10-13 NOTE — TELEPHONE ENCOUNTER
Edwige message sent in regard to 10/16/23 appointment. LUCIA Lewis will be out of the office. Advised patient to contact office to reschedule.

## 2023-10-16 DIAGNOSIS — M54.41 ACUTE MIDLINE LOW BACK PAIN WITH BILATERAL SCIATICA: ICD-10-CM

## 2023-10-16 DIAGNOSIS — M54.42 ACUTE MIDLINE LOW BACK PAIN WITH BILATERAL SCIATICA: ICD-10-CM

## 2023-10-16 RX ORDER — TIZANIDINE 4 MG/1
TABLET ORAL
Qty: 30 TABLET | Refills: 0 | Status: SHIPPED | OUTPATIENT
Start: 2023-10-16

## 2023-10-16 RX ORDER — IBUPROFEN 800 MG/1
TABLET ORAL
Qty: 30 TABLET | Refills: 0 | Status: SHIPPED | OUTPATIENT
Start: 2023-10-16

## 2023-10-17 ENCOUNTER — OFFICE VISIT (OUTPATIENT)
Dept: GASTROENTEROLOGY | Facility: CLINIC | Age: 38
End: 2023-10-17
Payer: COMMERCIAL

## 2023-10-17 ENCOUNTER — LAB (OUTPATIENT)
Dept: LAB | Facility: HOSPITAL | Age: 38
End: 2023-10-17
Payer: COMMERCIAL

## 2023-10-17 VITALS
OXYGEN SATURATION: 100 % | WEIGHT: 213 LBS | HEART RATE: 94 BPM | HEIGHT: 63 IN | BODY MASS INDEX: 37.74 KG/M2 | DIASTOLIC BLOOD PRESSURE: 83 MMHG | SYSTOLIC BLOOD PRESSURE: 143 MMHG

## 2023-10-17 DIAGNOSIS — R12 HEARTBURN: ICD-10-CM

## 2023-10-17 DIAGNOSIS — R13.10 DYSPHAGIA, UNSPECIFIED TYPE: ICD-10-CM

## 2023-10-17 DIAGNOSIS — R10.13 EPIGASTRIC PAIN: ICD-10-CM

## 2023-10-17 DIAGNOSIS — R19.7 DIARRHEA, UNSPECIFIED TYPE: ICD-10-CM

## 2023-10-17 DIAGNOSIS — R11.0 NAUSEA: ICD-10-CM

## 2023-10-17 DIAGNOSIS — R10.13 EPIGASTRIC PAIN: Primary | ICD-10-CM

## 2023-10-17 DIAGNOSIS — R19.4 CHANGE IN BOWEL HABITS: ICD-10-CM

## 2023-10-17 LAB
ALBUMIN SERPL-MCNC: 4.5 G/DL (ref 3.5–5.2)
ALBUMIN/GLOB SERPL: 1.6 G/DL
ALP SERPL-CCNC: 89 U/L (ref 39–117)
ALT SERPL W P-5'-P-CCNC: 41 U/L (ref 1–33)
AMYLASE SERPL-CCNC: 57 U/L (ref 28–100)
ANION GAP SERPL CALCULATED.3IONS-SCNC: 9.3 MMOL/L (ref 5–15)
AST SERPL-CCNC: 23 U/L (ref 1–32)
BASOPHILS # BLD AUTO: 0.03 10*3/MM3 (ref 0–0.2)
BASOPHILS NFR BLD AUTO: 0.3 % (ref 0–1.5)
BILIRUB SERPL-MCNC: 0.3 MG/DL (ref 0–1.2)
BUN SERPL-MCNC: 10 MG/DL (ref 6–20)
BUN/CREAT SERPL: 13.5 (ref 7–25)
CALCIUM SPEC-SCNC: 9.8 MG/DL (ref 8.6–10.5)
CHLORIDE SERPL-SCNC: 104 MMOL/L (ref 98–107)
CO2 SERPL-SCNC: 25.7 MMOL/L (ref 22–29)
CREAT SERPL-MCNC: 0.74 MG/DL (ref 0.57–1)
DEPRECATED RDW RBC AUTO: 43.2 FL (ref 37–54)
EGFRCR SERPLBLD CKD-EPI 2021: 107.7 ML/MIN/1.73
EOSINOPHIL # BLD AUTO: 0.14 10*3/MM3 (ref 0–0.4)
EOSINOPHIL NFR BLD AUTO: 1.6 % (ref 0.3–6.2)
ERYTHROCYTE [DISTWIDTH] IN BLOOD BY AUTOMATED COUNT: 13.1 % (ref 12.3–15.4)
GLOBULIN UR ELPH-MCNC: 2.8 GM/DL
GLUCOSE SERPL-MCNC: 104 MG/DL (ref 65–99)
HCT VFR BLD AUTO: 41.6 % (ref 34–46.6)
HGB BLD-MCNC: 13.9 G/DL (ref 12–15.9)
IMM GRANULOCYTES # BLD AUTO: 0.02 10*3/MM3 (ref 0–0.05)
IMM GRANULOCYTES NFR BLD AUTO: 0.2 % (ref 0–0.5)
LIPASE SERPL-CCNC: 26 U/L (ref 13–60)
LYMPHOCYTES # BLD AUTO: 2.27 10*3/MM3 (ref 0.7–3.1)
LYMPHOCYTES NFR BLD AUTO: 26 % (ref 19.6–45.3)
MCH RBC QN AUTO: 29.9 PG (ref 26.6–33)
MCHC RBC AUTO-ENTMCNC: 33.4 G/DL (ref 31.5–35.7)
MCV RBC AUTO: 89.5 FL (ref 79–97)
MONOCYTES # BLD AUTO: 0.75 10*3/MM3 (ref 0.1–0.9)
MONOCYTES NFR BLD AUTO: 8.6 % (ref 5–12)
NEUTROPHILS NFR BLD AUTO: 5.53 10*3/MM3 (ref 1.7–7)
NEUTROPHILS NFR BLD AUTO: 63.3 % (ref 42.7–76)
PLATELET # BLD AUTO: 317 10*3/MM3 (ref 140–450)
PMV BLD AUTO: 9.3 FL (ref 6–12)
POTASSIUM SERPL-SCNC: 4.6 MMOL/L (ref 3.5–5.2)
PROT SERPL-MCNC: 7.3 G/DL (ref 6–8.5)
RBC # BLD AUTO: 4.65 10*6/MM3 (ref 3.77–5.28)
SODIUM SERPL-SCNC: 139 MMOL/L (ref 136–145)
WBC NRBC COR # BLD: 8.74 10*3/MM3 (ref 3.4–10.8)

## 2023-10-17 PROCEDURE — 80053 COMPREHEN METABOLIC PANEL: CPT

## 2023-10-17 PROCEDURE — 85025 COMPLETE CBC W/AUTO DIFF WBC: CPT

## 2023-10-17 PROCEDURE — 86364 TISS TRNSGLTMNASE EA IG CLAS: CPT

## 2023-10-17 PROCEDURE — 86231 EMA EACH IG CLASS: CPT

## 2023-10-17 PROCEDURE — 82784 ASSAY IGA/IGD/IGG/IGM EACH: CPT

## 2023-10-17 PROCEDURE — 36415 COLL VENOUS BLD VENIPUNCTURE: CPT

## 2023-10-17 PROCEDURE — 82150 ASSAY OF AMYLASE: CPT

## 2023-10-17 PROCEDURE — 83690 ASSAY OF LIPASE: CPT

## 2023-10-17 RX ORDER — SOD SULF/POT CHLORIDE/MAG SULF 1.479 G
12 TABLET ORAL TAKE AS DIRECTED
Qty: 24 TABLET | Refills: 0 | Status: SHIPPED | OUTPATIENT
Start: 2023-10-17

## 2023-10-17 RX ORDER — NORETHINDRONE ACETATE AND ETHINYL ESTRADIOL 1MG-20(21)
1 KIT ORAL DAILY
COMMUNITY
Start: 2023-04-06 | End: 2024-04-05

## 2023-10-17 RX ORDER — FAMOTIDINE 40 MG/1
40 TABLET, FILM COATED ORAL DAILY
Qty: 90 TABLET | Refills: 1 | Status: SHIPPED | OUTPATIENT
Start: 2023-10-17

## 2023-10-17 NOTE — PROGRESS NOTES
Chief Complaint  No chief complaint on file.    Gin Alexandra is a 38 y.o. female who presents to [facility] [place] for abdominal pain    History of present Illness  Patient presents to the office for abdominal pain.  Patient has a history of IBS-constipation, reflux, epigastric pain, nausea, and vomiting. She has been experiencing sharp epigastric pain that radiates to her right side for the past 2 months, pain worse after meals. History of cholecystectomy at age 20 due to bile duct stones and possible pancreatitis.  She has a long history of persistent reflux, previously on Protonix but PCP switched to Nexium.  Still reports breakthrough episodes despite taking Nexium daily.  Experiences intermittent nausea but denies vomiting.  Complains of intermittent dysphagia with predominantly liquids.  Previously struggled with a lot of constipation but bowel habits changed to diarrhea about 2 months ago.  Has about 1-2 episodes a day. Denies melena, hematochezia, and unintentional weight loss.     EGD 6/13/2022 by Dr. Turner-normal esophagus, stomach, and duodenum.  Stomach biopsies-mild chronic inactive gastritis.  Normal duodenum biopsies.    EGD/Colonoscopy 4/13/2020 by Dr. Turner -normal esophagus, stomach, and duodenum.  Stomach biopsies normal.  Normal mucosa throughout colon.    Past Medical History:   Diagnosis Date    Allergic 01/01/1995    Allergic rhinitis     Allergies     CODEINE SULFATE   MODERATE    Anxiety     Asthma     Benign neoplasm of pituitary gland     Carpal tunnel syndrome, bilateral upper limbs     x2 surgeries on the L, x1 on the R    Cholelithiasis 2005    Gall bladder was removed    Chronic fatigue, unspecified     Constipation, unspecified     Depression     Started when i was 14    Dizziness     Dyspnea, unspecified     Dysuria     Fibromyalgia     Fibromyalgia     Fibromyalgia, primary 2019    GERD without esophagitis     Headache     HL (hearing loss)      Hypersomnia, unspecified     IBS (irritable bowel syndrome)     diarrhea and constipation    Obesity, unspecified     Other fatigue     Other mixed anxiety disorders     Other retention of urine     Pain in right hip     Pituitary tumor     Tinnitus     Vitamin D deficiency, unspecified        Past Surgical History:   Procedure Laterality Date    CARPAL TUNNEL RELEASE      CHOLECYSTECTOMY      AT AGE 21   DETAILS/COMPLICATIONS?    COLONOSCOPY      ENDOSCOPY N/A 06/13/2022    Procedure: ESOPHAGOGASTRODUODENOSCOPY WITH BIOPSIES;  Surgeon: Kumar Turner MD;  Location: [facility] ENDOSCOPY;  Service: Gastroenterology;  Laterality: N/A;  NORMAL EGD    LEEP  2012    TUBAL ABDOMINAL LIGATION      UPPER GASTROINTESTINAL ENDOSCOPY           Current Outpatient Medications:     albuterol sulfate  (90 Base) MCG/ACT inhaler, Inhale 2 puffs Every 4 (Four) Hours As Needed for Shortness of Air., Disp: 54 g, Rfl: 0    DULoxetine (CYMBALTA) 60 MG capsule, TAKE 1 CAPSULE DAILY, Disp: 90 capsule, Rfl: 1    esomeprazole (nexIUM) 40 MG capsule, Take 1 capsule by mouth Every Morning Before Breakfast., Disp: 90 capsule, Rfl: 0     MG tablet, TAKE 1 TABLET EVERY 6 HOURSAS NEEDED FOR MILD PAIN, Disp: 30 tablet, Rfl: 0    norethindrone-ethinyl estradiol FE (JUNEL FE 1/20) 1-20 MG-MCG per tablet, Take 1 tablet by mouth Daily., Disp: , Rfl:     Nurtec 75 MG dispersible tablet, TAKE ONE TABLET BY MOUTH EVERY DAY AS NEEDED FOR MIGRAINE, Disp: , Rfl:     ondansetron ODT (ZOFRAN-ODT) 4 MG disintegrating tablet, Place 1 tablet on the tongue Every 8 (Eight) Hours As Needed for Nausea or Vomiting., Disp: 30 tablet, Rfl: 0    tiZANidine (ZANAFLEX) 4 MG tablet, TAKE 1 TABLET EVERY 6 HOURSAS NEEDED FOR MUSCLE SPASMS, Disp: 30 tablet, Rfl: 0    famotidine (Pepcid) 40 MG tablet, Take 1 tablet by mouth Daily., Disp: 90 tablet, Rfl: 1    Sodium Sulfate-Mag Sulfate-KCl (Sutab) 2981-704-378 MG tablet, Take 12 tablets by mouth Take As  "Directed. Take 12 tablets at 6 pm and 12 tablets 4 hours prior to procedure., Disp: 24 tablet, Rfl: 0     Allergies   Allergen Reactions    Codeine Hives       Family History   Problem Relation Age of Onset    Arthritis Mother     Depression Mother     Hyperlipidemia Mother     COPD Father     Depression Father     Heart disease Father     Hyperlipidemia Father     Hyperlipidemia Brother     Cancer Maternal Aunt     Heart disease Maternal Uncle     Ovarian cancer Maternal Grandmother     Cancer Maternal Grandmother     Heart attack Maternal Grandfather     Stroke Maternal Grandfather     Heart disease Maternal Grandfather     COPD Paternal Grandmother     Depression Paternal Grandmother     Lung cancer Paternal Grandfather     Asthma Paternal Grandfather     Cancer Paternal Grandfather     COPD Paternal Grandfather     Vision loss Paternal Grandfather     Colon cancer Neg Hx         Social History     Social History Narrative    /2 children and 2 step children/homemaker       Objective       Vital Signs:   /83 (BP Location: Right arm, Patient Position: Sitting, Cuff Size: Adult)   Pulse 94   Ht 160 cm (62.99\")   Wt 96.6 kg (213 lb)   SpO2 100%   BMI 37.74 kg/m²       Physical Exam  Constitutional:       Appearance: Normal appearance.   HENT:      Head: Normocephalic.   Cardiovascular:      Rate and Rhythm: Normal rate and regular rhythm.      Heart sounds: Normal heart sounds.   Pulmonary:      Effort: Pulmonary effort is normal.      Breath sounds: Normal breath sounds.   Abdominal:      General: Bowel sounds are normal.      Palpations: Abdomen is soft.   Skin:     General: Skin is warm and dry.   Neurological:      Mental Status: She is alert and oriented to person, place, and time. Mental status is at baseline.   Psychiatric:         Mood and Affect: Mood normal.         Behavior: Behavior normal.         Thought Content: Thought content normal.         Judgment: Judgment normal.         Result " Review :                   Assessment and Plan    Diagnoses and all orders for this visit:    1. Epigastric pain (Primary)  -     Comprehensive Metabolic Panel; Future  -     CBC & Differential; Future  -     Lipase; Future  -     Amylase; Future  -     Celiac Disease Panel; Future  -     US Abdomen Complete; Future  -     Case Request; Standing  -     Obtain Informed Consent; Standing  -     Verify NPO; Standing  -     Verify Bowel Prep Was Successful; Standing  -     Give Tap Water Enema If Bowel Prep Insufficient; Standing  -     Case Request    2. Heartburn  -     Case Request; Standing  -     Obtain Informed Consent; Standing  -     Verify NPO; Standing  -     Verify Bowel Prep Was Successful; Standing  -     Give Tap Water Enema If Bowel Prep Insufficient; Standing  -     Case Request    3. Nausea  -     Case Request; Standing  -     Obtain Informed Consent; Standing  -     Verify NPO; Standing  -     Verify Bowel Prep Was Successful; Standing  -     Give Tap Water Enema If Bowel Prep Insufficient; Standing  -     Case Request    4. Dysphagia, unspecified type  -     Case Request; Standing  -     Obtain Informed Consent; Standing  -     Verify NPO; Standing  -     Verify Bowel Prep Was Successful; Standing  -     Give Tap Water Enema If Bowel Prep Insufficient; Standing  -     Case Request    5. Diarrhea, unspecified type  -     Clostridioides difficile Toxin - Stool, Per Rectum; Future  -     Enteric Bacterial Panel - Stool, Per Rectum; Future  -     Enteric Parasite Panel - Stool, Per Rectum; Future  -     Occult Blood, Fecal By Immunoassay - Stool, Per Rectum; Future  -     Fecal Lactoferrin Qual. - Stool, Per Rectum; Future  -     Pancreatic Elastase, Fecal - Stool, Per Rectum; Future  -     Case Request; Standing  -     Obtain Informed Consent; Standing  -     Verify NPO; Standing  -     Verify Bowel Prep Was Successful; Standing  -     Give Tap Water Enema If Bowel Prep Insufficient; Standing  -     Case  Request    6. Change in bowel habits  -     Case Request; Standing  -     Obtain Informed Consent; Standing  -     Verify NPO; Standing  -     Verify Bowel Prep Was Successful; Standing  -     Give Tap Water Enema If Bowel Prep Insufficient; Standing  -     Case Request    Other orders  -     famotidine (Pepcid) 40 MG tablet; Take 1 tablet by mouth Daily.  Dispense: 90 tablet; Refill: 1  -     Sodium Sulfate-Mag Sulfate-KCl (Sutab) 6284-790-288 MG tablet; Take 12 tablets by mouth Take As Directed. Take 12 tablets at 6 pm and 12 tablets 4 hours prior to procedure.  Dispense: 24 tablet; Refill: 0    38-year-old patient presents to the office with complaints of epigastric pain radiating to her right side, persistent heartburn, nausea, intermittent dysphagia, change in bowel habits to diarrhea.  Symptoms have worsened over the last 2 months.  I have ordered further evaluation with abdominal ultrasound.  Patient will complete labs listed above at her earliest convenience.  She will continue Nexium 40 mg in the morning and have added Pepcid 40 mg at night.  I have ordered further evaluation of diarrhea with stool studies to also include pancreatic elastase due to history of possible gallstone pancreatitis in the past.  Due to worsening symptoms and change in bowel habits have advised patient to proceed with EGD and colonoscopy for further evaluation.  Denies cardiopulmonary history.  Patient is agreeable to plan will call the office any questions or concerns.    EGD/COLONOSCOPY Surgical Risk and Benefits: Possible risk/complications, benefits, and alternatives to surgical or invasive procedure have been explained to patient and/or legal guardian. Risks include bleeding, infection, and perforation. Patient has been evaluated and can tolerate anesthesia and/or sedation. Risk, benefits, and alternatives to anesthesia and sedation have been explained to patient and/or legal guardian.     Follow Up   No follow-ups on  file.  Patient was given instructions and counseling regarding her condition or for health maintenance advice. Please see specific information pulled into the AVS if appropriate.

## 2023-10-18 DIAGNOSIS — R74.8 ELEVATED LIVER ENZYMES: Primary | ICD-10-CM

## 2023-10-18 LAB
ENDOMYSIUM IGA SER QL: NEGATIVE
IGA SERPL-MCNC: 179 MG/DL (ref 87–352)
TTG IGA SER-ACNC: <2 U/ML (ref 0–3)

## 2023-10-19 ENCOUNTER — LAB (OUTPATIENT)
Dept: LAB | Facility: HOSPITAL | Age: 38
End: 2023-10-19
Payer: COMMERCIAL

## 2023-10-19 DIAGNOSIS — R74.8 ELEVATED LIVER ENZYMES: ICD-10-CM

## 2023-10-19 DIAGNOSIS — R19.7 DIARRHEA, UNSPECIFIED TYPE: ICD-10-CM

## 2023-10-19 LAB
027 TOXIN: NORMAL
ALBUMIN SERPL-MCNC: 4.6 G/DL (ref 3.5–5.2)
ALP SERPL-CCNC: 92 U/L (ref 39–117)
ALPHA-FETOPROTEIN: 2.32 NG/ML (ref 0–8.3)
ALPHA1 GLOB MFR UR ELPH: 150 MG/DL (ref 90–200)
ALT SERPL W P-5'-P-CCNC: 46 U/L (ref 1–33)
AST SERPL-CCNC: 23 U/L (ref 1–32)
BILIRUB CONJ SERPL-MCNC: <0.2 MG/DL (ref 0–0.3)
BILIRUB INDIRECT SERPL-MCNC: ABNORMAL MG/DL
BILIRUB SERPL-MCNC: 0.4 MG/DL (ref 0–1.2)
C DIFF TOX GENS STL QL NAA+PROBE: NEGATIVE
CERULOPLASMIN SERPL-MCNC: 29 MG/DL (ref 19–39)
FERRITIN SERPL-MCNC: 90.26 NG/ML (ref 13–150)
HAV IGM SERPL QL IA: NORMAL
HBV CORE IGM SERPL QL IA: NORMAL
HBV SURFACE AG SERPL QL IA: NORMAL
HCV AB SER DONR QL: NORMAL
HEMOCCULT STL QL IA: NEGATIVE
INR PPP: 0.95 (ref 0.86–1.15)
IRON 24H UR-MRATE: 62 MCG/DL (ref 37–145)
IRON SATN MFR SERPL: 17 % (ref 20–50)
LACTOFERRIN STL QL LA: NEGATIVE
PROT SERPL-MCNC: 7.5 G/DL (ref 6–8.5)
PROTHROMBIN TIME: 12.8 SECONDS (ref 11.8–14.9)
TIBC SERPL-MCNC: 364 MCG/DL (ref 298–536)
TRANSFERRIN SERPL-MCNC: 244 MG/DL (ref 200–360)

## 2023-10-19 PROCEDURE — 80076 HEPATIC FUNCTION PANEL: CPT

## 2023-10-19 PROCEDURE — 82274 ASSAY TEST FOR BLOOD FECAL: CPT

## 2023-10-19 PROCEDURE — 80074 ACUTE HEPATITIS PANEL: CPT

## 2023-10-19 PROCEDURE — 83540 ASSAY OF IRON: CPT

## 2023-10-19 PROCEDURE — 86381 MITOCHONDRIAL ANTIBODY EACH: CPT

## 2023-10-19 PROCEDURE — 86225 DNA ANTIBODY NATIVE: CPT

## 2023-10-19 PROCEDURE — 86038 ANTINUCLEAR ANTIBODIES: CPT

## 2023-10-19 PROCEDURE — 82653 EL-1 FECAL QUANTITATIVE: CPT

## 2023-10-19 PROCEDURE — 82105 ALPHA-FETOPROTEIN SERUM: CPT

## 2023-10-19 PROCEDURE — 86334 IMMUNOFIX E-PHORESIS SERUM: CPT

## 2023-10-19 PROCEDURE — 82390 ASSAY OF CERULOPLASMIN: CPT

## 2023-10-19 PROCEDURE — 82103 ALPHA-1-ANTITRYPSIN TOTAL: CPT

## 2023-10-19 PROCEDURE — 83630 LACTOFERRIN FECAL (QUAL): CPT

## 2023-10-19 PROCEDURE — 87506 IADNA-DNA/RNA PROBE TQ 6-11: CPT

## 2023-10-19 PROCEDURE — 86015 ACTIN ANTIBODY EACH: CPT

## 2023-10-19 PROCEDURE — 36415 COLL VENOUS BLD VENIPUNCTURE: CPT

## 2023-10-19 PROCEDURE — 82728 ASSAY OF FERRITIN: CPT

## 2023-10-19 PROCEDURE — 82525 ASSAY OF COPPER: CPT

## 2023-10-19 PROCEDURE — 85610 PROTHROMBIN TIME: CPT

## 2023-10-19 PROCEDURE — 87493 C DIFF AMPLIFIED PROBE: CPT

## 2023-10-19 PROCEDURE — 82784 ASSAY IGA/IGD/IGG/IGM EACH: CPT

## 2023-10-19 PROCEDURE — 84466 ASSAY OF TRANSFERRIN: CPT

## 2023-10-21 LAB
C COLI+JEJ+UPSA DNA STL QL NAA+NON-PROBE: NOT DETECTED
EC STX1+STX2 GENES STL QL NAA+NON-PROBE: NOT DETECTED
MITOCHONDRIA M2 IGG SER-ACNC: <20 UNITS (ref 0–20)
S ENT+BONG DNA STL QL NAA+NON-PROBE: NOT DETECTED
SHIGELLA SP+EIEC IPAH ST NAA+NON-PROBE: NOT DETECTED
SMA IGG SER-ACNC: 53 UNITS (ref 0–19)

## 2023-10-22 LAB
CRYPTOSP DNA STL QL NAA+NON-PROBE: NOT DETECTED
E HISTOLYT DNA STL QL NAA+NON-PROBE: NOT DETECTED
G LAMBLIA DNA STL QL NAA+NON-PROBE: NOT DETECTED

## 2023-10-23 LAB
ANA SER QL: POSITIVE
DSDNA AB SER-ACNC: 1 IU/ML (ref 0–9)
IGA SERPL-MCNC: 186 MG/DL (ref 87–352)
IGG SERPL-MCNC: 1049 MG/DL (ref 586–1602)
IGM SERPL-MCNC: 106 MG/DL (ref 26–217)
Lab: NORMAL
PROT PATTERN SERPL IFE-IMP: NORMAL

## 2023-10-24 DIAGNOSIS — M79.7 FIBROMYALGIA: ICD-10-CM

## 2023-10-25 ENCOUNTER — TELEPHONE (OUTPATIENT)
Dept: GASTROENTEROLOGY | Facility: CLINIC | Age: 38
End: 2023-10-25
Payer: COMMERCIAL

## 2023-10-25 LAB — ELASTASE PANC STL-MCNT: <50 UG ELAST./G

## 2023-10-25 RX ORDER — DULOXETIN HYDROCHLORIDE 60 MG/1
CAPSULE, DELAYED RELEASE ORAL
Qty: 90 CAPSULE | Refills: 1 | Status: SHIPPED | OUTPATIENT
Start: 2023-10-25 | End: 2023-10-25 | Stop reason: SDUPTHER

## 2023-10-25 RX ORDER — DULOXETIN HYDROCHLORIDE 60 MG/1
60 CAPSULE, DELAYED RELEASE ORAL DAILY
Qty: 90 CAPSULE | Refills: 0 | Status: SHIPPED | OUTPATIENT
Start: 2023-10-25

## 2023-10-25 RX ORDER — SOD SULF/POT CHLORIDE/MAG SULF 1.479 G
12 TABLET ORAL TAKE AS DIRECTED
Qty: 24 TABLET | Refills: 0 | Status: SHIPPED | OUTPATIENT
Start: 2023-10-25

## 2023-10-25 RX ORDER — FAMOTIDINE 40 MG/1
40 TABLET, FILM COATED ORAL DAILY
Qty: 90 TABLET | Refills: 1 | Status: SHIPPED | OUTPATIENT
Start: 2023-10-25

## 2023-10-25 NOTE — TELEPHONE ENCOUNTER
Spoke with pt. Notified of results. Voiced understanding. Will also send her a mychart message of results.  eugene

## 2023-10-25 NOTE — TELEPHONE ENCOUNTER
----- Message from LUCIA Gomez sent at 10/24/2023 12:36 PM EDT -----  I have reviewed the patient's most recent lab work. LETICIA is positive, this is not liver specific and indicates an autoimmune process occurring within the body. ASMA is an autoimmune marker specific to the liver which is positive. ALT, which is a liver enzymes, is mildly elevated. Remaining liver work up is unremarkable. To further trend positive ASMA I would recommend repeat in 1 month as well as liver enzymes, orders placed. Proceed with liver ultrasound on 11/8/23 as scheduled.     Stool is negative for parasites, bacteria, C. Diff, inflammation (lactoferrin), and blood. Pancreatic elastase is still pending, will reach out once this result has been received.

## 2023-10-26 ENCOUNTER — TELEPHONE (OUTPATIENT)
Dept: GASTROENTEROLOGY | Facility: CLINIC | Age: 38
End: 2023-10-26
Payer: COMMERCIAL

## 2023-10-26 RX ORDER — FAMOTIDINE 40 MG/1
40 TABLET, FILM COATED ORAL DAILY
Qty: 90 TABLET | Refills: 1 | Status: SHIPPED | OUTPATIENT
Start: 2023-10-26

## 2023-10-26 RX ORDER — SOD SULF/POT CHLORIDE/MAG SULF 1.479 G
12 TABLET ORAL TAKE AS DIRECTED
Qty: 24 TABLET | Refills: 0 | Status: SHIPPED | OUTPATIENT
Start: 2023-10-26

## 2023-10-26 NOTE — TELEPHONE ENCOUNTER
----- Message from LUCIA Gomez sent at 10/25/2023  3:25 PM EDT -----  Pancreatic elastase shows severe pancreatic insufficiency.  This finding means that the patient is not secreting enough enzymes to appropriately digest food which can cause bloating and diarrhea.  Creon is a medication we use to supplement these enzymes.  Patient needs to take medication with meals to be effective.  Please take 2 capsules with every meal and 1 with every snack.  Ultrasound previously ordered to further assess elevated liver enzymes recommend proceeding with CT scan instead to also evaluate pancreas. (Please discontinue ultrasound order)

## 2023-10-26 NOTE — TELEPHONE ENCOUNTER
Spoke with pt. Notified of results. Will cx ultrasound appt and place order for CT.  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 Voiced understanding. eugene

## 2023-10-31 LAB — COPPER SERPL-MCNC: 141 UG/DL (ref 80–158)

## 2023-11-02 ENCOUNTER — OFFICE VISIT (OUTPATIENT)
Dept: FAMILY MEDICINE CLINIC | Age: 38
End: 2023-11-02
Payer: COMMERCIAL

## 2023-11-02 VITALS
SYSTOLIC BLOOD PRESSURE: 132 MMHG | DIASTOLIC BLOOD PRESSURE: 86 MMHG | OXYGEN SATURATION: 97 % | TEMPERATURE: 98.8 F | HEIGHT: 63 IN | BODY MASS INDEX: 38.02 KG/M2 | WEIGHT: 214.6 LBS | HEART RATE: 81 BPM

## 2023-11-02 DIAGNOSIS — F41.9 ANXIETY: Primary | ICD-10-CM

## 2023-11-02 DIAGNOSIS — R07.9 CHEST PAIN, UNSPECIFIED TYPE: ICD-10-CM

## 2023-11-02 DIAGNOSIS — Z23 IMMUNIZATION DUE: ICD-10-CM

## 2023-11-02 DIAGNOSIS — E78.5 HYPERLIPIDEMIA, UNSPECIFIED HYPERLIPIDEMIA TYPE: ICD-10-CM

## 2023-11-02 DIAGNOSIS — F33.1 MODERATE EPISODE OF RECURRENT MAJOR DEPRESSIVE DISORDER: ICD-10-CM

## 2023-11-02 PROCEDURE — 93000 ELECTROCARDIOGRAM COMPLETE: CPT | Performed by: NURSE PRACTITIONER

## 2023-11-02 PROCEDURE — 99214 OFFICE O/P EST MOD 30 MIN: CPT | Performed by: NURSE PRACTITIONER

## 2023-11-02 PROCEDURE — 90686 IIV4 VACC NO PRSV 0.5 ML IM: CPT | Performed by: NURSE PRACTITIONER

## 2023-11-02 PROCEDURE — 90471 IMMUNIZATION ADMIN: CPT | Performed by: NURSE PRACTITIONER

## 2023-11-02 RX ORDER — BUSPIRONE HYDROCHLORIDE 7.5 MG/1
7.5 TABLET ORAL 3 TIMES DAILY
Qty: 90 TABLET | Refills: 1 | Status: SHIPPED | OUTPATIENT
Start: 2023-11-02 | End: 2023-11-02

## 2023-11-02 RX ORDER — BUSPIRONE HYDROCHLORIDE 10 MG/1
10 TABLET ORAL 3 TIMES DAILY
Qty: 90 TABLET | Refills: 1 | Status: SHIPPED | OUTPATIENT
Start: 2023-11-02

## 2023-11-02 NOTE — PROGRESS NOTES
"Chief Complaint  Hyperlipidemia (Follow up)    Subjective          Gin Alexandra presents to [facility] for routine blood work and discuss anxiety.  Patient's mother has just been diagnosed with metastatic small cell lung cancer.  She is hopefully going to be discharged home from the hospital tomorrow.  Prognosis is not looking good in that regard.  Patient is already taking Cymbalta 60 mg daily for depression and fibromyalgia.  She states she is constantly feeling as if she is going to have a panic attack.  She will also have chest pains that radiate to back and down her left arm.  She just found out about her mother approximately 2 weeks ago.  She states she has been to the ER with similar chest pains in the past and cardiac work-up was negative.  Patient states that she is actually having chest pain at this time.  Denies shortness of air, nausea or diaphoresis.  Patient states that she is having a hard time falling asleep and staying asleep.  She has not tried anything over-the-counter to help with this.  States prior to diagnosis of mother, she was sleeping well. Denies SI.     Family history updated.       Objective   Vital Signs:   Vitals:    11/02/23 1201   BP: 132/86   BP Location: Left arm   Patient Position: Sitting   Cuff Size: Adult   Pulse: 81   Temp: 98.8 °F (37.1 °C)   TempSrc: Oral   SpO2: 97%   Weight: 97.3 kg (214 lb 9.6 oz)   Height: 160 cm (62.99\")       Physical Exam  Vitals reviewed.   Constitutional:       General: She is not in acute distress.     Appearance: Normal appearance. She is well-developed.   HENT:      Head: Normocephalic and atraumatic.   Eyes:      Conjunctiva/sclera: Conjunctivae normal.      Pupils: Pupils are equal, round, and reactive to light.   Cardiovascular:      Rate and Rhythm: Normal rate and regular rhythm.      Heart sounds: Normal heart sounds. No murmur heard.  Pulmonary:      Effort: Pulmonary effort is normal. No respiratory " distress.      Breath sounds: Normal breath sounds.   Skin:     General: Skin is warm and dry.   Neurological:      Mental Status: She is alert and oriented to person, place, and time.   Psychiatric:         Mood and Affect: Affect normal.         Behavior: Behavior normal.         Thought Content: Thought content normal.         Judgment: Judgment normal.      Comments: tearful          Result Review :              Assessment and Plan    Diagnoses and all orders for this visit:    1. Anxiety (Primary)  Comments:  Adding BuSpar 10 mg daily.  Potential side effects med discussed.  Patient is to try melatonin to help with sleep.  Sleep hygiene measures. Continue cymbalta  Orders:  -     Discontinue: busPIRone (BUSPAR) 7.5 MG tablet; Take 1 tablet by mouth 3 (Three) Times a Day.  Dispense: 90 tablet; Refill: 1    2. Immunization due  -     Fluzone >6 Months ([id number])    3. Hyperlipidemia, unspecified hyperlipidemia type  Comments:  Will notify patient of results and treat accordingly.  Orders:  -     Comprehensive Metabolic Panel; Future  -     Lipid Panel; Future    4. Chest pain, unspecified type  Comments:  ECG normal. Go to ED with any chest pain. Since this is ongoing and has radiation to back and arm, will send to cardio for further workup.  Orders:  -     Ambulatory Referral to Cardiology  -     ECG 12 Lead  -     busPIRone (BUSPAR) 10 MG tablet; Take 1 tablet by mouth 3 (Three) Times a Day.  Dispense: 90 tablet; Refill: 1    5. Moderate episode of recurrent major depressive disorder  Comments:  Adding BuSpar 10 mg daily. Potential side effects med discussed. Patient is to try melatonin to help with sleep. Sleep hygiene measures. Continue cymbalta    Patient to notify office with any acute concerns or issues.  Patient verbalizes understanding, agrees with plan of care and has no further questions upon discharge.    Please note that portions of this note were completed with a voice recognition  program.    Follow Up    Return in about 4 weeks (around 11/30/2023) for Recheck.  Patient was given instructions and counseling regarding her condition or for health maintenance advice. Please see specific information pulled into the AVS if appropriate.

## 2023-11-06 ENCOUNTER — LAB (OUTPATIENT)
Dept: LAB | Facility: HOSPITAL | Age: 38
End: 2023-11-06
Payer: COMMERCIAL

## 2023-11-06 DIAGNOSIS — R74.8 ELEVATED LIVER ENZYMES: ICD-10-CM

## 2023-11-06 DIAGNOSIS — E78.5 HYPERLIPIDEMIA, UNSPECIFIED HYPERLIPIDEMIA TYPE: ICD-10-CM

## 2023-11-06 LAB
ALBUMIN SERPL-MCNC: 4.1 G/DL (ref 3.5–5.2)
ALBUMIN/GLOB SERPL: 1.5 G/DL
ALP SERPL-CCNC: 78 U/L (ref 39–117)
ALT SERPL W P-5'-P-CCNC: 26 U/L (ref 1–33)
ANION GAP SERPL CALCULATED.3IONS-SCNC: 8.6 MMOL/L (ref 5–15)
AST SERPL-CCNC: 17 U/L (ref 1–32)
BILIRUB CONJ SERPL-MCNC: <0.2 MG/DL (ref 0–0.3)
BILIRUB SERPL-MCNC: 0.3 MG/DL (ref 0–1.2)
BUN SERPL-MCNC: 11 MG/DL (ref 6–20)
BUN/CREAT SERPL: 13.6 (ref 7–25)
CALCIUM SPEC-SCNC: 9.5 MG/DL (ref 8.6–10.5)
CHLORIDE SERPL-SCNC: 101 MMOL/L (ref 98–107)
CHOLEST SERPL-MCNC: 180 MG/DL (ref 0–200)
CO2 SERPL-SCNC: 27.4 MMOL/L (ref 22–29)
CREAT SERPL-MCNC: 0.81 MG/DL (ref 0.57–1)
EGFRCR SERPLBLD CKD-EPI 2021: 96.6 ML/MIN/1.73
GLOBULIN UR ELPH-MCNC: 2.8 GM/DL
GLUCOSE SERPL-MCNC: 96 MG/DL (ref 65–99)
HDLC SERPL-MCNC: 38 MG/DL (ref 40–60)
LDLC SERPL CALC-MCNC: 98 MG/DL (ref 0–100)
LDLC/HDLC SERPL: 2.38 {RATIO}
POTASSIUM SERPL-SCNC: 4.1 MMOL/L (ref 3.5–5.2)
PROT SERPL-MCNC: 6.9 G/DL (ref 6–8.5)
SODIUM SERPL-SCNC: 137 MMOL/L (ref 136–145)
TRIGL SERPL-MCNC: 257 MG/DL (ref 0–150)
VLDLC SERPL-MCNC: 44 MG/DL (ref 5–40)

## 2023-11-06 PROCEDURE — 36415 COLL VENOUS BLD VENIPUNCTURE: CPT

## 2023-11-06 PROCEDURE — 86015 ACTIN ANTIBODY EACH: CPT

## 2023-11-06 PROCEDURE — 80053 COMPREHEN METABOLIC PANEL: CPT

## 2023-11-06 PROCEDURE — 82248 BILIRUBIN DIRECT: CPT

## 2023-11-06 PROCEDURE — 80061 LIPID PANEL: CPT

## 2023-11-06 NOTE — PROGRESS NOTES
Procedure      ECG 12 Lead    Date/Time: 11/6/2023 12:23 PM  Performed by: Rosalina Edwards MD    Authorized by: Rekha Kim APRN  Comparison: not compared with previous ECG   Rhythm: sinus rhythm  Rate: normal  Conduction: conduction normal  ST Segments: ST segments normal  T inversion: III and V1  QRS axis: normal  Other: no other findings    Clinical impression: normal ECG

## 2023-11-07 ENCOUNTER — HOSPITAL ENCOUNTER (OUTPATIENT)
Dept: CT IMAGING | Facility: HOSPITAL | Age: 38
Discharge: HOME | End: 2023-11-07
Payer: COMMERCIAL

## 2023-11-07 DIAGNOSIS — R10.13 EPIGASTRIC PAIN: ICD-10-CM

## 2023-11-07 DIAGNOSIS — K86.89 PANCREATIC INSUFFICIENCY: ICD-10-CM

## 2023-11-07 DIAGNOSIS — R74.8 ELEVATED LIVER ENZYMES: ICD-10-CM

## 2023-11-07 PROCEDURE — 74177 CT ABD & PELVIS W/CONTRAST: CPT

## 2023-11-07 PROCEDURE — 25510000001 IOPAMIDOL PER 1 ML

## 2023-11-07 RX ADMIN — IOPAMIDOL 100 ML: 755 INJECTION, SOLUTION INTRAVENOUS at 13:14

## 2023-11-08 ENCOUNTER — TELEPHONE (OUTPATIENT)
Dept: GASTROENTEROLOGY | Facility: CLINIC | Age: 38
End: 2023-11-08
Payer: COMMERCIAL

## 2023-11-08 DIAGNOSIS — R74.8 ELEVATED LIVER ENZYMES: Primary | ICD-10-CM

## 2023-11-08 LAB — SMA IGG SER-ACNC: 71 UNITS (ref 0–19)

## 2023-11-08 NOTE — TELEPHONE ENCOUNTER
----- Message from LUCIA Gomez sent at 11/7/2023  3:11 PM EST -----  I have reviewed the patient's most recent CT scan which shows unremarkable pancreas and liver. There is some congestion of pelvic vascularity which can be nonspecific, if patient has chronic lower abdominal pain then recommend following up with PCP for this finding.

## 2023-11-09 ENCOUNTER — TELEPHONE (OUTPATIENT)
Dept: GASTROENTEROLOGY | Facility: CLINIC | Age: 38
End: 2023-11-09
Payer: COMMERCIAL

## 2023-11-09 NOTE — TELEPHONE ENCOUNTER
Spoke with pt. Notified of results. Fibro scheduled 12/29/23. Mailed pt info/appt. Voiced understanding. eugene

## 2023-11-09 NOTE — TELEPHONE ENCOUNTER
----- Message from LUCIA Gomez sent at 11/8/2023  3:44 PM EST -----  I have reviewed the patient's most recent labs. ASMA remains elevated however liver enzymes are normal. Order for Fibroscan placed, pending these findings may consider liver biopsy due to persistent elevation in alk phos. Please schedule fibroscan within the next month.

## 2023-11-18 DIAGNOSIS — M54.41 ACUTE MIDLINE LOW BACK PAIN WITH BILATERAL SCIATICA: ICD-10-CM

## 2023-11-18 DIAGNOSIS — M54.42 ACUTE MIDLINE LOW BACK PAIN WITH BILATERAL SCIATICA: ICD-10-CM

## 2023-11-20 RX ORDER — TIZANIDINE 4 MG/1
TABLET ORAL
Qty: 30 TABLET | Refills: 0 | Status: SHIPPED | OUTPATIENT
Start: 2023-11-20

## 2023-11-20 RX ORDER — IBUPROFEN 800 MG/1
TABLET ORAL
Qty: 30 TABLET | Refills: 0 | Status: SHIPPED | OUTPATIENT
Start: 2023-11-20

## 2023-11-20 NOTE — PRE-PROCEDURE INSTRUCTIONS
"Instructed on date and arrival time of [address]". Must have  over age 18 to drive home.  May have two visitors; however, children under 12 must stay in waiting room.  Discussed clear liquid diet (no red or purple), bowel prep, and NPO.  May take medications as usual except for blood thinners, diabetic medications, and weight loss medications.  Bring list of medications.  Verbalized understanding of instructions given.  Instructed to call for questions or concerns.  "

## 2023-11-22 ENCOUNTER — ANESTHESIA EVENT (OUTPATIENT)
Dept: GASTROENTEROLOGY | Facility: HOSPITAL | Age: 38
End: 2023-11-22
Payer: COMMERCIAL

## 2023-11-22 NOTE — ANESTHESIA PREPROCEDURE EVALUATION
Anesthesia Evaluation     Patient summary reviewed and Nursing notes reviewed   no history of anesthetic complications:   NPO Solid Status: > 8 hours  NPO Liquid Status: > 4 hours           Airway   Mallampati: I  TM distance: >3 FB  Neck ROM: full  No difficulty expected  Dental - normal exam     Pulmonary - normal exam    breath sounds clear to auscultation  (+) a smoker (quit in 2015) Former, asthma (mild per the patient - states she rarely uses an inhaler),shortness of breath  Cardiovascular - normal exam  Exercise tolerance: good (4-7 METS)    ECG reviewed  Rhythm: regular  Rate: normal    (+) hyperlipidemia      Neuro/Psych  (+) headaches, dizziness/light headedness, numbness, psychiatric history Anxiety and Depression  GI/Hepatic/Renal/Endo    (+) obesity, GERD well controlled    Musculoskeletal     (+) myalgias  Abdominal    Substance History - negative use     OB/GYN negative ob/gyn ROS         Other - negative ROS       ROS/Med Hx Other: Date/Time: 11/6/2023 12:23 PM  Performed by: Rosalina Edwards MD     Authorized by: Rekha Kim APRN  Comparison: not compared with previous ECG   Rhythm: sinus rhythm  Rate: normal  Conduction: conduction normal  ST Segments: ST segments normal  T inversion: III and V1  QRS axis: normal  Other: no other findings  Clinical impression: normal ECG                  Anesthesia Plan    ASA 3     general   total IV anesthesia  (Patient understands anesthesia not responsible for dental damage.)  intravenous induction     Anesthetic plan, risks, benefits, and alternatives have been provided, discussed and informed consent has been obtained with: patient.  Pre-procedure education provided  Plan discussed with CRNA.      CODE STATUS:

## 2023-11-27 ENCOUNTER — HOSPITAL ENCOUNTER (OUTPATIENT)
Facility: HOSPITAL | Age: 38
Setting detail: HOSPITAL OUTPATIENT SURGERY
Discharge: HOME | End: 2023-11-27
Attending: INTERNAL MEDICINE | Admitting: INTERNAL MEDICINE
Payer: COMMERCIAL

## 2023-11-27 ENCOUNTER — ANESTHESIA (OUTPATIENT)
Dept: GASTROENTEROLOGY | Facility: HOSPITAL | Age: 38
End: 2023-11-27
Payer: COMMERCIAL

## 2023-11-27 VITALS
BODY MASS INDEX: 38.05 KG/M2 | WEIGHT: 214.73 LBS | DIASTOLIC BLOOD PRESSURE: 65 MMHG | OXYGEN SATURATION: 95 % | HEART RATE: 80 BPM | SYSTOLIC BLOOD PRESSURE: 123 MMHG | RESPIRATION RATE: 21 BRPM | TEMPERATURE: 97 F

## 2023-11-27 DIAGNOSIS — R19.4 CHANGE IN BOWEL HABITS: ICD-10-CM

## 2023-11-27 DIAGNOSIS — R19.7 DIARRHEA, UNSPECIFIED TYPE: ICD-10-CM

## 2023-11-27 DIAGNOSIS — R11.0 NAUSEA: ICD-10-CM

## 2023-11-27 DIAGNOSIS — R10.13 EPIGASTRIC PAIN: ICD-10-CM

## 2023-11-27 DIAGNOSIS — R12 HEARTBURN: ICD-10-CM

## 2023-11-27 DIAGNOSIS — R13.10 DYSPHAGIA, UNSPECIFIED TYPE: ICD-10-CM

## 2023-11-27 PROCEDURE — 25810000003 LACTATED RINGERS PER 1000 ML: Performed by: ANESTHESIOLOGY

## 2023-11-27 PROCEDURE — 25810000003 LACTATED RINGERS PER 1000 ML

## 2023-11-27 PROCEDURE — 43239 EGD BIOPSY SINGLE/MULTIPLE: CPT | Performed by: INTERNAL MEDICINE

## 2023-11-27 PROCEDURE — 88305 TISSUE EXAM BY PATHOLOGIST: CPT | Performed by: INTERNAL MEDICINE

## 2023-11-27 PROCEDURE — 25010000002 ONDANSETRON PER 1 MG

## 2023-11-27 PROCEDURE — 45378 DIAGNOSTIC COLONOSCOPY: CPT | Performed by: INTERNAL MEDICINE

## 2023-11-27 PROCEDURE — 25010000002 PROPOFOL 10 MG/ML EMULSION: Performed by: NURSE ANESTHETIST, CERTIFIED REGISTERED

## 2023-11-27 RX ORDER — PROPOFOL 10 MG/ML
VIAL (ML) INTRAVENOUS AS NEEDED
Status: DISCONTINUED | OUTPATIENT
Start: 2023-11-27 | End: 2023-11-27 | Stop reason: SURG

## 2023-11-27 RX ORDER — DICYCLOMINE HCL 20 MG
20 TABLET ORAL 3 TIMES DAILY PRN
Qty: 90 TABLET | Refills: 5 | Status: SHIPPED | OUTPATIENT
Start: 2023-11-27

## 2023-11-27 RX ORDER — SODIUM CHLORIDE, SODIUM LACTATE, POTASSIUM CHLORIDE, CALCIUM CHLORIDE 600; 310; 30; 20 MG/100ML; MG/100ML; MG/100ML; MG/100ML
30 INJECTION, SOLUTION INTRAVENOUS CONTINUOUS
Status: DISCONTINUED | OUTPATIENT
Start: 2023-11-27 | End: 2023-11-27 | Stop reason: HOSPADM

## 2023-11-27 RX ORDER — ONDANSETRON 2 MG/ML
4 INJECTION INTRAMUSCULAR; INTRAVENOUS ONCE
Status: COMPLETED | OUTPATIENT
Start: 2023-11-27 | End: 2023-11-27

## 2023-11-27 RX ORDER — LIDOCAINE HYDROCHLORIDE 20 MG/ML
INJECTION, SOLUTION EPIDURAL; INFILTRATION; INTRACAUDAL; PERINEURAL AS NEEDED
Status: DISCONTINUED | OUTPATIENT
Start: 2023-11-27 | End: 2023-11-27 | Stop reason: SURG

## 2023-11-27 RX ADMIN — SODIUM CHLORIDE, POTASSIUM CHLORIDE, SODIUM LACTATE AND CALCIUM CHLORIDE: 600; 310; 30; 20 INJECTION, SOLUTION INTRAVENOUS at 11:57

## 2023-11-27 RX ADMIN — SODIUM CHLORIDE, POTASSIUM CHLORIDE, SODIUM LACTATE AND CALCIUM CHLORIDE 30 ML/HR: 600; 310; 30; 20 INJECTION, SOLUTION INTRAVENOUS at 10:27

## 2023-11-27 RX ADMIN — ONDANSETRON 4 MG: 2 INJECTION INTRAMUSCULAR; INTRAVENOUS at 10:28

## 2023-11-27 RX ADMIN — PROPOFOL 145 MCG/KG/MIN: 10 INJECTION, EMULSION INTRAVENOUS at 11:57

## 2023-11-27 RX ADMIN — PROPOFOL 100 MG: 10 INJECTION, EMULSION INTRAVENOUS at 11:57

## 2023-11-27 RX ADMIN — LIDOCAINE HYDROCHLORIDE 60 MG: 20 INJECTION, SOLUTION EPIDURAL; INFILTRATION; INTRACAUDAL; PERINEURAL at 11:57

## 2023-11-27 NOTE — ANESTHESIA POSTPROCEDURE EVALUATION
Patient: Gin Alexandra    Procedure Summary       Date: 11/27/23 Room / Location: [facility] ENDOSCOPY 3 / [facility] ENDOSCOPY    Anesthesia Start: 1156 Anesthesia Stop: 1236    Procedures:       ESOPHAGOGASTRODUODENOSCOPY WITH BIOPSIES      COLONOSCOPY Diagnosis:       Epigastric pain      Diarrhea, unspecified type      Heartburn      Nausea      Dysphagia, unspecified type      Change in bowel habits      (Epigastric pain [R10.13])      (Diarrhea, unspecified type [R19.7])      (Heartburn [R12])      (Nausea [R11.0])      (Dysphagia, unspecified type [R13.10])      (Change in bowel habits [R19.4])    Surgeons: Kumar Turner MD Provider: Ted Mratino CRNA    Anesthesia Type: general ASA Status: 3            Anesthesia Type: general    Vitals  Vitals Value Taken Time   /65 11/27/23 1254   Temp 36.1 °C (97 °F) 11/27/23 1232   Pulse 80 11/27/23 1254   Resp 21 11/27/23 1252   SpO2 98 % 11/27/23 1254   Vitals shown include unfiled device data.        Post Anesthesia Care and Evaluation    Patient location during evaluation: bedside  Patient participation: complete - patient participated  Level of consciousness: awake  Pain management: adequate    Airway patency: patent  Anesthetic complications: No anesthetic complications  PONV Status: controlled  Cardiovascular status: acceptable and stable  Respiratory status: acceptable

## 2023-11-27 NOTE — H&P
Pre Procedure History & Physical    Chief Complaint:   Epig pain  IBS  Constipaton  Gerd  nausea    Subjective     HPI:   As above    Past Medical History:   Past Medical History:   Diagnosis Date    Allergic 01/01/1995    Allergic rhinitis     Allergies     CODEINE SULFATE   MODERATE    Anxiety     Asthma     Benign neoplasm of pituitary gland     Carpal tunnel syndrome, bilateral upper limbs     x2 surgeries on the L, x1 on the R    Cholelithiasis 2005    Gall bladder was removed    Chronic fatigue, unspecified     Constipation, unspecified     Depression     Started when i was 14    Dizziness     Dyspnea, unspecified     Dysuria     Fibromyalgia     Fibromyalgia     Fibromyalgia, primary 2019    GERD without esophagitis     Headache     HL (hearing loss)     Hypersomnia, unspecified     IBS (irritable bowel syndrome)     diarrhea and constipation    Obesity, unspecified     Other fatigue     Other mixed anxiety disorders     Other retention of urine     Pain in right hip     Pituitary tumor     Tinnitus     Vitamin D deficiency, unspecified        Past Surgical History:  Past Surgical History:   Procedure Laterality Date    CARPAL TUNNEL RELEASE      CHOLECYSTECTOMY      AT AGE 21   DETAILS/COMPLICATIONS?    COLONOSCOPY      ENDOSCOPY N/A 06/13/2022    Procedure: ESOPHAGOGASTRODUODENOSCOPY WITH BIOPSIES;  Surgeon: Kumar Turner MD;  Location: [facility] ENDOSCOPY;  Service: Gastroenterology;  Laterality: N/A;  NORMAL EGD    LEEP  2012    TUBAL ABDOMINAL LIGATION      UPPER GASTROINTESTINAL ENDOSCOPY         Family History:  Family History   Problem Relation Age of Onset    Arthritis Mother     Depression Mother     Hyperlipidemia Mother     Cancer Mother         Non-small lung cancer    COPD Father     Depression Father     Heart disease Father     Hyperlipidemia Father     Hyperlipidemia Brother     Cancer Maternal Aunt     Heart disease Maternal Uncle     Ovarian cancer Maternal Grandmother     Cancer  Maternal Grandmother     Heart attack Maternal Grandfather     Stroke Maternal Grandfather     Heart disease Maternal Grandfather     COPD Paternal Grandmother     Depression Paternal Grandmother     Lung cancer Paternal Grandfather     Asthma Paternal Grandfather     Cancer Paternal Grandfather         lung cancer    COPD Paternal Grandfather     Vision loss Paternal Grandfather     Colon cancer Neg Hx        Social History:   reports that she quit smoking about 8 years ago. Her smoking use included cigarettes. She started smoking about 24 years ago. She has a 32.00 pack-year smoking history. She has been exposed to tobacco smoke. She has never used smokeless tobacco. She reports that she does not currently use alcohol. She reports that she does not use drugs.    Medications:   Medications Prior to Admission   Medication Sig Dispense Refill Last Dose    albuterol sulfate  (90 Base) MCG/ACT inhaler Inhale 2 puffs Every 4 (Four) Hours As Needed for Shortness of Air. 54 g 0 Past Month    busPIRone (BUSPAR) 10 MG tablet Take 1 tablet by mouth 3 (Three) Times a Day. 90 tablet 1 Past Week    DULoxetine (CYMBALTA) 60 MG capsule Take 1 capsule by mouth Daily. 90 capsule 0 11/26/2023    esomeprazole (nexIUM) 40 MG capsule Take 1 capsule by mouth Every Morning Before Breakfast. 90 capsule 0 Past Week    famotidine (Pepcid) 40 MG tablet Take 1 tablet by mouth Daily. 90 tablet 1      MG tablet TAKE 1 TABLET EVERY 6 HOURSAS NEEDED FOR MILD PAIN 30 tablet 0     metroNIDAZOLE (METROCREAM) 0.75 % cream Apply thin layer to rosacea on face twice daily. 45 g 2     Nurtec 75 MG dispersible tablet TAKE ONE TABLET BY MOUTH EVERY DAY AS NEEDED FOR MIGRAINE       ondansetron ODT (ZOFRAN-ODT) 4 MG disintegrating tablet Place 1 tablet on the tongue Every 8 (Eight) Hours As Needed for Nausea or Vomiting. 30 tablet 0     Pancrelipase, Lip-Prot-Amyl, (Creon) 95893-344942 units capsule delayed-release particles capsule Take 1  capsule by mouth Take As Directed. Take 2 capsule with every meal and 1 capsule with snacks 240 capsule 5     tiZANidine (ZANAFLEX) 4 MG tablet TAKE 1 TABLET EVERY 6 HOURSAS NEEDED FOR MUSCLE SPASMS 30 tablet 0        Allergies:  Codeine        Objective     Blood pressure 125/73, pulse 78, temperature 99.3 °F (37.4 °C), temperature source Temporal, resp. rate 14, weight 97.4 kg (214 lb 11.7 oz), SpO2 97%.    Physical Exam   Constitutional: Pt is oriented to person, place, and time and well-developed, well-nourished, and in no distress.   Mouth/Throat: Oropharynx is clear and moist.   Neck: Normal range of motion.   Cardiovascular: Normal rate, regular rhythm and normal heart sounds.    Pulmonary/Chest: Effort normal and breath sounds normal.   Abdominal: Soft. Nontender  Skin: Skin is warm and dry.   Psychiatric: Mood, memory, affect and judgment normal.     Assessment & Plan     Diagnosis:  Epig pain  IBS  Constipaton  Gerd  nausea    Anticipated Surgical Procedure:  Egd  colonsocopy    The risks, benefits, and alternatives of this procedure have been discussed with the patient or the responsible party- the patient understands and agrees to proceed.

## 2023-12-20 DIAGNOSIS — M54.41 ACUTE MIDLINE LOW BACK PAIN WITH BILATERAL SCIATICA: ICD-10-CM

## 2023-12-20 DIAGNOSIS — M54.42 ACUTE MIDLINE LOW BACK PAIN WITH BILATERAL SCIATICA: ICD-10-CM

## 2023-12-20 RX ORDER — TIZANIDINE 4 MG/1
TABLET ORAL
Qty: 30 TABLET | Refills: 0 | Status: SHIPPED | OUTPATIENT
Start: 2023-12-20

## 2023-12-20 RX ORDER — IBUPROFEN 800 MG/1
TABLET ORAL
Qty: 30 TABLET | Refills: 0 | Status: SHIPPED | OUTPATIENT
Start: 2023-12-20

## 2023-12-26 ENCOUNTER — OFFICE VISIT (OUTPATIENT)
Dept: FAMILY MEDICINE CLINIC | Age: 38
End: 2023-12-26
Payer: COMMERCIAL

## 2023-12-26 VITALS
SYSTOLIC BLOOD PRESSURE: 128 MMHG | WEIGHT: 215 LBS | OXYGEN SATURATION: 96 % | TEMPERATURE: 99.4 F | HEART RATE: 98 BPM | BODY MASS INDEX: 38.09 KG/M2 | HEIGHT: 63 IN | DIASTOLIC BLOOD PRESSURE: 85 MMHG

## 2023-12-26 DIAGNOSIS — U07.1 COVID-19: Primary | ICD-10-CM

## 2023-12-26 LAB
EXPIRATION DATE: ABNORMAL
FLUAV AG UPPER RESP QL IA.RAPID: NOT DETECTED
FLUBV AG UPPER RESP QL IA.RAPID: NOT DETECTED
INTERNAL CONTROL: ABNORMAL
Lab: ABNORMAL
SARS-COV-2 AG UPPER RESP QL IA.RAPID: DETECTED

## 2023-12-26 PROCEDURE — 87428 SARSCOV & INF VIR A&B AG IA: CPT | Performed by: NURSE PRACTITIONER

## 2023-12-26 PROCEDURE — 99213 OFFICE O/P EST LOW 20 MIN: CPT | Performed by: NURSE PRACTITIONER

## 2023-12-26 RX ORDER — BROMPHENIRAMINE MALEATE, PSEUDOEPHEDRINE HYDROCHLORIDE, AND DEXTROMETHORPHAN HYDROBROMIDE 2; 30; 10 MG/5ML; MG/5ML; MG/5ML
5 SYRUP ORAL 4 TIMES DAILY PRN
Qty: 118 ML | Refills: 0 | Status: SHIPPED | OUTPATIENT
Start: 2023-12-26

## 2023-12-26 NOTE — PROGRESS NOTES
"Chief Complaint  Cough (Tested positive for covid x 1 day ago.  Cough, nausea, body aches x 2 days)    Subjective          Gin Alexandra presents to [facility] with nonproductive cough, nausea, body aches x 2 days.  She tested positive for COVID yesterday.  Denies fever, chills, vomiting, diarrhea or chest pain.  Does have some mild shortness of air with activity.  Does have an inhaler at home.  Patient also has Zofran at home for nausea.      Objective   Vital Signs:   Vitals:    12/26/23 1507   BP: 128/85   BP Location: Left arm   Patient Position: Sitting   Cuff Size: Adult   Pulse: 98   Temp: 99.4 °F (37.4 °C)   TempSrc: Oral   SpO2: 96%   Weight: 97.5 kg (215 lb)   Height: 160 cm (62.99\")       Physical Exam  Vitals reviewed.   Constitutional:       General: She is not in acute distress.     Appearance: She is well-developed. She is ill-appearing.   HENT:      Head: Normocephalic and atraumatic.   Eyes:      Conjunctiva/sclera: Conjunctivae normal.      Pupils: Pupils are equal, round, and reactive to light.   Cardiovascular:      Rate and Rhythm: Normal rate and regular rhythm.      Heart sounds: Normal heart sounds. No murmur heard.  Pulmonary:      Effort: Pulmonary effort is normal. No respiratory distress.      Breath sounds: Normal breath sounds.   Skin:     General: Skin is warm and dry.   Neurological:      Mental Status: She is alert and oriented to person, place, and time.   Psychiatric:         Mood and Affect: Mood and affect normal.         Behavior: Behavior normal.         Thought Content: Thought content normal.         Judgment: Judgment normal.          Result Review :              Assessment and Plan    Diagnoses and all orders for this visit:    1. COVID-19 (Primary)  -     POCT SARS-CoV-2 Antigen SKIP + Flu  -     Nirmatrelvir & Ritonavir, 300mg/100mg, (PAXLOVID) 20 x 150 MG & 10 x 100MG tablet therapy pack tablet; Take 3 tablets by mouth 2 Times a Day for 5 " days. Do not take Nurtec & decrease buspar to 1/2 tablet (5mg) twice a day while on medication.  Dispense: 30 tablet; Refill: 0  -     brompheniramine-pseudoephedrine-DM 30-2-10 MG/5ML syrup; Take 5mL by mouth 4 (Four) Times a Day As Needed for Congestion or Cough.  Dispense: 118 mL; Refill: 0    Increase fluid intake and rest.  Tylenol/ibuprofen as needed.  Zofran as needed.  Albuterol as needed.  Cough medication sent.  Patient instructed to hold Nurtec while taking Paxlovid and decrease BuSpar from 10 mg 3 times daily to 5 mg twice daily while taking Paxlovid.  Potential side effects medication discussed.  Go to ER with any shortness of air.  Quarantine for the next 3 days and wear mask for an additional 5 while in public.    Patient to notify office with any acute concerns or issues.  Patient verbalizes understanding, agrees with plan of care and has no further questions upon discharge.    Please note that portions of this note were completed with a voice recognition program.    Follow Up    Return if symptoms worsen or fail to improve.  Patient was given instructions and counseling regarding her condition or for health maintenance advice. Please see specific information pulled into the AVS if appropriate.

## 2023-12-27 ENCOUNTER — TELEPHONE (OUTPATIENT)
Dept: GASTROENTEROLOGY | Facility: CLINIC | Age: 38
End: 2023-12-27
Payer: COMMERCIAL

## 2023-12-27 NOTE — TELEPHONE ENCOUNTER
Contacted patient about a cancellation for a Fibroscan on 12/28/2023, moved appointment from 12/29/2023 to 12/28/2023 at 10:30 am.

## 2023-12-28 ENCOUNTER — PROCEDURE VISIT (OUTPATIENT)
Dept: OTHER | Facility: HOSPITAL | Age: 38
End: 2023-12-28
Payer: COMMERCIAL

## 2023-12-28 DIAGNOSIS — R74.8 ELEVATED LIVER ENZYMES: ICD-10-CM

## 2023-12-28 PROCEDURE — 91200 LIVER ELASTOGRAPHY: CPT | Performed by: NURSE PRACTITIONER

## 2023-12-29 NOTE — PROGRESS NOTES
Liver Elastography    Performed by: Lilo Dallas APRN  Authorized by: Yudelka Morrow APRN  Ordering Provider: Yudelka Morrow APRN    Probe:  XL+  Procedure Details:  Procedure: After providing an oral and written explanation of the Fibroscan vibration controlled transient elastography (VTCE) test procedure to the patient. The patient was placed in supine position with right arm in maximum abduction to allow optimal exposure of right lateral abdomen. Patient was briefly assessed, identifying terminus of the xyphoid process and locating an ideal transient elastography testing site, midline and lateral to this point. Patient was instructed to breathe normally and remain stationary during the test process. Pre-measurement data confirmed the transient elastography probe was centered over the liver parenchyma. A series of ten 50 Hz mechanical pulses were applied with controlled application pressure to induce a mechanical shear wave in the liver tissue. For each measurement, the shear wave propagation speed was detected, displayed and converted to its equivalent liver stiffness value in kilopascals. Skin to liver capsules distance and shear wave characteristics were monitored during the entire examination to assure quality data. Median liver stiffness measurement and interquartile range were calculated and displayed in real time. Acquired measurement data was stored and submitted to the provider for review and interpretation. Patient tolerated the procedure well and was discharged without incident.   Clinical Information:     NPO 3 Hours or More: Yes      Actively Drinking: No    Findings:     Median Liver Stiffness Score:  4.6    Interquartile Range (IQR) to Median Ratio:  13    Queenie Stiffness Consistent with:  F0-F1    Current Scan Considered Reliab: Yes      Median Controlled Attenuation Parameter (dB/m):  229    IQR:  14    CAP SCORE:  Normal/mild liver fat

## 2024-01-18 DIAGNOSIS — M54.41 ACUTE MIDLINE LOW BACK PAIN WITH BILATERAL SCIATICA: ICD-10-CM

## 2024-01-18 DIAGNOSIS — M54.42 ACUTE MIDLINE LOW BACK PAIN WITH BILATERAL SCIATICA: ICD-10-CM

## 2024-01-18 DIAGNOSIS — M79.7 FIBROMYALGIA: ICD-10-CM

## 2024-01-18 RX ORDER — TIZANIDINE 4 MG/1
4 TABLET ORAL EVERY 8 HOURS PRN
Qty: 30 TABLET | Refills: 0 | Status: SHIPPED | OUTPATIENT
Start: 2024-01-18

## 2024-01-18 RX ORDER — DULOXETIN HYDROCHLORIDE 60 MG/1
60 CAPSULE, DELAYED RELEASE ORAL DAILY
Qty: 90 CAPSULE | Refills: 0 | Status: SHIPPED | OUTPATIENT
Start: 2024-01-18

## 2024-01-23 ENCOUNTER — OFFICE VISIT (OUTPATIENT)
Dept: CARDIOLOGY | Facility: CLINIC | Age: 39
End: 2024-01-23
Payer: COMMERCIAL

## 2024-01-23 VITALS
HEIGHT: 62 IN | DIASTOLIC BLOOD PRESSURE: 87 MMHG | SYSTOLIC BLOOD PRESSURE: 142 MMHG | WEIGHT: 215 LBS | HEART RATE: 85 BPM | BODY MASS INDEX: 39.56 KG/M2

## 2024-01-23 DIAGNOSIS — R07.9 CHEST PAIN, UNSPECIFIED TYPE: Primary | ICD-10-CM

## 2024-01-23 PROCEDURE — 99203 OFFICE O/P NEW LOW 30 MIN: CPT | Performed by: INTERNAL MEDICINE

## 2024-01-23 NOTE — PROGRESS NOTES
Chief Complaint  Establish Care and Chest Pain    Subjective        Gin Alexandra presents to [facility] CARDIOLOGY  History of present illness:    Patient is a 38-year-old female with past medical history significant for gastroesophageal reflux disease.  She does follow with the GI doctor and had a last EGD back in December 2023 and everything looked fine at that time.  She presents with retrosternal/upper chest pain and tightness.  It happens when she is up moving around but also with sitting still.  It can last for from 10 to 20 minutes up to a few hours.  She has been into the emergency department twice for it.  She notes no nausea, vomiting or burping with it.  She notes no real alleviating or exacerbating factors.  She has had her gallbladder removed.  She does note that may be food such as heavy starches can exacerbate it.  She has a remote smoking history for quitting 8 to 9 years ago.  She denies any alcohol.  Mother and father have no heart disease.      Past Medical History:   Diagnosis Date    Allergic 01/01/1995    Allergic rhinitis     Allergies     CODEINE SULFATE   MODERATE    Anxiety     Asthma     Benign neoplasm of pituitary gland     Carpal tunnel syndrome, bilateral upper limbs     x2 surgeries on the L, x1 on the R    Cholelithiasis 2005    Gall bladder was removed    Chronic fatigue, unspecified     Constipation, unspecified     Depression     Started when i was 14    Dizziness     Dyspnea, unspecified     Dysuria     Fibromyalgia     Fibromyalgia     Fibromyalgia, primary 2019    GERD without esophagitis     Headache     HL (hearing loss)     Hypersomnia, unspecified     IBS (irritable bowel syndrome)     diarrhea and constipation    Obesity, unspecified     Other fatigue     Other mixed anxiety disorders     Other retention of urine     Pain in right hip     Pituitary tumor     Tinnitus     Vitamin D deficiency, unspecified          Past Surgical History:   Procedure  Laterality Date    CARPAL TUNNEL RELEASE      CHOLECYSTECTOMY      AT AGE 21   DETAILS/COMPLICATIONS?    COLONOSCOPY      COLONOSCOPY N/A 2023    Procedure: COLONOSCOPY;  Surgeon: Kumar Turner MD;  Location: [facility] ENDOSCOPY;  Service: Gastroenterology;  Laterality: N/A;  NORMAL COLONOSCOPY    ENDOSCOPY N/A 2022    Procedure: ESOPHAGOGASTRODUODENOSCOPY WITH BIOPSIES;  Surgeon: Kumar Turner MD;  Location: [facility] ENDOSCOPY;  Service: Gastroenterology;  Laterality: N/A;  NORMAL EGD    ENDOSCOPY N/A 2023    Procedure: ESOPHAGOGASTRODUODENOSCOPY WITH BIOPSIES;  Surgeon: Kumar Turner MD;  Location: [facility] ENDOSCOPY;  Service: Gastroenterology;  Laterality: N/A;  HIATAL HERNIA, GASTRITIS    LEEP  2012    TUBAL ABDOMINAL LIGATION      UPPER GASTROINTESTINAL ENDOSCOPY            Social History     Socioeconomic History    Marital status:     Number of children: 2   Tobacco Use    Smoking status: Former     Packs/day: 2.00     Years: 16.00     Additional pack years: 0.00     Total pack years: 32.00     Types: Cigarettes     Start date: 1999     Quit date: 2015     Years since quittin.0     Passive exposure: Past    Smokeless tobacco: Never   Vaping Use    Vaping Use: Never used   Substance and Sexual Activity    Alcohol use: Not Currently     Comment: very rarely    Drug use: Never    Sexual activity: Yes     Partners: Male     Birth control/protection: Surgical         Family History   Problem Relation Age of Onset    Arthritis Mother     Depression Mother     Hyperlipidemia Mother     Cancer Mother         Non-small lung cancer    COPD Father     Depression Father     Heart disease Father     Hyperlipidemia Father     Hyperlipidemia Brother     Cancer Maternal Aunt     Heart disease Maternal Uncle     Ovarian cancer Maternal Grandmother     Cancer Maternal Grandmother     Heart attack Maternal Grandfather     Stroke Maternal Grandfather     Heart disease  "Maternal Grandfather     COPD Paternal Grandmother     Depression Paternal Grandmother     Lung cancer Paternal Grandfather     Asthma Paternal Grandfather     Cancer Paternal Grandfather         lung cancer    COPD Paternal Grandfather     Vision loss Paternal Grandfather     Colon cancer Neg Hx           Allergies   Allergen Reactions    Codeine Hives            Current Outpatient Medications:     albuterol sulfate  (90 Base) MCG/ACT inhaler, Inhale 2 puffs Every 4 (Four) Hours As Needed for Shortness of Air., Disp: 54 g, Rfl: 0    busPIRone (BUSPAR) 10 MG tablet, Take 1 tablet by mouth 3 (Three) Times a Day., Disp: 90 tablet, Rfl: 1    dicyclomine (BENTYL) 20 MG tablet, Take 1 tablet by mouth 3 (Three) Times a Day As Needed (abdominal pain)., Disp: 90 tablet, Rfl: 5    DULoxetine (CYMBALTA) 60 MG capsule, Take 1 capsule by mouth Daily., Disp: 90 capsule, Rfl: 0    esomeprazole (nexIUM) 40 MG capsule, Take 1 capsule by mouth Every Morning Before Breakfast., Disp: 90 capsule, Rfl: 0    famotidine (Pepcid) 40 MG tablet, Take 1 tablet by mouth Daily., Disp: 90 tablet, Rfl: 1     MG tablet, TAKE 1 TABLET EVERY 6 HOURSAS NEEDED FOR MILD PAIN, Disp: 30 tablet, Rfl: 0    metroNIDAZOLE (METROCREAM) 0.75 % cream, Apply thin layer to rosacea on face twice daily., Disp: 45 g, Rfl: 2    Nurtec 75 MG dispersible tablet, TAKE ONE TABLET BY MOUTH EVERY DAY AS NEEDED FOR MIGRAINE, Disp: , Rfl:     ondansetron ODT (ZOFRAN-ODT) 4 MG disintegrating tablet, Place 1 tablet on the tongue Every 8 (Eight) Hours As Needed for Nausea or Vomiting., Disp: 30 tablet, Rfl: 0    tiZANidine (ZANAFLEX) 4 MG tablet, Take 1 tablet by mouth Every 8 (Eight) Hours As Needed for Muscle Spasms., Disp: 30 tablet, Rfl: 0      ROS:  Cardiac review of systems positive for relatively atypical chest pain    Objective     /87   Pulse 85   Ht 157.5 cm (62\")   Wt 97.5 kg (215 lb)   BMI 39.32 kg/m²       General Appearance:   well " developed  well nourished  HENT:   oropharynx moist  lips not cyanotic  Respiratory:  no respiratory distress  normal breath sounds  no rales  Cardiovascular:  no jugular venous distention  regular rhythm  S1 normal, S2 normal  no S3, no S4   no murmur  no rub, no thrill  No carotid bruit  pedal pulses normal  lower extremity edema: none    Musculoskeletal:  no clubbing of fingers.   normocephalic, head atraumatic  Skin:   warm, dry  Psychiatric:  judgement and insight appropriate  normal mood and affect    ECHO:    STRESS:    CATH:  No results found for this or any previous visit.    BMP:     Glucose   Date Value Ref Range Status   11/06/2023 96 65 - 99 mg/dL Final     BUN   Date Value Ref Range Status   11/06/2023 11 6 - 20 mg/dL Final     Creatinine   Date Value Ref Range Status   11/06/2023 0.81 0.57 - 1.00 mg/dL Final     Sodium   Date Value Ref Range Status   11/06/2023 137 136 - 145 mmol/L Final     Potassium   Date Value Ref Range Status   11/06/2023 4.1 3.5 - 5.2 mmol/L Final     Chloride   Date Value Ref Range Status   11/06/2023 101 98 - 107 mmol/L Final     CO2   Date Value Ref Range Status   11/06/2023 27.4 22.0 - 29.0 mmol/L Final     Calcium   Date Value Ref Range Status   11/06/2023 9.5 8.6 - 10.5 mg/dL Final     BUN/Creatinine Ratio   Date Value Ref Range Status   11/06/2023 13.6 7.0 - 25.0 Final     Anion Gap   Date Value Ref Range Status   11/06/2023 8.6 5.0 - 15.0 mmol/L Final     eGFR   Date Value Ref Range Status   11/06/2023 96.6 >60.0 mL/min/1.73 Final     LIPIDS:  Total Cholesterol   Date Value Ref Range Status   11/06/2023 180 0 - 200 mg/dL Final     Triglycerides   Date Value Ref Range Status   11/06/2023 257 (H) 0 - 150 mg/dL Final     HDL Cholesterol   Date Value Ref Range Status   11/06/2023 38 (L) 40 - 60 mg/dL Final     LDL Cholesterol    Date Value Ref Range Status   11/06/2023 98 0 - 100 mg/dL Final     VLDL Cholesterol   Date Value Ref Range Status   11/06/2023 44 (H) 5 - 40 mg/dL  Final     LDL/HDL Ratio   Date Value Ref Range Status   11/06/2023 2.38  Final         Procedures             ASSESSMENT:  Diagnoses and all orders for this visit:    1. Chest pain, unspecified type (Primary)  -     Treadmill Stress Test; Future         PLAN:    1.  Patient's chest pain is fairly atypical.  I did ask her to get a treadmill EKG.  I did review her emergency department visit from 6/7/2023 which had 2 negative sets of troponins.  2.  Patient's EKG 11/2/2023 showed normal sinus rhythm.  3.  Blood pressure is very slightly elevated but the last 3 times has been under excellent control.  We will just continue to monitor.  4.  Patient had cholesterol checked 11/6/2023 with LDL 98, HDL 38, and triglycerides 257.  5.  Patient does not smoke.  6.  Patient has had a recent EGD that was normal.  She is on Nexium.  She has also had her gallbladder removed.      Return in about 6 weeks (around 3/5/2024) for dinesh silva.     Patient was given instructions and counseling regarding her condition or for health maintenance advice. Please see specific information pulled into the AVS if appropriate.         Prabhjot Bhardwaj MD   1/23/2024  14:44 EST

## 2024-02-01 ENCOUNTER — OFFICE VISIT (OUTPATIENT)
Dept: FAMILY MEDICINE CLINIC | Age: 39
End: 2024-02-01
Payer: COMMERCIAL

## 2024-02-01 VITALS
DIASTOLIC BLOOD PRESSURE: 84 MMHG | WEIGHT: 215.8 LBS | BODY MASS INDEX: 39.71 KG/M2 | OXYGEN SATURATION: 97 % | HEIGHT: 62 IN | SYSTOLIC BLOOD PRESSURE: 128 MMHG | TEMPERATURE: 98.8 F | HEART RATE: 71 BPM

## 2024-02-01 DIAGNOSIS — N94.89 PELVIC CONGESTION SYNDROME: Primary | ICD-10-CM

## 2024-02-01 DIAGNOSIS — F41.9 ANXIETY: ICD-10-CM

## 2024-02-01 DIAGNOSIS — G89.29 CHRONIC MIDLINE LOW BACK PAIN WITH RIGHT-SIDED SCIATICA: ICD-10-CM

## 2024-02-01 DIAGNOSIS — G44.221 CHRONIC TENSION-TYPE HEADACHE, INTRACTABLE: ICD-10-CM

## 2024-02-01 DIAGNOSIS — R10.2 PELVIC PAIN: ICD-10-CM

## 2024-02-01 DIAGNOSIS — E28.2 POLYCYSTIC OVARIES: ICD-10-CM

## 2024-02-01 DIAGNOSIS — M25.551 RIGHT HIP PAIN: ICD-10-CM

## 2024-02-01 DIAGNOSIS — R22.2 SUBCUTANEOUS NODULE OF ABDOMINAL WALL: ICD-10-CM

## 2024-02-01 DIAGNOSIS — M54.41 CHRONIC MIDLINE LOW BACK PAIN WITH RIGHT-SIDED SCIATICA: ICD-10-CM

## 2024-02-01 PROCEDURE — 99214 OFFICE O/P EST MOD 30 MIN: CPT | Performed by: NURSE PRACTITIONER

## 2024-02-01 RX ORDER — TOPIRAMATE 25 MG/1
25 TABLET ORAL 2 TIMES DAILY
Qty: 180 TABLET | Refills: 1 | Status: SHIPPED | OUTPATIENT
Start: 2024-02-01

## 2024-02-01 RX ORDER — MECLIZINE HYDROCHLORIDE 25 MG/1
25 TABLET ORAL 3 TIMES DAILY PRN
Qty: 30 TABLET | Refills: 1 | Status: SHIPPED | OUTPATIENT
Start: 2024-02-01

## 2024-02-06 NOTE — PROGRESS NOTES
"Chief Complaint  Dizziness (Dizziness while walking x 2 months), Headache (Happening more frequently recently), Pelvic Pain (Pain in lower abdomen.  Has been a problem for years.  Has spoken with gyno about it with no relief.), Anxiety (3 month f/u), Hip Pain (Right hip pain), and Mass (Lump on ribs on left side x 3 months)    Subjective          Gin Alexandra presents to [facility] with multiple complaints. She is c/o dizziness  x 2 months. She states she also has had headaches for years, but they are occurring more frequently now. She has nurtec as rescue medication. Interested in preventative medication. She also has chronic pelvic pressure. She had a CT of the abd/pelvis with gastro and incidental finding of pelvic congestion syndrome was noted. Pt is c/o right hip pain. No injury. Lastly a lump to her ribs x 3 months. Non tender.     Pt buspar was increased from 7.5mg tid to 10mg tid. She is doing well in this regard.       Objective   Vital Signs:   Vitals:    02/01/24 1528   BP: 128/84   BP Location: Right arm   Patient Position: Sitting   Cuff Size: Large Adult   Pulse: 71   Temp: 98.8 °F (37.1 °C)   TempSrc: Oral   SpO2: 97%   Weight: 97.9 kg (215 lb 12.8 oz)   Height: 157.5 cm (62.01\")       Physical Exam  Vitals reviewed.   Constitutional:       General: She is not in acute distress.     Appearance: Normal appearance. She is well-developed.   HENT:      Head: Normocephalic and atraumatic.      Right Ear: Ear canal and external ear normal.      Left Ear: Ear canal and external ear normal.      Ears:      Comments: Chapin effusion  Eyes:      Conjunctiva/sclera: Conjunctivae normal.      Pupils: Pupils are equal, round, and reactive to light.   Cardiovascular:      Rate and Rhythm: Normal rate and regular rhythm.      Heart sounds: Normal heart sounds. No murmur heard.  Pulmonary:      Effort: Pulmonary effort is normal. No respiratory distress.      Breath sounds: Normal " breath sounds.   Abdominal:      General: Bowel sounds are normal. There is no distension.      Palpations: Abdomen is soft. There is no mass.      Tenderness: There is abdominal tenderness.      Hernia: No hernia is present.      Comments: Mild suprapubic tenderness noted.    Skin:     General: Skin is warm and dry.      Comments: Soft, mobile, nontender nodule to left lower rib area   Neurological:      Mental Status: She is alert and oriented to person, place, and time.   Psychiatric:         Mood and Affect: Mood and affect normal.         Behavior: Behavior normal.         Thought Content: Thought content normal.         Judgment: Judgment normal.          Result Review :   The following data was reviewed by: LUCIA Quiroz on 02/01/2024:  XR Spine Lumbar Complete 4+VW (09/28/2023 11:03)          Assessment and Plan    Diagnoses and all orders for this visit:    1. Pelvic congestion syndrome (Primary)  Comments:  referral initated  Orders:  -     Ambulatory Referral to Gynecology    2. Pelvic pain  Comments:  Go to ED with severe pain  Orders:  -     Ambulatory Referral to Gynecology    3. Polycystic ovaries  Comments:  referral initated  Orders:  -     Ambulatory Referral to Gynecology    4. Chronic tension-type headache, intractable  Comments:  starting prophylactic Topamax.  Potential side effects medication discussed.  Patient has been on this before and tolerated it well.  Orders:  -     Cancel: Ambulatory Referral to Neurology    5. Right hip pain  Comments:  I believe right hip pain is coming from lower back.  Tylenol/ibuprofen as needed.    6. Subcutaneous nodule of abdominal wall  Comments:  Will notify patient of results and treat accordingly.  Suspecting lipoma.  Will send to general surgery if needed.  Orders:  -     US Soft Tissue; Future    7. Chronic midline low back pain with right-sided sciatica  Comments:  X-ray reviewed.  MRI ordered.  Patient has been to pain management.  Orders:  -      MRI Lumbar Spine Without Contrast; Future    8. Anxiety    Other orders  -     meclizine (ANTIVERT) 25 MG tablet; Take 1 tablet by mouth 3 (Three) Times a Day As Needed for Dizziness.  Dispense: 30 tablet; Refill: 1  -     topiramate (Topamax) 25 MG tablet; Take 1 tablet by mouth 2 (Two) Times a Day.  Dispense: 180 tablet; Refill: 1    Patient to notify office with any acute concerns or issues.  Patient verbalizes understanding, agrees with plan of care and has no further questions upon discharge.    Please note that portions of this note were completed with a voice recognition program.    Follow Up    Return in about 6 weeks (around 3/14/2024) for Recheck.  Patient was given instructions and counseling regarding her condition or for health maintenance advice. Please see specific information pulled into the AVS if appropriate.

## 2024-02-07 ENCOUNTER — TELEPHONE (OUTPATIENT)
Dept: GASTROENTEROLOGY | Facility: CLINIC | Age: 39
End: 2024-02-07
Payer: COMMERCIAL

## 2024-02-07 NOTE — TELEPHONE ENCOUNTER
Artera message sent to notify patient  Yudelka wSansonLUCIA baptiste will be out of the office 2/27/24. Advised atient to call back to reschedule

## 2024-02-19 DIAGNOSIS — M54.41 ACUTE MIDLINE LOW BACK PAIN WITH BILATERAL SCIATICA: ICD-10-CM

## 2024-02-19 DIAGNOSIS — M54.42 ACUTE MIDLINE LOW BACK PAIN WITH BILATERAL SCIATICA: ICD-10-CM

## 2024-02-19 RX ORDER — TIZANIDINE 4 MG/1
4 TABLET ORAL EVERY 8 HOURS PRN
Qty: 30 TABLET | Refills: 0 | Status: SHIPPED | OUTPATIENT
Start: 2024-02-19

## 2024-02-19 RX ORDER — IBUPROFEN 800 MG/1
800 TABLET ORAL EVERY 6 HOURS PRN
Qty: 30 TABLET | Refills: 0 | Status: SHIPPED | OUTPATIENT
Start: 2024-02-19

## 2024-02-26 ENCOUNTER — HOSPITAL ENCOUNTER (OUTPATIENT)
Dept: ULTRASOUND IMAGING | Facility: HOSPITAL | Age: 39
Discharge: HOME | End: 2024-02-26
Payer: COMMERCIAL

## 2024-02-26 ENCOUNTER — HOSPITAL ENCOUNTER (OUTPATIENT)
Dept: MRI IMAGING | Facility: HOSPITAL | Age: 39
Discharge: HOME | End: 2024-02-26
Payer: COMMERCIAL

## 2024-02-26 DIAGNOSIS — R22.2 SUBCUTANEOUS NODULE OF ABDOMINAL WALL: ICD-10-CM

## 2024-02-26 DIAGNOSIS — M54.41 CHRONIC MIDLINE LOW BACK PAIN WITH RIGHT-SIDED SCIATICA: ICD-10-CM

## 2024-02-26 DIAGNOSIS — G89.29 CHRONIC MIDLINE LOW BACK PAIN WITH RIGHT-SIDED SCIATICA: ICD-10-CM

## 2024-02-26 PROCEDURE — 72148 MRI LUMBAR SPINE W/O DYE: CPT

## 2024-02-26 PROCEDURE — 76999 ECHO EXAMINATION PROCEDURE: CPT

## 2024-02-28 DIAGNOSIS — M79.7 FIBROMYALGIA: ICD-10-CM

## 2024-02-29 RX ORDER — DULOXETIN HYDROCHLORIDE 60 MG/1
60 CAPSULE, DELAYED RELEASE ORAL DAILY
Qty: 90 CAPSULE | Refills: 0 | Status: SHIPPED | OUTPATIENT
Start: 2024-02-29

## 2024-03-08 DIAGNOSIS — D17.1 LIPOMA OF TORSO: Primary | ICD-10-CM

## 2024-03-12 DIAGNOSIS — G89.29 CHRONIC MIDLINE LOW BACK PAIN WITH RIGHT-SIDED SCIATICA: Primary | ICD-10-CM

## 2024-03-12 DIAGNOSIS — M54.41 CHRONIC MIDLINE LOW BACK PAIN WITH RIGHT-SIDED SCIATICA: Primary | ICD-10-CM

## 2024-03-12 RX ORDER — DICLOFENAC SODIUM 75 MG/1
75 TABLET, DELAYED RELEASE ORAL 2 TIMES DAILY
Qty: 180 TABLET | Refills: 1 | Status: SHIPPED | OUTPATIENT
Start: 2024-03-12

## 2024-03-31 DIAGNOSIS — M54.42 ACUTE MIDLINE LOW BACK PAIN WITH BILATERAL SCIATICA: ICD-10-CM

## 2024-03-31 DIAGNOSIS — M54.41 ACUTE MIDLINE LOW BACK PAIN WITH BILATERAL SCIATICA: ICD-10-CM

## 2024-04-02 RX ORDER — TIZANIDINE 4 MG/1
TABLET ORAL
Qty: 30 TABLET | Refills: 0 | Status: SHIPPED | OUTPATIENT
Start: 2024-04-02

## 2024-04-29 ENCOUNTER — OFFICE VISIT (OUTPATIENT)
Dept: SURGERY | Facility: CLINIC | Age: 39
End: 2024-04-29
Payer: COMMERCIAL

## 2024-04-29 ENCOUNTER — PREP FOR SURGERY (OUTPATIENT)
Dept: OTHER | Facility: HOSPITAL | Age: 39
End: 2024-04-29
Payer: COMMERCIAL

## 2024-04-29 VITALS
SYSTOLIC BLOOD PRESSURE: 117 MMHG | DIASTOLIC BLOOD PRESSURE: 71 MMHG | HEART RATE: 88 BPM | WEIGHT: 215.2 LBS | BODY MASS INDEX: 39.6 KG/M2 | HEIGHT: 62 IN

## 2024-04-29 DIAGNOSIS — R22.2 SUBCUTANEOUS MASS OF ABDOMINAL WALL: Primary | ICD-10-CM

## 2024-04-29 PROCEDURE — 99203 OFFICE O/P NEW LOW 30 MIN: CPT | Performed by: SURGERY

## 2024-04-29 NOTE — PROGRESS NOTES
Chief Complaint:  NEW PATIENT    Primary Care Provider: Rekha Kim APRN    Referring Provider: Rekha Kim APRN    History of Present Illness  Gin Alexandra is a 38 y.o. female referred by LUCIA Quiroz a lipoma.  Patient has an area at her right upper abdominal wall where she can palpate a mass.  She has tenderness at this area.  Especially when there is pressure placed over the area.  Soft tissue ultrasound of the area was done and showed a subcutaneous nodule measuring 2 cm in size with imaging characteristics consistent with a lipoma.  Patient wants to have it removed.  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    Allergies: Codeine    Outpatient Medications Marked as Taking for the 4/29/24 encounter (Office Visit) with Julio Warren MD   Medication Sig Dispense Refill    albuterol sulfate  (90 Base) MCG/ACT inhaler Inhale 2 puffs Every 4 (Four) Hours As Needed for Shortness of Air. 54 g 0    busPIRone (BUSPAR) 10 MG tablet Take 1 tablet by mouth 3 (Three) Times a Day. 90 tablet 1    dicyclomine (BENTYL) 20 MG tablet Take 1 tablet by mouth 3 (Three) Times a Day As Needed (abdominal pain). 90 tablet 5    DULoxetine (CYMBALTA) 60 MG capsule Take 1 capsule by mouth Daily. 90 capsule 0    esomeprazole (nexIUM) 40 MG capsule Take 1 capsule by mouth Every Morning Before Breakfast. 90 capsule 0    famotidine (Pepcid) 40 MG tablet Take 1 tablet by mouth Daily. 90 tablet 1    meclizine (ANTIVERT) 25 MG tablet Take 1 tablet by mouth 3 (Three) Times a Day As Needed for Dizziness. 30 tablet 1    Nurtec 75 MG dispersible tablet TAKE ONE TABLET BY MOUTH EVERY DAY AS NEEDED FOR MIGRAINE      ondansetron ODT (ZOFRAN-ODT) 4 MG disintegrating tablet Place 1 tablet on the tongue Every 8 (Eight) Hours As Needed for Nausea or Vomiting. 30 tablet 0    tiZANidine (ZANAFLEX) 4 MG tablet TAKE 1 TABLET EVERY 8 HOURSAS NEEDED FOR MUSCLE SPASMS 30 tablet 0    topiramate (Topamax) 25 MG  tablet Take 1 tablet by mouth 2 (Two) Times a Day. 180 tablet 1    topiramate (TOPAMAX) 25 MG tablet Take 1 tablet by mouth 2 (Two) Times a Day as directed for headache prevention 60 tablet 1       Past Medical History:    Allergic    Allergic rhinitis    Allergies    CODEINE SULFATE   MODERATE    Anxiety    Asthma    Benign neoplasm of pituitary gland    Carpal tunnel syndrome, bilateral upper limbs    x2 surgeries on the L, x1 on the R    Cholelithiasis    Gall bladder was removed    Chronic fatigue, unspecified    Constipation, unspecified    Depression    Started when i was 14    Dizziness    Dyspnea, unspecified    Dysuria    Fibrocystic breast    Fibromyalgia    Fibromyalgia    Fibromyalgia, primary    GERD without esophagitis    Headache    HL (hearing loss)    Hyperlipidemia    Hypersomnia, unspecified    IBS (irritable bowel syndrome)    diarrhea and constipation    Obesity, unspecified    Other fatigue    Other mixed anxiety disorders    Other retention of urine    Pain in right hip    Pituitary tumor    PONV (postoperative nausea and vomiting)    Severe nausea with vomiting for several hours after    Tinnitus    Vitamin D deficiency, unspecified        Past Surgical History:    CARPAL TUNNEL RELEASE    CHOLECYSTECTOMY    AT AGE 21   DETAILS/COMPLICATIONS?    COLONOSCOPY    COLONOSCOPY    Procedure: COLONOSCOPY;  Surgeon: Kumar Turner MD;  Location: [facility] END[facility]COPY;  Service: Gastroenterology;  Laterality: N/A;  NORMAL COLONOSCOPY    ENDOSCOPY    Procedure: ESOPHAGOGASTRODUODENOSCOPY WITH BIOPSIES;  Surgeon: Kumar Turner MD;  Location: [facility] ENDOSCOPY;  Service: Gastroenterology;  Laterality: N/A;  NORMAL EGD    ENDOSCOPY    Procedure: ESOPHAGOGASTRODUODENOSCOPY WITH BIOPSIES;  Surgeon: Kumar Turner MD;  Location: [facility] ENDOSCOPY;  Service: Gastroenterology;  Laterality: N/A;  HIATAL HERNIA, GASTRITIS    LEEP    TUBAL ABDOMINAL LIGATION    UPPER GASTROINTESTINAL ENDOSCOPY  "      Family History:   Family History   Problem Relation Age of Onset    Arthritis Mother     Depression Mother     Hyperlipidemia Mother     Cancer Mother         Non-small lung cancer    COPD Father     Depression Father     Heart disease Father     Hyperlipidemia Father     Hyperlipidemia Brother     Cancer Maternal Aunt     Heart disease Maternal Uncle     Ovarian cancer Maternal Grandmother     Cancer Maternal Grandmother     Heart attack Maternal Grandfather     Stroke Maternal Grandfather     Heart disease Maternal Grandfather     COPD Paternal Grandmother     Depression Paternal Grandmother     Lung cancer Paternal Grandfather     Asthma Paternal Grandfather     Cancer Paternal Grandfather         lung cancer    COPD Paternal Grandfather     Vision loss Paternal Grandfather     Colon cancer Neg Hx         Social History:  Social History     Tobacco Use    Smoking status: Former     Current packs/day: 0.00     Average packs/day: 2.0 packs/day for 16.0 years (32.0 ttl pk-yrs)     Types: Cigarettes     Start date: 1999     Quit date: 2015     Years since quittin.3     Passive exposure: Past    Smokeless tobacco: Never   Substance Use Topics    Alcohol use: Not Currently     Comment: Very seldom       Objective     Vital Signs:  /71 (BP Location: Left arm, Patient Position: Sitting, Cuff Size: Adult)   Pulse 88   Ht 157.5 cm (62.01\")   Wt 97.6 kg (215 lb 3.2 oz)   BMI 39.35 kg/m²   Respiratory:  breathing not labored, respiratory effort appears normal  Cardiovascular:  heart regular rate  Skin and subcutaneous tissue: At the upper left abdominal wall, just over the lowermost rib, there is a palpable subcutaneous mass about 2 cm in diameter.  The mass is circumscribed and mobile.  Overlying skin is normal-appearing.  Mass is tender with palpation.  Musculoskeletal: moving all extremities symmetrically and purposefully  Neurologic:  no obvious motor or sensory deficits, speech " clear  Psychiatric:  judgment and insight intact, mood normal      Assessment:  Diagnoses and all orders for this visit:    1. Subcutaneous mass of abdominal wall (Primary)        Plan:  Excision of subcutaneous mass from abdominal wall    Discussion: Indications, options, risks, benefits, and expected outcomes of planned surgery were discussed with the patient and she agrees to proceed.    Julio Warren MD  04/29/2024    Electronically signed by Julio Warren MD, 04/29/24, 3:50 PM EDT.

## 2024-04-29 NOTE — H&P (VIEW-ONLY)
Chief Complaint:  NEW PATIENT    Primary Care Provider: Rekha Kim APRN    Referring Provider: Rekha Kim APRN    History of Present Illness  Gin Alexandra is a 38 y.o. female referred by LUCIA Quiroz a lipoma.  Patient has an area at her right upper abdominal wall where she can palpate a mass.  She has tenderness at this area.  Especially when there is pressure placed over the area.  Soft tissue ultrasound of the area was done and showed a subcutaneous nodule measuring 2 cm in size with imaging characteristics consistent with a lipoma.  Patient wants to have it removed.  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    Allergies: Codeine    Outpatient Medications Marked as Taking for the 4/29/24 encounter (Office Visit) with Julio Warren MD   Medication Sig Dispense Refill    albuterol sulfate  (90 Base) MCG/ACT inhaler Inhale 2 puffs Every 4 (Four) Hours As Needed for Shortness of Air. 54 g 0    busPIRone (BUSPAR) 10 MG tablet Take 1 tablet by mouth 3 (Three) Times a Day. 90 tablet 1    dicyclomine (BENTYL) 20 MG tablet Take 1 tablet by mouth 3 (Three) Times a Day As Needed (abdominal pain). 90 tablet 5    DULoxetine (CYMBALTA) 60 MG capsule Take 1 capsule by mouth Daily. 90 capsule 0    esomeprazole (nexIUM) 40 MG capsule Take 1 capsule by mouth Every Morning Before Breakfast. 90 capsule 0    famotidine (Pepcid) 40 MG tablet Take 1 tablet by mouth Daily. 90 tablet 1    meclizine (ANTIVERT) 25 MG tablet Take 1 tablet by mouth 3 (Three) Times a Day As Needed for Dizziness. 30 tablet 1    Nurtec 75 MG dispersible tablet TAKE ONE TABLET BY MOUTH EVERY DAY AS NEEDED FOR MIGRAINE      ondansetron ODT (ZOFRAN-ODT) 4 MG disintegrating tablet Place 1 tablet on the tongue Every 8 (Eight) Hours As Needed for Nausea or Vomiting. 30 tablet 0    tiZANidine (ZANAFLEX) 4 MG tablet TAKE 1 TABLET EVERY 8 HOURSAS NEEDED FOR MUSCLE SPASMS 30 tablet 0    topiramate (Topamax) 25 MG  tablet Take 1 tablet by mouth 2 (Two) Times a Day. 180 tablet 1    topiramate (TOPAMAX) 25 MG tablet Take 1 tablet by mouth 2 (Two) Times a Day as directed for headache prevention 60 tablet 1       Past Medical History:    Allergic    Allergic rhinitis    Allergies    CODEINE SULFATE   MODERATE    Anxiety    Asthma    Benign neoplasm of pituitary gland    Carpal tunnel syndrome, bilateral upper limbs    x2 surgeries on the L, x1 on the R    Cholelithiasis    Gall bladder was removed    Chronic fatigue, unspecified    Constipation, unspecified    Depression    Started when i was 14    Dizziness    Dyspnea, unspecified    Dysuria    Fibrocystic breast    Fibromyalgia    Fibromyalgia    Fibromyalgia, primary    GERD without esophagitis    Headache    HL (hearing loss)    Hyperlipidemia    Hypersomnia, unspecified    IBS (irritable bowel syndrome)    diarrhea and constipation    Obesity, unspecified    Other fatigue    Other mixed anxiety disorders    Other retention of urine    Pain in right hip    Pituitary tumor    PONV (postoperative nausea and vomiting)    Severe nausea with vomiting for several hours after    Tinnitus    Vitamin D deficiency, unspecified        Past Surgical History:    CARPAL TUNNEL RELEASE    CHOLECYSTECTOMY    AT AGE 21   DETAILS/COMPLICATIONS?    COLONOSCOPY    COLONOSCOPY    Procedure: COLONOSCOPY;  Surgeon: Kumar Turner MD;  Location: [facility] ENDOSCOPY;  Service: Gastroenterology;  Laterality: N/A;  NORMAL COLONOSCOPY    ENDOSCOPY    Procedure: ESOPHAGOGASTRODUODENOSCOPY WITH BIOPSIES;  Surgeon: Kumar Turner MD;  Location: [facility] ENDOSCOPY;  Service: Gastroenterology;  Laterality: N/A;  NORMAL EGD    ENDOSCOPY    Procedure: ESOPHAGOGASTRODUODENOSCOPY WITH BIOPSIES;  Surgeon: Kumar Turner MD;  Location: [facility] ENDOSCOPY;  Service: Gastroenterology;  Laterality: N/A;  HIATAL HERNIA, GASTRITIS    LEEP    TUBAL ABDOMINAL LIGATION    UPPER GASTROINTESTINAL ENDOSCOPY  "      Family History:   Family History   Problem Relation Age of Onset    Arthritis Mother     Depression Mother     Hyperlipidemia Mother     Cancer Mother         Non-small lung cancer    COPD Father     Depression Father     Heart disease Father     Hyperlipidemia Father     Hyperlipidemia Brother     Cancer Maternal Aunt     Heart disease Maternal Uncle     Ovarian cancer Maternal Grandmother     Cancer Maternal Grandmother     Heart attack Maternal Grandfather     Stroke Maternal Grandfather     Heart disease Maternal Grandfather     COPD Paternal Grandmother     Depression Paternal Grandmother     Lung cancer Paternal Grandfather     Asthma Paternal Grandfather     Cancer Paternal Grandfather         lung cancer    COPD Paternal Grandfather     Vision loss Paternal Grandfather     Colon cancer Neg Hx         Social History:  Social History     Tobacco Use    Smoking status: Former     Current packs/day: 0.00     Average packs/day: 2.0 packs/day for 16.0 years (32.0 ttl pk-yrs)     Types: Cigarettes     Start date: 1999     Quit date: 2015     Years since quittin.3     Passive exposure: Past    Smokeless tobacco: Never   Substance Use Topics    Alcohol use: Not Currently     Comment: Very seldom       Objective     Vital Signs:  /71 (BP Location: Left arm, Patient Position: Sitting, Cuff Size: Adult)   Pulse 88   Ht 157.5 cm (62.01\")   Wt 97.6 kg (215 lb 3.2 oz)   BMI 39.35 kg/m²   Respiratory:  breathing not labored, respiratory effort appears normal  Cardiovascular:  heart regular rate  Skin and subcutaneous tissue: At the upper left abdominal wall, just over the lowermost rib, there is a palpable subcutaneous mass about 2 cm in diameter.  The mass is circumscribed and mobile.  Overlying skin is normal-appearing.  Mass is tender with palpation.  Musculoskeletal: moving all extremities symmetrically and purposefully  Neurologic:  no obvious motor or sensory deficits, speech " clear  Psychiatric:  judgment and insight intact, mood normal      Assessment:  Diagnoses and all orders for this visit:    1. Subcutaneous mass of abdominal wall (Primary)        Plan:  Excision of subcutaneous mass from abdominal wall    Discussion: Indications, options, risks, benefits, and expected outcomes of planned surgery were discussed with the patient and she agrees to proceed.    Julio Warren MD  04/29/2024    Electronically signed by Julio Warren MD, 04/29/24, 3:50 PM EDT.

## 2024-05-02 DIAGNOSIS — M54.42 ACUTE MIDLINE LOW BACK PAIN WITH BILATERAL SCIATICA: ICD-10-CM

## 2024-05-02 DIAGNOSIS — M54.41 ACUTE MIDLINE LOW BACK PAIN WITH BILATERAL SCIATICA: ICD-10-CM

## 2024-05-03 RX ORDER — TIZANIDINE 4 MG/1
4 TABLET ORAL EVERY 8 HOURS PRN
Qty: 60 TABLET | Refills: 0 | Status: SHIPPED | OUTPATIENT
Start: 2024-05-03

## 2024-05-21 ENCOUNTER — CONSULT (OUTPATIENT)
Dept: BARIATRICS/WEIGHT MGMT | Facility: CLINIC | Age: 39
End: 2024-05-21
Payer: COMMERCIAL

## 2024-05-21 ENCOUNTER — HOSPITAL ENCOUNTER (OUTPATIENT)
Dept: GENERAL RADIOLOGY | Facility: HOSPITAL | Age: 39
Discharge: HOME | End: 2024-05-21
Payer: COMMERCIAL

## 2024-05-21 ENCOUNTER — OFFICE VISIT (OUTPATIENT)
Dept: FAMILY MEDICINE CLINIC | Age: 39
End: 2024-05-21
Payer: COMMERCIAL

## 2024-05-21 ENCOUNTER — LAB (OUTPATIENT)
Dept: LAB | Facility: HOSPITAL | Age: 39
End: 2024-05-21
Payer: COMMERCIAL

## 2024-05-21 ENCOUNTER — TELEPHONE (OUTPATIENT)
Dept: FAMILY MEDICINE CLINIC | Age: 39
End: 2024-05-21

## 2024-05-21 ENCOUNTER — TELEPHONE (OUTPATIENT)
Dept: CARDIOLOGY | Facility: CLINIC | Age: 39
End: 2024-05-21
Payer: COMMERCIAL

## 2024-05-21 VITALS
SYSTOLIC BLOOD PRESSURE: 129 MMHG | HEART RATE: 83 BPM | BODY MASS INDEX: 38.62 KG/M2 | HEIGHT: 63 IN | DIASTOLIC BLOOD PRESSURE: 67 MMHG | WEIGHT: 218 LBS | TEMPERATURE: 97.6 F

## 2024-05-21 VITALS
HEIGHT: 63 IN | OXYGEN SATURATION: 98 % | TEMPERATURE: 98.9 F | WEIGHT: 218.6 LBS | DIASTOLIC BLOOD PRESSURE: 88 MMHG | SYSTOLIC BLOOD PRESSURE: 136 MMHG | HEART RATE: 74 BPM | BODY MASS INDEX: 38.73 KG/M2

## 2024-05-21 DIAGNOSIS — Z01.818 PRE-OP TESTING: ICD-10-CM

## 2024-05-21 DIAGNOSIS — G47.33 OSA (OBSTRUCTIVE SLEEP APNEA): ICD-10-CM

## 2024-05-21 DIAGNOSIS — E66.9 OBESITY, CLASS II, BMI 35-39.9: Primary | ICD-10-CM

## 2024-05-21 DIAGNOSIS — E78.5 HYPERLIPIDEMIA, UNSPECIFIED HYPERLIPIDEMIA TYPE: ICD-10-CM

## 2024-05-21 DIAGNOSIS — Z23 IMMUNIZATION DUE: ICD-10-CM

## 2024-05-21 DIAGNOSIS — Z01.818 PRE-OP EVALUATION: ICD-10-CM

## 2024-05-21 DIAGNOSIS — R30.0 DYSURIA: Primary | ICD-10-CM

## 2024-05-21 DIAGNOSIS — K21.9 GASTROESOPHAGEAL REFLUX DISEASE, UNSPECIFIED WHETHER ESOPHAGITIS PRESENT: ICD-10-CM

## 2024-05-21 DIAGNOSIS — Z11.1 SCREENING FOR TUBERCULOSIS: ICD-10-CM

## 2024-05-21 DIAGNOSIS — E66.9 OBESITY, CLASS II, BMI 35-39.9: ICD-10-CM

## 2024-05-21 DIAGNOSIS — E66.09 CLASS 1 OBESITY DUE TO EXCESS CALORIES WITH SERIOUS COMORBIDITY AND BODY MASS INDEX (BMI) OF 33.0 TO 33.9 IN ADULT: ICD-10-CM

## 2024-05-21 PROBLEM — Z71.3 DIETARY COUNSELING: Status: ACTIVE | Noted: 2024-05-21

## 2024-05-21 PROBLEM — K76.0 FATTY LIVER: Status: ACTIVE | Noted: 2024-05-21

## 2024-05-21 PROBLEM — R12 HEARTBURN: Status: RESOLVED | Noted: 2023-10-17 | Resolved: 2024-05-21

## 2024-05-21 PROBLEM — J45.909 ASTHMA: Status: ACTIVE | Noted: 2024-05-21

## 2024-05-21 PROBLEM — E66.812 OBESITY, CLASS II, BMI 35-39.9: Status: ACTIVE | Noted: 2024-05-21

## 2024-05-21 PROBLEM — K44.9 HIATAL HERNIA: Status: ACTIVE | Noted: 2024-05-21

## 2024-05-21 PROBLEM — F41.8 DEPRESSION WITH ANXIETY: Status: ACTIVE | Noted: 2021-11-11

## 2024-05-21 LAB
ALBUMIN SERPL-MCNC: 4 G/DL (ref 3.5–5.2)
ALBUMIN/GLOB SERPL: 1.7 G/DL
ALP SERPL-CCNC: 81 U/L (ref 39–117)
ALT SERPL W P-5'-P-CCNC: 20 U/L (ref 1–33)
ANION GAP SERPL CALCULATED.3IONS-SCNC: 10.5 MMOL/L (ref 5–15)
AST SERPL-CCNC: 15 U/L (ref 1–32)
BASOPHILS # BLD AUTO: 0.04 10*3/MM3 (ref 0–0.2)
BASOPHILS NFR BLD AUTO: 0.5 % (ref 0–1.5)
BILIRUB BLD-MCNC: NEGATIVE MG/DL
BILIRUB SERPL-MCNC: <0.2 MG/DL (ref 0–1.2)
BUN SERPL-MCNC: 12 MG/DL (ref 6–20)
BUN/CREAT SERPL: 15.6 (ref 7–25)
CALCIUM SPEC-SCNC: 9.8 MG/DL (ref 8.6–10.5)
CHLORIDE SERPL-SCNC: 105 MMOL/L (ref 98–107)
CHOLEST SERPL-MCNC: 146 MG/DL (ref 0–200)
CLARITY, POC: CLEAR
CO2 SERPL-SCNC: 24.5 MMOL/L (ref 22–29)
COLOR UR: YELLOW
CREAT SERPL-MCNC: 0.77 MG/DL (ref 0.57–1)
DEPRECATED RDW RBC AUTO: 45.1 FL (ref 37–54)
EGFRCR SERPLBLD CKD-EPI 2021: 102.7 ML/MIN/1.73
EOSINOPHIL # BLD AUTO: 0.11 10*3/MM3 (ref 0–0.4)
EOSINOPHIL NFR BLD AUTO: 1.3 % (ref 0.3–6.2)
ERYTHROCYTE [DISTWIDTH] IN BLOOD BY AUTOMATED COUNT: 13.3 % (ref 12.3–15.4)
EXPIRATION DATE: NORMAL
GLOBULIN UR ELPH-MCNC: 2.4 GM/DL
GLUCOSE SERPL-MCNC: 80 MG/DL (ref 65–99)
GLUCOSE UR STRIP-MCNC: NEGATIVE MG/DL
HBA1C MFR BLD: 5.2 % (ref 4.8–5.6)
HCT VFR BLD AUTO: 36.7 % (ref 34–46.6)
HDLC SERPL-MCNC: 25 MG/DL (ref 40–60)
HGB BLD-MCNC: 12 G/DL (ref 12–15.9)
IMM GRANULOCYTES # BLD AUTO: 0.01 10*3/MM3 (ref 0–0.05)
IMM GRANULOCYTES NFR BLD AUTO: 0.1 % (ref 0–0.5)
KETONES UR QL: NEGATIVE
LDLC SERPL CALC-MCNC: 49 MG/DL (ref 0–100)
LDLC/HDLC SERPL: 0.97 {RATIO}
LEUKOCYTE EST, POC: NEGATIVE
LYMPHOCYTES # BLD AUTO: 2.58 10*3/MM3 (ref 0.7–3.1)
LYMPHOCYTES NFR BLD AUTO: 31.2 % (ref 19.6–45.3)
Lab: NORMAL
MCH RBC QN AUTO: 29.9 PG (ref 26.6–33)
MCHC RBC AUTO-ENTMCNC: 32.7 G/DL (ref 31.5–35.7)
MCV RBC AUTO: 91.3 FL (ref 79–97)
MONOCYTES # BLD AUTO: 0.65 10*3/MM3 (ref 0.1–0.9)
MONOCYTES NFR BLD AUTO: 7.9 % (ref 5–12)
NEUTROPHILS NFR BLD AUTO: 4.89 10*3/MM3 (ref 1.7–7)
NEUTROPHILS NFR BLD AUTO: 59 % (ref 42.7–76)
NITRITE UR-MCNC: NEGATIVE MG/ML
PH UR: 5.5 [PH] (ref 5–8)
PLATELET # BLD AUTO: 300 10*3/MM3 (ref 140–450)
PMV BLD AUTO: 9.4 FL (ref 6–12)
POTASSIUM SERPL-SCNC: 3.8 MMOL/L (ref 3.5–5.2)
PROT SERPL-MCNC: 6.4 G/DL (ref 6–8.5)
PROT UR STRIP-MCNC: NEGATIVE MG/DL
RBC # BLD AUTO: 4.02 10*6/MM3 (ref 3.77–5.28)
RBC # UR STRIP: NEGATIVE /UL
SODIUM SERPL-SCNC: 140 MMOL/L (ref 136–145)
SP GR UR: 1.03 (ref 1–1.03)
TRIGL SERPL-MCNC: 484 MG/DL (ref 0–150)
TSH SERPL DL<=0.05 MIU/L-ACNC: 2.99 UIU/ML (ref 0.27–4.2)
UROBILINOGEN UR QL: NORMAL
VLDLC SERPL-MCNC: 72 MG/DL (ref 5–40)
WBC NRBC COR # BLD AUTO: 8.28 10*3/MM3 (ref 3.4–10.8)

## 2024-05-21 PROCEDURE — 36415 COLL VENOUS BLD VENIPUNCTURE: CPT | Performed by: NURSE PRACTITIONER

## 2024-05-21 PROCEDURE — 80061 LIPID PANEL: CPT | Performed by: NURSE PRACTITIONER

## 2024-05-21 PROCEDURE — 71046 X-RAY EXAM CHEST 2 VIEWS: CPT

## 2024-05-21 PROCEDURE — 99215 OFFICE O/P EST HI 40 MIN: CPT | Performed by: NURSE PRACTITIONER

## 2024-05-21 PROCEDURE — 83036 HEMOGLOBIN GLYCOSYLATED A1C: CPT | Performed by: NURSE PRACTITIONER

## 2024-05-21 PROCEDURE — 80050 GENERAL HEALTH PANEL: CPT | Performed by: NURSE PRACTITIONER

## 2024-05-21 PROCEDURE — 99417 PROLNG OP E/M EACH 15 MIN: CPT | Performed by: NURSE PRACTITIONER

## 2024-05-21 NOTE — PRE-PROCEDURE INSTRUCTIONS
PATIENT INSTRUCTED TO BE:    - NOTHING TO EAT AFTER MIDNIGHT OR CHEW, EXCEPT CAN HAVE CLEAR LIQUIDS 2 HOURS PRIOR TO SURGERY ARRIVAL TIME     - TO HOLD ALL VITAMINS, SUPPLEMENTS, NSAIDS FOR ONE WEEK PRIOR TO THEIR SURGICAL PROCEDURE    - INSTRUCTED PT TO USE SURGICAL SOAP 1 TIME THE NIGHT PRIOR TO SURGERY OR THE AM OF SURGERY.   USE SOAP FROM NECK TO TOES AVOID THEIR FACE, HAIR, AND PRIVATE PARTS. INSTRUCTED NO LOTIONS, JEWELRY, PIERCINGS, OR DEODORANT DAY OF SURGERY    - IF DIABETIC, CHECK BLOOD GLUCOSE IF LESS THAN 70 OR HAVING SYMPTOMS CALL THE PRE[facility] AREA FOR INSTRUCTIONS ON AM OF SURGERY ([phone])    -INSTRUCTED TO TAKE THE FOLLOWING MEDICATIONS THE DAY OF SURGERY: Buspar, Topamax, Nurtec as needed.  Nexium, Pepcid.           - DO NOT BRING ANY MEDICATIONS WITH YOU TO THE HOSPITAL THE DAY OF SURGERY, EXCEPT IF USE INHALERS. BRING INHALERS DAY OF SURGERY       - BRING CPAP OR BIPAP TO THE HOSPITAL ONLY IF ARE SPENDING THE NIGHT    - DO NOT SMOKE OR VAPE 24 HOURS PRIOR TO PROCEDURE PER ANESTHESIA REQUEST     -MAKE SURE YOU HAVE A RIDE HOME OR SOMEONE TO STAY WITH YOU THE DAY OF THE PROCEDURE AFTER YOU GO HOME    - FOLLOW ANY OTHER INSTRUCTIONS GIVEN TO YOU BY YOUR SURGEON'S OFFICE.     - PREADMISSION TESTING NURSE Maggie LUND RN AT  [phone] IF HAVE ANY QUESTIONS     PATIENT PROVIDED THE NUMBER FOR [facility] SURGICAL DEPT IF HAD QUESTIONS AFTER HOURS PRIOR TO SURGERY ([phone])  INFORMED PT IF NO ANSWER, LEAVE A MESSAGE AND SOMEONE WILL RETURN THEIR CALL       PATIENT VERBALIZED UNDERSTANDING

## 2024-05-21 NOTE — PROGRESS NOTES
"Chief Complaint  Difficulty Urinating (X 1 week) and Weight Loss (Seeing bariatric doctor, needs EKG)    Subjective          Gin Alexandra presents to [facility] to discuss weight loss surgical paper work and clearance. She is seeing cardiology for clearance as well. She is planning on the gastric sleeve. She is also needing vaccination record/titers for school. Pt is c/o difficulty urinating x 1 week. Denies fever, chills, nausea, vomiting, diarrhea, loss of taste or smell, shortness of air or chest pain. Denies abd or flank pain. Feels as if she drinks plenty of water.     Objective   Vital Signs:   Vitals:    05/21/24 1340   BP: 136/88   BP Location: Left arm   Patient Position: Sitting   Cuff Size: Adult   Pulse: 74   Temp: 98.9 °F (37.2 °C)   TempSrc: Oral   SpO2: 98%   Weight: 99.2 kg (218 lb 9.6 oz)   Height: 159.5 cm (62.8\")       Body mass index is 38.98 kg/m².         Physical Exam  Vitals reviewed.   Constitutional:       General: She is not in acute distress.     Appearance: Normal appearance. She is well-developed.   HENT:      Head: Normocephalic and atraumatic.   Eyes:      Conjunctiva/sclera: Conjunctivae normal.      Pupils: Pupils are equal, round, and reactive to light.   Cardiovascular:      Rate and Rhythm: Normal rate and regular rhythm.      Heart sounds: Normal heart sounds. No murmur heard.  Pulmonary:      Effort: Pulmonary effort is normal. No respiratory distress.      Breath sounds: Normal breath sounds.   Abdominal:      General: Bowel sounds are normal. There is no distension.      Palpations: Abdomen is soft. There is no mass.      Tenderness: There is no abdominal tenderness. There is no right CVA tenderness, left CVA tenderness or guarding.      Hernia: No hernia is present.   Skin:     General: Skin is warm and dry.   Neurological:      Mental Status: She is alert and oriented to person, place, and time.   Psychiatric:         Mood and Affect: Mood " and affect normal.         Behavior: Behavior normal.         Thought Content: Thought content normal.         Judgment: Judgment normal.            Lab Results   Component Value Date    GLUCOSE 80 05/21/2024    BUN 12 05/21/2024    CREATININE 0.77 05/21/2024    EGFR 102.7 05/21/2024    BCR 15.6 05/21/2024    K 3.8 05/21/2024    CO2 24.5 05/21/2024    CALCIUM 9.8 05/21/2024    ALBUMIN 4.0 05/21/2024    BILITOT <0.2 05/21/2024    AST 15 05/21/2024    ALT 20 05/21/2024     Lab Results   Component Value Date    HGBA1C 5.20 05/21/2024     Lab Results   Component Value Date    WBC 8.28 05/21/2024    HGB 12.0 05/21/2024    HCT 36.7 05/21/2024    MCV 91.3 05/21/2024     05/21/2024     Lab Results   Component Value Date    CHOL 146 05/21/2024    CHOL 180 11/06/2023    CHOL 184 11/15/2021    CHLPL 138 01/18/2019     Lab Results   Component Value Date    TRIG 484 (H) 05/21/2024    TRIG 257 (H) 11/06/2023    TRIG 264 (H) 11/15/2021     Lab Results   Component Value Date    HDL 25 (L) 05/21/2024    HDL 38 (L) 11/06/2023    HDL 32 (L) 11/15/2021     Lab Results   Component Value Date    LDL 49 05/21/2024    LDL 98 11/06/2023     (H) 11/15/2021     Lab Results   Component Value Date    TSH 2.990 05/21/2024              Assessment and Plan    Assessment & Plan  Dysuria  UA negative. Increase water intake.   Pre-op testing  Will notify patient of results and treat accordingly.   Will sign paper work once results have returned.  Screening for tuberculosis  Will notify patient of results and treat accordingly.     Immunization due  Tdap provided. Attempting to get vaccination record.   Class 1 obesity due to excess calories with serious comorbidity and body mass index (BMI) of 33.0 to 33.9 in adult  Patient's (Body mass index is 38.98 kg/m².) indicates that they are morbidly/severely obese (BMI > 40 or > 35 with obesity - related health condition) with health conditions that include dyslipidemias . Weight is unchanged.  BMI  is above average; BMI management plan is completed. We discussed portion control, increasing exercise, and continue plans for gastric sleeve .     Orders Placed This Encounter   Procedures    Tdap Vaccine Greater Than or Equal To 6yo IM    POCT urinalysis dipstick, automated    TB Skin Test    ECG 12 Lead            Patient to notify office with any acute concerns or issues.  Patient verbalizes understanding, agrees with plan of care and has no further questions upon discharge.    Please note that portions of this note were completed with a voice recognition program.      Follow Up    Return in about 3 months (around 8/21/2024) for Annual physical.  Patient was given instructions and counseling regarding her condition or for health maintenance advice. Please see specific information pulled into the AVS if appropriate.     There are no discontinued medications.

## 2024-05-21 NOTE — PROGRESS NOTES
"Metabolic and Bariatric Surgery Nutrition Assessment    Patient Name: Gin Alexandra    YOB: 1985   Age: 38 y.o.  Sex: female  MRN: [id number]     ASSESSMENT    Procedure considering: sleeve    Anthropometric Measurements  Estimated body mass index is 38.87 kg/m² as calculated from the following:    Height as of this encounter: 159.5 cm (62.8\").    Weight as of this encounter: 98.9 kg (218 lb).    Patient Goals and Expectations                        Patient's stated weight goal: 150 lbs  Reasons for desiring weight loss: improved health and confidence, reduced pain     Medical History  Pertinent diagnosis/problems: dyslipidemia, GERD, sleep apnea, fibromyalgia, back pain   Past Medical History:   Diagnosis Date    Allergic 01/01/1995    Allergic rhinitis     Allergies     CODEINE SULFATE   MODERATE    Anxiety     Asthma     Benign neoplasm of pituitary gland     Carpal tunnel syndrome, bilateral upper limbs     x2 surgeries on the L, x1 on the R    Cholelithiasis 2005    Gall bladder was removed    Chronic fatigue, unspecified     Constipation, unspecified     Depression     Started when i was 14    Dizziness     Dyspnea, unspecified     Dysuria     Fatty liver 5/21/2024    Fibrocystic breast 2008    Fibromyalgia     Fibromyalgia     Fibromyalgia, primary 2019    GERD without esophagitis     Headache     HL (hearing loss)     Hyperlipidemia     Hypersomnia, unspecified     IBS (irritable bowel syndrome)     diarrhea and constipation    Obesity, unspecified     STEPHANIA (obstructive sleep apnea) 5/21/2024    Other fatigue     Other mixed anxiety disorders     Other retention of urine     Pain in right hip     Pituitary tumor     PONV (postoperative nausea and vomiting) 2006    Severe nausea with vomiting for several hours after    Tinnitus     Vitamin D deficiency, unspecified      Past Surgical History:   Procedure Laterality Date    CARPAL TUNNEL RELEASE      x3    COLONOSCOPY      COLONOSCOPY N/A " 11/27/2023    Procedure: COLONOSCOPY;  Surgeon: Kumar Turner MD;  Location: [facility] END[facility]COPY;  Service: Gastroenterology;  Laterality: N/A;  NORMAL COLONOSCOPY    ENDOSCOPY N/A 06/13/2022    Procedure: ESOPHAGOGASTRODUODENOSCOPY WITH BIOPSIES;  Surgeon: Kumar Turner MD;  Location: [facility] ENDOSCOPY;  Service: Gastroenterology;  Laterality: N/A;  NORMAL EGD    ENDOSCOPY N/A 11/27/2023    Procedure: ESOPHAGOGASTRODUODENOSCOPY WITH BIOPSIES;  Surgeon: Kumar Turner MD;  Location: [facility] ENDOSCOPY;  Service: Gastroenterology;  Laterality: N/A;  HIATAL HERNIA, GASTRITIS    LAPAROSCOPIC CHOLECYSTECTOMY      AT AGE 21   DETAILS/COMPLICATIONS?    LEEP  2012    TUBAL ABDOMINAL LIGATION           Weight History   Highest adult weight: 220 lbs   Lowest adult weight: 145 lbs   Onset of obesity: Always been overweight    Possible triggers or life events that may have led to weight gain:   Pregnancy, post partum depression and emotional eating      Previous Weight Loss Efforts   Patient has tried to lose weight in the past including:   Slim Fast, Atkins, calorie counting, low carbohydrate, low fat, fasting, prescription medications and exercise.       Patient has lost up to 50 lbs on diets, but was unable to maintain this long term.        Diet History   Patient is eating 2 meals and 1-2 snacks daily. Schedule varies; works 3rd shift.        24 Hour Recall   Breakfast: None    Lunch: Burger from gas station    Dinner: Ramen noodles x 3   Snacks: Cereal    Beverages: Soda, Vitamin Water, flavored water, water        Eating out: 2-3 times per week    Vitamins/supplements: None    Dietary restrictions: NKFA      Patient identifies problem areas to be:   Physical hunger, over consumption, eating large portions, sweets cravings, grazing, binge eating, eating out of boredom, emotional eating     Physical Activity   Work-related activity: Transporting patients at hospital    Planned exercise: Playing soccer  with daughter    Barriers to activity: Fibromyalgia pain and fatigue     DIAGNOSIS   .  Nutrition problem statement: Obesity related to multifactorial biochemical, behavioral and environmental contributors to disease as evidenced by BMI 38.87 kg/m².    INTERVENTION    Pre and post op nutrition education and coaching for behavior change completed. Program materials provided. Emphasized the importance of taking in at least 70 g protein and 64 oz fluid daily. Discussed personal habits and lifestyle behaviors that may influence weight loss efforts. Self monitoring strategies such as keeping a food journal were encouraged. Patient verbalized understanding and questions were answered.    Patient will be evaluated by multidisciplinary team before undergoing a review of their candidacy. Additional nutrition counseling available and encouraged both pre and post op. Patient demonstrated a good comprehension of diet requirements and commitment to make healthy changes.     Gin Alexandra appears to be a suitable candidate for bariatric surgery from a nutritional standpoint.    MONITORING & EVALUATION    Short term goals:   Prioritize lean proteins with meals  Work on eating consistently during awake hours, even if schedule varies  Decrease/eliminate carbonated and high calorie beverages         Electronically signed by:  Samanta Ferrer RD   05/21/24 16:08 EDT  Encounter date: 05/21/2024

## 2024-05-21 NOTE — TELEPHONE ENCOUNTER
Caller: Gin Alexandra    Relationship to patient: Self    Best call back number    [phone] (Mobile)       Patient is needing: PATIENT CALLED IN AND IS REQUESTING RECORDS FROM VISIT ON 9/26/22 AND 3/31/23 BE SENT TO [facility] IN [place]. PATIENT SAID HER PROVIDER IS REQUESTING INFORMATION. PATIENT IS COMING IN TODAY AND WOULD LIKE A HARD COPY OF THOSE RECORDS ALSO PLEASE.

## 2024-05-21 NOTE — TELEPHONE ENCOUNTER
Caller: CHELSEA    Relationship: SELF    Best call back number: [phone]    What form or medical record are you requesting: CARDIAC CLEARANCE    Who is requesting this form or medical record from you: [facility]    How would you like to receive the form or medical records (pick-up, mail, fax): MY CHART LETTER  If fax, what is the fax number:  If mail, what is the address:   If pick-up, provide patient with address and location details    Timeframe paperwork needed: ASAP    Additional notes: PATIENT NEEDS A LETTER FROM [facility] STATING IT IS OK FOR PATIENT TO HAVE BARIATRIC SURGERY - SLEEVE DONE. PLEASE CALL PATIENT WITH ANY QUESTIONS. THANK YOU!

## 2024-05-21 NOTE — TELEPHONE ENCOUNTER
Dr Mckeon is in Epic.  She can see all of our chart notes.  Copy of notes can be given to pt at visit.

## 2024-05-21 NOTE — PROGRESS NOTES
[facility] BARIATRIC [facility] BARIATRIC SURGERY  [address]  [facility] [id number] [phone]  [address]  [facility] [place] [address] [phone]  Dept: [phone]  5/21/2024      Gin Alexandra.  [id number]  [id number]  1985  female      Chief Complaint of weight gain; unable to maintain weight loss    History of Present Illness:   Gin is a 38 y.o. female who presents today for evaluation, education and consultation regarding weight loss surgery. The patient is interested in the sleeve gastrectomy.      Diet History:Gin has been overweight for at least 20+ years, has been 35 pounds or more overweight for at least 20+ years, has been 100 pounds or more overweight for 0 or more years and started dieting at age 18.  The most weight Gin lost was 50 pounds on reduced calorie and maintained the weight loss for 1 year. Gin describes her eating habits as volume, snacker and sweets eater. Gin Alexandra has tried Atkins, Fasting, reduced calorie, exercising, medications, and slim fast among others with success of losing up to 50 pounds, but in each instance regained the weight.    See dietician documentation for complete history.    Bariatric Surgery Evaluation: The patient is being seen for an initial visit for bariatric surgery evaluation.     Bariatric Co-morbidities:  sleep apnea, dyslipidemia, fibromyalgia, GERD, asthma, and depression    Patient Active Problem List   Diagnosis    Depression with anxiety    IBS (irritable bowel syndrome)    Fibromyalgia    Chronic midline thoracic back pain    Chronic tension-type headache, intractable    Fatigue    Hyperlipidemia    Epigastric pain    Gastroesophageal reflux disease    Nausea and vomiting    Bilateral arm pain    Occipital neuralgia    Diarrhea    Nausea    Dysphagia    Change in bowel habits    Subcutaneous mass of abdominal wall    Obesity, Class II, BMI 35-39.9    Dietary counseling    Pre-op  evaluation    STEPHANIA (obstructive sleep apnea)    Asthma    Fatty liver    Hiatal hernia       Past Medical History:   Diagnosis Date    Allergic 01/01/1995    Allergic rhinitis     Allergies     CODEINE SULFATE   MODERATE    Anxiety     Asthma     Benign neoplasm of pituitary gland     Carpal tunnel syndrome, bilateral upper limbs     x2 surgeries on the L, x1 on the R    Cholelithiasis 2005    Gall bladder was removed    Chronic fatigue, unspecified     Constipation, unspecified     Depression     Started when i was 14    Dizziness     Dyspnea, unspecified     Dysuria     Fatty liver 5/21/2024    Fibrocystic breast 2008    Fibromyalgia     Fibromyalgia     Fibromyalgia, primary 2019    GERD without esophagitis     Headache     HL (hearing loss)     Hyperlipidemia     Hypersomnia, unspecified     IBS (irritable bowel syndrome)     diarrhea and constipation    Obesity, unspecified     STEPHANIA (obstructive sleep apnea) 5/21/2024    Other fatigue     Other mixed anxiety disorders     Other retention of urine     Pain in right hip     Pituitary tumor     PONV (postoperative nausea and vomiting) 2006    Severe nausea with vomiting for several hours after    Tinnitus     Vitamin D deficiency, unspecified        Past Surgical History:   Procedure Laterality Date    CARPAL TUNNEL RELEASE      x3    COLONOSCOPY      COLONOSCOPY N/A 11/27/2023    Procedure: COLONOSCOPY;  Surgeon: Kumar Turner MD;  Location: [facility] ENDOSCOPY;  Service: Gastroenterology;  Laterality: N/A;  NORMAL COLONOSCOPY    ENDOSCOPY N/A 06/13/2022    Procedure: ESOPHAGOGASTRODUODENOSCOPY WITH BIOPSIES;  Surgeon: Kumar Turner MD;  Location: [facility] ENDOSCOPY;  Service: Gastroenterology;  Laterality: N/A;  NORMAL EGD    ENDOSCOPY N/A 11/27/2023    Procedure: ESOPHAGOGASTRODUODENOSCOPY WITH BIOPSIES;  Surgeon: Kumar Turner MD;  Location: [facility] ENDOSCOPY;  Service: Gastroenterology;  Laterality: N/A;  HIATAL HERNIA, GASTRITIS     LAPAROSCOPIC CHOLECYSTECTOMY      AT AGE 21   DETAILS/COMPLICATIONS?    LEEP  2012    TUBAL ABDOMINAL LIGATION         Allergies   Allergen Reactions    Codeine Hives         Current Outpatient Medications:     albuterol sulfate  (90 Base) MCG/ACT inhaler, Inhale 2 puffs Every 4 (Four) Hours As Needed for Shortness of Air., Disp: 54 g, Rfl: 0    busPIRone (BUSPAR) 10 MG tablet, Take 1 tablet by mouth 3 (Three) Times a Day., Disp: 90 tablet, Rfl: 1    dicyclomine (BENTYL) 20 MG tablet, Take 1 tablet by mouth 3 (Three) Times a Day As Needed (abdominal pain)., Disp: 90 tablet, Rfl: 5    DULoxetine (CYMBALTA) 60 MG capsule, Take 1 capsule by mouth Daily., Disp: 90 capsule, Rfl: 0    esomeprazole (nexIUM) 40 MG capsule, Take 1 capsule by mouth Every Morning Before Breakfast., Disp: 90 capsule, Rfl: 0    famotidine (Pepcid) 40 MG tablet, Take 1 tablet by mouth Daily., Disp: 90 tablet, Rfl: 1    meclizine (ANTIVERT) 25 MG tablet, Take 1 tablet by mouth 3 (Three) Times a Day As Needed for Dizziness., Disp: 30 tablet, Rfl: 1    Nurtec 75 MG dispersible tablet, TAKE ONE TABLET BY MOUTH EVERY DAY AS NEEDED FOR MIGRAINE, Disp: , Rfl:     ondansetron ODT (ZOFRAN-ODT) 4 MG disintegrating tablet, Place 1 tablet on the tongue Every 8 (Eight) Hours As Needed for Nausea or Vomiting., Disp: 30 tablet, Rfl: 0    tiZANidine (ZANAFLEX) 4 MG tablet, Take 1 tablet by mouth Every 8 (Eight) Hours As Needed for Muscle Spasms., Disp: 60 tablet, Rfl: 0    topiramate (TOPAMAX) 25 MG tablet, Take 1 tablet by mouth 2 (Two) Times a Day as directed for headache prevention, Disp: 60 tablet, Rfl: 1    Social History     Socioeconomic History    Marital status:     Number of children: 2   Tobacco Use    Smoking status: Former     Current packs/day: 0.00     Average packs/day: 2.0 packs/day for 16.0 years (32.0 ttl pk-yrs)     Types: Cigarettes     Start date: 1999     Quit date: 2015     Years since quittin.3     Passive  exposure: Past    Smokeless tobacco: Never   Vaping Use    Vaping status: Never Used   Substance and Sexual Activity    Alcohol use: Not Currently     Comment: Very seldom    Drug use: Never    Sexual activity: Yes     Partners: Male     Birth control/protection: Surgical       Family History   Problem Relation Age of Onset    Arthritis Mother     Depression Mother     Hyperlipidemia Mother     Cancer Mother         Non-small lung cancer    COPD Father     Depression Father     Heart disease Father     Hyperlipidemia Father     Hyperlipidemia Brother     Cancer Maternal Aunt     Heart disease Maternal Uncle     Ovarian cancer Maternal Grandmother     Cancer Maternal Grandmother     Heart attack Maternal Grandfather     Stroke Maternal Grandfather     Heart disease Maternal Grandfather     COPD Paternal Grandmother     Depression Paternal Grandmother     Lung cancer Paternal Grandfather     Asthma Paternal Grandfather     Cancer Paternal Grandfather         lung cancer    COPD Paternal Grandfather     Vision loss Paternal Grandfather     Colon cancer Neg Hx     Malig Hyperthermia Neg Hx          Review of Systems:  Review of Systems   All other systems reviewed and are negative.      Physical Exam:  Vital Signs:  Weight: 98.9 kg (218 lb)   Body mass index is 38.87 kg/m².  Temp: 97.6 °F (36.4 °C)   Heart Rate: 83   BP: 129/67     Physical Exam  Vitals reviewed.   Constitutional:       Appearance: Normal appearance. She is well-developed. She is obese.   HENT:      Head: Normocephalic and atraumatic.   Cardiovascular:      Rate and Rhythm: Normal rate.   Pulmonary:      Effort: Pulmonary effort is normal.      Breath sounds: Normal breath sounds.   Abdominal:      General: Bowel sounds are normal. There is no distension.      Palpations: Abdomen is soft.      Tenderness: There is no abdominal tenderness.   Musculoskeletal:         General: Normal range of motion.   Skin:     General: Skin is warm and dry.    Neurological:      Mental Status: She is alert and oriented to person, place, and time.   Psychiatric:         Behavior: Behavior normal.         Thought Content: Thought content normal.         Judgment: Judgment normal.            Assessment:         Gin Alexandra is a 38 y.o. year old female with medically complicated severe obesity. Weight: 98.9 kg (218 lb), Body mass index is 38.87 kg/m². and weight related problems including sleep apnea, dyslipidemia, fibromyalgia, GERD, asthma, and depression.    I explained in detail, potential surgical options of interest to the patient including the RNY gastric bypass, sleeve gastrectomy, and gastric band while considering the patient's medical history. At this time, the patient expressed interest in the sleeve gastrectomy.  All of those procedures can be performed laparoscopically but there is a chance to convert to open if any technical challenges or complications do occur.  Bariatric surgery is not cosmetic surgery but rather a tool to help a patient make a life-long commitment lifestyle changes including diet, exercise, behavior changes, and taking supplemental vitamins and minerals. The risks, benefits, alternatives, and potential complications of all of the procedures were explained in detail including but not limited to failure to lose weight or gain weight and change in body image, metabolic complications. The patient was informed that surgery is a tool and requires lifestyle change including dietary modification to be successful. The patient was made aware of the need for vitamin supplementation after surgery due to the risk of deficiencies including but not limited to calcium, thiamine, vitamin B12, folate, iron, and anemia.    The patient was advised to start a high protein, low fat and low carbohydrate diet. The patient was given individualized information by our dietician along with handouts. The patient was encouraged to start routine exercise including but  not limited to 150 minutes per week. The patient received a resistance band along with a handout of exercises.     The patient was given information regarding the EMILIA educational video. EMILIA is an internet based educational video which explains the surgical procedure and answers basic questions regarding the procedure. The patient was provided with instructions and a password to watch the video.    Due to the patient's BMI, history and co-morbidities they are at a high risk for surgery and will obtain the following:  -The patient has been advised that a letter of medical support and a history and physical must be obtained from her primary care physician. The patient was given a copy of a sample form, that will suffice as their letter to take to their primary are provider.  -A referral for pre-operative psychological evaluation was ordered for the patient to evaluate candidacy as well as provide mental health support, should it be warranted before or after surgery.  -Pre-operative testing will be ordered to include: CBC, CMP,TSH and HgbA1C will be drawn, CXR, EKG. Previous results of testing were reviewed including EGD and CT ABD.     -Gin Alexandra will be set up for a pre-operative diagnostic esophagogastroduodenoscopy with biopsy for evaluation. The risks and benefits of the procedure were discussed with the patient in detail and all questions were answered.  Possibility of perforation, bleeding, aspiration, anoxic brain injury, respiratory and/or cardiac arrest and death were discussed. The patient received handouts regarding her instructions and all questions were answered.    The patient understands the surgical procedures and the different surgical options that are available.  She understands the lifestyle changes that would be required after surgery and has agreed to participate in a pre-operative and postoperative weight management program.  She also expressed understanding of possible risks, had several  questions answered and desires to proceed.    I think she is a good candidate for this surgery, and is interested in a sleeve gastrectomy.    Encounter Diagnoses   Name Primary?    Obesity, Class II, BMI 35-39.9 Yes    Gastroesophageal reflux disease, unspecified whether esophagitis present     STEPHANIA (obstructive sleep apnea)     Hyperlipidemia, unspecified hyperlipidemia type     Pre-op evaluation        Plan:    The consultation plan was reviewed with the patient.  Patient will have evaluations and follow up with bariatric dieticians and a psychologist before undergoing a multidisciplinary review of her candidacy.  We also discussed the weight loss requirement and rationale, as well as other program requirements to ensure the safest approach to surgery. We spent time discussing different surgical procedures and plan of care throughout their lifespan to ensure long term success in achieving and maintaining a healthier weight. Patient will proceed with preoperative lab work, radiology, and endoscopy after obtaining agreed upon clearances and letter of support from their primary care provider.     As this patient is a female of childbearing age, she was counseled regarding conception and pregnancy after surgery. Patient was advised that conception and pregnancy in the first 18 months post surgery is not advised due to increased metabolic demands required during pregnancy as well as increased risk of nutrient and vitamin deficiencies which could jeopardize fetal development and birth weight.     Total encounter exceeded 60 minutes including reviewing their chart/outside medical records/previous visits, face to face time obtaining medical history and physical, reviewing surgical options and answering any questions, discussing pre-operative plan and requirements along with care coordination.         Gin Mckeon, APRN  5/21/2024

## 2024-05-22 ENCOUNTER — ANESTHESIA EVENT (OUTPATIENT)
Dept: PERIOP | Facility: HOSPITAL | Age: 39
End: 2024-05-22
Payer: COMMERCIAL

## 2024-05-22 ENCOUNTER — TELEPHONE (OUTPATIENT)
Dept: SURGERY | Facility: CLINIC | Age: 39
End: 2024-05-22
Payer: COMMERCIAL

## 2024-05-22 NOTE — SIGNIFICANT NOTE
Spoke with Ms. Alexandra at [facility] request at Dr. Warren's. Pt informed of anesthesia request for cardiology clearance related to intermittent chest pain, incompleted stress test that was recommended by cardiology. Pt states she has called Dr. Bhardwaj office and notified. Dr. Bhardwaj/Anju Diaz RN notified as of need for clearance by myself as well.

## 2024-05-22 NOTE — TELEPHONE ENCOUNTER
Ni spoke with patient.     Patient has called cardiologist office attempting to get cardiac clearance so she can still proceed with surgery. She is aware she cannot have surgery if we do not receive clearance.

## 2024-05-22 NOTE — TELEPHONE ENCOUNTER
I tried calling patient, no answer. LMOM    I also tried calling In to see if she has spoken to pt, no answer. LMOM

## 2024-05-22 NOTE — TELEPHONE ENCOUNTER
Patient is needing surgery clearance for lipoma removal for tomorrow, Thursday, 5/23/24 at 10 am.    Gastric surgery is not scheduled at this time and does not need that clearance at this time.     Stress test has not been performed yet.      Patient reports chest pain has resolved.    LOV 1/23/2024  No F/U appt    H/O CP, Obesity, STEPHANIA

## 2024-05-22 NOTE — TELEPHONE ENCOUNTER
NEO CALLED FROM [facility].  PATIENT WILL NEED CARDIAC CLEARANCE.  SHE HAS HAD CHEST PAIN.  SHE DIDN'T DO HER RECOMMENDED STRESS TEST AND SHE DID NOT GO FOR HER CARDIAC APPOINTMENT.    PATIENT IS SCHEDULED FOR SURGERY 05/23/24.

## 2024-05-22 NOTE — TELEPHONE ENCOUNTER
Hub staff attempted to follow warm transfer process and was unsuccessful     Caller: Gin Alexandra    Relationship to patient: Self    Best call back number: [phone]    Patient is needing: PATIENT JUST RECEIVED A CALL FROM GENERAL SURGERY - MATTIE XAVIER WITH [facility] - STATING PATIENT WILL NEED A CARDIAC CLEARANCE FOR SURGERY THAT IS HAPPENING 5.23.24. PLEASE CALL PATIENT IF ANY QUESTIONS AND PUT CLEARANCE IN MYCHART. THANK YOU!

## 2024-05-22 NOTE — TELEPHONE ENCOUNTER
Procedure: Excision of abdominal wall mass 5/23/24    Med Directive: NA    PMH: atypical CP, HLD    Last Seen: 1/23/24

## 2024-05-22 NOTE — TELEPHONE ENCOUNTER
Called pt back. She is still trying to get the clearance. She is hopefull that cardiology will give her clearance because she feels fine & no longer has chest pain. I told her I would leave the surgery on, in case we did receive clearance first thing in the morning. She said she would even sign a wavier, if she could.     Just sending to you so you are aware.

## 2024-05-22 NOTE — TELEPHONE ENCOUNTER
Patient called back and I relayed Norman message to her. She still had some questions. I called PAT and left a message for Ni to call and talk with patient.

## 2024-05-22 NOTE — SIGNIFICANT NOTE
DR. FANG REV. PT CHART, NOTING C/O INTERMITTENT C/P WITH INCOMPLETE RECOMMENDED STRESS TEST, MISSED CARDIOLOGIST APPT. REQ. CARDS CLEARANCE. NOTIFIED DR. XAVIER/YAEL.

## 2024-05-23 ENCOUNTER — HOSPITAL ENCOUNTER (OUTPATIENT)
Facility: HOSPITAL | Age: 39
Setting detail: HOSPITAL OUTPATIENT SURGERY
Discharge: HOME | End: 2024-05-23
Attending: SURGERY | Admitting: SURGERY
Payer: COMMERCIAL

## 2024-05-23 ENCOUNTER — ANESTHESIA (OUTPATIENT)
Dept: PERIOP | Facility: HOSPITAL | Age: 39
End: 2024-05-23
Payer: COMMERCIAL

## 2024-05-23 VITALS
OXYGEN SATURATION: 98 % | WEIGHT: 220.24 LBS | BODY MASS INDEX: 39.02 KG/M2 | HEIGHT: 63 IN | HEART RATE: 59 BPM | DIASTOLIC BLOOD PRESSURE: 68 MMHG | RESPIRATION RATE: 20 BRPM | TEMPERATURE: 97.5 F | SYSTOLIC BLOOD PRESSURE: 122 MMHG

## 2024-05-23 DIAGNOSIS — R22.2 SUBCUTANEOUS MASS OF ABDOMINAL WALL: ICD-10-CM

## 2024-05-23 PROCEDURE — 25010000002 SUGAMMADEX 200 MG/2ML SOLUTION

## 2024-05-23 PROCEDURE — 25810000003 LACTATED RINGERS PER 1000 ML: Performed by: ANESTHESIOLOGY

## 2024-05-23 PROCEDURE — 25010000002 ONDANSETRON PER 1 MG

## 2024-05-23 PROCEDURE — 25010000002 PROPOFOL 10 MG/ML EMULSION

## 2024-05-23 PROCEDURE — 25010000002 BUPIVACAINE (PF) 0.25 % SOLUTION: Performed by: SURGERY

## 2024-05-23 PROCEDURE — 63710000001 PROMETHAZINE PER 12.5 MG: Performed by: ANESTHESIOLOGY

## 2024-05-23 PROCEDURE — 25010000002 MIDAZOLAM PER 1MG: Performed by: ANESTHESIOLOGY

## 2024-05-23 PROCEDURE — 25010000002 METOCLOPRAMIDE PER 10 MG: Performed by: ANESTHESIOLOGY

## 2024-05-23 PROCEDURE — 22902 EXC ABD LES SC < 3 CM: CPT | Performed by: SURGERY

## 2024-05-23 PROCEDURE — 25010000002 DEXAMETHASONE PER 1 MG

## 2024-05-23 PROCEDURE — 25010000002 FENTANYL CITRATE (PF) 50 MCG/ML SOLUTION

## 2024-05-23 PROCEDURE — 88304 TISSUE EXAM BY PATHOLOGIST: CPT | Performed by: SURGERY

## 2024-05-23 RX ORDER — PROMETHAZINE HYDROCHLORIDE 25 MG/1
25 SUPPOSITORY RECTAL ONCE AS NEEDED
Status: DISCONTINUED | OUTPATIENT
Start: 2024-05-23 | End: 2024-05-23 | Stop reason: HOSPADM

## 2024-05-23 RX ORDER — BUPIVACAINE HYDROCHLORIDE 2.5 MG/ML
INJECTION, SOLUTION EPIDURAL; INFILTRATION; INTRACAUDAL AS NEEDED
Status: DISCONTINUED | OUTPATIENT
Start: 2024-05-23 | End: 2024-05-23 | Stop reason: HOSPADM

## 2024-05-23 RX ORDER — SODIUM CHLORIDE, SODIUM LACTATE, POTASSIUM CHLORIDE, CALCIUM CHLORIDE 600; 310; 30; 20 MG/100ML; MG/100ML; MG/100ML; MG/100ML
9 INJECTION, SOLUTION INTRAVENOUS CONTINUOUS PRN
Status: DISCONTINUED | OUTPATIENT
Start: 2024-05-23 | End: 2024-05-23 | Stop reason: HOSPADM

## 2024-05-23 RX ORDER — MEPERIDINE HYDROCHLORIDE 25 MG/ML
12.5 INJECTION INTRAMUSCULAR; INTRAVENOUS; SUBCUTANEOUS
Status: DISCONTINUED | OUTPATIENT
Start: 2024-05-23 | End: 2024-05-23 | Stop reason: HOSPADM

## 2024-05-23 RX ORDER — MIDAZOLAM HYDROCHLORIDE 2 MG/2ML
2 INJECTION, SOLUTION INTRAMUSCULAR; INTRAVENOUS ONCE
Status: COMPLETED | OUTPATIENT
Start: 2024-05-23 | End: 2024-05-23

## 2024-05-23 RX ORDER — DEXAMETHASONE SODIUM PHOSPHATE 4 MG/ML
INJECTION, SOLUTION INTRA-ARTICULAR; INTRALESIONAL; INTRAMUSCULAR; INTRAVENOUS; SOFT TISSUE AS NEEDED
Status: DISCONTINUED | OUTPATIENT
Start: 2024-05-23 | End: 2024-05-23 | Stop reason: SURG

## 2024-05-23 RX ORDER — ONDANSETRON 2 MG/ML
INJECTION INTRAMUSCULAR; INTRAVENOUS AS NEEDED
Status: DISCONTINUED | OUTPATIENT
Start: 2024-05-23 | End: 2024-05-23 | Stop reason: SURG

## 2024-05-23 RX ORDER — METOCLOPRAMIDE HYDROCHLORIDE 5 MG/ML
10 INJECTION INTRAMUSCULAR; INTRAVENOUS ONCE
Status: COMPLETED | OUTPATIENT
Start: 2024-05-23 | End: 2024-05-23

## 2024-05-23 RX ORDER — SCOLOPAMINE TRANSDERMAL SYSTEM 1 MG/1
1 PATCH, EXTENDED RELEASE TRANSDERMAL ONCE
Status: DISCONTINUED | OUTPATIENT
Start: 2024-05-23 | End: 2024-05-23 | Stop reason: HOSPADM

## 2024-05-23 RX ORDER — SODIUM CHLORIDE 9 MG/ML
40 INJECTION, SOLUTION INTRAVENOUS AS NEEDED
Status: DISCONTINUED | OUTPATIENT
Start: 2024-05-23 | End: 2024-05-23 | Stop reason: HOSPADM

## 2024-05-23 RX ORDER — ONDANSETRON 2 MG/ML
4 INJECTION INTRAMUSCULAR; INTRAVENOUS ONCE AS NEEDED
Status: DISCONTINUED | OUTPATIENT
Start: 2024-05-23 | End: 2024-05-23 | Stop reason: HOSPADM

## 2024-05-23 RX ORDER — ROCURONIUM BROMIDE 10 MG/ML
INJECTION, SOLUTION INTRAVENOUS AS NEEDED
Status: DISCONTINUED | OUTPATIENT
Start: 2024-05-23 | End: 2024-05-23 | Stop reason: SURG

## 2024-05-23 RX ORDER — DEXMEDETOMIDINE HYDROCHLORIDE 100 UG/ML
INJECTION, SOLUTION INTRAVENOUS AS NEEDED
Status: DISCONTINUED | OUTPATIENT
Start: 2024-05-23 | End: 2024-05-23 | Stop reason: SURG

## 2024-05-23 RX ORDER — SUCCINYLCHOLINE/SOD CL,ISO/PF 100 MG/5ML
SYRINGE (ML) INTRAVENOUS AS NEEDED
Status: DISCONTINUED | OUTPATIENT
Start: 2024-05-23 | End: 2024-05-23 | Stop reason: SURG

## 2024-05-23 RX ORDER — PROMETHAZINE HYDROCHLORIDE 12.5 MG/1
25 TABLET ORAL ONCE
Status: COMPLETED | OUTPATIENT
Start: 2024-05-23 | End: 2024-05-23

## 2024-05-23 RX ORDER — OXYCODONE HYDROCHLORIDE 5 MG/1
5 TABLET ORAL
Status: DISCONTINUED | OUTPATIENT
Start: 2024-05-23 | End: 2024-05-23 | Stop reason: HOSPADM

## 2024-05-23 RX ORDER — ACETAMINOPHEN 500 MG
1000 TABLET ORAL ONCE
Status: COMPLETED | OUTPATIENT
Start: 2024-05-23 | End: 2024-05-23

## 2024-05-23 RX ORDER — FAMOTIDINE 10 MG/ML
20 INJECTION, SOLUTION INTRAVENOUS
Status: COMPLETED | OUTPATIENT
Start: 2024-05-23 | End: 2024-05-23

## 2024-05-23 RX ORDER — PROMETHAZINE HYDROCHLORIDE 12.5 MG/1
25 TABLET ORAL ONCE AS NEEDED
Status: DISCONTINUED | OUTPATIENT
Start: 2024-05-23 | End: 2024-05-23 | Stop reason: HOSPADM

## 2024-05-23 RX ORDER — LIDOCAINE HYDROCHLORIDE 20 MG/ML
INJECTION, SOLUTION EPIDURAL; INFILTRATION; INTRACAUDAL; PERINEURAL AS NEEDED
Status: DISCONTINUED | OUTPATIENT
Start: 2024-05-23 | End: 2024-05-23 | Stop reason: SURG

## 2024-05-23 RX ORDER — HYDROCODONE BITARTRATE AND ACETAMINOPHEN 5; 325 MG/1; MG/1
1 TABLET ORAL EVERY 6 HOURS PRN
Qty: 4 TABLET | Refills: 0 | Status: SHIPPED | OUTPATIENT
Start: 2024-05-23

## 2024-05-23 RX ORDER — PROPOFOL 10 MG/ML
VIAL (ML) INTRAVENOUS AS NEEDED
Status: DISCONTINUED | OUTPATIENT
Start: 2024-05-23 | End: 2024-05-23 | Stop reason: SURG

## 2024-05-23 RX ORDER — FENTANYL CITRATE 50 UG/ML
INJECTION, SOLUTION INTRAMUSCULAR; INTRAVENOUS AS NEEDED
Status: DISCONTINUED | OUTPATIENT
Start: 2024-05-23 | End: 2024-05-23 | Stop reason: SURG

## 2024-05-23 RX ADMIN — SUGAMMADEX 150 MG: 100 INJECTION, SOLUTION INTRAVENOUS at 12:26

## 2024-05-23 RX ADMIN — SODIUM CHLORIDE, POTASSIUM CHLORIDE, SODIUM LACTATE AND CALCIUM CHLORIDE 9 ML/HR: 600; 310; 30; 20 INJECTION, SOLUTION INTRAVENOUS at 09:48

## 2024-05-23 RX ADMIN — DEXAMETHASONE SODIUM PHOSPHATE 8 MG: 4 INJECTION, SOLUTION INTRAMUSCULAR; INTRAVENOUS at 11:50

## 2024-05-23 RX ADMIN — METOCLOPRAMIDE HYDROCHLORIDE 10 MG: 5 INJECTION INTRAMUSCULAR; INTRAVENOUS at 11:27

## 2024-05-23 RX ADMIN — PROPOFOL 150 MCG/KG/MIN: 10 INJECTION, EMULSION INTRAVENOUS at 11:47

## 2024-05-23 RX ADMIN — ROCURONIUM BROMIDE 5 MG: 10 INJECTION, SOLUTION INTRAVENOUS at 11:47

## 2024-05-23 RX ADMIN — MIDAZOLAM HYDROCHLORIDE 2 MG: 1 INJECTION, SOLUTION INTRAMUSCULAR; INTRAVENOUS at 11:27

## 2024-05-23 RX ADMIN — FENTANYL CITRATE 50 MCG: 50 INJECTION, SOLUTION INTRAMUSCULAR; INTRAVENOUS at 11:47

## 2024-05-23 RX ADMIN — PROPOFOL 100 MG: 10 INJECTION, EMULSION INTRAVENOUS at 12:26

## 2024-05-23 RX ADMIN — Medication 100 MG: at 11:47

## 2024-05-23 RX ADMIN — LIDOCAINE HYDROCHLORIDE 50 MG: 20 INJECTION, SOLUTION INTRAVENOUS at 11:47

## 2024-05-23 RX ADMIN — ROCURONIUM BROMIDE 25 MG: 10 INJECTION, SOLUTION INTRAVENOUS at 11:50

## 2024-05-23 RX ADMIN — ONDANSETRON HYDROCHLORIDE 4 MG: 2 SOLUTION INTRAMUSCULAR; INTRAVENOUS at 11:50

## 2024-05-23 RX ADMIN — SCOPALAMINE 1 PATCH: 1 PATCH, EXTENDED RELEASE TRANSDERMAL at 09:48

## 2024-05-23 RX ADMIN — FAMOTIDINE 20 MG: 10 INJECTION INTRAVENOUS at 11:27

## 2024-05-23 RX ADMIN — PROMETHAZINE HYDROCHLORIDE 25 MG: 12.5 TABLET ORAL at 11:28

## 2024-05-23 RX ADMIN — ACETAMINOPHEN 1000 MG: 500 TABLET ORAL at 09:48

## 2024-05-23 RX ADMIN — PROPOFOL 150 MG: 10 INJECTION, EMULSION INTRAVENOUS at 11:47

## 2024-05-23 RX ADMIN — SUGAMMADEX 50 MG: 100 INJECTION, SOLUTION INTRAVENOUS at 12:24

## 2024-05-23 RX ADMIN — DEXMEDETOMIDINE 10 MCG: 100 INJECTION, SOLUTION INTRAVENOUS at 12:10

## 2024-05-23 NOTE — PROGRESS NOTES
ECG 12 Lead    Date/Time: 5/21/2024 2:19 PM  Performed by: Lee Henriquez MD    Authorized by: Rekha Kim APRN  Comparison: compared with previous ECG from 11/2/2023  Similar to previous ECG  Rhythm: sinus rhythm  Rate: normal  BPM: 74  Conduction: conduction normal  Q waves: III    ST Segments: ST segments normal  T Waves: T waves normal  T inversion: III  QRS axis: normal  Other: no other findings    Clinical impression: normal ECG  Clinical impression comment: This is an essentially normal EKG without obvious change when compared to most recent tracing.  The the T wave findings mentioned in the computer interpretation are stable.

## 2024-05-23 NOTE — DISCHARGE INSTRUCTIONS
DISCHARGE INSTRUCTIONS  SURGICAL / AMBULATORY  PROCEDURES      For your surgery you had:  General anesthesia (you may have a sore throat for the first 24 hours)  IV sedation.  Local anesthesia  Monitored anesthesia Care  You received a medicated patch for nausea prevention today (behind your ear). It is recommended that you remove it 24-48 hours post-operatively. It must be removed within 72 hours. RIGHT EAR   You have received an anesthesia medication today that can cause hormonal forms of birth control to be ineffective. You should use a different form of birth control (to prevent pregnancy) for 7 days.   You may experience dizziness, drowsiness, or light-headedness for several hours following surgery/procedure.  Do not stay alone today or tonight.  Limit your activity for 24 hours.  Resume your diet slowly.  Follow whatever special dietary instructions you may have been given by your doctor.  You should not drive or operate machinery, drink alcohol, or sign legally binding documents for 24 hours or while you are taking pain medication.  Last dose of pain medication was given at:   .TYLENOL AT 0948AM MAY START PAIN MEDS AFTER 148PM  NOTIFY YOUR DOCTOR IF YOU EXPERIENCE ANY OF THE FOLLOWING:  Temperature greater than 101 degrees Fahrenheit  Shaking Chills  Redness or excessive drainage from incision  Nausea, vomiting and/or pain that is not controlled by prescribed medications  Increase in bleeding or bleeding that is excessive  Unable to urinate in 6 hours after surgery  If unable to reach your doctor, please go to the closest Emergency Room  You may begin dressing changes:     [] in 24 hours   [] in 48 hours   [x] Other:  INCISION IS HEALED WITH GLUE .WILL FLAKE OFF 7-10 DAYS     You may shower TOMORROW   Apply an ice pack 24-48 hours.  Medications per physician instructions as indicated on Discharge Medication Information Sheet.  You should see Dr. XAVIER  for follow-up care AS SCHEDULED/ AS NEEDED   on    .  Phone number: [phone]      SPECIAL INSTRUCTIONS:                                   Dr. Warren's Instructions          DIET  Resume your regular diet.     ACTIVITY & RETURN TO WORK  You are excused from work for one week but may return anytime before then should you feel able to do so.  No specific activity restrictions.     WOUND CARE & SHOWERING/BATHING  Your incision is covered with a plastic type material that will flake off on its own in the next few weeks.  If the plastic type material has not flaked off on its own by 2 weeks after your surgery then use tweezers to pull it off.  You have sutures in your incision but they are placed below the level of the skin and they will slowly dissolve (they do not need to be removed).  The skin around your incision will likely have some bruising.  This is normal.  You can shower beginning tomorrow but wait two weeks after your surgery before taking any tub baths.     HOME MEDICATIONS  Resume all of your normal home medications.     PAIN CONTROL  You will receive a narcotic pain medicine prescription before you are discharged home.  Be sure to take the narcotic pain medication with some food so as not to upset your stomach.  Do not drive while you are taking the prescription pain medication.  Take Tylenol or Motrin for mild pain.     BOWEL MOVEMENTS  It is not unusual for narcotic pain medications to cause constipation.  Also, the medications and anesthesia you received for your surgery can have a constipating effect.  To help avoid constipation, drink at least four eight-ounce glasses of water each day and use over-the-counter laxatives/stool softeners (dulcolax, milk of magnesia, senokot, etc.).  I recommend drinking the aforementioned amount of water daily and taking 30 ml of milk of magnesia two times each day while you are using the prescribed narcotic pain medication.  If your bowel movements become too loose or too frequent, then simply stop following these  recommendations unless you start to feel constipated.     FOLLOW-UP VISIT & QUESTIONS/CONCERNS  Call Dr. Vega office at [phone] and schedule a follow-up appointment for about three to four weeks after your surgery date.  Should you have any questions or concerns, please call Dr. Vega office.

## 2024-05-23 NOTE — ANESTHESIA PREPROCEDURE EVALUATION
Anesthesia Evaluation     Patient summary reviewed and Nursing notes reviewed   history of anesthetic complications:  PONV  NPO Solid Status: > 8 hours  NPO Liquid Status: > 4 hours           Airway   Mallampati: I  TM distance: >3 FB  Neck ROM: full  No difficulty expected  Dental - normal exam     Pulmonary - normal exam    breath sounds clear to auscultation  (+) a smoker (quit in 2015) Former, asthma (mild per the patient - states she rarely uses an inhaler),shortness of breath, sleep apnea  Cardiovascular - normal exam  Exercise tolerance: good (4-7 METS)    ECG reviewed  Rhythm: regular  Rate: normal    (+) hyperlipidemia      Neuro/Psych  (+) headaches, dizziness/light headedness, numbness, psychiatric history Anxiety and Depression  GI/Hepatic/Renal/Endo    (+) obesity, morbid obesity, hiatal hernia, GERD well controlled, liver disease    Musculoskeletal     (+) myalgias  Abdominal    Substance History - negative use     OB/GYN negative ob/gyn ROS         Other - negative ROS       ROS/Med Hx Other: Date/Time: 11/6/2023 12:23 PM  Performed by: Rosalina Edwards MD     Authorized by: Rekha Kim APRN  Comparison: not compared with previous ECG   Rhythm: sinus rhythm  Rate: normal  Conduction: conduction normal  ST Segments: ST segments normal  T inversion: III and V1  QRS axis: normal  Other: no other findings  Clinical impression: normal ECG                  Anesthesia Plan    ASA 3     general   Rapid sequence  (Patient understands anesthesia not responsible for dental damage.)  intravenous induction     Anesthetic plan, risks, benefits, and alternatives have been provided, discussed and informed consent has been obtained with: patient.  Pre-procedure education provided      CODE STATUS:

## 2024-05-23 NOTE — OP NOTE
Operative Report    Patient Name:  Gin Alexandra  YOB: 1985    Date of Surgery:  5/23/2024     Pre-op Diagnosis:   Subcutaneous mass of abdominal wall [R22.2]       Post-op Diagnosis:   Post-Op Diagnosis Codes:     * Subcutaneous mass of abdominal wall [R22.2]    Procedure:  Excision of subcutaneous mass from abdominal wall    Staff:  Surgeon(s):  Juilo Warren MD    Anesthesia: General    Estimated Blood Loss: 2 mL    Complications:  None    Drains:  None    Packing:  None    Implants:    Nothing was implanted during the procedure    Specimen:          Specimens       ID Source Type Tests Collected By Collected At Frozen?    A Abdominal Wall Tissue TISSUE PATHOLOGY EXAM   Julio Warren MD 5/23/24 1217 No    Description: abdominal wall lipoma          Indications:  See my preop H&P note.     Findings: Approximately 3 cm diameter fatty mass removed from the subcutaneous tissue of the upper left abdominal wall.  Gross findings are consistent with a lipoma.    Description of Procedure: The patient was taken the operating placed supine on procedure table.  Timeout was performed.  Anesthesia was administered.  The area of the upper left abdominal wall where the lesion is located and was palpated and marked was prepped and draped in usual fashion.  Local anesthesia was injected in the skin and subcutaneous tissue.  15 blade scalpel was used to make a cut through the skin and deepened into the subcutaneous tissue and a fatty mass was circumferentially dissected and removed.  In doing so, I removed all of the fatty tissue at this area down to the level of the superficial fascia of the intercostal muscles as this location was directly over one of the lower ribs.  Excised tissue was sent to pathology.  Excised tissue included a 3 cm size fatty mass with gross characteristics consistent with a lipoma.  Adequate hemostasis.  Minimal blood loss.  Wound was closed with veritable over suture followed by skin  adhesive.  No complications.  Patient was awake from anesthesia and transported to the recovery area in stable condition.      Julio Warren MD     Date: 5/23/2024  Time: 12:52 EDT    Electronically signed by Julio Warren MD, 05/23/24, 12:52 PM EDT.

## 2024-05-23 NOTE — ANESTHESIA POSTPROCEDURE EVALUATION
Patient: Gin Alexandra    Procedure Summary       Date: 05/23/24 Room / Location: [facility] OR  / [facility]    Anesthesia Start: 1144 Anesthesia Stop: 1240    Procedure: Excision of subcutaneous mass from abdominal wall Diagnosis:       Subcutaneous mass of abdominal wall      (Subcutaneous mass of abdominal wall [R22.2])    Surgeons: Julio Warren MD Provider: Saravanan Sumner MD    Anesthesia Type: general ASA Status: 3            Anesthesia Type: general    Vitals  Vitals Value Taken Time   /72 05/23/24 1333   Temp 36.4 °C (97.5 °F) 05/23/24 1325   Pulse 80 05/23/24 1328   Resp 20 05/23/24 1325   SpO2 98 % 05/23/24 1328   Vitals shown include unfiled device data.        Post Anesthesia Care and Evaluation    Patient location during evaluation: bedside  Patient participation: complete - patient participated  Level of consciousness: awake    Airway patency: patent  PONV Status: none  Cardiovascular status: acceptable  Respiratory status: acceptable  Hydration status: acceptable

## 2024-05-23 NOTE — TELEPHONE ENCOUNTER
Per Verbal order LUCIA Perez/ Salinas Diaz RN: patient is cleared for surgical procedure at minimal cardiac risk.

## 2024-05-24 ENCOUNTER — CLINICAL SUPPORT (OUTPATIENT)
Dept: FAMILY MEDICINE CLINIC | Age: 39
End: 2024-05-24
Payer: COMMERCIAL

## 2024-05-24 DIAGNOSIS — M79.7 FIBROMYALGIA: ICD-10-CM

## 2024-05-24 DIAGNOSIS — M54.41 ACUTE MIDLINE LOW BACK PAIN WITH BILATERAL SCIATICA: ICD-10-CM

## 2024-05-24 DIAGNOSIS — M54.42 ACUTE MIDLINE LOW BACK PAIN WITH BILATERAL SCIATICA: ICD-10-CM

## 2024-05-24 DIAGNOSIS — R07.9 CHEST PAIN, UNSPECIFIED TYPE: ICD-10-CM

## 2024-05-24 LAB
INDURATION: 0 MM (ref 0–10)
Lab: NORMAL
Lab: NORMAL
TB SKIN TEST: NEGATIVE

## 2024-05-24 RX ORDER — TIZANIDINE 4 MG/1
4 TABLET ORAL EVERY 8 HOURS PRN
Qty: 60 TABLET | Refills: 0 | Status: SHIPPED | OUTPATIENT
Start: 2024-05-24

## 2024-05-24 RX ORDER — DULOXETIN HYDROCHLORIDE 60 MG/1
60 CAPSULE, DELAYED RELEASE ORAL DAILY
Qty: 90 CAPSULE | Refills: 0 | Status: SHIPPED | OUTPATIENT
Start: 2024-05-24

## 2024-05-24 RX ORDER — BUSPIRONE HYDROCHLORIDE 10 MG/1
10 TABLET ORAL 3 TIMES DAILY
Qty: 90 TABLET | Refills: 1 | Status: SHIPPED | OUTPATIENT
Start: 2024-05-24

## 2024-05-24 RX ORDER — ESOMEPRAZOLE MAGNESIUM 40 MG/1
40 CAPSULE, DELAYED RELEASE ORAL
Qty: 90 CAPSULE | Refills: 0 | Status: SHIPPED | OUTPATIENT
Start: 2024-05-24

## 2024-05-30 DIAGNOSIS — Z01.84 IMMUNITY STATUS TESTING: Primary | ICD-10-CM

## 2024-05-30 RX ORDER — FAMOTIDINE 40 MG/1
40 TABLET, FILM COATED ORAL DAILY
Qty: 90 TABLET | Refills: 1 | Status: SHIPPED | OUTPATIENT
Start: 2024-05-30

## 2024-06-10 ENCOUNTER — OFFICE VISIT (OUTPATIENT)
Dept: SURGERY | Facility: CLINIC | Age: 39
End: 2024-06-10
Payer: COMMERCIAL

## 2024-06-10 VITALS
SYSTOLIC BLOOD PRESSURE: 125 MMHG | WEIGHT: 215 LBS | BODY MASS INDEX: 38.09 KG/M2 | DIASTOLIC BLOOD PRESSURE: 82 MMHG | HEIGHT: 63 IN | HEART RATE: 98 BPM

## 2024-06-10 DIAGNOSIS — D17.1 LIPOMA OF TORSO: Primary | ICD-10-CM

## 2024-06-10 PROCEDURE — 99024 POSTOP FOLLOW-UP VISIT: CPT | Performed by: NURSE PRACTITIONER

## 2024-06-10 NOTE — PROGRESS NOTES
"Chief Complaint: Follow-up (Post surgical f/u)    Subjective      Follow-up visit       History of Present Illness  Gin Alexandra is a 38 y.o. female presents to [facility] for follow-up visit.    Patient presents today for follow-up visit after undergoing an excision of an abdominal wall mass on 5/23/2024 performed by Dr. Julio Warren.  Pathology revealed this mass to be a lipoma.  Patient is without complaints today.    Results for orders placed or performed during the hospital encounter of 05/23/24   Tissue Pathology Exam    Specimen: Abdominal Wall; Tissue   Result Value Ref Range    Case Report       Surgical Pathology Report                         Case: [id number]                                  Authorizing Provider:  Julio Warren MD        Collected:           05/23/2024 12:17 PM          Ordering Location:     [facility]  Received:            05/24/2024 07:59 AM                                 OR                                                                           Pathologist:           Radha Godoy DO                                                       Specimen:    Abdominal Wall, abdominal wall lipoma                                                      Clinical Information       Subcutaneous mass of abdominal wall      Final Diagnosis       Soft tissue, abdominal wall, excision:   - Mature fibroadipose tissue, consistent with lipoma        Gross Description       1. Abdominal Wall.  The specimen is received in 1 formalin filled container labeled \" abdominal wall lipoma\" and consists of a 5.0 x 4.0 x 2.5 cm aggregate of detached, unoriented adipose tissue fragments.  No skin is present.  Sectioning reveals tan-yellow, lobulated, homogenous adipose tissue with no distinct lesions, lymph nodes, nodules, or calcifications.  Representative sections are submitted in 3 cassettes.   SHAWN      Microscopic Description       Microscopic examination " performed.           Objective     Past Medical History:   Diagnosis Date    Allergic 01/01/1995    Allergic rhinitis     Allergies     CODEINE SULFATE   MODERATE    Anxiety     Asthma     Benign neoplasm of pituitary gland     Carpal tunnel syndrome, bilateral upper limbs     x2 surgeries on the L, x1 on the R    Cholelithiasis 2005    Gall bladder was removed    Chronic fatigue, unspecified     Constipation, unspecified     Depression     Started when i was 14    Dizziness     Dyspnea, unspecified     Dysuria     Fatty liver 5/21/2024    Fibrocystic breast 2008    Fibromyalgia     Fibromyalgia     Fibromyalgia, primary 2019    GERD without esophagitis     Headache     HL (hearing loss)     Hyperlipidemia     Hypersomnia, unspecified     IBS (irritable bowel syndrome)     diarrhea and constipation    Obesity, unspecified     STEPHANIA (obstructive sleep apnea) 5/21/2024    Other fatigue     Other mixed anxiety disorders     Other retention of urine     Pain in right hip     Pituitary tumor     PONV (postoperative nausea and vomiting) 2006    Severe nausea with vomiting for several hours after    Tinnitus     Vitamin D deficiency, unspecified        Past Surgical History:   Procedure Laterality Date    CARPAL TUNNEL RELEASE      x3    COLONOSCOPY      COLONOSCOPY N/A 11/27/2023    Procedure: COLONOSCOPY;  Surgeon: Kumar Turner MD;  Location: [facility] ENDOSCOPY;  Service: Gastroenterology;  Laterality: N/A;  NORMAL COLONOSCOPY    CYST REMOVAL N/A 5/23/2024    Procedure: Excision of subcutaneous mass from abdominal wall;  Surgeon: Julio Warren MD;  [place] [facility] [place] [facility]  Service: General;  Laterality: N/A;    ENDOSCOPY N/A 06/13/2022    Procedure: ESOPHAGOGASTRODUODENOSCOPY WITH BIOPSIES;  Surgeon: Kumar Turner MD;  Location: [facility] ENDOSCOPY;  Service: Gastroenterology;  Laterality: N/A;  NORMAL EGD    ENDOSCOPY N/A 11/27/2023    Procedure: ESOPHAGOGASTRODUODENOSCOPY WITH BIOPSIES;  Surgeon:  Kumar Turner MD;  Location: [facility] ENDOSCOPY;  Service: Gastroenterology;  Laterality: N/A;  HIATAL HERNIA, GASTRITIS    LAPAROSCOPIC CHOLECYSTECTOMY      AT AGE 21   DETAILS/COMPLICATIONS?    LEEP  2012    TUBAL ABDOMINAL LIGATION         Outpatient Medications Marked as Taking for the 6/10/24 encounter (Office Visit) with Rahul, April, APRN   Medication Sig Dispense Refill    albuterol sulfate  (90 Base) MCG/ACT inhaler Inhale 2 puffs Every 4 (Four) Hours As Needed for Shortness of Air. 54 g 0    busPIRone (BUSPAR) 10 MG tablet Take 1 tablet by mouth 3 (Three) Times a Day. 90 tablet 1    DULoxetine (CYMBALTA) 60 MG capsule Take 1 capsule by mouth Daily. 90 capsule 0    Elagolix Sodium (Orilissa) 150 MG tablet Take 1 tablet by mouth daily. 30 tablet 11    esomeprazole (nexIUM) 40 MG capsule Take 1 capsule by mouth Every Morning Before Breakfast. 90 capsule 0    famotidine (Pepcid) 40 MG tablet Take 1 tablet by mouth Daily. 90 tablet 1    meclizine (ANTIVERT) 25 MG tablet Take 1 tablet by mouth 3 (Three) Times a Day As Needed for Dizziness. 30 tablet 1    Nurtec 75 MG dispersible tablet TAKE ONE TABLET BY MOUTH EVERY DAY AS NEEDED FOR MIGRAINE      ondansetron ODT (ZOFRAN-ODT) 4 MG disintegrating tablet Place 1 tablet on the tongue Every 8 (Eight) Hours As Needed for Nausea or Vomiting. 30 tablet 0    tiZANidine (ZANAFLEX) 4 MG tablet Take 1 tablet by mouth Every 8 (Eight) Hours As Needed for Muscle Spasms. 60 tablet 0    topiramate (TOPAMAX) 25 MG tablet Take 1 tablet by mouth 2 (Two) Times a Day as directed for headache prevention 60 tablet 1       Allergies   Allergen Reactions    Codeine Hives        Family History   Problem Relation Age of Onset    Arthritis Mother     Depression Mother     Hyperlipidemia Mother     Cancer Mother         Non-small lung cancer    COPD Father     Depression Father     Heart disease Father     Hyperlipidemia Father     Hyperlipidemia Brother     Cancer Maternal  "Aunt     Heart disease Maternal Uncle     Ovarian cancer Maternal Grandmother     Cancer Maternal Grandmother     Heart attack Maternal Grandfather     Stroke Maternal Grandfather     Heart disease Maternal Grandfather     COPD Paternal Grandmother     Depression Paternal Grandmother     Lung cancer Paternal Grandfather     Asthma Paternal Grandfather     Cancer Paternal Grandfather         lung cancer    COPD Paternal Grandfather     Vision loss Paternal Grandfather     Colon cancer Neg Hx     Malig Hyperthermia Neg Hx        Social History     Socioeconomic History    Marital status:     Number of children: 2   Tobacco Use    Smoking status: Former     Current packs/day: 0.00     Average packs/day: 2.0 packs/day for 16.0 years (32.0 ttl pk-yrs)     Types: Cigarettes     Start date: 1999     Quit date: 2015     Years since quittin.4     Passive exposure: Past    Smokeless tobacco: Never   Vaping Use    Vaping status: Never Used   Substance and Sexual Activity    Alcohol use: Not Currently     Comment: Very seldom    Drug use: Never    Sexual activity: Yes     Partners: Male     Birth control/protection: Surgical       Review of Systems   Constitutional:  Negative for chills and fever.   Skin:  Negative for color change, dry skin, pallor, rash, skin lesions, wound and bruise.        Vital Signs:   /82 (BP Location: Right arm, Patient Position: Sitting, Cuff Size: Adult)   Pulse 98   Ht 159.4 cm (62.76\")   Wt 97.5 kg (215 lb)   BMI 38.38 kg/m²      Physical Exam  Vitals and nursing note reviewed.   Constitutional:       General: She is not in acute distress.     Appearance: Normal appearance. She is not ill-appearing.   HENT:      Head: Normocephalic and atraumatic.   Cardiovascular:      Rate and Rhythm: Normal rate.   Pulmonary:      Effort: Pulmonary effort is normal.   Abdominal:      Palpations: Abdomen is soft.      Comments: Left upper abdomen: Surgical incision is clean dry and " intact without erythema.  Abdomen is non-distended, non-ptender, bowel sounds positive   Neurological:      Mental Status: She is alert.          Result Review :          []  Laboratory  []  Radiology  []  Pathology  []  Microbiology  []  EKG/Telemetry   []  Cardiology/Vascular   []  Old records  Today I reviewed Dr. Collazo's operative and pathology report.     Assessment and Plan    Diagnoses and all orders for this visit:    1. Lipoma of torso (Primary)        Follow Up   Return if symptoms worsen or fail to improve.    Seek medical emergency services:  Fever greater than 101 associated with chills.  Extreme pain.  Patient to call the office, 911, or go to the ER with new or worsening symptoms.      Patient was given instructions and counseling regarding her condition or for health maintenance advice. Please see specific information pulled into the AVS if appropriate.

## 2024-06-16 DIAGNOSIS — M54.42 ACUTE MIDLINE LOW BACK PAIN WITH BILATERAL SCIATICA: ICD-10-CM

## 2024-06-16 DIAGNOSIS — M54.41 ACUTE MIDLINE LOW BACK PAIN WITH BILATERAL SCIATICA: ICD-10-CM

## 2024-06-17 ENCOUNTER — HOSPITAL ENCOUNTER (OUTPATIENT)
Dept: SLEEP MEDICINE | Facility: HOSPITAL | Age: 39
End: 2024-06-17
Payer: COMMERCIAL

## 2024-06-17 DIAGNOSIS — E66.9 OBESITY, CLASS II, BMI 35-39.9: ICD-10-CM

## 2024-06-17 DIAGNOSIS — G47.33 OSA (OBSTRUCTIVE SLEEP APNEA): ICD-10-CM

## 2024-06-17 DIAGNOSIS — Z01.818 PRE-OP EVALUATION: ICD-10-CM

## 2024-06-17 PROCEDURE — 95806 SLEEP STUDY UNATT&RESP EFFT: CPT

## 2024-06-17 RX ORDER — TIZANIDINE 4 MG/1
4 TABLET ORAL EVERY 8 HOURS PRN
Qty: 60 TABLET | Refills: 0 | Status: SHIPPED | OUTPATIENT
Start: 2024-06-17

## 2024-06-19 ENCOUNTER — LAB (OUTPATIENT)
Dept: LAB | Facility: HOSPITAL | Age: 39
End: 2024-06-19
Payer: COMMERCIAL

## 2024-06-19 ENCOUNTER — TELEPHONE (OUTPATIENT)
Dept: FAMILY MEDICINE CLINIC | Age: 39
End: 2024-06-19
Payer: COMMERCIAL

## 2024-06-19 DIAGNOSIS — Z01.84 IMMUNITY STATUS TESTING: ICD-10-CM

## 2024-06-19 PROCEDURE — 86658 ENTEROVIRUS ANTIBODY: CPT

## 2024-06-19 PROCEDURE — 36415 COLL VENOUS BLD VENIPUNCTURE: CPT

## 2024-06-19 NOTE — TELEPHONE ENCOUNTER
Caller: Gin Alexandra    Relationship: Self    Best call back number: [phone]     What orders are you requesting (i.e. lab or imaging): LAB ORDERS FOR T4, T3, PROGESTOGEN, TESTOSTERONE, ESTROGEN      In what timeframe would the patient need to come in: AS SOON AS POSSIBLE    Where will you receive your lab/imaging services: [facility]    Additional notes: PATIENT REPORTS THAT HER PHYCOLOGIST WITH [facility] WOULD LIKE FOR US TO ORDER LABS -       PLEASE ADVISE

## 2024-06-19 NOTE — TELEPHONE ENCOUNTER
Caller: Gin Alexandra    Relationship: Self    Best call back number: [phone]     What medication are you requesting: PATIENT STATES HER PHYCOLOGIST WITH [facility] WANTS US TO INCREASE HER CYMBALTA FROM 60 MG TO 90 MG      If a prescription is needed, what is your preferred pharmacy and phone number: [facility]

## 2024-06-20 NOTE — TELEPHONE ENCOUNTER
Spoke with pt and informed her to discuss this with Rekha tomorrow at her appt because we would need to know why in order for insurance to pay for labs.

## 2024-06-21 ENCOUNTER — LAB (OUTPATIENT)
Dept: LAB | Facility: HOSPITAL | Age: 39
End: 2024-06-21
Payer: COMMERCIAL

## 2024-06-21 ENCOUNTER — OFFICE VISIT (OUTPATIENT)
Dept: FAMILY MEDICINE CLINIC | Age: 39
End: 2024-06-21
Payer: COMMERCIAL

## 2024-06-21 VITALS
TEMPERATURE: 99 F | BODY MASS INDEX: 38.59 KG/M2 | HEART RATE: 108 BPM | OXYGEN SATURATION: 98 % | WEIGHT: 217.8 LBS | HEIGHT: 63 IN | DIASTOLIC BLOOD PRESSURE: 89 MMHG | SYSTOLIC BLOOD PRESSURE: 126 MMHG | RESPIRATION RATE: 16 BRPM

## 2024-06-21 DIAGNOSIS — F33.0 MILD EPISODE OF RECURRENT MAJOR DEPRESSIVE DISORDER: ICD-10-CM

## 2024-06-21 DIAGNOSIS — E66.01 CLASS 2 SEVERE OBESITY DUE TO EXCESS CALORIES WITH SERIOUS COMORBIDITY AND BODY MASS INDEX (BMI) OF 38.0 TO 38.9 IN ADULT: Primary | ICD-10-CM

## 2024-06-21 DIAGNOSIS — Z01.84 IMMUNITY STATUS TESTING: ICD-10-CM

## 2024-06-21 DIAGNOSIS — M79.7 FIBROMYALGIA: ICD-10-CM

## 2024-06-21 DIAGNOSIS — E66.01 CLASS 2 SEVERE OBESITY DUE TO EXCESS CALORIES WITH SERIOUS COMORBIDITY AND BODY MASS INDEX (BMI) OF 38.0 TO 38.9 IN ADULT: ICD-10-CM

## 2024-06-21 LAB
HAV IGM SERPL QL IA: NORMAL
PROGEST SERPL-MCNC: 7.11 NG/ML
T3FREE SERPL-MCNC: 3.03 PG/ML (ref 2–4.4)
T4 FREE SERPL-MCNC: 1.13 NG/DL (ref 0.92–1.68)
TSH SERPL DL<=0.05 MIU/L-ACNC: 3.32 UIU/ML (ref 0.27–4.2)

## 2024-06-21 PROCEDURE — 84144 ASSAY OF PROGESTERONE: CPT

## 2024-06-21 PROCEDURE — 86800 THYROGLOBULIN ANTIBODY: CPT

## 2024-06-21 PROCEDURE — 84481 FREE ASSAY (FT-3): CPT

## 2024-06-21 PROCEDURE — 84403 ASSAY OF TOTAL TESTOSTERONE: CPT

## 2024-06-21 PROCEDURE — 84439 ASSAY OF FREE THYROXINE: CPT

## 2024-06-21 PROCEDURE — 36415 COLL VENOUS BLD VENIPUNCTURE: CPT

## 2024-06-21 PROCEDURE — 82672 ASSAY OF ESTROGEN: CPT

## 2024-06-21 PROCEDURE — 86709 HEPATITIS A IGM ANTIBODY: CPT

## 2024-06-21 PROCEDURE — 84402 ASSAY OF FREE TESTOSTERONE: CPT

## 2024-06-21 PROCEDURE — 86376 MICROSOMAL ANTIBODY EACH: CPT

## 2024-06-21 PROCEDURE — 84443 ASSAY THYROID STIM HORMONE: CPT

## 2024-06-21 RX ORDER — ONABOTULINUMTOXINA 200 [USP'U]/1
INJECTION, POWDER, LYOPHILIZED, FOR SOLUTION INTRADERMAL; INTRAMUSCULAR
COMMUNITY
Start: 2024-06-20

## 2024-06-21 RX ORDER — DULOXETIN HYDROCHLORIDE 60 MG/1
60 CAPSULE, DELAYED RELEASE ORAL DAILY
Qty: 90 CAPSULE | Refills: 1 | Status: SHIPPED | OUTPATIENT
Start: 2024-06-21

## 2024-06-21 RX ORDER — DULOXETIN HYDROCHLORIDE 30 MG/1
30 CAPSULE, DELAYED RELEASE ORAL DAILY
Qty: 90 CAPSULE | Refills: 1 | Status: SHIPPED | OUTPATIENT
Start: 2024-06-21

## 2024-06-21 NOTE — PROGRESS NOTES
"Chief Complaint  Annual Exam    Subjective          Gin Alexandra presents to [facility] for annual exam.  She is wondering if her Cymbalta can be increased.  She is currently on 60 mg daily.  She is going to counseling and it was recommended by therapist.  She is also hoping that it will help with generalized pain from fibromyalgia.  She would like hormone and thyroid testing for weight loss.    Review of Systems   Constitutional:  Negative for fatigue.   HENT:  Negative for hearing loss and trouble swallowing.    Respiratory:  Negative for cough and shortness of breath.    Cardiovascular:  Negative for chest pain, palpitations and leg swelling.   Gastrointestinal:  Negative for abdominal pain, constipation, diarrhea, nausea and vomiting.   Genitourinary:  Negative for difficulty urinating.   Musculoskeletal:  Negative for arthralgias and myalgias.   Skin:  Negative for rash.   Neurological:  Negative for headaches.   Psychiatric/Behavioral:  Positive for dysphoric mood. Negative for sleep disturbance and suicidal ideas. The patient is nervous/anxious.         Objective   Vital Signs:   Vitals:    06/21/24 1039   BP: 126/89   BP Location: Right arm   Patient Position: Sitting   Cuff Size: Adult   Pulse: 108  Comment: checked manually   Resp: 16   Temp: 99 °F (37.2 °C)   TempSrc: Oral   SpO2: 98%   Weight: 98.8 kg (217 lb 12.8 oz)   Height: 159.4 cm (62.76\")       Body mass index is 38.88 kg/m².        Physical Exam  Vitals reviewed.   Constitutional:       General: She is not in acute distress.     Appearance: Normal appearance. She is well-developed. She is not diaphoretic.   HENT:      Head: Normocephalic and atraumatic. Hair is normal.      Right Ear: Hearing and external ear normal. No decreased hearing noted. No drainage.      Left Ear: Hearing and external ear normal. No decreased hearing noted.      Nose: Nose normal. No nasal deformity.      Mouth/Throat:      Mouth: Mucous " membranes are moist.   Eyes:      General: Lids are normal.         Right eye: No discharge.         Left eye: No discharge.      Extraocular Movements: Extraocular movements intact.      Conjunctiva/sclera: Conjunctivae normal.      Pupils: Pupils are equal, round, and reactive to light.   Neck:      Thyroid: No thyromegaly.   Cardiovascular:      Rate and Rhythm: Normal rate and regular rhythm.      Pulses: Normal pulses.      Heart sounds: Normal heart sounds. No murmur heard.     No friction rub. No gallop.   Pulmonary:      Effort: Pulmonary effort is normal. No respiratory distress.      Breath sounds: Normal breath sounds. No wheezing or rales.   Chest:      Chest wall: No tenderness.   Abdominal:      General: Bowel sounds are normal. There is no distension.      Palpations: Abdomen is soft. There is no mass.      Tenderness: There is no abdominal tenderness. There is no guarding or rebound.      Hernia: No hernia is present.   Musculoskeletal:         General: No tenderness or deformity. Normal range of motion.      Cervical back: Normal range of motion and neck supple.   Lymphadenopathy:      Cervical: No cervical adenopathy.   Skin:     General: Skin is warm and dry.      Findings: No erythema or rash.   Neurological:      Mental Status: She is alert and oriented to person, place, and time.      Motor: No abnormal muscle tone.      Coordination: Coordination normal.   Psychiatric:         Mood and Affect: Mood normal.         Behavior: Behavior normal.         Thought Content: Thought content normal.         Judgment: Judgment normal.            Lab Results   Component Value Date    GLUCOSE 80 05/21/2024    BUN 12 05/21/2024    CREATININE 0.77 05/21/2024    EGFR 102.7 05/21/2024    BCR 15.6 05/21/2024    K 3.8 05/21/2024    CO2 24.5 05/21/2024    CALCIUM 9.8 05/21/2024    ALBUMIN 4.0 05/21/2024    BILITOT <0.2 05/21/2024    AST 15 05/21/2024    ALT 20 05/21/2024     Lab Results   Component Value Date     HGBA1C 5.20 05/21/2024     Lab Results   Component Value Date    WBC 8.28 05/21/2024    HGB 12.0 05/21/2024    HCT 36.7 05/21/2024    MCV 91.3 05/21/2024     05/21/2024     Lab Results   Component Value Date    CHOL 146 05/21/2024    CHOL 180 11/06/2023    CHOL 184 11/15/2021    CHLPL 138 01/18/2019     Lab Results   Component Value Date    TRIG 484 (H) 05/21/2024    TRIG 257 (H) 11/06/2023    TRIG 264 (H) 11/15/2021     Lab Results   Component Value Date    HDL 25 (L) 05/21/2024    HDL 38 (L) 11/06/2023    HDL 32 (L) 11/15/2021     Lab Results   Component Value Date    LDL 49 05/21/2024    LDL 98 11/06/2023     (H) 11/15/2021     Lab Results   Component Value Date    TSH 3.320 06/21/2024                  Assessment and Plan    Assessment & Plan  Fibromyalgia  Cymbalta increased from 60 mg to 90 mg daily.  Class 2 severe obesity due to excess calories with serious comorbidity and body mass index (BMI) of 38.0 to 38.9 in adult  Patient's (Body mass index is 38.88 kg/m².) indicates that they are morbidly/severely obese (BMI > 40 or > 35 with obesity - related health condition) with health conditions that include dyslipidemias and GERD . Weight is unchanged. BMI  is above average; BMI management plan is completed. We discussed portion control, increasing exercise, and continuing follow-up with bariatric surgery. .   Mild episode of recurrent major depressive disorder   Cymbalta increased from 60 to 90 mg daily.  Continue therapy.    Orders Placed This Encounter   Procedures    Thyroid Antibodies    T4, Free    T3, Free    TSH    Estrogens, Total    Progesterone    Testosterone, Free, Total     New Medications Ordered This Visit   Medications    DULoxetine (CYMBALTA) 60 MG capsule     Sig: Take 1 capsule by mouth Daily. Take along with cymbalta 30mg daily to equal 90mg daily.     Dispense:  90 capsule     Refill:  1    DULoxetine (CYMBALTA) 30 MG capsule     Sig: Take 1 capsule by mouth Daily. Take along  with cymbalta 60mg daily to equal 90mg daily.     Dispense:  90 capsule     Refill:  1         Patient to notify office with any acute concerns or issues.  Patient verbalizes understanding, agrees with plan of care and has no further questions upon discharge.    Please note that portions of this note were completed with a voice recognition program.      Follow Up    Return in about 1 year (around 6/21/2025) for Annual physical.  Patient was given instructions and counseling regarding her condition or for health maintenance advice. Please see specific information pulled into the AVS if appropriate.   Medications Discontinued During This Encounter   Medication Reason    dicyclomine (BENTYL) 20 MG tablet Historical Med - Therapy completed    HYDROcodone-acetaminophen (Norco) 5-325 MG per tablet Historical Med - Therapy completed    Elagolix Sodium (Orilissa) 150 MG tablet Historical Med - Therapy completed    DULoxetine (CYMBALTA) 60 MG capsule Reorder

## 2024-06-24 LAB
THYROGLOB AB SERPL-ACNC: <1 IU/ML (ref 0–0.9)
THYROPEROXIDASE AB SERPL-ACNC: <9 IU/ML (ref 0–34)

## 2024-06-26 LAB
TESTOST FREE SERPL-MCNC: 0.7 PG/ML (ref 0–4.2)
TESTOST SERPL-MCNC: 36 NG/DL (ref 8–60)

## 2024-06-28 LAB — ESTROGEN SERPL-MCNC: 379 PG/ML

## 2024-07-01 ENCOUNTER — TELEPHONE (OUTPATIENT)
Dept: SLEEP MEDICINE | Facility: HOSPITAL | Age: 39
End: 2024-07-01
Payer: COMMERCIAL

## 2024-07-01 LAB
PV1 NAB TITR SER NT: NORMAL {TITER}
PV3 NAB TITR SER NT: NORMAL {TITER}

## 2024-07-30 ENCOUNTER — PREP FOR SURGERY (OUTPATIENT)
Dept: OTHER | Facility: HOSPITAL | Age: 39
End: 2024-07-30
Payer: COMMERCIAL

## 2024-07-30 DIAGNOSIS — K44.9 PARAESOPHAGEAL HERNIA: ICD-10-CM

## 2024-07-30 DIAGNOSIS — E66.9 OBESITY, CLASS II, BMI 35-39.9: Primary | ICD-10-CM

## 2024-07-30 RX ORDER — SODIUM CHLORIDE, SODIUM LACTATE, POTASSIUM CHLORIDE, CALCIUM CHLORIDE 600; 310; 30; 20 MG/100ML; MG/100ML; MG/100ML; MG/100ML
100 INJECTION, SOLUTION INTRAVENOUS CONTINUOUS
Status: CANCELLED | OUTPATIENT
Start: 2024-08-05

## 2024-07-30 RX ORDER — SODIUM CHLORIDE 9 MG/ML
40 INJECTION, SOLUTION INTRAVENOUS AS NEEDED
Status: CANCELLED | OUTPATIENT
Start: 2024-08-05

## 2024-07-30 RX ORDER — SODIUM CHLORIDE 0.9 % (FLUSH) 0.9 %
3 SYRINGE (ML) INJECTION EVERY 12 HOURS SCHEDULED
OUTPATIENT
Start: 2024-07-30

## 2024-07-30 RX ORDER — PROMETHAZINE HYDROCHLORIDE 12.5 MG/1
12.5 TABLET ORAL ONCE
Status: CANCELLED | OUTPATIENT
Start: 2024-08-05 | End: 2024-07-30

## 2024-07-30 RX ORDER — SODIUM CHLORIDE 0.9 % (FLUSH) 0.9 %
3-10 SYRINGE (ML) INJECTION AS NEEDED
Status: CANCELLED | OUTPATIENT
Start: 2024-08-05

## 2024-07-30 RX ORDER — SCOLOPAMINE TRANSDERMAL SYSTEM 1 MG/1
1 PATCH, EXTENDED RELEASE TRANSDERMAL CONTINUOUS
Status: CANCELLED | OUTPATIENT
Start: 2024-08-05 | End: 2024-08-08

## 2024-07-30 RX ORDER — CHLORHEXIDINE GLUCONATE ORAL RINSE 1.2 MG/ML
15 SOLUTION DENTAL SEE ADMIN INSTRUCTIONS
Status: CANCELLED | OUTPATIENT
Start: 2024-08-05

## 2024-07-30 RX ORDER — PANTOPRAZOLE SODIUM 40 MG/10ML
40 INJECTION, POWDER, LYOPHILIZED, FOR SOLUTION INTRAVENOUS ONCE
Status: CANCELLED | OUTPATIENT
Start: 2024-08-05 | End: 2024-07-30

## 2024-07-30 RX ORDER — GABAPENTIN 300 MG/1
300 CAPSULE ORAL ONCE
Status: CANCELLED | OUTPATIENT
Start: 2024-08-05 | End: 2024-07-30

## 2024-07-30 RX ORDER — METOCLOPRAMIDE HYDROCHLORIDE 5 MG/ML
10 INJECTION INTRAMUSCULAR; INTRAVENOUS ONCE
Status: CANCELLED | OUTPATIENT
Start: 2024-08-05 | End: 2024-07-30

## 2024-07-30 RX ORDER — PROMETHAZINE HYDROCHLORIDE 25 MG/1
25 SUPPOSITORY RECTAL ONCE
Status: CANCELLED | OUTPATIENT
Start: 2024-08-05

## 2024-08-01 ENCOUNTER — CONSULT (OUTPATIENT)
Dept: BARIATRICS/WEIGHT MGMT | Facility: CLINIC | Age: 39
End: 2024-08-01
Payer: COMMERCIAL

## 2024-08-01 ENCOUNTER — PRE-ADMISSION TESTING (OUTPATIENT)
Dept: PREADMISSION TESTING | Facility: HOSPITAL | Age: 39
End: 2024-08-01
Payer: COMMERCIAL

## 2024-08-01 VITALS
WEIGHT: 212 LBS | BODY MASS INDEX: 37.56 KG/M2 | TEMPERATURE: 98 F | SYSTOLIC BLOOD PRESSURE: 130 MMHG | DIASTOLIC BLOOD PRESSURE: 83 MMHG | HEIGHT: 63 IN | HEART RATE: 77 BPM

## 2024-08-01 VITALS
RESPIRATION RATE: 16 BRPM | DIASTOLIC BLOOD PRESSURE: 82 MMHG | OXYGEN SATURATION: 97 % | SYSTOLIC BLOOD PRESSURE: 129 MMHG | TEMPERATURE: 98.5 F | HEART RATE: 77 BPM

## 2024-08-01 DIAGNOSIS — Z71.3 DIETARY COUNSELING: ICD-10-CM

## 2024-08-01 DIAGNOSIS — K44.9 PARAESOPHAGEAL HERNIA: ICD-10-CM

## 2024-08-01 DIAGNOSIS — F41.8 DEPRESSION WITH ANXIETY: ICD-10-CM

## 2024-08-01 DIAGNOSIS — G47.33 OSA (OBSTRUCTIVE SLEEP APNEA): ICD-10-CM

## 2024-08-01 DIAGNOSIS — E66.9 OBESITY, CLASS II, BMI 35-39.9: Primary | ICD-10-CM

## 2024-08-01 DIAGNOSIS — E66.9 OBESITY, CLASS II, BMI 35-39.9: ICD-10-CM

## 2024-08-01 DIAGNOSIS — K58.2 IRRITABLE BOWEL SYNDROME WITH BOTH CONSTIPATION AND DIARRHEA: ICD-10-CM

## 2024-08-01 DIAGNOSIS — M79.7 FIBROMYALGIA: ICD-10-CM

## 2024-08-01 DIAGNOSIS — K21.9 GASTROESOPHAGEAL REFLUX DISEASE, UNSPECIFIED WHETHER ESOPHAGITIS PRESENT: ICD-10-CM

## 2024-08-01 PROBLEM — R11.2 NAUSEA AND VOMITING: Status: RESOLVED | Noted: 2022-04-01 | Resolved: 2024-08-01

## 2024-08-01 PROBLEM — R10.13 EPIGASTRIC PAIN: Status: RESOLVED | Noted: 2022-04-01 | Resolved: 2024-08-01

## 2024-08-01 PROBLEM — R19.4 CHANGE IN BOWEL HABITS: Status: RESOLVED | Noted: 2023-10-17 | Resolved: 2024-08-01

## 2024-08-01 PROBLEM — R13.10 DYSPHAGIA: Status: RESOLVED | Noted: 2023-10-17 | Resolved: 2024-08-01

## 2024-08-01 PROBLEM — R19.7 DIARRHEA: Status: RESOLVED | Noted: 2023-10-17 | Resolved: 2024-08-01

## 2024-08-01 LAB
ALBUMIN SERPL-MCNC: 4.4 G/DL (ref 3.5–5.2)
ALBUMIN/GLOB SERPL: 1.6 G/DL
ALP SERPL-CCNC: 92 U/L (ref 39–117)
ALT SERPL W P-5'-P-CCNC: 28 U/L (ref 1–33)
ANION GAP SERPL CALCULATED.3IONS-SCNC: 11 MMOL/L (ref 5–15)
AST SERPL-CCNC: 19 U/L (ref 1–32)
BILIRUB SERPL-MCNC: 0.3 MG/DL (ref 0–1.2)
BUN SERPL-MCNC: 12 MG/DL (ref 6–20)
BUN/CREAT SERPL: 16.2 (ref 7–25)
CALCIUM SPEC-SCNC: 9.5 MG/DL (ref 8.6–10.5)
CHLORIDE SERPL-SCNC: 101 MMOL/L (ref 98–107)
CO2 SERPL-SCNC: 25 MMOL/L (ref 22–29)
CREAT SERPL-MCNC: 0.74 MG/DL (ref 0.57–1)
DEPRECATED RDW RBC AUTO: 40.4 FL (ref 37–54)
EGFRCR SERPLBLD CKD-EPI 2021: 107.7 ML/MIN/1.73
ERYTHROCYTE [DISTWIDTH] IN BLOOD BY AUTOMATED COUNT: 12.6 % (ref 12.3–15.4)
GLOBULIN UR ELPH-MCNC: 2.8 GM/DL
GLUCOSE SERPL-MCNC: 93 MG/DL (ref 65–99)
HCG SERPL QL: NEGATIVE
HCT VFR BLD AUTO: 39 % (ref 34–46.6)
HGB BLD-MCNC: 13.1 G/DL (ref 12–15.9)
MCH RBC QN AUTO: 29.5 PG (ref 26.6–33)
MCHC RBC AUTO-ENTMCNC: 33.6 G/DL (ref 31.5–35.7)
MCV RBC AUTO: 87.8 FL (ref 79–97)
PLATELET # BLD AUTO: 333 10*3/MM3 (ref 140–450)
PMV BLD AUTO: 9.1 FL (ref 6–12)
POTASSIUM SERPL-SCNC: 3.9 MMOL/L (ref 3.5–5.2)
PROT SERPL-MCNC: 7.2 G/DL (ref 6–8.5)
RBC # BLD AUTO: 4.44 10*6/MM3 (ref 3.77–5.28)
SODIUM SERPL-SCNC: 137 MMOL/L (ref 136–145)
WBC NRBC COR # BLD AUTO: 9.04 10*3/MM3 (ref 3.4–10.8)

## 2024-08-01 PROCEDURE — 80053 COMPREHEN METABOLIC PANEL: CPT

## 2024-08-01 PROCEDURE — 84703 CHORIONIC GONADOTROPIN ASSAY: CPT

## 2024-08-01 PROCEDURE — 85027 COMPLETE CBC AUTOMATED: CPT

## 2024-08-01 PROCEDURE — 36415 COLL VENOUS BLD VENIPUNCTURE: CPT

## 2024-08-01 RX ORDER — ENOXAPARIN SODIUM 100 MG/ML
40 INJECTION SUBCUTANEOUS
Qty: 5.6 ML | Refills: 0 | Status: ON HOLD | OUTPATIENT
Start: 2024-08-01 | End: 2024-08-16

## 2024-08-01 NOTE — H&P (VIEW-ONLY)
Bariatric Consult:  Referred by Rekha Kim APRN    Gin Alexandra is here today for consult on Consult (Consult Sleeve)      History of Present Illness:     Gin Alexandra is a 38 y.o. female with morbid obesity with co-morbidities including sleep apnea, back pain, fibromyalgia, GERD, and depression who presents for surgical consultation for the above procedure. Gin has completed the initial intake visit and has been examined by our nurse practitioner, dietician, psychologist and underwent the extensive educational teaching process under the guidance of our bariatric coordinator and myself. Gin also has seen or if has not yet will see the educational video EMILIA on the surgical procedure if available. Gin attended today more educational teaching from our bariatric coordinator and myself. Gin has had an extensive medical workup including a visit with their primary care physician, EKG, chest radiograph, blood work, EGD or UGI and possibly further testing. These have been reviewed by me and discussed with the patient. Gin is now ready to proceed with surgery. Gin presently denies nausea, vomiting, fever, chills, chest pain, shortness of air, melena, hematochezia, hemetemesis, dysuria, frequency, hematuria.     Past Medical History:   Diagnosis Date    Allergic 01/01/1995    Allergic rhinitis     Allergies     CODEINE SULFATE   MODERATE    Anxiety     Asthma     Benign neoplasm of pituitary gland     Carpal tunnel syndrome, bilateral upper limbs     x2 surgeries on the L, x1 on the R    Cholelithiasis 2005    Gall bladder was removed    Chronic fatigue, unspecified     Constipation, unspecified     Depression     Started when i was 14    Dizziness     Dyspnea, unspecified     Dysuria     Fatty liver 5/21/2024    Fibrocystic breast 2008    Fibromyalgia     Fibromyalgia     Fibromyalgia, primary 2019    GERD without esophagitis     Headache     HL (hearing loss)     Hyperlipidemia     Hypersomnia,  unspecified     IBS (irritable bowel syndrome)     diarrhea and constipation    Obesity, unspecified     STEPHANIA (obstructive sleep apnea) 5/21/2024    Other fatigue     Other mixed anxiety disorders     Other retention of urine     Pain in right hip     Pituitary tumor     PONV (postoperative nausea and vomiting) 2006    Severe nausea with vomiting for several hours after    Tinnitus     Vitamin D deficiency, unspecified        Encounter Diagnoses   Name Primary?    Obesity, Class II, BMI 35-39.9 Yes    Paraesophageal hernia     Gastroesophageal reflux disease, unspecified whether esophagitis present     STEPHANIA (obstructive sleep apnea)     Depression with anxiety     Fibromyalgia     Irritable bowel syndrome with both constipation and diarrhea     Dietary counseling        Past Surgical History:   Procedure Laterality Date    CARPAL TUNNEL RELEASE      x3    COLONOSCOPY      COLONOSCOPY N/A 11/27/2023    Procedure: COLONOSCOPY;  Surgeon: Kumar Turner MD;  Location: [facility] ENDOSCOPY;  Service: Gastroenterology;  Laterality: N/A;  NORMAL COLONOSCOPY    CYST REMOVAL N/A 5/23/2024    Procedure: Excision of subcutaneous mass from abdominal wall;  Surgeon: Julio Warren MD;  Location: [facility];  Service: General;  Laterality: N/A;    ENDOSCOPY N/A 06/13/2022    Procedure: ESOPHAGOGASTRODUODENOSCOPY WITH BIOPSIES;  Surgeon: Kumar Turner MD;  Location: [facility] ENDOSCOPY;  Service: Gastroenterology;  Laterality: N/A;  NORMAL EGD    ENDOSCOPY N/A 11/27/2023    Procedure: ESOPHAGOGASTRODUODENOSCOPY WITH BIOPSIES;  Surgeon: Kumar Turner MD;  Location: [facility] ENDOSCOPY;  Service: Gastroenterology;  Laterality: N/A;  HIATAL HERNIA, GASTRITIS    LAPAROSCOPIC CHOLECYSTECTOMY      AT AGE 21   DETAILS/COMPLICATIONS?    LEEP  2012    TUBAL ABDOMINAL LIGATION           * No active hospital problems. *      Allergies   Allergen Reactions    Codeine Hives         Current Outpatient Medications:      albuterol sulfate  (90 Base) MCG/ACT inhaler, Inhale 2 puffs Every 4 (Four) Hours As Needed for Shortness of Air., Disp: 54 g, Rfl: 0    Botox 200 units reconstituted solution, , Disp: , Rfl:     DULoxetine (CYMBALTA) 30 MG capsule, Take 1 capsule by mouth Daily. Take along with cymbalta 60mg daily to equal 90mg daily., Disp: 90 capsule, Rfl: 1    DULoxetine (CYMBALTA) 60 MG capsule, Take 1 capsule by mouth Daily. Take along with cymbalta 30mg daily to equal 90mg daily., Disp: 90 capsule, Rfl: 1    esomeprazole (nexIUM) 40 MG capsule, Take 1 capsule by mouth Every Morning Before Breakfast., Disp: 90 capsule, Rfl: 0    famotidine (Pepcid) 40 MG tablet, Take 1 tablet by mouth Daily., Disp: 90 tablet, Rfl: 1    meclizine (ANTIVERT) 25 MG tablet, Take 1 tablet by mouth 3 (Three) Times a Day As Needed for Dizziness., Disp: 30 tablet, Rfl: 1    Nurtec 75 MG dispersible tablet, TAKE ONE TABLET BY MOUTH EVERY DAY AS NEEDED FOR MIGRAINE, Disp: , Rfl:     ondansetron ODT (ZOFRAN-ODT) 4 MG disintegrating tablet, Place 1 tablet on the tongue Every 8 (Eight) Hours As Needed for Nausea or Vomiting., Disp: 30 tablet, Rfl: 0    tiZANidine (ZANAFLEX) 4 MG tablet, Take 1 tablet by mouth Every 8 (Eight) Hours As Needed for Muscle Spasms., Disp: 60 tablet, Rfl: 0    topiramate (TOPAMAX) 25 MG tablet, Take 1 tablet by mouth 2 (Two) Times a Day as directed for headache prevention, Disp: 60 tablet, Rfl: 1    busPIRone (BUSPAR) 10 MG tablet, Take 1 tablet by mouth 3 (Three) Times a Day. (Patient not taking: Reported on 8/1/2024), Disp: 90 tablet, Rfl: 1    Enoxaparin Sodium (LOVENOX) 40 MG/0.4ML solution prefilled syringe syringe, Inject 0.4 mL under the skin into the appropriate area as directed Daily for 14 days. Start after surgery unless instructed otherwise, Disp: 5.6 mL, Rfl: 0    folic acid-vit B6-vit B12 (FOLBEE) 2.5-25-1 MG tablet tablet, Take 1 tablet by mouth Daily., Disp: 40 tablet, Rfl: 0    Social History      Socioeconomic History    Marital status:     Number of children: 2   Tobacco Use    Smoking status: Former     Current packs/day: 0.00     Average packs/day: 2.0 packs/day for 16.0 years (32.0 ttl pk-yrs)     Types: Cigarettes     Start date: 1999     Quit date: 2015     Years since quittin.5     Passive exposure: Past    Smokeless tobacco: Never   Vaping Use    Vaping status: Never Used   Substance and Sexual Activity    Alcohol use: Not Currently     Comment: Very seldom    Drug use: Never    Sexual activity: Yes     Partners: Male     Birth control/protection: Surgical       Family History   Problem Relation Age of Onset    Arthritis Mother     Depression Mother     Hyperlipidemia Mother     Cancer Mother         Non-small lung cancer    COPD Father     Depression Father     Heart disease Father     Hyperlipidemia Father     Hyperlipidemia Brother     Cancer Maternal Aunt     Heart disease Maternal Uncle     Ovarian cancer Maternal Grandmother     Cancer Maternal Grandmother     Heart attack Maternal Grandfather     Stroke Maternal Grandfather     Heart disease Maternal Grandfather     COPD Paternal Grandmother     Depression Paternal Grandmother     Lung cancer Paternal Grandfather     Asthma Paternal Grandfather     Cancer Paternal Grandfather         lung cancer    COPD Paternal Grandfather     Vision loss Paternal Grandfather     Colon cancer Neg Hx     Malig Hyperthermia Neg Hx        Review of Systems:  Review of Systems   Constitutional:  Positive for fatigue.   Gastrointestinal:  Positive for constipation and diarrhea.   Musculoskeletal:  Positive for arthralgias and back pain.   All other systems reviewed and are negative.      Physical Exam:  Vital Signs:  Weight: 96.2 kg (212 lb)   Body mass index is 37.8 kg/m².  Temp: 98 °F (36.7 °C)   Heart Rate: 77   BP: 130/83     Physical Exam  Vitals reviewed.   HENT:      Head: Normocephalic and atraumatic.      Mouth/Throat:      Mouth:  Mucous membranes are moist.      Pharynx: Oropharynx is clear.   Eyes:      General: No scleral icterus.     Extraocular Movements: Extraocular movements intact.      Conjunctiva/sclera: Conjunctivae normal.      Pupils: Pupils are equal, round, and reactive to light.   Neck:      Thyroid: No thyromegaly.   Cardiovascular:      Rate and Rhythm: Normal rate.   Pulmonary:      Effort: Pulmonary effort is normal. No respiratory distress.      Breath sounds: Normal breath sounds. No stridor. No wheezing or rhonchi.   Abdominal:      General: Bowel sounds are normal.      Palpations: Abdomen is soft.      Tenderness: There is no abdominal tenderness. There is no right CVA tenderness, left CVA tenderness, guarding or rebound.      Hernia: No hernia is present.   Musculoskeletal:         General: Normal range of motion.      Cervical back: Normal range of motion and neck supple.   Lymphadenopathy:      Cervical: No cervical adenopathy.   Skin:     General: Skin is warm and dry.      Findings: No erythema.   Neurological:      Mental Status: She is alert and oriented to person, place, and time.   Psychiatric:         Mood and Affect: Mood normal.         Behavior: Behavior normal.         Thought Content: Thought content normal.         Judgment: Judgment normal.         Assessment:    Gin Alexandra is a 38 y.o. year old female with medically complicated severe obesity with a BMI of Body mass index is 37.8 kg/m². and multiple co-morbidities listed in the encounter diagnosis.    I think she is an appropriate candidate for this surgery, and is ready to proceed.    Plan/Discussion/Summary:  Small hiatal hernia per outside EGD.  Patient does take H2 blocker.  H. pylori negative    The patient has returned to the office for a surgical consultation and has requested to proceed with gastric sleeve.  I have had the opportunity to obtain a history, examine the patient and review the patient's chart. The procedure could be done  laparoscopically and or robotically.    The patient understands that surgery is a tool and that weight loss is not guaranteed but only seen in the context of appropriate use, regular follow up, exercise and making appropriate food choices.     I personally discussed the potential complications of the laparoscopic gastric sleeve with this patient.  The patient is well aware of potential complications of the surgery that include but not limited to bleeding, infections, deep vein thrombosis, pulmonary embolism, pulmonary complications such as pneumonia, cardiac event, hernias, small bowel obstruction, damage to the spleen or other organs, bowel injury, disfiguring scars, failure to lose weight, need for additional surgery, conversion to an open procedure and death.  The patient is also aware of complications which apply in particular to the gastric sleeve and can include but not limited to the leakage of gastric contents at the staple line, the development of an intra-abdominal abscess, gastroesophageal reflux disease, Moran's esophagus, ulcers, vitamin/mineral deficiencies, strictures, and the possibility of converting this procedure to a Giovanny-en-Y gastric bypass. The patient also understands the possibility of requiring an acid reducer medication for the rest of their life.    The risks, benefits, potential complications and alternative therapies were discussed at great length as outlined in our extensive consent forms, online consent and educational teaching processes.    The patient has confirmed the participation in the programs extensive educational activities.    All questions and concerns were answered to patient's satisfaction.  The patient now wishes to proceed with surgery.     The patient has agreed to a postoperative course of anitcoagulant therapy.      I instructed patient that the surgery could be laparoscopically and/or robotically.    I instructed patient to start on a H2 blocker or proton pump  inhibitor if not already on one of these medications.    I explained in detail the procedures that are in the consent.  All of these procedures have a chance to convert to open if any technical challenges or complications do occur.  Bariatric surgery is not cosmetic surgery but rather a tool to help a patient make a life-long commitment lifestyle change including diet, exercise, behavior changes, and taking supplemental vitamins and minerals.    Problems after surgery may require more operations to correct them.    The risks, benefits, alternatives, and potential complications of all of the procedures were explained in detail including, but not limited to death, anesthesia and medication adverse effect, deep venous thrombosis, pulmonary embolism, trocar site/incisional hernia, wound infection, abdominal infection, bleeding, failure to lose weight, gain weight, a change in body image, metabolic complications with vitamin deficiences and anemia.    Weight loss expectations were discussed with the patient in detail. The weight loss operations most commonly performed are the sleeve gastrectomy and the Giovanny-en-Y gastric bypass. These operations result in weight losses up to approximately 25-35% of initial body weight 12 to 24 months after surgery with the gastric bypass usually the higher percent of weight loss but depends on patient using the tool.    For the gastric bypass and loop duodenal switch (FACUNDO-S) the risks include but not limited to the following early complications:  Anastomotic leak/peritonitis, Giovanny/Alimentary/biliopancreatic limb obstruction, severe & minor wound infection/seroma, and nausea/vomiting.  Late complications can include but are not limited to malnutrition, vitamin deficiencies, frequent loose stools,  stomal stenosis, marginal ulcer, bowel obstruction, intussusception, internal, and incisional hernia.    Regarding the gastric sleeve, there is higher risk of dysphagia and reflux leading to  possible Moran's esophagus compared to a gastric bypass, as well as risk of internal visceral/organ injury, splenectomy, bleeding, infection, leak (which could require further intervention possible conversion to Giovanny-en-Y gastric bypass), stenosis and possibility of regaining weight.    Gin was counseled regarding diagnostic results, instructions for management, risk factor reductions, prognosis, patient and family education, impressions, risks and benefits of treatment options and importance of compliance with treatment. Total time of the encounter was over 45 minutes counseling the patient regarding the procedure as above and reviewing as well as ordering labs, medications and the procedure.  The chart was also reviewed prior to seeing the patient reviewing previous testing, studies and labs.    Gin understands the surgical procedures and the different surgical options that are available.  She understands the lifestyle changes that are required after surgery and has agreed to follow the guidelines outlined in the weight management program.  She also expressed understanding of the risks involved and had all of female questions answered and desires to proceed.      Itz Amador MD  8/1/2024

## 2024-08-01 NOTE — H&P
Bariatric Consult:  Referred by Rekha Kim APRN    Gin Alexandra is here today for consult on Consult (Consult Sleeve)      History of Present Illness:     Gin Alexandra is a 38 y.o. female with morbid obesity with co-morbidities including sleep apnea, back pain, fibromyalgia, GERD, and depression who presents for surgical consultation for the above procedure. Gin has completed the initial intake visit and has been examined by our nurse practitioner, dietician, psychologist and underwent the extensive educational teaching process under the guidance of our bariatric coordinator and myself. Gin also has seen or if has not yet will see the educational video EMILIA on the surgical procedure if available. Gin attended today more educational teaching from our bariatric coordinator and myself. Gin has had an extensive medical workup including a visit with their primary care physician, EKG, chest radiograph, blood work, EGD or UGI and possibly further testing. These have been reviewed by me and discussed with the patient. Gin is now ready to proceed with surgery. Gin presently denies nausea, vomiting, fever, chills, chest pain, shortness of air, melena, hematochezia, hemetemesis, dysuria, frequency, hematuria.     Past Medical History:   Diagnosis Date    Allergic 01/01/1995    Allergic rhinitis     Allergies     CODEINE SULFATE   MODERATE    Anxiety     Asthma     Benign neoplasm of pituitary gland     Carpal tunnel syndrome, bilateral upper limbs     x2 surgeries on the L, x1 on the R    Cholelithiasis 2005    Gall bladder was removed    Chronic fatigue, unspecified     Constipation, unspecified     Depression     Started when i was 14    Dizziness     Dyspnea, unspecified     Dysuria     Fatty liver 5/21/2024    Fibrocystic breast 2008    Fibromyalgia     Fibromyalgia     Fibromyalgia, primary 2019    GERD without esophagitis     Headache     HL (hearing loss)     Hyperlipidemia     Hypersomnia,  unspecified     IBS (irritable bowel syndrome)     diarrhea and constipation    Obesity, unspecified     STEPHANIA (obstructive sleep apnea) 5/21/2024    Other fatigue     Other mixed anxiety disorders     Other retention of urine     Pain in right hip     Pituitary tumor     PONV (postoperative nausea and vomiting) 2006    Severe nausea with vomiting for several hours after    Tinnitus     Vitamin D deficiency, unspecified        Encounter Diagnoses   Name Primary?    Obesity, Class II, BMI 35-39.9 Yes    Paraesophageal hernia     Gastroesophageal reflux disease, unspecified whether esophagitis present     STEPHANIA (obstructive sleep apnea)     Depression with anxiety     Fibromyalgia     Irritable bowel syndrome with both constipation and diarrhea     Dietary counseling        Past Surgical History:   Procedure Laterality Date    CARPAL TUNNEL RELEASE      x3    COLONOSCOPY      COLONOSCOPY N/A 11/27/2023    Procedure: COLONOSCOPY;  Surgeon: Kumar Turner MD;  Location: [facility] ENDOSCOPY;  Service: Gastroenterology;  Laterality: N/A;  NORMAL COLONOSCOPY    CYST REMOVAL N/A 5/23/2024    Procedure: Excision of subcutaneous mass from abdominal wall;  Surgeon: Julio Warren MD;  [place] [facility] [place] [facility] [place]  Service: General;  Laterality: N/A;    ENDOSCOPY N/A 06/13/2022    Procedure: ESOPHAGOGASTRODUODENOSCOPY WITH BIOPSIES;  Surgeon: Kumar Turner MD;  Location: [facility] ENDOSCOPY;  Service: Gastroenterology;  Laterality: N/A;  NORMAL EGD    ENDOSCOPY N/A 11/27/2023    Procedure: ESOPHAGOGASTRODUODENOSCOPY WITH BIOPSIES;  Surgeon: Kumar Turner MD;  Location: [facility] ENDOSCOPY;  Service: Gastroenterology;  Laterality: N/A;  HIATAL HERNIA, GASTRITIS    LAPAROSCOPIC CHOLECYSTECTOMY      AT AGE 21   DETAILS/COMPLICATIONS?    LEEP  2012    TUBAL ABDOMINAL LIGATION           * No active hospital problems. *      Allergies   Allergen Reactions    Codeine Hives         Current Outpatient Medications:      albuterol sulfate  (90 Base) MCG/ACT inhaler, Inhale 2 puffs Every 4 (Four) Hours As Needed for Shortness of Air., Disp: 54 g, Rfl: 0    Botox 200 units reconstituted solution, , Disp: , Rfl:     DULoxetine (CYMBALTA) 30 MG capsule, Take 1 capsule by mouth Daily. Take along with cymbalta 60mg daily to equal 90mg daily., Disp: 90 capsule, Rfl: 1    DULoxetine (CYMBALTA) 60 MG capsule, Take 1 capsule by mouth Daily. Take along with cymbalta 30mg daily to equal 90mg daily., Disp: 90 capsule, Rfl: 1    esomeprazole (nexIUM) 40 MG capsule, Take 1 capsule by mouth Every Morning Before Breakfast., Disp: 90 capsule, Rfl: 0    famotidine (Pepcid) 40 MG tablet, Take 1 tablet by mouth Daily., Disp: 90 tablet, Rfl: 1    meclizine (ANTIVERT) 25 MG tablet, Take 1 tablet by mouth 3 (Three) Times a Day As Needed for Dizziness., Disp: 30 tablet, Rfl: 1    Nurtec 75 MG dispersible tablet, TAKE ONE TABLET BY MOUTH EVERY DAY AS NEEDED FOR MIGRAINE, Disp: , Rfl:     ondansetron ODT (ZOFRAN-ODT) 4 MG disintegrating tablet, Place 1 tablet on the tongue Every 8 (Eight) Hours As Needed for Nausea or Vomiting., Disp: 30 tablet, Rfl: 0    tiZANidine (ZANAFLEX) 4 MG tablet, Take 1 tablet by mouth Every 8 (Eight) Hours As Needed for Muscle Spasms., Disp: 60 tablet, Rfl: 0    topiramate (TOPAMAX) 25 MG tablet, Take 1 tablet by mouth 2 (Two) Times a Day as directed for headache prevention, Disp: 60 tablet, Rfl: 1    busPIRone (BUSPAR) 10 MG tablet, Take 1 tablet by mouth 3 (Three) Times a Day. (Patient not taking: Reported on 8/1/2024), Disp: 90 tablet, Rfl: 1    Enoxaparin Sodium (LOVENOX) 40 MG/0.4ML solution prefilled syringe syringe, Inject 0.4 mL under the skin into the appropriate area as directed Daily for 14 days. Start after surgery unless instructed otherwise, Disp: 5.6 mL, Rfl: 0    folic acid-vit B6-vit B12 (FOLBEE) 2.5-25-1 MG tablet tablet, Take 1 tablet by mouth Daily., Disp: 40 tablet, Rfl: 0    Social History      Socioeconomic History    Marital status:     Number of children: 2   Tobacco Use    Smoking status: Former     Current packs/day: 0.00     Average packs/day: 2.0 packs/day for 16.0 years (32.0 ttl pk-yrs)     Types: Cigarettes     Start date: 1999     Quit date: 2015     Years since quittin.5     Passive exposure: Past    Smokeless tobacco: Never   Vaping Use    Vaping status: Never Used   Substance and Sexual Activity    Alcohol use: Not Currently     Comment: Very seldom    Drug use: Never    Sexual activity: Yes     Partners: Male     Birth control/protection: Surgical       Family History   Problem Relation Age of Onset    Arthritis Mother     Depression Mother     Hyperlipidemia Mother     Cancer Mother         Non-small lung cancer    COPD Father     Depression Father     Heart disease Father     Hyperlipidemia Father     Hyperlipidemia Brother     Cancer Maternal Aunt     Heart disease Maternal Uncle     Ovarian cancer Maternal Grandmother     Cancer Maternal Grandmother     Heart attack Maternal Grandfather     Stroke Maternal Grandfather     Heart disease Maternal Grandfather     COPD Paternal Grandmother     Depression Paternal Grandmother     Lung cancer Paternal Grandfather     Asthma Paternal Grandfather     Cancer Paternal Grandfather         lung cancer    COPD Paternal Grandfather     Vision loss Paternal Grandfather     Colon cancer Neg Hx     Malig Hyperthermia Neg Hx        Review of Systems:  Review of Systems   Constitutional:  Positive for fatigue.   Gastrointestinal:  Positive for constipation and diarrhea.   Musculoskeletal:  Positive for arthralgias and back pain.   All other systems reviewed and are negative.      Physical Exam:  Vital Signs:  Weight: 96.2 kg (212 lb)   Body mass index is 37.8 kg/m².  Temp: 98 °F (36.7 °C)   Heart Rate: 77   BP: 130/83     Physical Exam  Vitals reviewed.   HENT:      Head: Normocephalic and atraumatic.      Mouth/Throat:      Mouth:  Mucous membranes are moist.      Pharynx: Oropharynx is clear.   Eyes:      General: No scleral icterus.     Extraocular Movements: Extraocular movements intact.      Conjunctiva/sclera: Conjunctivae normal.      Pupils: Pupils are equal, round, and reactive to light.   Neck:      Thyroid: No thyromegaly.   Cardiovascular:      Rate and Rhythm: Normal rate.   Pulmonary:      Effort: Pulmonary effort is normal. No respiratory distress.      Breath sounds: Normal breath sounds. No stridor. No wheezing or rhonchi.   Abdominal:      General: Bowel sounds are normal.      Palpations: Abdomen is soft.      Tenderness: There is no abdominal tenderness. There is no right CVA tenderness, left CVA tenderness, guarding or rebound.      Hernia: No hernia is present.   Musculoskeletal:         General: Normal range of motion.      Cervical back: Normal range of motion and neck supple.   Lymphadenopathy:      Cervical: No cervical adenopathy.   Skin:     General: Skin is warm and dry.      Findings: No erythema.   Neurological:      Mental Status: She is alert and oriented to person, place, and time.   Psychiatric:         Mood and Affect: Mood normal.         Behavior: Behavior normal.         Thought Content: Thought content normal.         Judgment: Judgment normal.         Assessment:    Gin Alexandra is a 38 y.o. year old female with medically complicated severe obesity with a BMI of Body mass index is 37.8 kg/m². and multiple co-morbidities listed in the encounter diagnosis.    I think she is an appropriate candidate for this surgery, and is ready to proceed.    Plan/Discussion/Summary:  Small hiatal hernia per outside EGD.  Patient does take H2 blocker.  H. pylori negative    The patient has returned to the office for a surgical consultation and has requested to proceed with gastric sleeve.  I have had the opportunity to obtain a history, examine the patient and review the patient's chart. The procedure could be done  laparoscopically and or robotically.    The patient understands that surgery is a tool and that weight loss is not guaranteed but only seen in the context of appropriate use, regular follow up, exercise and making appropriate food choices.     I personally discussed the potential complications of the laparoscopic gastric sleeve with this patient.  The patient is well aware of potential complications of the surgery that include but not limited to bleeding, infections, deep vein thrombosis, pulmonary embolism, pulmonary complications such as pneumonia, cardiac event, hernias, small bowel obstruction, damage to the spleen or other organs, bowel injury, disfiguring scars, failure to lose weight, need for additional surgery, conversion to an open procedure and death.  The patient is also aware of complications which apply in particular to the gastric sleeve and can include but not limited to the leakage of gastric contents at the staple line, the development of an intra-abdominal abscess, gastroesophageal reflux disease, Moran's esophagus, ulcers, vitamin/mineral deficiencies, strictures, and the possibility of converting this procedure to a Giovanny-en-Y gastric bypass. The patient also understands the possibility of requiring an acid reducer medication for the rest of their life.    The risks, benefits, potential complications and alternative therapies were discussed at great length as outlined in our extensive consent forms, online consent and educational teaching processes.    The patient has confirmed the participation in the programs extensive educational activities.    All questions and concerns were answered to patient's satisfaction.  The patient now wishes to proceed with surgery.     The patient has agreed to a postoperative course of anitcoagulant therapy.      I instructed patient that the surgery could be laparoscopically and/or robotically.    I instructed patient to start on a H2 blocker or proton pump  inhibitor if not already on one of these medications.    I explained in detail the procedures that are in the consent.  All of these procedures have a chance to convert to open if any technical challenges or complications do occur.  Bariatric surgery is not cosmetic surgery but rather a tool to help a patient make a life-long commitment lifestyle change including diet, exercise, behavior changes, and taking supplemental vitamins and minerals.    Problems after surgery may require more operations to correct them.    The risks, benefits, alternatives, and potential complications of all of the procedures were explained in detail including, but not limited to death, anesthesia and medication adverse effect, deep venous thrombosis, pulmonary embolism, trocar site/incisional hernia, wound infection, abdominal infection, bleeding, failure to lose weight, gain weight, a change in body image, metabolic complications with vitamin deficiences and anemia.    Weight loss expectations were discussed with the patient in detail. The weight loss operations most commonly performed are the sleeve gastrectomy and the Giovanny-en-Y gastric bypass. These operations result in weight losses up to approximately 25-35% of initial body weight 12 to 24 months after surgery with the gastric bypass usually the higher percent of weight loss but depends on patient using the tool.    For the gastric bypass and loop duodenal switch (FACUNDO-S) the risks include but not limited to the following early complications:  Anastomotic leak/peritonitis, Giovanny/Alimentary/biliopancreatic limb obstruction, severe & minor wound infection/seroma, and nausea/vomiting.  Late complications can include but are not limited to malnutrition, vitamin deficiencies, frequent loose stools,  stomal stenosis, marginal ulcer, bowel obstruction, intussusception, internal, and incisional hernia.    Regarding the gastric sleeve, there is higher risk of dysphagia and reflux leading to  possible Moran's esophagus compared to a gastric bypass, as well as risk of internal visceral/organ injury, splenectomy, bleeding, infection, leak (which could require further intervention possible conversion to Giovanny-en-Y gastric bypass), stenosis and possibility of regaining weight.    Gin was counseled regarding diagnostic results, instructions for management, risk factor reductions, prognosis, patient and family education, impressions, risks and benefits of treatment options and importance of compliance with treatment. Total time of the encounter was over 45 minutes counseling the patient regarding the procedure as above and reviewing as well as ordering labs, medications and the procedure.  The chart was also reviewed prior to seeing the patient reviewing previous testing, studies and labs.    Gin understands the surgical procedures and the different surgical options that are available.  She understands the lifestyle changes that are required after surgery and has agreed to follow the guidelines outlined in the weight management program.  She also expressed understanding of the risks involved and had all of female questions answered and desires to proceed.      Itz Amador MD  8/1/2024

## 2024-08-01 NOTE — PATIENT INSTRUCTIONS
Bariatric Manual    You were provided a manual specific to the procedure that you have chosen.  Please refer to that with any questions or call the office at [phone]

## 2024-08-01 NOTE — DISCHARGE INSTRUCTIONS
Pec scanned into chart,pec faxed to [facility] [place] nico serrato.   Take only the following medications the morning of surgery:     Bring inhaler with you    Do not take Bariatric Vitamins, Folic Acid, Actigall (if applicable) or Lovenox Injections (if applicable) the morning of surgery.  If you have a history of blood clots or have a BMI greater than 50, Dr. Amador may order Lovenox for after surgery. Do not take Lovenox blood thinner before surgery.      General Instructions:    Liquids only the day before surgery.  Drink one 20 ounce Gatorade G2 the evening before surgery.  Nothing red in color.   The morning of surgery have another 20 ounce Gatorade G2.  Again, nothing red in color.  Your drink must be completed 2 hours before your arrival time.   Patients who avoid smoking, chewing tobacco and alcohol for 4 weeks prior to surgery have a reduced risk of post-operative complications.  Quit smoking as many days before surgery as you can.  Do not smoke, use chewing tobacco or drink alcohol the day of surgery.   Bring any papers given to you in the doctor's office.  Wear clean comfortable clothes.  Do not wear contact lenses, false eyelashes or make-up.  Bring a case for your glasses.   Bring crutches or walker if applicable.  Remove all piercings.  Leave jewelry and any other valuables at home.  Remove fingernail polish, gel overlays or any artificial nails.  Hair extensions with metal clips must be removed prior to surgery.  The Pre-Admission Testing nurse will instruct you to bring medications if unable to obtain an accurate list in Pre-Admission Testing.    If you were given a blood bank ID arm band remember to bring it with you the day of surgery.    Preventing a Surgical Site Infection:  For 2 to 3 days before surgery, avoid shaving with a razor because the razor can irritate skin and make it easier to develop an infection.    Any areas of open skin can increase the risk of a post-operative wound infection by allowing bacteria to enter and travel throughout the body.  Notify  your surgeon if you have any skin wounds / rashes even if it is not near the expected surgical site.  The area will need assessed to determine if surgery should be delayed until it is healed.  2 days prior to surgery, take a shower using a fresh bar of anti-bacterial soap (such as Dial).  Use a clean washcloth and dry with a clean towel.    The day prior to surgery, take a shower using a fresh bar of anti-bacterial soap (such as Dial).  Use a clean washcloth and dry with a clean towel.  Sleep in a clean bed with clean clothing.  Do not allow pets to sleep with you.  The morning of surgery shower using a fresh bar of anti-bacterial soap (such as Dial).  Use a clean washcloth and dry with a clean towel.  Follow the Chlorhexidine instructions below.    CHLORHEXIDINE CLOTH INSTRUCTIONS  The morning of surgery follow these instructions using the Chlorhexidine cloths you've been given.  These steps reduce bacteria on the body.  Do not use the cloths near your eyes, ears mouth, genitalia or on open wounds.  Throw the cloths away after use but do not try to flush them down a toilet.    Open and remove one cloth at a time from the package.    Leave the cloth unfolded and begin the bathing.  Massage the skin with the cloths using gentle pressure to remove bacteria.  Do not scrub harshly.   Follow the steps below with one 2% CHG cloth per area (6 total cloths).  One cloth for neck, shoulders and chest.  One cloth for both arms, hands, fingers and underarms (do underarms last).  One cloth for the abdomen followed by groin.  One cloth for right leg and foot including between the toes.  One cloth for left leg and foot including between the toes.  The last cloth is to be used for the back of the neck, back and buttocks.    Allow the CHG to air dry 3 minutes on the skin which will give it time to work and decrease the chance of irritation.  The skin may feel sticky until it is dry.  Do not rinse with water or any other liquid or  you will lose the beneficial effects of the CHG.  If mild skin irritation occurs, do rinse the skin to remove the CHG.  Report this to the nurse at time of admission.  Do not apply lotions, creams, ointments, deodorants or perfumes after using the clothes. Dress in clean clothes before coming to the hospital.    Ask your surgeon if you will be receiving antibiotics prior to surgery.  Make sure you, your family, and all healthcare providers clean their hands with soap and water or an alcohol based hand  before caring for you or your wound.      Day of surgery:  Your arrival time is approximately two hours before your scheduled surgery time.  Upon arrival, a Pre-op nurse and Anesthesiologist will review your health history, obtain vital signs, and answer questions you may have.  A Pre-op nurse will start an IV and you may receive medication in preparation for surgery, including something to help you relax.  If applicable, we do ask that you have your C-PAP/BI-PAP machine available. It can be utilized the night of surgery.     Please be aware that surgery does come with discomfort.  We want to make every effort to control your discomfort so please discuss any uncontrolled symptoms with your nurse.   Your doctor will most likely have prescribed pain medications.      If you are going home after surgery you will receive individualized written care instructions before being discharged.  A responsible adult must drive you to and from the hospital on the day of your surgery and ideally stay with you through the night.   Discharge prescriptions can be filled by the hospital pharmacy during regular pharmacy hours.  If you are having surgery late in the day/evening your prescription may be e-prescribed to your pharmacy.  Please verify your pharmacy hours or chose a 24 hour pharmacy to avoid not having access to your prescription because your pharmacy has closed for the day.    If you are staying overnight following  surgery, you will be transported to your hospital room following the recovery period.  [facility] has all private rooms.    If you have any questions please call Pre-Admission Testing at [phone].  Deductibles and co-payments are collected on the day of service. Please be prepared to pay the required co-pay, deductible or deposit on the day of service as defined by your plan.    Call your surgeon immediately if you experience any of the following symptoms:  Sore Throat  Shortness of Breath or difficulty breathing  Cough  Chills  Body soreness or muscle pain  Headache  Fever  New loss of taste or smell  Do not arrive for your surgery ill.  Your procedure will need to be rescheduled to another time.  You will need to call your physician before the day of surgery to avoid any unnecessary exposure to hospital staff as well as other patients.

## 2024-08-05 ENCOUNTER — HOSPITAL ENCOUNTER (INPATIENT)
Facility: HOSPITAL | Age: 39
LOS: 1 days | Discharge: HOME | DRG: 621 | End: 2024-08-06
Attending: SURGERY | Admitting: SURGERY
Payer: COMMERCIAL

## 2024-08-05 ENCOUNTER — ANESTHESIA EVENT (OUTPATIENT)
Dept: PERIOP | Facility: HOSPITAL | Age: 39
DRG: 621 | End: 2024-08-05
Payer: COMMERCIAL

## 2024-08-05 ENCOUNTER — ANESTHESIA (OUTPATIENT)
Dept: PERIOP | Facility: HOSPITAL | Age: 39
DRG: 621 | End: 2024-08-05
Payer: COMMERCIAL

## 2024-08-05 DIAGNOSIS — E66.9 OBESITY, CLASS II, BMI 35-39.9: ICD-10-CM

## 2024-08-05 DIAGNOSIS — K44.9 PARAESOPHAGEAL HERNIA: ICD-10-CM

## 2024-08-05 PROCEDURE — C1713 ANCHOR/SCREW BN/BN,TIS/BN: HCPCS | Performed by: SURGERY

## 2024-08-05 PROCEDURE — 25010000002 ENOXAPARIN PER 10 MG: Performed by: SURGERY

## 2024-08-05 PROCEDURE — 25010000002 PROPOFOL 200 MG/20ML EMULSION: Performed by: NURSE ANESTHETIST, CERTIFIED REGISTERED

## 2024-08-05 PROCEDURE — 25010000002 FENTANYL CITRATE (PF) 100 MCG/2ML SOLUTION: Performed by: NURSE ANESTHETIST, CERTIFIED REGISTERED

## 2024-08-05 PROCEDURE — 25010000002 METOCLOPRAMIDE PER 10 MG: Performed by: SURGERY

## 2024-08-05 PROCEDURE — 25010000002 HYDROMORPHONE PER 4 MG: Performed by: SURGERY

## 2024-08-05 PROCEDURE — 25810000003 LACTATED RINGERS PER 1000 ML: Performed by: STUDENT IN AN ORGANIZED HEALTH CARE EDUCATION/TRAINING PROGRAM

## 2024-08-05 PROCEDURE — 25010000002 ONDANSETRON PER 1 MG: Performed by: NURSE ANESTHETIST, CERTIFIED REGISTERED

## 2024-08-05 PROCEDURE — 25010000002 MIDAZOLAM PER 1 MG: Performed by: STUDENT IN AN ORGANIZED HEALTH CARE EDUCATION/TRAINING PROGRAM

## 2024-08-05 PROCEDURE — 25010000002 HYDROMORPHONE PER 4 MG: Performed by: NURSE ANESTHETIST, CERTIFIED REGISTERED

## 2024-08-05 PROCEDURE — 25010000002 PROPOFOL 10 MG/ML EMULSION: Performed by: NURSE ANESTHETIST, CERTIFIED REGISTERED

## 2024-08-05 PROCEDURE — 25810000003 LACTATED RINGERS PER 1000 ML: Performed by: SURGERY

## 2024-08-05 PROCEDURE — 25010000002 ESMOLOL 100 MG/10ML SOLUTION: Performed by: NURSE ANESTHETIST, CERTIFIED REGISTERED

## 2024-08-05 PROCEDURE — 43775 LAP SLEEVE GASTRECTOMY: CPT | Performed by: NURSE PRACTITIONER

## 2024-08-05 PROCEDURE — 25010000002 KETOROLAC TROMETHAMINE PER 15 MG: Performed by: SURGERY

## 2024-08-05 PROCEDURE — 25810000003 LACTATED RINGERS SOLUTION: Performed by: SURGERY

## 2024-08-05 PROCEDURE — 25010000002 CLONIDINE PER 1 MG: Performed by: SURGERY

## 2024-08-05 PROCEDURE — 25010000002 EPINEPHRINE 1 MG/ML SOLUTION 30 ML VIAL: Performed by: SURGERY

## 2024-08-05 PROCEDURE — 25010000002 ROPIVACAINE PER 1 MG: Performed by: SURGERY

## 2024-08-05 PROCEDURE — 25010000002 CEFAZOLIN 3 G RECONSTITUTED SOLUTION 1 EACH VIAL: Performed by: SURGERY

## 2024-08-05 PROCEDURE — 0DB64Z3 EXCISION OF STOMACH, PERCUTANEOUS ENDOSCOPIC APPROACH, VERTICAL: ICD-10-PCS | Performed by: SURGERY

## 2024-08-05 PROCEDURE — 25010000002 ACETAMINOPHEN 10 MG/ML SOLUTION: Performed by: NURSE ANESTHETIST, CERTIFIED REGISTERED

## 2024-08-05 PROCEDURE — 25010000002 DEXAMETHASONE SODIUM PHOSPHATE 20 MG/5ML SOLUTION: Performed by: NURSE ANESTHETIST, CERTIFIED REGISTERED

## 2024-08-05 PROCEDURE — 25010000002 MAGNESIUM SULFATE PER 500 MG OF MAGNESIUM: Performed by: NURSE ANESTHETIST, CERTIFIED REGISTERED

## 2024-08-05 PROCEDURE — 25010000002 FENTANYL CITRATE (PF) 50 MCG/ML SOLUTION: Performed by: NURSE ANESTHETIST, CERTIFIED REGISTERED

## 2024-08-05 PROCEDURE — 25010000002 SUGAMMADEX 200 MG/2ML SOLUTION: Performed by: NURSE ANESTHETIST, CERTIFIED REGISTERED

## 2024-08-05 PROCEDURE — 0BQT4ZZ REPAIR DIAPHRAGM, PERCUTANEOUS ENDOSCOPIC APPROACH: ICD-10-PCS | Performed by: SURGERY

## 2024-08-05 PROCEDURE — 43775 LAP SLEEVE GASTRECTOMY: CPT | Performed by: SURGERY

## 2024-08-05 PROCEDURE — 88307 TISSUE EXAM BY PATHOLOGIST: CPT | Performed by: SURGERY

## 2024-08-05 DEVICE — TITAN SGS STAPLER WITH TITAN SGS STANDARD STAPLE-LINE REINFORCEMENT
Type: IMPLANTABLE DEVICE | Site: ABDOMEN | Status: FUNCTIONAL
Brand: TELEFLEX

## 2024-08-05 DEVICE — PBT NON ABSORBABLE WOUND CLOSURE DEVICE
Type: IMPLANTABLE DEVICE | Site: ABDOMEN | Status: FUNCTIONAL
Brand: V-LOC

## 2024-08-05 RX ORDER — LIDOCAINE HYDROCHLORIDE 10 MG/ML
0.5 INJECTION, SOLUTION INFILTRATION; PERINEURAL ONCE AS NEEDED
Status: DISCONTINUED | OUTPATIENT
Start: 2024-08-05 | End: 2024-08-05 | Stop reason: HOSPADM

## 2024-08-05 RX ORDER — SODIUM CHLORIDE, SODIUM LACTATE, POTASSIUM CHLORIDE, CALCIUM CHLORIDE 600; 310; 30; 20 MG/100ML; MG/100ML; MG/100ML; MG/100ML
100 INJECTION, SOLUTION INTRAVENOUS CONTINUOUS
Status: DISCONTINUED | OUTPATIENT
Start: 2024-08-05 | End: 2024-08-05

## 2024-08-05 RX ORDER — HYDRALAZINE HYDROCHLORIDE 20 MG/ML
10 INJECTION INTRAMUSCULAR; INTRAVENOUS
Status: DISCONTINUED | OUTPATIENT
Start: 2024-08-05 | End: 2024-08-06 | Stop reason: HOSPADM

## 2024-08-05 RX ORDER — CHLORHEXIDINE GLUCONATE ORAL RINSE 1.2 MG/ML
15 SOLUTION DENTAL SEE ADMIN INSTRUCTIONS
Status: COMPLETED | OUTPATIENT
Start: 2024-08-05 | End: 2024-08-05

## 2024-08-05 RX ORDER — PANTOPRAZOLE SODIUM 40 MG/10ML
40 INJECTION, POWDER, LYOPHILIZED, FOR SOLUTION INTRAVENOUS ONCE
Status: COMPLETED | OUTPATIENT
Start: 2024-08-05 | End: 2024-08-05

## 2024-08-05 RX ORDER — ESMOLOL HYDROCHLORIDE 10 MG/ML
INJECTION INTRAVENOUS AS NEEDED
Status: DISCONTINUED | OUTPATIENT
Start: 2024-08-05 | End: 2024-08-05 | Stop reason: SURG

## 2024-08-05 RX ORDER — METOCLOPRAMIDE HYDROCHLORIDE 5 MG/ML
10 INJECTION INTRAMUSCULAR; INTRAVENOUS EVERY 6 HOURS
Status: DISCONTINUED | OUTPATIENT
Start: 2024-08-05 | End: 2024-08-06 | Stop reason: HOSPADM

## 2024-08-05 RX ORDER — ACETAMINOPHEN 500 MG
1000 TABLET ORAL EVERY 6 HOURS
Status: DISCONTINUED | OUTPATIENT
Start: 2024-08-05 | End: 2024-08-06 | Stop reason: HOSPADM

## 2024-08-05 RX ORDER — DIPHENHYDRAMINE HYDROCHLORIDE 50 MG/ML
25 INJECTION INTRAMUSCULAR; INTRAVENOUS EVERY 4 HOURS PRN
Status: DISCONTINUED | OUTPATIENT
Start: 2024-08-05 | End: 2024-08-06 | Stop reason: HOSPADM

## 2024-08-05 RX ORDER — HYDROMORPHONE HYDROCHLORIDE 1 MG/ML
0.5 INJECTION, SOLUTION INTRAMUSCULAR; INTRAVENOUS; SUBCUTANEOUS
Status: DISCONTINUED | OUTPATIENT
Start: 2024-08-05 | End: 2024-08-05 | Stop reason: HOSPADM

## 2024-08-05 RX ORDER — PROMETHAZINE HYDROCHLORIDE 25 MG/1
25 SUPPOSITORY RECTAL ONCE
Status: DISCONTINUED | OUTPATIENT
Start: 2024-08-05 | End: 2024-08-05

## 2024-08-05 RX ORDER — PROPOFOL 10 MG/ML
VIAL (ML) INTRAVENOUS AS NEEDED
Status: DISCONTINUED | OUTPATIENT
Start: 2024-08-05 | End: 2024-08-05 | Stop reason: SURG

## 2024-08-05 RX ORDER — FENTANYL CITRATE 50 UG/ML
INJECTION, SOLUTION INTRAMUSCULAR; INTRAVENOUS AS NEEDED
Status: DISCONTINUED | OUTPATIENT
Start: 2024-08-05 | End: 2024-08-05 | Stop reason: SURG

## 2024-08-05 RX ORDER — FAMOTIDINE 10 MG/ML
20 INJECTION, SOLUTION INTRAVENOUS EVERY 12 HOURS SCHEDULED
Status: DISCONTINUED | OUTPATIENT
Start: 2024-08-05 | End: 2024-08-06 | Stop reason: HOSPADM

## 2024-08-05 RX ORDER — SCOLOPAMINE TRANSDERMAL SYSTEM 1 MG/1
1 PATCH, EXTENDED RELEASE TRANSDERMAL CONTINUOUS
Status: DISCONTINUED | OUTPATIENT
Start: 2024-08-05 | End: 2024-08-06 | Stop reason: HOSPADM

## 2024-08-05 RX ORDER — METOCLOPRAMIDE HYDROCHLORIDE 5 MG/ML
10 INJECTION INTRAMUSCULAR; INTRAVENOUS ONCE
Status: COMPLETED | OUTPATIENT
Start: 2024-08-05 | End: 2024-08-05

## 2024-08-05 RX ORDER — FENTANYL CITRATE 50 UG/ML
50 INJECTION, SOLUTION INTRAMUSCULAR; INTRAVENOUS
Status: DISCONTINUED | OUTPATIENT
Start: 2024-08-05 | End: 2024-08-05 | Stop reason: HOSPADM

## 2024-08-05 RX ORDER — SODIUM CHLORIDE 0.9 % (FLUSH) 0.9 %
3-10 SYRINGE (ML) INJECTION AS NEEDED
Status: DISCONTINUED | OUTPATIENT
Start: 2024-08-05 | End: 2024-08-05 | Stop reason: HOSPADM

## 2024-08-05 RX ORDER — MIRTAZAPINE 15 MG/1
15 TABLET, ORALLY DISINTEGRATING ORAL NIGHTLY PRN
Status: DISCONTINUED | OUTPATIENT
Start: 2024-08-05 | End: 2024-08-06 | Stop reason: HOSPADM

## 2024-08-05 RX ORDER — DIPHENHYDRAMINE HYDROCHLORIDE 50 MG/ML
12.5 INJECTION INTRAMUSCULAR; INTRAVENOUS
Status: DISCONTINUED | OUTPATIENT
Start: 2024-08-05 | End: 2024-08-05 | Stop reason: HOSPADM

## 2024-08-05 RX ORDER — SODIUM CHLORIDE, SODIUM LACTATE, POTASSIUM CHLORIDE, CALCIUM CHLORIDE 600; 310; 30; 20 MG/100ML; MG/100ML; MG/100ML; MG/100ML
9 INJECTION, SOLUTION INTRAVENOUS CONTINUOUS
Status: DISCONTINUED | OUTPATIENT
Start: 2024-08-05 | End: 2024-08-05

## 2024-08-05 RX ORDER — LORAZEPAM 1 MG/1
1 TABLET ORAL EVERY 12 HOURS PRN
Status: DISCONTINUED | OUTPATIENT
Start: 2024-08-05 | End: 2024-08-06 | Stop reason: HOSPADM

## 2024-08-05 RX ORDER — HYDRALAZINE HYDROCHLORIDE 20 MG/ML
5 INJECTION INTRAMUSCULAR; INTRAVENOUS
Status: DISCONTINUED | OUTPATIENT
Start: 2024-08-05 | End: 2024-08-05 | Stop reason: HOSPADM

## 2024-08-05 RX ORDER — NALOXONE HCL 0.4 MG/ML
0.2 VIAL (ML) INJECTION AS NEEDED
Status: DISCONTINUED | OUTPATIENT
Start: 2024-08-05 | End: 2024-08-05 | Stop reason: HOSPADM

## 2024-08-05 RX ORDER — LIDOCAINE HYDROCHLORIDE 20 MG/ML
INJECTION, SOLUTION EPIDURAL; INFILTRATION; INTRACAUDAL; PERINEURAL AS NEEDED
Status: DISCONTINUED | OUTPATIENT
Start: 2024-08-05 | End: 2024-08-05 | Stop reason: SURG

## 2024-08-05 RX ORDER — IPRATROPIUM BROMIDE AND ALBUTEROL SULFATE 2.5; .5 MG/3ML; MG/3ML
3 SOLUTION RESPIRATORY (INHALATION) ONCE AS NEEDED
Status: DISCONTINUED | OUTPATIENT
Start: 2024-08-05 | End: 2024-08-05 | Stop reason: HOSPADM

## 2024-08-05 RX ORDER — DEXAMETHASONE SODIUM PHOSPHATE 4 MG/ML
INJECTION, SOLUTION INTRA-ARTICULAR; INTRALESIONAL; INTRAMUSCULAR; INTRAVENOUS; SOFT TISSUE AS NEEDED
Status: DISCONTINUED | OUTPATIENT
Start: 2024-08-05 | End: 2024-08-05 | Stop reason: SURG

## 2024-08-05 RX ORDER — SODIUM CHLORIDE 9 MG/ML
40 INJECTION, SOLUTION INTRAVENOUS AS NEEDED
Status: DISCONTINUED | OUTPATIENT
Start: 2024-08-05 | End: 2024-08-05 | Stop reason: HOSPADM

## 2024-08-05 RX ORDER — CYANOCOBALAMIN 1000 UG/ML
1000 INJECTION, SOLUTION INTRAMUSCULAR; SUBCUTANEOUS ONCE
Status: COMPLETED | OUTPATIENT
Start: 2024-08-06 | End: 2024-08-06

## 2024-08-05 RX ORDER — FLUMAZENIL 0.1 MG/ML
0.2 INJECTION INTRAVENOUS AS NEEDED
Status: DISCONTINUED | OUTPATIENT
Start: 2024-08-05 | End: 2024-08-05 | Stop reason: HOSPADM

## 2024-08-05 RX ORDER — ONDANSETRON 2 MG/ML
4 INJECTION INTRAMUSCULAR; INTRAVENOUS ONCE AS NEEDED
Status: DISCONTINUED | OUTPATIENT
Start: 2024-08-05 | End: 2024-08-05 | Stop reason: HOSPADM

## 2024-08-05 RX ORDER — HYDROMORPHONE HYDROCHLORIDE 2 MG/1
2 TABLET ORAL EVERY 4 HOURS PRN
Status: DISCONTINUED | OUTPATIENT
Start: 2024-08-05 | End: 2024-08-06 | Stop reason: HOSPADM

## 2024-08-05 RX ORDER — MAGNESIUM HYDROXIDE 1200 MG/15ML
LIQUID ORAL AS NEEDED
Status: DISCONTINUED | OUTPATIENT
Start: 2024-08-05 | End: 2024-08-05 | Stop reason: HOSPADM

## 2024-08-05 RX ORDER — HYDROMORPHONE HYDROCHLORIDE 1 MG/ML
0.5 INJECTION, SOLUTION INTRAMUSCULAR; INTRAVENOUS; SUBCUTANEOUS
Status: DISCONTINUED | OUTPATIENT
Start: 2024-08-05 | End: 2024-08-06 | Stop reason: HOSPADM

## 2024-08-05 RX ORDER — OXYCODONE AND ACETAMINOPHEN 7.5; 325 MG/1; MG/1
1 TABLET ORAL EVERY 4 HOURS PRN
Status: DISCONTINUED | OUTPATIENT
Start: 2024-08-05 | End: 2024-08-05 | Stop reason: HOSPADM

## 2024-08-05 RX ORDER — ONDANSETRON 2 MG/ML
4 INJECTION INTRAMUSCULAR; INTRAVENOUS EVERY 4 HOURS PRN
Status: DISCONTINUED | OUTPATIENT
Start: 2024-08-05 | End: 2024-08-06 | Stop reason: HOSPADM

## 2024-08-05 RX ORDER — ENOXAPARIN SODIUM 100 MG/ML
40 INJECTION SUBCUTANEOUS ONCE
Status: COMPLETED | OUTPATIENT
Start: 2024-08-06 | End: 2024-08-06

## 2024-08-05 RX ORDER — MAGNESIUM SULFATE HEPTAHYDRATE 500 MG/ML
INJECTION, SOLUTION INTRAMUSCULAR; INTRAVENOUS AS NEEDED
Status: DISCONTINUED | OUTPATIENT
Start: 2024-08-05 | End: 2024-08-05 | Stop reason: SURG

## 2024-08-05 RX ORDER — ACETAMINOPHEN 160 MG/5ML
975 SOLUTION ORAL EVERY 6 HOURS
Status: DISCONTINUED | OUTPATIENT
Start: 2024-08-05 | End: 2024-08-06 | Stop reason: HOSPADM

## 2024-08-05 RX ORDER — ENOXAPARIN SODIUM 100 MG/ML
40 INJECTION SUBCUTANEOUS ONCE
Status: COMPLETED | OUTPATIENT
Start: 2024-08-05 | End: 2024-08-05

## 2024-08-05 RX ORDER — SODIUM CHLORIDE, SODIUM LACTATE, POTASSIUM CHLORIDE, CALCIUM CHLORIDE 600; 310; 30; 20 MG/100ML; MG/100ML; MG/100ML; MG/100ML
150 INJECTION, SOLUTION INTRAVENOUS CONTINUOUS
Status: DISCONTINUED | OUTPATIENT
Start: 2024-08-05 | End: 2024-08-06 | Stop reason: HOSPADM

## 2024-08-05 RX ORDER — NALOXONE HCL 0.4 MG/ML
0.1 VIAL (ML) INJECTION
Status: DISCONTINUED | OUTPATIENT
Start: 2024-08-05 | End: 2024-08-06 | Stop reason: HOSPADM

## 2024-08-05 RX ORDER — HYDROCODONE BITARTRATE AND ACETAMINOPHEN 5; 325 MG/1; MG/1
1 TABLET ORAL ONCE AS NEEDED
Status: DISCONTINUED | OUTPATIENT
Start: 2024-08-05 | End: 2024-08-05 | Stop reason: HOSPADM

## 2024-08-05 RX ORDER — ONDANSETRON 4 MG/1
4 TABLET, ORALLY DISINTEGRATING ORAL EVERY 4 HOURS PRN
Status: DISCONTINUED | OUTPATIENT
Start: 2024-08-05 | End: 2024-08-06 | Stop reason: HOSPADM

## 2024-08-05 RX ORDER — SODIUM CHLORIDE 0.9 % (FLUSH) 0.9 %
3 SYRINGE (ML) INJECTION EVERY 12 HOURS SCHEDULED
Status: DISCONTINUED | OUTPATIENT
Start: 2024-08-05 | End: 2024-08-05 | Stop reason: HOSPADM

## 2024-08-05 RX ORDER — PROMETHAZINE HYDROCHLORIDE 12.5 MG/1
12.5 SUPPOSITORY RECTAL EVERY 4 HOURS PRN
Status: DISCONTINUED | OUTPATIENT
Start: 2024-08-05 | End: 2024-08-06 | Stop reason: HOSPADM

## 2024-08-05 RX ORDER — PROMETHAZINE HYDROCHLORIDE 12.5 MG/1
12.5 TABLET ORAL EVERY 4 HOURS PRN
Status: DISCONTINUED | OUTPATIENT
Start: 2024-08-05 | End: 2024-08-06 | Stop reason: HOSPADM

## 2024-08-05 RX ORDER — PROMETHAZINE HYDROCHLORIDE 25 MG/1
25 SUPPOSITORY RECTAL ONCE AS NEEDED
Status: DISCONTINUED | OUTPATIENT
Start: 2024-08-05 | End: 2024-08-05 | Stop reason: HOSPADM

## 2024-08-05 RX ORDER — PROMETHAZINE HYDROCHLORIDE 25 MG/1
12.5 TABLET ORAL ONCE
Status: DISCONTINUED | OUTPATIENT
Start: 2024-08-05 | End: 2024-08-05

## 2024-08-05 RX ORDER — PROMETHAZINE HYDROCHLORIDE 25 MG/1
25 TABLET ORAL ONCE AS NEEDED
Status: DISCONTINUED | OUTPATIENT
Start: 2024-08-05 | End: 2024-08-05 | Stop reason: HOSPADM

## 2024-08-05 RX ORDER — MIDAZOLAM HYDROCHLORIDE 1 MG/ML
1 INJECTION INTRAMUSCULAR; INTRAVENOUS
Status: COMPLETED | OUTPATIENT
Start: 2024-08-05 | End: 2024-08-05

## 2024-08-05 RX ORDER — PROCHLORPERAZINE EDISYLATE 5 MG/ML
10 INJECTION INTRAMUSCULAR; INTRAVENOUS EVERY 6 HOURS PRN
Status: DISCONTINUED | OUTPATIENT
Start: 2024-08-05 | End: 2024-08-06 | Stop reason: HOSPADM

## 2024-08-05 RX ORDER — EPHEDRINE SULFATE 50 MG/ML
5 INJECTION, SOLUTION INTRAVENOUS ONCE AS NEEDED
Status: DISCONTINUED | OUTPATIENT
Start: 2024-08-05 | End: 2024-08-05 | Stop reason: HOSPADM

## 2024-08-05 RX ORDER — DROPERIDOL 2.5 MG/ML
0.62 INJECTION, SOLUTION INTRAMUSCULAR; INTRAVENOUS
Status: DISCONTINUED | OUTPATIENT
Start: 2024-08-05 | End: 2024-08-05 | Stop reason: HOSPADM

## 2024-08-05 RX ORDER — LABETALOL HYDROCHLORIDE 5 MG/ML
5 INJECTION, SOLUTION INTRAVENOUS
Status: DISCONTINUED | OUTPATIENT
Start: 2024-08-05 | End: 2024-08-05 | Stop reason: HOSPADM

## 2024-08-05 RX ORDER — TRAMADOL HYDROCHLORIDE 50 MG/1
50 TABLET ORAL EVERY 4 HOURS PRN
Status: DISCONTINUED | OUTPATIENT
Start: 2024-08-05 | End: 2024-08-06 | Stop reason: HOSPADM

## 2024-08-05 RX ORDER — ACETAMINOPHEN 10 MG/ML
INJECTION, SOLUTION INTRAVENOUS AS NEEDED
Status: DISCONTINUED | OUTPATIENT
Start: 2024-08-05 | End: 2024-08-05 | Stop reason: SURG

## 2024-08-05 RX ORDER — ROCURONIUM BROMIDE 10 MG/ML
INJECTION, SOLUTION INTRAVENOUS AS NEEDED
Status: DISCONTINUED | OUTPATIENT
Start: 2024-08-05 | End: 2024-08-05 | Stop reason: SURG

## 2024-08-05 RX ORDER — ONDANSETRON 2 MG/ML
INJECTION INTRAMUSCULAR; INTRAVENOUS AS NEEDED
Status: DISCONTINUED | OUTPATIENT
Start: 2024-08-05 | End: 2024-08-05 | Stop reason: SURG

## 2024-08-05 RX ORDER — ALBUTEROL SULFATE 2.5 MG/3ML
2.5 SOLUTION RESPIRATORY (INHALATION) EVERY 4 HOURS PRN
Status: DISCONTINUED | OUTPATIENT
Start: 2024-08-05 | End: 2024-08-06 | Stop reason: HOSPADM

## 2024-08-05 RX ORDER — PROPOFOL 10 MG/ML
INJECTION, EMULSION INTRAVENOUS AS NEEDED
Status: DISCONTINUED | OUTPATIENT
Start: 2024-08-05 | End: 2024-08-05 | Stop reason: SURG

## 2024-08-05 RX ORDER — GABAPENTIN 300 MG/1
300 CAPSULE ORAL ONCE
Status: COMPLETED | OUTPATIENT
Start: 2024-08-05 | End: 2024-08-05

## 2024-08-05 RX ADMIN — ONDANSETRON 4 MG: 2 INJECTION INTRAMUSCULAR; INTRAVENOUS at 08:21

## 2024-08-05 RX ADMIN — TRAMADOL HYDROCHLORIDE 50 MG: 50 TABLET ORAL at 12:10

## 2024-08-05 RX ADMIN — SCOPALAMINE 1 PATCH: 1 PATCH, EXTENDED RELEASE TRANSDERMAL at 07:07

## 2024-08-05 RX ADMIN — PROPOFOL 180 MG: 10 INJECTION, EMULSION INTRAVENOUS at 07:51

## 2024-08-05 RX ADMIN — FENTANYL CITRATE 50 MCG: 50 INJECTION, SOLUTION INTRAMUSCULAR; INTRAVENOUS at 08:55

## 2024-08-05 RX ADMIN — ENOXAPARIN SODIUM 40 MG: 100 INJECTION SUBCUTANEOUS at 08:57

## 2024-08-05 RX ADMIN — FAMOTIDINE 20 MG: 10 INJECTION INTRAVENOUS at 20:26

## 2024-08-05 RX ADMIN — FENTANYL CITRATE 25 MCG: 50 INJECTION, SOLUTION INTRAMUSCULAR; INTRAVENOUS at 08:19

## 2024-08-05 RX ADMIN — MIDAZOLAM 1 MG: 1 INJECTION INTRAMUSCULAR; INTRAVENOUS at 07:24

## 2024-08-05 RX ADMIN — HYDROMORPHONE HYDROCHLORIDE 0.5 MG: 1 INJECTION, SOLUTION INTRAMUSCULAR; INTRAVENOUS; SUBCUTANEOUS at 09:13

## 2024-08-05 RX ADMIN — MAGNESIUM SULFATE HEPTAHYDRATE 2 G: 500 INJECTION, SOLUTION INTRAMUSCULAR; INTRAVENOUS at 08:02

## 2024-08-05 RX ADMIN — LIDOCAINE HYDROCHLORIDE 100 MG: 20 INJECTION, SOLUTION EPIDURAL; INFILTRATION; INTRACAUDAL; PERINEURAL at 07:51

## 2024-08-05 RX ADMIN — METOCLOPRAMIDE 10 MG: 5 INJECTION, SOLUTION INTRAMUSCULAR; INTRAVENOUS at 14:20

## 2024-08-05 RX ADMIN — HYDROMORPHONE HYDROCHLORIDE 0.5 MG: 1 INJECTION, SOLUTION INTRAMUSCULAR; INTRAVENOUS; SUBCUTANEOUS at 14:30

## 2024-08-05 RX ADMIN — FENTANYL CITRATE 75 MCG: 50 INJECTION, SOLUTION INTRAMUSCULAR; INTRAVENOUS at 08:10

## 2024-08-05 RX ADMIN — SODIUM CHLORIDE, POTASSIUM CHLORIDE, SODIUM LACTATE AND CALCIUM CHLORIDE 150 ML/HR: 600; 310; 30; 20 INJECTION, SOLUTION INTRAVENOUS at 18:46

## 2024-08-05 RX ADMIN — PROPOFOL 150 MCG/KG/MIN: 10 INJECTION, EMULSION INTRAVENOUS at 07:52

## 2024-08-05 RX ADMIN — SODIUM CHLORIDE 3 G: 900 INJECTION INTRAVENOUS at 07:39

## 2024-08-05 RX ADMIN — PANTOPRAZOLE SODIUM 40 MG: 40 INJECTION, POWDER, FOR SOLUTION INTRAVENOUS at 07:10

## 2024-08-05 RX ADMIN — Medication 30 MG: at 08:04

## 2024-08-05 RX ADMIN — METOCLOPRAMIDE 10 MG: 5 INJECTION, SOLUTION INTRAMUSCULAR; INTRAVENOUS at 07:37

## 2024-08-05 RX ADMIN — ACETAMINOPHEN 1000 MG: 1000 INJECTION INTRAVENOUS at 08:10

## 2024-08-05 RX ADMIN — HYDROMORPHONE HYDROCHLORIDE 0.5 MG: 1 INJECTION, SOLUTION INTRAMUSCULAR; INTRAVENOUS; SUBCUTANEOUS at 20:26

## 2024-08-05 RX ADMIN — ACETAMINOPHEN 1000 MG: 500 TABLET ORAL at 20:40

## 2024-08-05 RX ADMIN — ACETAMINOPHEN 1000 MG: 500 TABLET ORAL at 14:20

## 2024-08-05 RX ADMIN — ESMOLOL HYDROCHLORIDE 50 MG: 10 INJECTION, SOLUTION INTRAVENOUS at 07:51

## 2024-08-05 RX ADMIN — SODIUM CHLORIDE, POTASSIUM CHLORIDE, SODIUM LACTATE AND CALCIUM CHLORIDE: 600; 310; 30; 20 INJECTION, SOLUTION INTRAVENOUS at 07:46

## 2024-08-05 RX ADMIN — FAMOTIDINE 20 MG: 10 INJECTION INTRAVENOUS at 12:10

## 2024-08-05 RX ADMIN — MIDAZOLAM 1 MG: 1 INJECTION INTRAMUSCULAR; INTRAVENOUS at 07:15

## 2024-08-05 RX ADMIN — ROCURONIUM BROMIDE 50 MG: 10 INJECTION, SOLUTION INTRAVENOUS at 07:51

## 2024-08-05 RX ADMIN — 0.12% CHLORHEXIDINE GLUCONATE 15 ML: 1.2 RINSE ORAL at 06:57

## 2024-08-05 RX ADMIN — DEXAMETHASONE SODIUM PHOSPHATE 10 MG: 4 INJECTION, SOLUTION INTRAMUSCULAR; INTRAVENOUS at 08:04

## 2024-08-05 RX ADMIN — TRAMADOL HYDROCHLORIDE 50 MG: 50 TABLET ORAL at 19:14

## 2024-08-05 RX ADMIN — METOCLOPRAMIDE 10 MG: 5 INJECTION, SOLUTION INTRAMUSCULAR; INTRAVENOUS at 19:14

## 2024-08-05 RX ADMIN — SUGAMMADEX 200 MG: 100 INJECTION, SOLUTION INTRAVENOUS at 08:39

## 2024-08-05 RX ADMIN — GABAPENTIN 300 MG: 300 CAPSULE ORAL at 06:56

## 2024-08-05 RX ADMIN — SODIUM CHLORIDE 100 ML: 900 INJECTION INTRAVENOUS at 07:39

## 2024-08-05 RX ADMIN — SODIUM CHLORIDE, POTASSIUM CHLORIDE, SODIUM LACTATE AND CALCIUM CHLORIDE 500 ML: 600; 310; 30; 20 INJECTION, SOLUTION INTRAVENOUS at 07:10

## 2024-08-05 NOTE — PLAN OF CARE
Goal Outcome Evaluation:  Lap sites x 5 dry & intact with glue, medicated for pain x 2, one with PO, once with IV, no c/o nausea, ambulate in pabon, voiding w/o difficulty, family in to visit, Incentive Spirometer not available, awaiting delivery, falls protocol  Plan of Care Reviewed With: patient

## 2024-08-05 NOTE — OP NOTE
PREOPERATIVE DIAGNOSIS:  Morbid obesity with multiple comorbidities as referenced in the most recent history and physical.    POSTOPERATIVE DIAGNOSIS:  Morbid obesity with multiple comorbidities as referenced in the most recent history and physical.  Paraesophageal hernia    PROCEDURES PERFORMED:  1.  Laparoscopic sleeve gastrectomy with Titan stapler with buttress # [id number]  2.  Laparoscopic paraesophageal hernia repair    SURGEON:  Itz Amador Jr., MD    ASSISTANT: DOMINGO Corrales      Surgery assisted and facilitated by a certified physician assistant, who directly resulted in a decreased operative time, anesthetic time, wound exposure, and possibly of an operative wound infection, thereby decreasing patient morbidity and ultimately total expenditures.  The surgical assistant assisted in placement of trochars, take down of the gastrocolic omentum, short gastric vessels and dissection at the angle of His.  Also assisted in retraction of the stomach during stapling so as not to kink the gastric sleeve.  Also assisted in removing of the gastric specimen, closure of the fascial defect as well as closure of the skin incisions.    ANESTHESIA:  General endotracheal and TAP block with Ropivacaine mixture    ESTIMATED BLOOD LOSS:   Less than 25 mL unless dictated below.    FLUIDS:  Crystalloids.    SPECIMENS:  Gastric remnant    DRAINS:  None.    COUNTS:  Correct.    COMPLICATIONS:  None.    INDICATIONS:  This patient with morbid obesity and associated comorbidities presents for elective laparoscopic, possible open sleeve gastrectomy.  The patient has received medical clearance to proceed.  The patient has undergone our extensive educational process and consent process and wishes to proceed.    DESCRIPTION OF PROCEDURE:  The patient was brought to the operating room and placed supine upon the operating room table. SCD hose were placed.  The patient underwent uneventful general endotracheal anesthesia per the  anesthesiology staff. The abdomen was prepped with ChloraPrep and draped in the usual sterile fashion.  An Ioban was used as well if not allergic.  Anesthesia staff then passed a 38-Fr balloon bougie into the stomach to decompress.  A 5-10 mm transverse incision was made a few centimeters above and to the left of the umbilicus and the peritoneal cavity entered under direct camera visualization using a 5 or 10 mm 0° laparoscope and an Optiview trocar.  The abdomen was then insufflated to a pressure of 15 mmHg with CO2 gas.  Exploratory laparoscopy revealed no evidence of injury from the entrance technique and no significant abnormalities unless addendum dictated below.  An angled laparoscope was then used.  The patient was placed in reverse Trendelenburg position.  Under direct camera visualization a 19 mm trocar was placed in the right lateral subcostal position.  A 5 mm trocar was placed in the right midabdominal position.  A 5 mm trocar was placed in the left midabdominal position. A Kayce retractor was placed through an epigastric incision and used to elevate the left lobe of the liver.  The fat pad was elevated and the left alina exposed.  At this point, approximately skilled nursing along the greater curvature, the gastrocolic omentum was divided with the Enseal and this proceeded superiorly to the angle of His taking down the short gastric vessels.  All posterior attachments of the lesser sac and posterior aspect of the stomach to the pancreas were taken down as well.  The left alina was exposed along its length.  Dissection then proceeded medially taking down the greater curvature with an Enseal until just proximal to the pylorus.  The 38 [place] bougie was then directed distally into the stomach to the pylorus.  The balloon was inflated with 15 cc of saline.  The stomach was marked in the 3 positions using indelible ink.  The 3 markings were at the angle of Hiss, the incisura and antrum using 1cm, 3cm and 4-5cm  respectively.  The Titan stapler was then placed through the 19 mm trocar into the abdomen and opened up and the stomach pulled in to the markings on the stomach.  Careful attention was made to stay 1 cm from the esophagus.  Positive pressure retraction was then used to size the stomach perfectly.  The balloon on the bougie was then deflated and placed to suction prior to closing the stapler.  The stapler was then fired and then removed after completion.  Areas of the staple line were oversewn with absorbable sutures as needed for bleeding or questionable staple lines.  At this point, the gastrectomy specimen was withdrawn through the 12 mm trocar site incision. The specimen staple line was inspected and was intact.  The specimen was then sent off to pathology.  At this point, the sleeve was submerged under saline and using the bougie to insufflate the stomach, a leak test was performed.  This revealed the sleeve to be watertight, no air bubbles, no leak, and no bleeding seen from the staple lines and no significant abnormalities.  Irrigation fluid from the abdomen was then suctioned. The fascia at the 19 mm trocar site incision was closed with a single 0 Vicryl suture in a figure-of-eight fashion placed under direct laparoscopic camera visualization with a suture passer and tying the knot extracorporeally.  The fascia in the area was infiltrated with local anesthesia. All incisions were then infiltrated with local anesthetic. The remaining trocars were removed under direct camera visualization with no bleeding noted from their sites.  The abdomen was desufflated of gas. The skin in each incision was closed using 4-0 antibiotic impregnated Monocryl in a subcuticular fashion followed by Dermabond.  The patient tolerated the procedure well without complication and was taken to the recovery room in stable condition.  All sponge, needle and instrument counts were correct.     The patient was noted to have a  paraesophageal hernia.  The phrenoesophageal membrane was opened up with electrocautery and the right and left crura were cleaned off using sharp and blunt dissection.  The peritoneum overlying the right alina was incised and the plane along the hernia sac was developed.  The dissection then extended anteriorly and laterally to the left alina.  The base of the crural confluence was dissected free of adhesions to the hernia sac.  The hernia sac was carefully dissected into the mediastinum with caudal traction.  The interfaces between the pleura, pericardium, spine, and aorta were developed as a dissection was carried cephalically to the top of the hernia sac.  Sac contents were completely reduced back into the abdominal cavity.  Careful attention was made not to injure the vagus nerve.  The esophagus was identified and dissected circumferentially and along its previous stomach course in order to reduce tension, allowing the gastroesophageal junction to rest comfortably within the abdominal cavity.  The gastrosplenic ligament and the short gastric vessels were divided with the Enseal device.  The retroesophageal window was developed and the esophagus was retracted caudally.  The crural pillars were then approximated with 2-0 V Loc around the esophagus

## 2024-08-05 NOTE — ANESTHESIA PROCEDURE NOTES
Airway  Urgency: elective    Date/Time: 8/5/2024 7:55 AM  Airway not difficult    General Information and Staff    Patient location during procedure: OR  Anesthesiologist: Taran Torres DO  CRNA/CAA: Adali Lugo CRNA    Indications and Patient Condition  Indications for airway management: airway protection    Preoxygenated: yes  Mask difficulty assessment: 1 - vent by mask    Final Airway Details  Final airway type: endotracheal airway      Successful airway: ETT  Cuffed: yes   Successful intubation technique: direct laryngoscopy  Facilitating devices/methods: intubating stylet  Endotracheal tube insertion site: oral  Blade: Catalan  Blade size: 2  ETT size (mm): 7.0  Cormack-Lehane Classification: grade I - full view of glottis  Placement verified by: capnometry   Measured from: lips  ETT/EBT  to lips (cm): 22  Number of attempts at approach: 1  Assessment: lips, teeth, and gum same as pre-op and atraumatic intubation

## 2024-08-05 NOTE — ANESTHESIA PREPROCEDURE EVALUATION
Anesthesia Evaluation     Patient summary reviewed and Nursing notes reviewed   history of anesthetic complications:  PONV  NPO Solid Status: > 8 hours  NPO Liquid Status: > 2 hours           Airway   Mallampati: II  TM distance: >3 FB  Neck ROM: full  Dental      Pulmonary    (+) asthma,sleep apnea  Cardiovascular     ECG reviewed      ROS comment: EKG normal    Neuro/Psych  (+) psychiatric history Depression  GI/Hepatic/Renal/Endo    (+) obesity, hiatal hernia, GERD    Musculoskeletal     Abdominal   (+) obese   Substance History      OB/GYN          Other                          Anesthesia Plan    ASA 3     general   total IV anesthesia  intravenous induction     Anesthetic plan, risks, benefits, and alternatives have been provided, discussed and informed consent has been obtained with: patient.        CODE STATUS:

## 2024-08-05 NOTE — DISCHARGE INSTRUCTIONS
GOING HOME AFTER GASTRIC SLEEVE/ GASTRIC BYPASS SURGERY  [facility]: Post-Operative Information/Instructions  Itz Amador Jr., MD  General Patient Instructions for Discharge   - Call Surgeon's office at [phone] for follow-up appointment.    - Be sure you, the patient, have a follow-up appointment to be seen within seven (7) days after discharge. If not, please call [phone] to schedule an appointment. If you are discharged on a Saturday or Sunday, please call Monday to schedule the appointment.  - Contact the Surgeon at [phone] for any questions or concerns, including temperature greater than or equal to 101F, shortness of breath, leg swelling, redness at incision sites, nausea, vomiting, chills, or problems or questions.    - Follow the Gastric Stage 1 Diet    à Clear liquids, room temperature, sugar-free, caffeine-free, non-carbonated, 70 grams of protein, No Straws.  - You may shower. No tub bath for 2 weeks.  - No lifting, pushing, pulling, or tugging >25 pounds for 3 weeks.  - Ambulate every 3 hours while awake minimum for seven (7) days, increase distance daily.  - For the next several weeks, you are at an increased risk for blood clot formation. Therefore, you should walk regularly. You should not sit for prolonged periods of time, more than 45 minutes, without getting up and walking for 5-10 minutes. This includes any car rides, including the drive home from the hospital. If driving any distance greater than 30 miles over the next two (2) weeks, stop every 30-45 minutes and walk for 5-10 minutes each time.  - Continue using Incentive Spirometer and coughing exercises at least every two (2) hours while awake for one week.  - Continue use of CPAP/BIPAP for diagnosis of sleep apnea as directed.  - No driving or operating machinery allowed while taking narcotic (prescription) pain medication, and until you feel comfortable forcefully applying the brakes if needed. (This  usually takes more than 3 days.)    - Make an appointment with your Primary Care Physician within one week post-op to look at your home medications for possible changes or discontinuity.   Medications  - The nurse will provide a list of medications for you to continue at home   - If you received a Lovenox (Enoxaparin) or Apixiban (Eliquis) prescription at pre-op visit with Surgeon, start taking the medicine the morning after discharge unless directed otherwise.    - If you were prescribed Lovenox (Enoxaparin), review the education/teaching material/video with the nurse.    - Take post op pain meds as prescribed as needed.   - Continue Foltx until finished.   - Resume use of Actigall (Ursodiol) one (1) week after surgery if patient still has gallbladder. You should have been given a prescription at your pre-op visit. Contact the office if you do not have the prescription.   - Resume bariatric vitamin regimen as instructed in pre-op education with bariatric coordinator.    - Zegerid or Prilosec OTC (or generic) by mouth once daily for four (4) weeks unless you are already taking a proton pump inhibitor as home medication. Follow dosing instructions on package.   Nausea/Vomiting:  The following are possible causes for nausea/vomiting:  - Drinking too much or too fast.  - Sinus drainage/post nasal drip for allergy sufferers (you may take Sudafed, Claritin, Tylenol Sinus/Allergy, or other decongestants and nose sprays to help with this discomfort).  - Low blood sugar (sweating, shaky, irritable, weakness, dizzy or tunnel-vision) - treatment is to sip 100% fruit juice - no sugar added until symptoms subside.  - Acid in fruit juice - (may dilute with water or avoid).  - Eating or drinking something that is not on clear liquid (stage 1) diet.  Any nausea/vomiting that prohibits you from keeping fluids down for greater than 24 hours requires a call to the surgeon's office.  Urine:  Use your urine color as a guide to  determine if you are drinking enough fluid. The darker the urine, the more fluids you need to drink. Urine should be clear to light yellow if you are getting enough fluid. If you should experience frequency, burning or pain with urination, blood in urine, contact us or your primary care physician for possible UTI (urinary tract infection), which could require antibiotics (liquid preferred).  Bowel Movements:  You may not have a bowel movement for 2-5 days after going home. You may then experience liquid, runny or loose stools for approximately 3-4 weeks following surgery. This would require you to drink even more fluids to prevent dehydration. Some patients may experience constipation, which can be treated with increased fluids, drinking warm liquids, increased activity and the use of a Fleets Enema, Milk of Magnesia, or suppositories. The first couple of bowel movements could be bloody, tarry black or dark maroon in color. This is OK as long as the stool returns to a normal color in 1-2 days. If however, you have frequent or a large amount of bloody or tarry black stools and/or become light-headed or dizzy, you may be bleeding and require urgent attention. Please call us right away.  Abdominal Incisions:  You will have small incisions. Do not scrub incisions, but allow the warm, soapy water to run over the incisions, rinse well, and pat dry. You may use any brand of anti-bacterial soap. Do not use Peroxide or Neosporin type ointments on sites, unless instructed to do so by a surgeon or nurse. Monitor daily for signs/symptoms of infection, which might include: drainage with a foul odor, pain, redness, swelling or heat at the incision sight; fever, body aches and chills. If you suspect infection or have a fever, give us a call.  Pain:  You will be given a prescription for pain medication to control your pain. If you feel the dose is too strong, you may take half the ordered dose, or you may take Tylenol adult liquid  per package instructions for minor pain. Do not take any medications that contain aspirin or aspirin products.  Do not take medications like: Motrin, Aleve, Ibuprofen, Advil, Naproxen, Celebrex, Daypro, Bextra, Meloxicam or other medications commonly used for arthritis or joint pain.  No steroids or cortisone injections. There may be pain, which should improve every few days. Pain should not suddenly get worse or more intense. Pain that suddenly changes and is constant and severe should be called in to the surgeon's office. Any sudden pain in the lower extremities with associated warmth and redness should be called in to the surgeon's office immediately. Do not rub or massage this area, as it could be a blood clot.  Diet:  Remain on the clear liquid diet (stage 1) per your  which includes 70 grams of protein each day, sugar free, non carbonated and no straws. Day 1 is the day of surgery. If you are tolerating the stage 1 diet, you may then proceed to stage 2 diet, as instructed in the . Do not progress to the stage 2 diet if you are having nausea/vomiting. Refer to the Basic Nutrition and Food Principles guide.  Medications:  The nurse will let you know which medications you will need to continue once you go home. Do not take any medications that are extended or time released if you had the gastric bypass procedure, OK to take if you had the gastric sleeve procedure. Large capsules can be opened and diluted with clear liquids. Check with your physician or pharmacist as to which pills may be crushed and which capsules may be opened and diluted safely. Continue taking Foltx as surgeon orders. If you still have your gall bladder and were prescribed Actigall (Ursodiol), you may resume this medication one week after your surgery. You will remain on Actigall (Ursodiol) for approximately 6 months. The dose is 1 pill, 2 times each day for 6 months.  Activity:  Continue your deep breathing and  coughing exercises with your Incentive Spirometer breathing device at least every 3 hours while awake (10 repetitions each time) for one week. May use CPAP. This will help to prevent respiratory problems such as pneumonia. No lifting, pulling or tugging anything over 25 pounds for 3 weeks after surgery. You may shower but no tub baths, hot tubs or swimming for 2 weeks. Moderate walking is recommended every 3 hours while awake minimum, increase distance daily. Further exercise will be discussed at the first post-op visit. No driving or operating machinery allow until off narcotic pain medication and until you feel comfortable forcefully applying the brakes (usually takes 3 or more days). For the next few weeks you are at an increased risk for blood clot formation. Therefore you should walk regularly and you should not sit for prolonged periods of time, more than 45 minutes without getting up and walking for 5-10 minutes. This includes car rides. Including riding home from the hospital. If riding a distance greater than 30 miles over the next 2 weeks stop every 30-45 minutes and walk 5-10 mintues each time. No tanning bed use for 8 weeks after surgery and in general, not recommended due to the increased risk for skin cancer. Incisions will burn/blister very badly with tanning bed use.  Illness:  Your primary care physician should treat general illness such as ear infections, sinus infections, and viral type illnesses, etc. Medications prescribed should be liquid/elixir form when possible, for the first 30 days.  General:  In general, it is recommended that you weigh yourself no more than once per week. Let the weight come off you and concentrate on more important things. Remember the weight was not gained overnight, nor will it be lost overnight. Gastric Bypass/ Gastric Sleeve weight loss will continue over a period of 12-18 months. Do not  yourself according to how others are doing after surgery, as this will  cause unnecessary discouragement.  THE ABOVE ARE GENERAL GUIDELINES TO ASSIST YOU ONCE HOME, IF YOU ARE IN DOUBT, OR YOU HAVE ANY QUESTIONS, CALL US AT THE NUMBERS LISTED BELOW.  IN THE EVENT OF SUDDEN CHEST PAIN, SHORTNESS OF BREATH, OR ANY LIFE THREATENING CONDITION, CALL 911.  Any time you are evaluated or admitted to another facility, please have someone notify the surgeon's office.  Supplements:  70 grams of protein taken EVERY DAY. Remember to drink at least 64 ounces of fluid a day, sipping slowly early on. Increase this amount during the summertime. Sipping slowly will not stretch your new stomach. Drinking too fast or gulping liquids will cause brief discomfort and early could cause staple line disruption (leak). With eating, tiny bites, then chew, chew, chew, and swallow. Lay your fork/spoon down for 2-3 minutes, and then take your next bite. Your pouch will tell you within 1-2 bites if it is going to tolerate what you are eating.   Protein Vendors:  Refer to protein vendors' handout from consult class. You can always find protein drinks at the bariatric office, grocery stores, [employer], drug [employer], [employer], health food stores, and on the Internet. Find one high in protein (15-30 grams per serving) and low carb (less than 18 grams per serving).  Now is a great time to re-read your . Please review specific instructions given to you at discharge by your physician (surgeon).  HOW/WHEN TO CONTACT US:  It is imperative that you contact us with any of the following:    Ÿ fever greater than 101 degrees  Ÿ shortness of breath  Ÿ leg swelling  Ÿ body aches  Ÿ shaking chills  Ÿ nausea and vomitting  Ÿ pain that has worsened  Ÿ redness at incision sites  Ÿ pus or foul smelling drainage from an incision or wound  Ÿ inability to keep fluids down for more than a day  Ÿ any other condition you feel needs our attention.  [facility] - Bariatric: [phone] call this number anytime 24 hours a  day / 7 days a week.  Teach-back Questions to be answered by the patient prior to discharge.   What complications would prompt you to call your doctor when you return home? _________________    What is the purpose of your prescribed medication? ________________  What are some potential side effects of the medications you will be taking at home? _______________

## 2024-08-05 NOTE — ANESTHESIA POSTPROCEDURE EVALUATION
Patient: Gin Alexandra    Procedure Summary       Date: 08/05/24 Room / Location:  [facility] OR [facility] /  [facility]    Anesthesia Start: 0745 Anesthesia Stop: 0852    Procedure: Sleeve gastrectomy LAPAROSCOPIC With Paraesophageal Hernia Repair (Abdomen) Diagnosis:       Obesity, Class II, BMI 35-39.9      Paraesophageal hernia      (Obesity, Class II, BMI 35-39.9 [E66.9])      (Paraesophageal hernia [K44.9])    Surgeons: Itz Amador Jr., MD Provider: Taran Torres DO    Anesthesia Type: general ASA Status: 3            Anesthesia Type: general    Vitals  Vitals Value Taken Time   /81 08/05/24 0930   Temp 36.4 °C (97.5 °F) 08/05/24 0850   Pulse 80 08/05/24 0930   Resp 18 08/05/24 0930   SpO2 95 % 08/05/24 0930           Anesthesia Post Evaluation    
HLD (hyperlipidemia)

## 2024-08-06 ENCOUNTER — READMISSION MANAGEMENT (OUTPATIENT)
Dept: CALL CENTER | Facility: HOSPITAL | Age: 39
End: 2024-08-06
Payer: COMMERCIAL

## 2024-08-06 VITALS
OXYGEN SATURATION: 96 % | HEART RATE: 66 BPM | RESPIRATION RATE: 18 BRPM | WEIGHT: 209.22 LBS | BODY MASS INDEX: 37.07 KG/M2 | DIASTOLIC BLOOD PRESSURE: 82 MMHG | HEIGHT: 63 IN | TEMPERATURE: 98.8 F | SYSTOLIC BLOOD PRESSURE: 143 MMHG

## 2024-08-06 LAB
ALBUMIN SERPL-MCNC: 3.7 G/DL (ref 3.5–5.2)
ALBUMIN/GLOB SERPL: 1.5 G/DL
ALP SERPL-CCNC: 99 U/L (ref 39–117)
ALT SERPL W P-5'-P-CCNC: 28 U/L (ref 1–33)
ANION GAP SERPL CALCULATED.3IONS-SCNC: 7 MMOL/L (ref 5–15)
AST SERPL-CCNC: 25 U/L (ref 1–32)
BASOPHILS # BLD AUTO: 0.05 10*3/MM3 (ref 0–0.2)
BASOPHILS NFR BLD AUTO: 0.5 % (ref 0–1.5)
BILIRUB SERPL-MCNC: 0.3 MG/DL (ref 0–1.2)
BUN SERPL-MCNC: 10 MG/DL (ref 6–20)
BUN/CREAT SERPL: 11.6 (ref 7–25)
CALCIUM SPEC-SCNC: 9.2 MG/DL (ref 8.6–10.5)
CHLORIDE SERPL-SCNC: 106 MMOL/L (ref 98–107)
CO2 SERPL-SCNC: 27 MMOL/L (ref 22–29)
CREAT SERPL-MCNC: 0.86 MG/DL (ref 0.57–1)
DEPRECATED RDW RBC AUTO: 41.8 FL (ref 37–54)
EGFRCR SERPLBLD CKD-EPI 2021: 89.9 ML/MIN/1.73
EOSINOPHIL # BLD AUTO: 0.05 10*3/MM3 (ref 0–0.4)
EOSINOPHIL NFR BLD AUTO: 0.5 % (ref 0.3–6.2)
ERYTHROCYTE [DISTWIDTH] IN BLOOD BY AUTOMATED COUNT: 12.8 % (ref 12.3–15.4)
GLOBULIN UR ELPH-MCNC: 2.5 GM/DL
GLUCOSE SERPL-MCNC: 94 MG/DL (ref 65–99)
HCT VFR BLD AUTO: 37.6 % (ref 34–46.6)
HGB BLD-MCNC: 12.5 G/DL (ref 12–15.9)
IMM GRANULOCYTES # BLD AUTO: 0.06 10*3/MM3 (ref 0–0.05)
IMM GRANULOCYTES NFR BLD AUTO: 0.6 % (ref 0–0.5)
LAB AP CASE REPORT: NORMAL
LYMPHOCYTES # BLD AUTO: 2.23 10*3/MM3 (ref 0.7–3.1)
LYMPHOCYTES NFR BLD AUTO: 21 % (ref 19.6–45.3)
MAGNESIUM SERPL-MCNC: 2.5 MG/DL (ref 1.6–2.6)
MCH RBC QN AUTO: 29.8 PG (ref 26.6–33)
MCHC RBC AUTO-ENTMCNC: 33.2 G/DL (ref 31.5–35.7)
MCV RBC AUTO: 89.7 FL (ref 79–97)
MONOCYTES # BLD AUTO: 0.83 10*3/MM3 (ref 0.1–0.9)
MONOCYTES NFR BLD AUTO: 7.8 % (ref 5–12)
NEUTROPHILS NFR BLD AUTO: 69.6 % (ref 42.7–76)
NEUTROPHILS NFR BLD AUTO: 7.41 10*3/MM3 (ref 1.7–7)
NRBC BLD AUTO-RTO: 0 /100 WBC (ref 0–0.2)
PATH REPORT.FINAL DX SPEC: NORMAL
PATH REPORT.GROSS SPEC: NORMAL
PHOSPHATE SERPL-MCNC: 3.7 MG/DL (ref 2.5–4.5)
PLATELET # BLD AUTO: 285 10*3/MM3 (ref 140–450)
PMV BLD AUTO: 9.5 FL (ref 6–12)
POTASSIUM SERPL-SCNC: 4.7 MMOL/L (ref 3.5–5.2)
PROT SERPL-MCNC: 6.2 G/DL (ref 6–8.5)
RBC # BLD AUTO: 4.19 10*6/MM3 (ref 3.77–5.28)
SODIUM SERPL-SCNC: 140 MMOL/L (ref 136–145)
WBC NRBC COR # BLD AUTO: 10.63 10*3/MM3 (ref 3.4–10.8)

## 2024-08-06 PROCEDURE — 83735 ASSAY OF MAGNESIUM: CPT | Performed by: SURGERY

## 2024-08-06 PROCEDURE — 25010000002 THIAMINE HCL 200 MG/2ML SOLUTION 2 ML VIAL: Performed by: SURGERY

## 2024-08-06 PROCEDURE — 25010000002 ENOXAPARIN PER 10 MG: Performed by: SURGERY

## 2024-08-06 PROCEDURE — 84100 ASSAY OF PHOSPHORUS: CPT | Performed by: SURGERY

## 2024-08-06 PROCEDURE — 25010000002 METOCLOPRAMIDE PER 10 MG: Performed by: SURGERY

## 2024-08-06 PROCEDURE — 25810000003 SODIUM CHLORIDE 0.9 % SOLUTION 1,000 ML FLEX CONT: Performed by: SURGERY

## 2024-08-06 PROCEDURE — 85025 COMPLETE CBC W/AUTO DIFF WBC: CPT | Performed by: SURGERY

## 2024-08-06 PROCEDURE — 63710000001 ONDANSETRON ODT 4 MG TABLET DISPERSIBLE: Performed by: SURGERY

## 2024-08-06 PROCEDURE — 25810000003 LACTATED RINGERS PER 1000 ML: Performed by: SURGERY

## 2024-08-06 PROCEDURE — 25010000002 CYANOCOBALAMIN PER 1000 MCG: Performed by: SURGERY

## 2024-08-06 PROCEDURE — 80053 COMPREHEN METABOLIC PANEL: CPT | Performed by: SURGERY

## 2024-08-06 RX ORDER — ONDANSETRON 4 MG/1
4 TABLET, ORALLY DISINTEGRATING ORAL EVERY 8 HOURS PRN
Qty: 20 TABLET | Refills: 0 | Status: SHIPPED | OUTPATIENT
Start: 2024-08-06

## 2024-08-06 RX ORDER — TRAMADOL HYDROCHLORIDE 50 MG/1
50 TABLET ORAL EVERY 6 HOURS PRN
Qty: 12 TABLET | Refills: 0 | Status: SHIPPED | OUTPATIENT
Start: 2024-08-06

## 2024-08-06 RX ADMIN — FAMOTIDINE 20 MG: 10 INJECTION INTRAVENOUS at 10:38

## 2024-08-06 RX ADMIN — ENOXAPARIN SODIUM 40 MG: 100 INJECTION SUBCUTANEOUS at 10:38

## 2024-08-06 RX ADMIN — ONDANSETRON 4 MG: 4 TABLET, ORALLY DISINTEGRATING ORAL at 12:41

## 2024-08-06 RX ADMIN — ACETAMINOPHEN 975 MG: 650 SOLUTION ORAL at 02:02

## 2024-08-06 RX ADMIN — CYANOCOBALAMIN 1000 MCG: 1000 INJECTION, SOLUTION INTRAMUSCULAR; SUBCUTANEOUS at 10:38

## 2024-08-06 RX ADMIN — METOCLOPRAMIDE 10 MG: 5 INJECTION, SOLUTION INTRAMUSCULAR; INTRAVENOUS at 02:02

## 2024-08-06 RX ADMIN — TRAMADOL HYDROCHLORIDE 50 MG: 50 TABLET ORAL at 00:25

## 2024-08-06 RX ADMIN — ACETAMINOPHEN 975 MG: 650 SOLUTION ORAL at 10:47

## 2024-08-06 RX ADMIN — TRAMADOL HYDROCHLORIDE 50 MG: 50 TABLET ORAL at 12:41

## 2024-08-06 RX ADMIN — METOCLOPRAMIDE 10 MG: 5 INJECTION, SOLUTION INTRAMUSCULAR; INTRAVENOUS at 07:23

## 2024-08-06 RX ADMIN — THIAMINE HYDROCHLORIDE 250 ML/HR: 100 INJECTION, SOLUTION INTRAMUSCULAR; INTRAVENOUS at 00:08

## 2024-08-06 RX ADMIN — SODIUM CHLORIDE, POTASSIUM CHLORIDE, SODIUM LACTATE AND CALCIUM CHLORIDE 150 ML/HR: 600; 310; 30; 20 INJECTION, SOLUTION INTRAVENOUS at 06:00

## 2024-08-06 RX ADMIN — TRAMADOL HYDROCHLORIDE 50 MG: 50 TABLET ORAL at 07:36

## 2024-08-06 NOTE — PROGRESS NOTES
Continued Stay Note  [facility]     Patient Name: Gin Alexandra  MRN: [id number]  Today's Date: 8/6/2024    Admit Date: 8/5/2024    Plan: Home no needs   Discharge Plan       Row Name 08/06/24 0931       Plan    Plan Home no needs    Plan Comments Discharge order noted. Met with patient who confirmed DC plan is to return home. Stated her spouse will assist as needed and will provide transportation at DC. Denies any needs/equipment.                   Discharge Codes    No documentation.                 Expected Discharge Date and Time       Expected Discharge Date Expected Discharge Time    Aug 6, 2024               Bianca Gaines RN

## 2024-08-06 NOTE — PROGRESS NOTES
Case Management Discharge Note      Final Note: Discharged home. Bianca Gaines RN         Selected Continued Care - Discharged on 8/6/2024 Admission date: 8/5/2024 - Discharge disposition: Home or Self Care         Transportation Services  Private: Car    Final Discharge Disposition Code: 01 - home or self-care   mother/father

## 2024-08-06 NOTE — OUTREACH NOTE
Prep Survey      Flowsheet Row Responses   [facility] facility patient discharged from? [facility]   Is LACE score < 7 ? No   Eligibility [facility]   Date of Admission 08/05/24   Date of Discharge 08/06/24   Discharge Disposition Home or Self Care   Discharge diagnosis Sleeve gastrectomy LAP this visit   Does the patient have one of the following disease processes/diagnoses(primary or secondary)? General Surgery   Does the patient have Home health ordered? No   Is there a DME ordered? No   Prep survey completed? Yes            MAKSIM CAMERON - Registered Nurse

## 2024-08-06 NOTE — PAYOR COMM NOTE
"Gin Li (38 y.o. Female)          DC SUMMARY FOR [id number]          Date of Birth   1985    Social Security Number       Address   [address] [place] [address] [facility] [address]    Home Phone   [phone]    MRN   [id number]       [employer]   [employer]    Marital Status                               Admission Date   8/5/24    Admission Type   Elective    Admitting Provider   Itz Amador Jr., MD    Attending Provider       Department, Room/Bed   [address], 81/1       Discharge Date   8/6/2024    Discharge Disposition   Home or Self Care    Discharge Destination                                 Attending Provider: (none)   Allergies: Codeine    Isolation: None   Infection: None   Code Status: Prior    Ht: 159.5 cm (62.8\")   Wt: 94.9 kg (209 lb 3.5 oz)    Admission Cmt: None   Principal Problem: Obesity, Class II, BMI 35-39.9 [E66.9]                   Active Insurance as of 8/5/2024       Primary Coverage       Payor Plan Insurance Group Employer/Plan Group    [employer] [facility] EMPLOYEE [id number]       Payor Plan Address Payor Plan Phone Number Payor Plan Fax Number Effective Dates    [address] [phone]  5/1/2023 - None Entered    [place] [address]         Subscriber Name Subscriber Birth Date Member ID       GIN LI 1985 [id number]               Secondary Coverage       Payor Plan Insurance Group Employer/Plan Group    [employer]HE[employer] BLUE CROSS [employer] PPO [id number]       Payor Plan Address Payor Plan Phone Number Payor Plan Fax Number Effective Dates    [address] [phone]  1/1/2020 - None Entered    [place] [address]         Subscriber Name Subscriber Birth Date Member ID       HARRIS LI 1985 [id number]                     Emergency Contacts        (Rel.) Home Phone Work Phone Mobile Phone    PIPER LI (Spouse) -- -- [phone]                 Discharge Summary    " "    Kelley Guzmán APRN at 08/06/24 1254          Discharge Summary    Patient name: Gin Alexandra    Medical record number: [id number]    Admission date: 8/5/2024  Discharge date:  8/6/2024    Attending physician: Dr. Itz Amador    Primary care physician: Rekha Kim APRN    Referring physician: Itz Amador Jr., MD  [address] [place] [address]    Condition on discharge: Stable    Primary Diagnoses:  Morbid obesity with co-morbidities  Obesity, Class II, BMI 35-39.9 Yes     Paraesophageal hernia      Gastroesophageal reflux disease, unspecified whether esophagitis present      STEPHANIA (obstructive sleep apnea)      Depression with anxiety      Fibromyalgia      Irritable bowel syndrome with both constipation and diarrhea      Dietary counseling      Operative Procedure:  laparoscopic sleeve gastrectomy with pehr    Subjective Gin Alexandra  is post op day one status post procedure listed. Patient denies shortness of air and lower extremity pain. Feels better than yesterday. No vomiting this am. Ambulating well and using incentive spirometer.      Objective   /82 (BP Location: Left arm, Patient Position: Lying)   Pulse 66   Temp 98.8 °F (37.1 °C) (Oral)   Resp 18   Ht 159.5 cm (62.8\")   Wt 94.9 kg (209 lb 3.5 oz)   SpO2 96%   BMI 37.30 kg/m²     General:  alert, appears stated age, and cooperative   Abdomen: soft, bowel sounds active, appropriate tenderness   Incision:   healing well, no drainage, no erythema, no hernia, no seroma, no swelling, no dehiscence, incision well approximated   Heart: Regular rate   Lungs: Clear to auscultation bilaterally     I reviewed the patient's new clinical results.     Lab Results (last 24 hours)       Procedure Component Value Units Date/Time    Tissue Pathology Exam [[id number]] Collected: 08/05/24 0817    Specimen: Tissue from Stomach Updated: 08/06/24 1229     Case Report --     Surgical Pathology Report                         " "Case: [id number]                                  Authorizing Provider:  Itz Amador Jr.,   Collected:           08/05/2024 08:17 AM                                 MD                                                                           Ordering Location:     [facility]  Received:            08/05/2024 10:42 AM                                 [facility] OR                                                                       Pathologist:           Tod Palomo MD                                                         Specimen:    Stomach, PORTION OF STOMACH                                                                 Final Diagnosis --     1. Stomach, Partial Gastrectomy:   A. Gastric tissue with mild chronic inflammation.   B. No epithelial dysplasia nor malignancy identified.   C. Surgical margin appears viable and is negative for neoplasm/malignancy.          Gross Description --     1. Stomach.  Received in formalin, labeled with the patient's name, and designated \" portion of stomach\", is a pink-tan segment of stomach measuring 21 x 4.2 x 3.5 cm.  The serosal surface is predominantly smooth with a minimal amount of attached adipose tissue.  The surgical margin is closed by a series of metal staples.  The specimen is opened to reveal approximately 8 cc of dark brown gastric fluid.  The rugae no polyps or flattening identified.  The specimen is submitted as follows:    1A-1B: Representative sections of serosa mucosa  1C: Representative sections of surgical margin    mb/uso/jat          Comprehensive Metabolic Panel [[id number]] Collected: 08/06/24 0611    Specimen: Blood Updated: 08/06/24 0721     Glucose 94 mg/dL      BUN 10 mg/dL      Creatinine 0.86 mg/dL      Sodium 140 mmol/L      Potassium 4.7 mmol/L      Comment: Slight hemolysis detected by analyzer. Result may be falsely elevated.        Chloride 106 mmol/L      CO2 27.0 mmol/L      Calcium 9.2 mg/dL      Total Protein 6.2 " g/dL      Albumin 3.7 g/dL      ALT (SGPT) 28 U/L      AST (SGOT) 25 U/L      Alkaline Phosphatase 99 U/L      Total Bilirubin 0.3 mg/dL      Globulin 2.5 gm/dL      A/G Ratio 1.5 g/dL      BUN/Creatinine Ratio 11.6     Anion Gap 7.0 mmol/L      eGFR 89.9 mL/min/1.73     Narrative:      GFR Normal >60  Chronic Kidney Disease <60  Kidney Failure <15      Magnesium [[id number]]  (Normal) Collected: 08/06/24 0611    Specimen: Blood Updated: 08/06/24 0721     Magnesium 2.5 mg/dL     Phosphorus [[id number]]  (Normal) Collected: 08/06/24 0611    Specimen: Blood Updated: 08/06/24 0721     Phosphorus 3.7 mg/dL     CBC & Differential [[id number]]  (Abnormal) Collected: 08/06/24 0611    Specimen: Blood Updated: 08/06/24 0708    Narrative:      The following orders were created for panel order CBC & Differential.  Procedure                               Abnormality         Status                     ---------                               -----------         ------                     CBC Auto Differential[[id number]]        Abnormal            Final result                 Please view results for these tests on the individual orders.    CBC Auto Differential [[id number]]  (Abnormal) Collected: 08/06/24 0611    Specimen: Blood Updated: 08/06/24 0708     WBC 10.63 10*3/mm3      RBC 4.19 10*6/mm3      Hemoglobin 12.5 g/dL      Hematocrit 37.6 %      MCV 89.7 fL      MCH 29.8 pg      MCHC 33.2 g/dL      RDW 12.8 %      RDW-SD 41.8 fl      MPV 9.5 fL      Platelets 285 10*3/mm3      Neutrophil % 69.6 %      Lymphocyte % 21.0 %      Monocyte % 7.8 %      Eosinophil % 0.5 %      Basophil % 0.5 %      Immature Grans % 0.6 %      Neutrophils, Absolute 7.41 10*3/mm3      Lymphocytes, Absolute 2.23 10*3/mm3      Monocytes, Absolute 0.83 10*3/mm3      Eosinophils, Absolute 0.05 10*3/mm3      Basophils, Absolute 0.05 10*3/mm3      Immature Grans, Absolute 0.06 10*3/mm3      nRBC 0.0 /100 WBC               Assessment:     Assessment Doing well  postoperatively.     Plan:   1. Continue Stage 1 diet  2. Continue with ambulation and Incentive spirometry  3. Plan for d/c home    Patient was seen and examined by their surgeon.    Hospital Course: The patient is a very pleasant 38 y.o. female that was admitted to the hospital with morbid obesity with comorbidities who underwent the above mentioned procedure without complication.  Postoperatively the patient was then transferred to the bariatric unit per protocol.  The patient was afebrile with vital signs stable.  They were ambulating well and using the incentive spirometer.  POD 1 the patient was started on bariatric diet which they tolerated without difficulty.  Patient was then discharged home to follow up as an outpatient.      Discharge medications:      Discharge Medications        New Medications        Instructions Start Date   traMADol 50 MG tablet  Commonly known as: ULTRAM   50 mg, Oral, Every 6 Hours PRN             Changes to Medications        Instructions Start Date   DULoxetine 60 MG capsule  Commonly known as: CYMBALTA  What changed: when to take this   60 mg, Oral, Daily, Take along with cymbalta 30mg daily to equal 90mg daily.      DULoxetine 30 MG capsule  Commonly known as: CYMBALTA  What changed: when to take this   30 mg, Oral, Daily, Take along with cymbalta 60mg daily to equal 90mg daily.      esomeprazole 40 MG capsule  Commonly known as: nexIUM  What changed: when to take this   40 mg, Oral, Every Morning Before Breakfast      ondansetron ODT 4 MG disintegrating tablet  Commonly known as: ZOFRAN-ODT  What changed: reasons to take this   4 mg, Translingual, Every 8 Hours PRN             Continue These Medications        Instructions Start Date   albuterol sulfate  (90 Base) MCG/ACT inhaler  Commonly known as: PROVENTIL HFA;VENTOLIN HFA;PROAIR HFA   2 puffs, Inhalation, Every 4 Hours PRN      Botox 200 units reconstituted solution  Generic drug: OnabotulinumtoxinA       busPIRone  10 MG tablet  Commonly known as: BUSPAR   10 mg, Oral, 3 Times Daily      Enoxaparin Sodium 40 MG/0.4ML solution prefilled syringe syringe  Commonly known as: LOVENOX   40 mg, Subcutaneous, Every 24 Hours Scheduled, Start after surgery unless instructed otherwise      famotidine 40 MG tablet  Commonly known as: Pepcid   40 mg, Oral, Daily      folic acid-vit B6-vit B12 2.5-25-1 MG tablet tablet  Commonly known as: FOLBEE   1 tablet, Oral, Daily      meclizine 25 MG tablet  Commonly known as: ANTIVERT   25 mg, Oral, 3 Times Daily PRN      Nurtec 75 MG dispersible tablet  Generic drug: Rimegepant Sulfate   TAKE ONE TABLET BY MOUTH EVERY DAY AS NEEDED FOR MIGRAINE      tiZANidine 4 MG tablet  Commonly known as: ZANAFLEX   4 mg, Oral, Every 8 Hours PRN      topiramate 25 MG tablet  Commonly known as: TOPAMAX   Take 1 tablet by mouth 2 (Two) Times a Day as directed for headache prevention               Discharge instructions:  Per Bariatric manual; per our protocol      Follow-up appointment: Follow up in the office as scheduled.  If not already scheduled call for appointment at [phone].      Electronically signed by Kelley Guzmán APRN at 08/06/24 6600

## 2024-08-06 NOTE — PLAN OF CARE
Problem: Adult Inpatient Plan of Care  Goal: Plan of Care Review  Outcome: Ongoing, Not Progressing  Flowsheets (Taken 8/6/2024 8741)  Progress: improving  Plan of Care Reviewed With: patient  Outcome Evaluation: coping well, medicated with Tramadol and IV Dilaudid for breakthrough pain with adequate pain relief, abdomen soft and tender, passed flatus, ambulated, voiding  freely, IS up to 1500, on I L of Oxygen, VSS   Goal Outcome Evaluation:  Plan of Care Reviewed With: patient        Progress: improving  Outcome Evaluation: coping well, medicated with Tramadol and IV Dilaudid for breakthrough pain with adequate pain relief, abdomen soft and tender, passed flatus, ambulated, voiding  freely, IS up to 1500, on I L of Oxygen, VSS

## 2024-08-06 NOTE — NURSING NOTE
Reviewed AVS with patient, educated on self injecting Lovenox, all questions answered, patient verbalized understanding, iv removed, received meds to bed.

## 2024-08-06 NOTE — DISCHARGE SUMMARY
"Discharge Summary    Patient name: Gin Alexandra    Medical record number: [id number]    Admission date: 8/5/2024  Discharge date:  8/6/2024    Attending physician: Dr. Itz Amador    Primary care physician: Rekha Kim APRN    Referring physician: Itz Amador Jr., MD  [address] [place], [place] [address]    Condition on discharge: Stable    Primary Diagnoses:  Morbid obesity with co-morbidities  Obesity, Class II, BMI 35-39.9 Yes     Paraesophageal hernia      Gastroesophageal reflux disease, unspecified whether esophagitis present      STEPHANIA (obstructive sleep apnea)      Depression with anxiety      Fibromyalgia      Irritable bowel syndrome with both constipation and diarrhea      Dietary counseling      Operative Procedure:  laparoscopic sleeve gastrectomy with pehr     Gin Alexandra  is post op day one status post procedure listed. Patient denies shortness of air and lower extremity pain. Feels better than yesterday. No vomiting this am. Ambulating well and using incentive spirometer.          /82 (BP Location: Left arm, Patient Position: Lying)   Pulse 66   Temp 98.8 °F (37.1 °C) (Oral)   Resp 18   Ht 159.5 cm (62.8\")   Wt 94.9 kg (209 lb 3.5 oz)   SpO2 96%   BMI 37.30 kg/m²     General:  alert, appears stated age, and cooperative   Abdomen: soft, bowel sounds active, appropriate tenderness   Incision:   healing well, no drainage, no erythema, no hernia, no seroma, no swelling, no dehiscence, incision well approximated   Heart: Regular rate   Lungs: Clear to auscultation bilaterally     I reviewed the patient's new clinical results.     Lab Results (last 24 hours)       Procedure Component Value Units Date/Time    Tissue Pathology Exam [[id number]] Collected: 08/05/24 0817    Specimen: Tissue from Stomach Updated: 08/06/24 1229     Case Report --     Surgical Pathology Report                         Case: [id number]                                  Authorizing Provider:  " "Itz Amador JrJacinto,   Collected:           08/05/2024 08:17 AM                                 MD                                                                           Ordering Location[facility]     [facility]  Received:            08/05/2024 10:42 AM                                 [facility]                                                                       Pathologist:           Tod Palomo MD                                                         Specimen:    Stomach, PORTION OF STOMACH                                                                 Final Diagnosis --     1. Stomach, Partial Gastrectomy:   A. Gastric tissue with mild chronic inflammation.   B. No epithelial dysplasia nor malignancy identified.   C. Surgical margin appears viable and is negative for neoplasm/malignancy.          Gross Description --     1. Stomach.  Received in formalin, labeled with the patient's name, and designated \" portion of stomach\", is a pink-tan segment of stomach measuring 21 x 4.2 x 3.5 cm.  The serosal surface is predominantly smooth with a minimal amount of attached adipose tissue.  The surgical margin is closed by a series of metal staples.  The specimen is opened to reveal approximately 8 cc of dark brown gastric fluid.  The rugae no polyps or flattening identified.  The specimen is submitted as follows:    1A-1B: Representative sections of serosa mucosa  1C: Representative sections of surgical margin    mb/uso/jat          Comprehensive Metabolic Panel [[id number]] Collected: 08/06/24 0611    Specimen: Blood Updated: 08/06/24 0721     Glucose 94 mg/dL      BUN 10 mg/dL      Creatinine 0.86 mg/dL      Sodium 140 mmol/L      Potassium 4.7 mmol/L      Comment: Slight hemolysis detected by analyzer. Result may be falsely elevated.        Chloride 106 mmol/L      CO2 27.0 mmol/L      Calcium 9.2 mg/dL      Total Protein 6.2 g/dL      Albumin 3.7 g/dL      ALT (SGPT) 28 U/L      AST (SGOT) 25 U/L  "     Alkaline Phosphatase 99 U/L      Total Bilirubin 0.3 mg/dL      Globulin 2.5 gm/dL      A/G Ratio 1.5 g/dL      BUN/Creatinine Ratio 11.6     Anion Gap 7.0 mmol/L      eGFR 89.9 mL/min/1.73     Narrative:      GFR Normal >60  Chronic Kidney Disease <60  Kidney Failure <15      Magnesium [[id number]]  (Normal) Collected: 08/06/24 0611    Specimen: Blood Updated: 08/06/24 0721     Magnesium 2.5 mg/dL     Phosphorus [[id number]]  (Normal) Collected: 08/06/24 0611    Specimen: Blood Updated: 08/06/24 0721     Phosphorus 3.7 mg/dL     CBC & Differential [[id number]]  (Abnormal) Collected: 08/06/24 0611    Specimen: Blood Updated: 08/06/24 0708    Narrative:      The following orders were created for panel order CBC & Differential.  Procedure                               Abnormality         Status                     ---------                               -----------         ------                     CBC Auto Differential[[id number]]        Abnormal            Final result                 Please view results for these tests on the individual orders.    CBC Auto Differential [[id number]]  (Abnormal) Collected: 08/06/24 0611    Specimen: Blood Updated: 08/06/24 0708     WBC 10.63 10*3/mm3      RBC 4.19 10*6/mm3      Hemoglobin 12.5 g/dL      Hematocrit 37.6 %      MCV 89.7 fL      MCH 29.8 pg      MCHC 33.2 g/dL      RDW 12.8 %      RDW-SD 41.8 fl      MPV 9.5 fL      Platelets 285 10*3/mm3      Neutrophil % 69.6 %      Lymphocyte % 21.0 %      Monocyte % 7.8 %      Eosinophil % 0.5 %      Basophil % 0.5 %      Immature Grans % 0.6 %      Neutrophils, Absolute 7.41 10*3/mm3      Lymphocytes, Absolute 2.23 10*3/mm3      Monocytes, Absolute 0.83 10*3/mm3      Eosinophils, Absolute 0.05 10*3/mm3      Basophils, Absolute 0.05 10*3/mm3      Immature Grans, Absolute 0.06 10*3/mm3      nRBC 0.0 /100 WBC                Assessment:      Doing well postoperatively.      Plan:   1. Continue Stage 1 diet  2. Continue with ambulation  and Incentive spirometry  3. Plan for d/c home    Patient was seen and examined by their surgeon.    Hospital Course: The patient is a very pleasant 38 y.o. female that was admitted to the hospital with morbid obesity with comorbidities who underwent the above mentioned procedure without complication.  Postoperatively the patient was then transferred to the bariatric unit per protocol.  The patient was afebrile with vital signs stable.  They were ambulating well and using the incentive spirometer.  POD 1 the patient was started on bariatric diet which they tolerated without difficulty.  Patient was then discharged home to follow up as an outpatient.      Discharge medications:      Discharge Medications        New Medications        Instructions Start Date   traMADol 50 MG tablet  Commonly known as: ULTRAM   50 mg, Oral, Every 6 Hours PRN             Changes to Medications        Instructions Start Date   DULoxetine 60 MG capsule  Commonly known as: CYMBALTA  What changed: when to take this   60 mg, Oral, Daily, Take along with cymbalta 30mg daily to equal 90mg daily.      DULoxetine 30 MG capsule  Commonly known as: CYMBALTA  What changed: when to take this   30 mg, Oral, Daily, Take along with cymbalta 60mg daily to equal 90mg daily.      esomeprazole 40 MG capsule  Commonly known as: nexIUM  What changed: when to take this   40 mg, Oral, Every Morning Before Breakfast      ondansetron ODT 4 MG disintegrating tablet  Commonly known as: ZOFRAN-ODT  What changed: reasons to take this   4 mg, Translingual, Every 8 Hours PRN             Continue These Medications        Instructions Start Date   albuterol sulfate  (90 Base) MCG/ACT inhaler  Commonly known as: PROVENTIL HFA;VENTOLIN HFA;PROAIR HFA   2 puffs, Inhalation, Every 4 Hours PRN      Botox 200 units reconstituted solution  Generic drug: OnabotulinumtoxinA       busPIRone 10 MG tablet  Commonly known as: BUSPAR   10 mg, Oral, 3 Times Daily       Enoxaparin Sodium 40 MG/0.4ML solution prefilled syringe syringe  Commonly known as: LOVENOX   40 mg, Subcutaneous, Every 24 Hours Scheduled, Start after surgery unless instructed otherwise      famotidine 40 MG tablet  Commonly known as: Pepcid   40 mg, Oral, Daily      folic acid-vit B6-vit B12 2.5-25-1 MG tablet tablet  Commonly known as: FOLBEE   1 tablet, Oral, Daily      meclizine 25 MG tablet  Commonly known as: ANTIVERT   25 mg, Oral, 3 Times Daily PRN      Nurtec 75 MG dispersible tablet  Generic drug: Rimegepant Sulfate   TAKE ONE TABLET BY MOUTH EVERY DAY AS NEEDED FOR MIGRAINE      tiZANidine 4 MG tablet  Commonly known as: ZANAFLEX   4 mg, Oral, Every 8 Hours PRN      topiramate 25 MG tablet  Commonly known as: TOPAMAX   Take 1 tablet by mouth 2 (Two) Times a Day as directed for headache prevention               Discharge instructions:  Per Bariatric manual; per our protocol      Follow-up appointment: Follow up in the office as scheduled.  If not already scheduled call for appointment at [phone].

## 2024-08-07 ENCOUNTER — TRANSITIONAL CARE MANAGEMENT TELEPHONE ENCOUNTER (OUTPATIENT)
Dept: CALL CENTER | Facility: HOSPITAL | Age: 39
End: 2024-08-07
Payer: COMMERCIAL

## 2024-08-07 NOTE — OUTREACH NOTE
Call Center TCM Note      Flowsheet Row Responses   [facility] patient discharged from? [place]   Does the patient have one of the following disease processes/diagnoses(primary or secondary)? General Surgery   TCM attempt successful? No  [no names listed on verbal release]   Unsuccessful attempts Attempt 1            Arlin Lucero RN    8/7/2024, 11:34 EDT

## 2024-08-07 NOTE — OUTREACH NOTE
Call Center TCM Note      Flowsheet Row Responses   [facility] patient discharged from? [place]   Does the patient have one of the following disease processes/diagnoses(primary or secondary)? General Surgery   TCM attempt successful? Yes   Call start time 1603   Call end time 1607   Discharge diagnosis Sleeve gastrectomy LAP this visit   Medication alerts for this patient LOVENOX   Meds reviewed with patient/caregiver? Yes   Is the patient having any side effects they believe may be caused by any medication additions or changes? No   Does the patient have all medications related to this admission filled (includes all antibiotics, pain medications, etc.) Yes   Is the patient taking all medications as directed (includes completed medication regime)? Yes   Comments Hospital f/u 8/15/24@0915am, Surgical f/u 8/9/24   Does the patient have an appointment with their PCP within 7-14 days of discharge? Yes   Has home health visited the patient within 72 hours of discharge? N/A   Psychosocial issues? No   Did the patient receive a copy of their discharge instructions? Yes   Nursing interventions Reviewed instructions with patient   What is the patient's perception of their health status since discharge? Improving  [Pt imrproving, has some sharp pain at times.  NO BM yet  but aware to refer to AVS for instructions.  Pt has no questions today.  Appts scheduled.]   Nursing interventions Nurse provided patient education  [post op instructions reviewed]   Is the patient /caregiver able to teach back basic post-op care? Continue use of incentive spirometry at least 1 week post discharge, No tub bath, swimming, or hot tub until instructed by MD, Keep incision areas clean,dry and protected, Lifting as instructed by MD in discharge instructions   Is the patient/caregiver able to teach back signs and symptoms of incisional infection? Increased redness, swelling or pain at the incisonal site, Increased drainage or bleeding,  Incisional warmth, Pus or odor from incision, Fever  [denies issues]   Is the patient/caregiver able to teach back steps to recovery at home? Rest and rebuild strength, gradually increase activity  [reviewed activity guidelines, tolerating clear liquids]   Is the patient/caregiver able to teach back the hierarchy of who to call/visit for symptoms/problems? PCP, Specialist, Home health nurse, Urgent Care, ED, 911 Yes   TCM call completed? Yes   Call end time 1600            Arlin Lucero RN    8/7/2024, 16:10 EDT

## 2024-08-12 DIAGNOSIS — M54.41 ACUTE MIDLINE LOW BACK PAIN WITH BILATERAL SCIATICA: ICD-10-CM

## 2024-08-12 DIAGNOSIS — M54.42 ACUTE MIDLINE LOW BACK PAIN WITH BILATERAL SCIATICA: ICD-10-CM

## 2024-08-13 ENCOUNTER — OFFICE VISIT (OUTPATIENT)
Dept: BARIATRICS/WEIGHT MGMT | Facility: CLINIC | Age: 39
End: 2024-08-13
Payer: COMMERCIAL

## 2024-08-13 VITALS
BODY MASS INDEX: 34.91 KG/M2 | WEIGHT: 197 LBS | HEART RATE: 103 BPM | DIASTOLIC BLOOD PRESSURE: 79 MMHG | TEMPERATURE: 97.6 F | SYSTOLIC BLOOD PRESSURE: 110 MMHG | HEIGHT: 63 IN

## 2024-08-13 DIAGNOSIS — Z98.84 S/P LAPAROSCOPIC SLEEVE GASTRECTOMY: ICD-10-CM

## 2024-08-13 DIAGNOSIS — E66.9 OBESITY, CLASS II, BMI 35-39.9: Primary | ICD-10-CM

## 2024-08-13 PROCEDURE — 99024 POSTOP FOLLOW-UP VISIT: CPT | Performed by: PHYSICIAN ASSISTANT

## 2024-08-13 RX ORDER — ALBUTEROL SULFATE 90 UG/1
2 AEROSOL, METERED RESPIRATORY (INHALATION) EVERY 4 HOURS PRN
Qty: 20.1 G | Refills: 0 | Status: SHIPPED | OUTPATIENT
Start: 2024-08-13

## 2024-08-13 RX ORDER — TIZANIDINE 4 MG/1
4 TABLET ORAL EVERY 8 HOURS PRN
Qty: 60 TABLET | Refills: 0 | Status: SHIPPED | OUTPATIENT
Start: 2024-08-13

## 2024-08-13 NOTE — PROGRESS NOTES
[facility] BARIATRIC [facility] BARIATRIC SURGERY  [address]  [facility] [id number] [phone]  [address]  [facility] [id number] [phone]  Dept: [phone]  8/13/2024      Gin Alexandra.  [id number]  [id number]  1985  female      Chief Complaint   Patient presents with    Post-op     8 days post op sleeve        BH Post-Op Bariatric Surgery:   Gin Alexandra is status post laparopscopic Laparoscopic Sleeve with PEHR procedure, performed on 08/05/2024.     HPI:   Today's weight is 89.4 kg (197 lb) pounds, today's BMI is Body mass index is 35.13 kg/m²., she   has a  loss of 15 pounds since the last visit and her    weight loss since surgery is 15 pounds. The patient reports a decreased portion size and loss of appetite.      Gin Alexandra denies n/v/c/d/regurg/reflux and reports fatigue and dizziness.  Pt reports feeling lightheaded when walking around or while showering. Pt reports feeling short of breath when walking and like she cannot get a good breath. Pt denies shortness of air at rest. Pt reports once she calms down she is able to get a good deep breath. Pt denies any chest pain or chest tightness. Pt is using spirometry at home. Pt voiced her mother passed away suddenly the day before her sleeve surgery. Pt reports yesterday going to the store, taking her kids to school, and being at her daughters soccer game for several hours.  The patient c/o appropriate post-op incisional discomfort that is improving.     The patient states they are drinking 48-50oz. And 50g of protein including clear protein drinks and protein shakes.   Taking their vitamins, walking and using IS. Denies fevers, chills, chest pain or shortness of air.      Diet and Exercise: Diet history reviewed and discussed with the patient. Weight loss/gains to date discussed with the patient. No carbonated beverage consumption and exercising regularly- walking frequently.    Supplements: multivitamins, B-12, calcium, iron, B-1 and Vitamin D.   Patient is taking blood thinner as prescribed: Lovenox  Current outpatient and discharge medications have been reconciled for the patient.  Reviewed by: Yudelka Munoz PA-C        Review of Systems   Constitutional:  Positive for fatigue. Negative for appetite change, fever and unexpected weight change.   HENT: Negative.     Eyes: Negative.    Respiratory: Negative.     Cardiovascular: Negative.  Negative for leg swelling.   Gastrointestinal:  Positive for abdominal pain. Negative for abdominal distention, constipation, diarrhea, nausea and vomiting.   Genitourinary:  Negative for difficulty urinating, frequency and urgency.   Musculoskeletal:  Negative for back pain.   Skin:  Positive for wound.   Neurological:  Positive for dizziness.   Psychiatric/Behavioral: Negative.     All other systems reviewed and are negative.      Patient Active Problem List   Diagnosis    Depression with anxiety    IBS (irritable bowel syndrome)    Fibromyalgia    Chronic midline thoracic back pain    Chronic tension-type headache, intractable    Fatigue    Hyperlipidemia    Gastroesophageal reflux disease    Bilateral arm pain    Occipital neuralgia    Nausea    Obesity, Class II, BMI 35-39.9    Dietary counseling    Pre-op evaluation    STEPHANIA (obstructive sleep apnea)    Asthma    Fatty liver    Paraesophageal hernia    S/P laparoscopic sleeve gastrectomy       The following portions of the patient's history were reviewed and updated as appropriate: allergies, current medications, past medical history, past surgical history, and problem list.    Vitals:    08/13/24 1134   BP: 110/79   Pulse: 103   Temp: 97.6 °F (36.4 °C)       Physical Exam  Vitals reviewed.   Constitutional:       General: She is awake. She is not in acute distress.     Appearance: She is morbidly obese.   HENT:      Head: Normocephalic and atraumatic.      Mouth/Throat:      Mouth: Mucous  membranes are moist.   Eyes:      General: No scleral icterus.     Pupils: Pupils are equal, round, and reactive to light.   Cardiovascular:      Rate and Rhythm: Normal rate and regular rhythm.   Pulmonary:      Effort: Pulmonary effort is normal. No respiratory distress.      Breath sounds: Normal breath sounds.   Abdominal:      General: Abdomen is flat. Bowel sounds are normal. There is no distension.      Palpations: Abdomen is soft.      Tenderness: There is no abdominal tenderness. There is no guarding.      Comments: Incisions clean, dry, intact  Surrounding erythema to incisions from pt scratching skin.   Musculoskeletal:         General: Normal range of motion.      Cervical back: Normal range of motion and neck supple.   Skin:     General: Skin is warm and dry.   Neurological:      General: No focal deficit present.      Mental Status: She is alert and oriented to person, place, and time.   Psychiatric:         Mood and Affect: Mood normal.         Behavior: Behavior normal. Behavior is cooperative.         Assessment:   Post-op, the patient is improving as expected.     Encounter Diagnoses   Name Primary?    Obesity, Class II, BMI 35-39.9 Yes    S/P laparoscopic sleeve gastrectomy        Plan:   Advised pt to use Benadryl cream and hydrocortisone cream surrounding skin of incisions. Advised pt she could take PO Benadryl at night.  Advised pt to ensure she is drinking protein daily. Pt can advance diet today. Advised pt she may be exerting a lot of energy and this is causing lightheadedness.  Advised pt she may have overdone it yesterday.  Continue spirometry.  Advised pt if this is not improving or any fevers to call the office.    Reviewed with patient the importance of following the manual for diet progression. Increase activity as tolerated. Continue increasing daily intake of protein and water.   Return to work: the patient is to return to 3 weeks from their surgery date with no restrictions unless  they develop medical problems in which we will see them back in the office. They received a note in our office today with their return to work date.  Activity restrictions: no lifting, pushing or pulling over 25lbs for 3 weeks.   Recommended patient be sure to get at least 70 grams of protein per day. Discussed with the patient the recommended amount of water per day to intake. Reviewed vitamin requirements. Be sure to do routine exercise and increase activity as tolerated. No asa, nsaids or steroids for 8 weeks if gastric sleeve procedure and lifelong if gastric bypass procedure.. Patient may use miralax as needed if necessary.     Instructions / Recommendations: dietary counseling recommended, recommended a daily protein intake of  grams, vitamin supplement(s) recommended, recommended exercising consistently throughout the week, behavior modifications recommended and instructed to call the office for concerns, questions, or problems.     The patient was instructed to follow up at one month follow up appt.     The patient was counseled regarding post op bariatric manual

## 2024-08-16 ENCOUNTER — READMISSION MANAGEMENT (OUTPATIENT)
Dept: CALL CENTER | Facility: HOSPITAL | Age: 39
End: 2024-08-16
Payer: COMMERCIAL

## 2024-08-16 NOTE — OUTREACH NOTE
General Surgery Week 2 Survey      Flowsheet Row Responses   [facility] facility patient discharged from? [place]   Does the patient have one of the following disease processes/diagnoses(primary or secondary)? General Surgery   Week 2 attempt successful? No   Unsuccessful attempts Attempt 1            Ginger Gonsalez Registered Nurse

## 2024-08-20 ENCOUNTER — HOSPITAL ENCOUNTER (EMERGENCY)
Facility: HOSPITAL | Age: 39
Discharge: HOME | End: 2024-08-20
Attending: EMERGENCY MEDICINE
Payer: COMMERCIAL

## 2024-08-20 ENCOUNTER — READMISSION MANAGEMENT (OUTPATIENT)
Dept: CALL CENTER | Facility: HOSPITAL | Age: 39
End: 2024-08-20
Payer: COMMERCIAL

## 2024-08-20 ENCOUNTER — TELEPHONE (OUTPATIENT)
Dept: BARIATRICS/WEIGHT MGMT | Facility: CLINIC | Age: 39
End: 2024-08-20
Payer: COMMERCIAL

## 2024-08-20 ENCOUNTER — APPOINTMENT (OUTPATIENT)
Dept: CT IMAGING | Facility: HOSPITAL | Age: 39
End: 2024-08-20
Payer: COMMERCIAL

## 2024-08-20 ENCOUNTER — APPOINTMENT (OUTPATIENT)
Dept: GENERAL RADIOLOGY | Facility: HOSPITAL | Age: 39
End: 2024-08-20
Payer: COMMERCIAL

## 2024-08-20 VITALS
SYSTOLIC BLOOD PRESSURE: 106 MMHG | HEART RATE: 86 BPM | WEIGHT: 190 LBS | OXYGEN SATURATION: 98 % | HEIGHT: 63 IN | BODY MASS INDEX: 33.66 KG/M2 | TEMPERATURE: 98.2 F | DIASTOLIC BLOOD PRESSURE: 73 MMHG | RESPIRATION RATE: 18 BRPM

## 2024-08-20 DIAGNOSIS — I95.1 ORTHOSTASIS: ICD-10-CM

## 2024-08-20 DIAGNOSIS — R53.1 WEAKNESS: Primary | ICD-10-CM

## 2024-08-20 DIAGNOSIS — R06.00 DYSPNEA, UNSPECIFIED TYPE: ICD-10-CM

## 2024-08-20 LAB
ALBUMIN SERPL-MCNC: 4.5 G/DL (ref 3.5–5.2)
ALBUMIN/GLOB SERPL: 1.5 G/DL
ALP SERPL-CCNC: 94 U/L (ref 39–117)
ALT SERPL W P-5'-P-CCNC: 53 U/L (ref 1–33)
ANION GAP SERPL CALCULATED.3IONS-SCNC: 19.7 MMOL/L (ref 5–15)
AST SERPL-CCNC: 34 U/L (ref 1–32)
BASOPHILS # BLD AUTO: 0.09 10*3/MM3 (ref 0–0.2)
BASOPHILS NFR BLD AUTO: 0.7 % (ref 0–1.5)
BILIRUB SERPL-MCNC: 0.3 MG/DL (ref 0–1.2)
BUN SERPL-MCNC: 14 MG/DL (ref 6–20)
BUN/CREAT SERPL: 15.2 (ref 7–25)
CALCIUM SPEC-SCNC: 10.2 MG/DL (ref 8.6–10.5)
CHLORIDE SERPL-SCNC: 97 MMOL/L (ref 98–107)
CO2 SERPL-SCNC: 19.3 MMOL/L (ref 22–29)
CREAT SERPL-MCNC: 0.92 MG/DL (ref 0.57–1)
D DIMER PPP FEU-MCNC: 0.87 MCGFEU/ML (ref 0–0.5)
DEPRECATED RDW RBC AUTO: 42.5 FL (ref 37–54)
EGFRCR SERPLBLD CKD-EPI 2021: 82.9 ML/MIN/1.73
EOSINOPHIL # BLD AUTO: 0.23 10*3/MM3 (ref 0–0.4)
EOSINOPHIL NFR BLD AUTO: 1.9 % (ref 0.3–6.2)
ERYTHROCYTE [DISTWIDTH] IN BLOOD BY AUTOMATED COUNT: 13.3 % (ref 12.3–15.4)
GLOBULIN UR ELPH-MCNC: 3.1 GM/DL
GLUCOSE SERPL-MCNC: 112 MG/DL (ref 65–99)
HCT VFR BLD AUTO: 42.6 % (ref 34–46.6)
HGB BLD-MCNC: 14.4 G/DL (ref 12–15.9)
HOLD SPECIMEN: NORMAL
HOLD SPECIMEN: NORMAL
IMM GRANULOCYTES # BLD AUTO: 0.03 10*3/MM3 (ref 0–0.05)
IMM GRANULOCYTES NFR BLD AUTO: 0.2 % (ref 0–0.5)
LIPASE SERPL-CCNC: 58 U/L (ref 13–60)
LYMPHOCYTES # BLD AUTO: 2.39 10*3/MM3 (ref 0.7–3.1)
LYMPHOCYTES NFR BLD AUTO: 19.2 % (ref 19.6–45.3)
MCH RBC QN AUTO: 29.8 PG (ref 26.6–33)
MCHC RBC AUTO-ENTMCNC: 33.8 G/DL (ref 31.5–35.7)
MCV RBC AUTO: 88 FL (ref 79–97)
MONOCYTES # BLD AUTO: 0.74 10*3/MM3 (ref 0.1–0.9)
MONOCYTES NFR BLD AUTO: 6 % (ref 5–12)
NEUTROPHILS NFR BLD AUTO: 72 % (ref 42.7–76)
NEUTROPHILS NFR BLD AUTO: 8.94 10*3/MM3 (ref 1.7–7)
NRBC BLD AUTO-RTO: 0 /100 WBC (ref 0–0.2)
NT-PROBNP SERPL-MCNC: <36 PG/ML (ref 0–450)
PLATELET # BLD AUTO: 354 10*3/MM3 (ref 140–450)
PMV BLD AUTO: 9.4 FL (ref 6–12)
POTASSIUM SERPL-SCNC: 3.6 MMOL/L (ref 3.5–5.2)
PROT SERPL-MCNC: 7.6 G/DL (ref 6–8.5)
QT INTERVAL: 313 MS
QTC INTERVAL: 407 MS
RBC # BLD AUTO: 4.84 10*6/MM3 (ref 3.77–5.28)
SODIUM SERPL-SCNC: 136 MMOL/L (ref 136–145)
TROPONIN T SERPL HS-MCNC: <6 NG/L
WBC NRBC COR # BLD AUTO: 12.42 10*3/MM3 (ref 3.4–10.8)
WHOLE BLOOD HOLD COAG: NORMAL
WHOLE BLOOD HOLD SPECIMEN: NORMAL

## 2024-08-20 PROCEDURE — 93010 ELECTROCARDIOGRAM REPORT: CPT | Performed by: INTERNAL MEDICINE

## 2024-08-20 PROCEDURE — 71045 X-RAY EXAM CHEST 1 VIEW: CPT

## 2024-08-20 PROCEDURE — 93005 ELECTROCARDIOGRAM TRACING: CPT | Performed by: EMERGENCY MEDICINE

## 2024-08-20 PROCEDURE — 99285 EMERGENCY DEPT VISIT HI MDM: CPT

## 2024-08-20 PROCEDURE — 25510000001 IOPAMIDOL PER 1 ML: Performed by: EMERGENCY MEDICINE

## 2024-08-20 PROCEDURE — 74177 CT ABD & PELVIS W/CONTRAST: CPT

## 2024-08-20 PROCEDURE — 25810000003 SODIUM CHLORIDE 0.9 % SOLUTION: Performed by: NURSE PRACTITIONER

## 2024-08-20 PROCEDURE — 84484 ASSAY OF TROPONIN QUANT: CPT | Performed by: EMERGENCY MEDICINE

## 2024-08-20 PROCEDURE — 71260 CT THORAX DX C+: CPT

## 2024-08-20 PROCEDURE — 80053 COMPREHEN METABOLIC PANEL: CPT | Performed by: EMERGENCY MEDICINE

## 2024-08-20 PROCEDURE — 96374 THER/PROPH/DIAG INJ IV PUSH: CPT

## 2024-08-20 PROCEDURE — 85025 COMPLETE CBC W/AUTO DIFF WBC: CPT | Performed by: EMERGENCY MEDICINE

## 2024-08-20 PROCEDURE — 83880 ASSAY OF NATRIURETIC PEPTIDE: CPT | Performed by: EMERGENCY MEDICINE

## 2024-08-20 PROCEDURE — 85379 FIBRIN DEGRADATION QUANT: CPT | Performed by: NURSE PRACTITIONER

## 2024-08-20 PROCEDURE — 83690 ASSAY OF LIPASE: CPT | Performed by: NURSE PRACTITIONER

## 2024-08-20 PROCEDURE — 25010000002 ONDANSETRON PER 1 MG: Performed by: NURSE PRACTITIONER

## 2024-08-20 RX ORDER — SODIUM CHLORIDE 0.9 % (FLUSH) 0.9 %
10 SYRINGE (ML) INJECTION AS NEEDED
Status: DISCONTINUED | OUTPATIENT
Start: 2024-08-20 | End: 2024-08-20 | Stop reason: HOSPADM

## 2024-08-20 RX ORDER — IOPAMIDOL 755 MG/ML
100 INJECTION, SOLUTION INTRAVASCULAR
Status: COMPLETED | OUTPATIENT
Start: 2024-08-20 | End: 2024-08-20

## 2024-08-20 RX ORDER — ONDANSETRON 2 MG/ML
4 INJECTION INTRAMUSCULAR; INTRAVENOUS ONCE
Status: COMPLETED | OUTPATIENT
Start: 2024-08-20 | End: 2024-08-20

## 2024-08-20 RX ADMIN — ONDANSETRON 4 MG: 2 INJECTION INTRAMUSCULAR; INTRAVENOUS at 10:20

## 2024-08-20 RX ADMIN — SODIUM CHLORIDE 1000 ML: 9 INJECTION, SOLUTION INTRAVENOUS at 10:30

## 2024-08-20 RX ADMIN — IOPAMIDOL 100 ML: 755 INJECTION, SOLUTION INTRAVENOUS at 11:25

## 2024-08-20 NOTE — OUTREACH NOTE
General Surgery Week 2 Survey      Flowsheet Row Responses   [facility], [place], operated by [facility] patient discharged from? [place]   Does the patient have one of the following disease processes/diagnoses(primary or secondary)? General Surgery   Week 2 attempt successful? No   Unsuccessful attempts Attempt 2            Rosa FREY - Registered Nurse

## 2024-08-20 NOTE — ED PROVIDER NOTES
terrell: 9:48 AM EDT  Date of encounter:  8/20/2024  Independent Historian/Clinical History and Information was obtained by:   Patient          Chief Complaint: fatigue        History is limited by: N/A          History of Present Illness:  Patient is a 38 y.o. 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 Amador at [facility], reflux, fibromyalgia, IBS, and asthma who presents to the emergency department for evaluation of generalized fatigue, shortness of breath, and palpitations.  Patient has mild pain in her upper chest but moves through to her back.  Patient states she has been nauseated and has not felt like eating and is not drinking fluids.  Patient denies fever, cough, heavy lifting or straining, trauma/fall/injury, history DVT or PE, abdominal pain, dysuria, hematuria, bloody stools, constipation or diarrhea, birth control pill use, or other complaint.  She had a normal menstrual August 3.  Denies smoking or alcohol use.              Patient Care Team  Primary Care Provider: Rekha Kim APRN    Past Medical History:     Allergies   Allergen Reactions    Codeine Hives     Past Medical History:   Diagnosis Date    Abnormal EKG     cardiac clearance note in epic    Allergic rhinitis     Anxiety     Asthma     Chronic fatigue, unspecified     Constipation, unspecified     Depression     Started when i was 14    Fatty liver 05/21/2024    Fibromyalgia     GERD without esophagitis     Headache     HL (hearing loss)     right ear less    Hyperlipidemia     IBS (irritable bowel syndrome)     diarrhea and constipation    Migraine     Obesity, unspecified     Occipital neuralgia     STEPHANIA (obstructive sleep apnea) 05/21/2024    no machine    Pain in right hip     Pituitary tumor     PONV (postoperative nausea and vomiting) 2006    Severe nausea with vomiting for several hours after    Tinnitus      Past Surgical History:   Procedure Laterality Date     CARPAL TUNNEL RELEASE      x3    COLONOSCOPY      COLONOSCOPY N/A 11/27/2023    Procedure: COLONOSCOPY;  Surgeon: Kumar Turner MD;  Location: [facility] ENDOSCOPY;  Service: Gastroenterology;  Laterality: N/A;  NORMAL COLONOSCOPY    CYST REMOVAL N/A 05/23/2024    Procedure: Excision of subcutaneous mass from abdominal wall;  Surgeon: Julio Warren MD;  Location: [facility] OR [facility] [place];  Service: General;  Laterality: N/A;    ENDOSCOPY N/A 06/13/2022    Procedure: ESOPHAGOGASTRODUODENOSCOPY WITH BIOPSIES;  Surgeon: Kumar Turner MD;  Location: [facility] ENDOSCOPY;  Service: Gastroenterology;  Laterality: N/A;  NORMAL EGD    ENDOSCOPY N/A 11/27/2023    Procedure: ESOPHAGOGASTRODUODENOSCOPY WITH BIOPSIES;  Surgeon: Kumar Turner MD;  Location: [facility] ENDOSCOPY;  Service: Gastroenterology;  Laterality: N/A;  HIATAL HERNIA, GASTRITIS    GASTRIC SLEEVE LAPAROSCOPIC N/A 08/05/2024    Procedure: Sleeve gastrectomy LAPAROSCOPIC With Paraesophageal Hernia Repair;  Surgeon: Itz Amador Jr., MD;  Location: [facility] OR [facility] [place];  Service: [employer];  Laterality: N/A;    LAPAROSCOPIC CHOLECYSTECTOMY      AT AGE 21   DETAILS/COMPLICATIONS?    LEEP  2012    TUBAL ABDOMINAL LIGATION       Family History   Problem Relation Age of Onset    Arthritis Mother     Depression Mother     Hyperlipidemia Mother     Cancer Mother         Non-small lung cancer    COPD Father     Depression Father     Heart disease Father     Hyperlipidemia Father     Hyperlipidemia Brother     Cancer Maternal Aunt     Heart disease Maternal Uncle     Ovarian cancer Maternal Grandmother     Cancer Maternal Grandmother     Heart attack Maternal Grandfather     Stroke Maternal Grandfather     Heart disease Maternal Grandfather     COPD Paternal Grandmother     Depression Paternal Grandmother     Lung cancer Paternal Grandfather     Asthma Paternal Grandfather     Cancer Paternal Grandfather         lung cancer    COPD Paternal  Grandfather     Vision loss Paternal Grandfather     Colon cancer Neg Hx     Malig Hyperthermia Neg Hx        Home Medications:  Prior to Admission medications    Medication Sig Start Date End Date Taking? Authorizing Provider   albuterol sulfate  (90 Base) MCG/ACT inhaler Inhale 2 puffs Every 4 (Four) Hours As Needed for Shortness of Air. 8/13/24   Rekha Kim APRN   Botox 200 units reconstituted solution  6/20/24   Provider, MD Delisa   DULoxetine (CYMBALTA) 30 MG capsule Take 1 capsule by mouth Daily. Take along with cymbalta 60mg daily to equal 90mg daily.  Patient taking differently: Take 1 capsule by mouth Every Night. Take along with cymbalta 60mg daily to equal 90mg daily. 6/21/24   Rekha Kim APRN   DULoxetine (CYMBALTA) 60 MG capsule Take 1 capsule by mouth Daily. Take along with cymbalta 30mg daily to equal 90mg daily.  Patient taking differently: Take 1 capsule by mouth Every Night. Take along with cymbalta 30mg daily to equal 90mg daily. 6/21/24   Rekha Kim APRN   Enoxaparin Sodium (LOVENOX) 40 MG/0.4ML solution prefilled syringe syringe Inject 0.4 mL under the skin into the appropriate area as directed Daily for 14 days. Start after surgery unless instructed otherwise 8/1/24 8/21/24  Itz Amador Jr., MD   esomeprazole (nexIUM) 40 MG capsule Take 1 capsule by mouth Every Morning Before Breakfast.  Patient taking differently: Take 1 capsule by mouth Every Night. 5/24/24   Rekha Kim APRN   famotidine (Pepcid) 40 MG tablet Take 1 tablet by mouth Daily. 5/30/24   Yudelka Morrow APRN   folic acid-vit B6-vit B12 (FOLBEE) 2.5-25-1 MG tablet tablet Take 1 tablet by mouth Daily. 8/1/24   Itz Amador Jr., MD   meclizine (ANTIVERT) 25 MG tablet Take 1 tablet by mouth 3 (Three) Times a Day As Needed for Dizziness. 2/1/24   Rekha Kim APRN   Nurtec 75 MG dispersible tablet TAKE ONE TABLET BY MOUTH EVERY DAY AS NEEDED FOR MIGRAINE 8/9/22   Provider,  "MD Delisa   ondansetron ODT (ZOFRAN-ODT) 4 MG disintegrating tablet Place 1 tablet on the tongue Every 8 (Eight) Hours As Needed for Nausea. 24   Kelley Guzmán APRN   tiZANidine (ZANAFLEX) 4 MG tablet Take 1 tablet by mouth Every 8 (Eight) Hours As Needed for Muscle Spasms. 24   Rekha Kim APRN   topiramate (Topamax) 25 MG tablet Take 1 tablet by mouth 2 (Two) Times a Day as directed for headache prevention 24      traMADol (ULTRAM) 50 MG tablet Take 1 tablet by mouth Every 6 (Six) Hours As Needed for Moderate Pain. 24   Kelley Guzmán APRN        Social History:   Social History     Tobacco Use    Smoking status: Former     Current packs/day: 0.00     Average packs/day: 2.0 packs/day for 16.0 years (32.0 ttl pk-yrs)     Types: Cigarettes     Start date: 1999     Quit date: 2015     Years since quittin.6     Passive exposure: Past    Smokeless tobacco: Never   Vaping Use    Vaping status: Never Used   Substance Use Topics    Alcohol use: Not Currently     Comment: Very seldom    Drug use: Not Currently     Comment: delta 8 occasionally         Physical Exam:  /79   Pulse 82   Temp 97.7 °F (36.5 °C) (Oral)   Resp 22   Ht 160 cm (63\")   Wt 86.2 kg (190 lb)   LMP 2024   SpO2 100%   BMI 33.66 kg/m²     Physical Exam  Constitutional:       Appearance: Normal appearance. She is well-developed. She is obese.   HENT:      Head: Normocephalic and atraumatic.   Cardiovascular:      Rate and Rhythm: Normal rate and regular rhythm.      Pulses: Normal pulses.      Heart sounds: Normal heart sounds.   Pulmonary:      Effort: Pulmonary effort is normal.      Breath sounds: Normal breath sounds.   Abdominal:      General: Bowel sounds are normal.      Palpations: Abdomen is soft.   Musculoskeletal:         General: Normal range of motion.   Skin:     General: Skin is warm and dry.   Neurological:      General: No focal deficit present.      Mental Status: She is alert " and oriented to person, place, and time.   Psychiatric:         Mood and Affect: Mood normal.         Behavior: Behavior normal.         Thought Content: Thought content normal.         Judgment: Judgment normal.                        Comorbidities that affect care:    Asthma, Obesity    External Notes reviewed:    None      The following orders were placed and all results were independently analyzed by me:  Orders Placed This Encounter   Procedures    XR Chest 1 View    CT Chest With Contrast Diagnostic    CT Abdomen Pelvis With Contrast    [facility] Draw    Comprehensive Metabolic Panel    BNP    Single High Sensitivity Troponin T    CBC Auto Differential    Lipase    D-dimer, Quantitative    NPO Diet NPO Type: Strict NPO    Undress & Gown    Continuous Pulse Oximetry    Vital Signs    Orthostatic Vitals (Blood Pressure & Heart Rate)    Po challenge  Misc Nursing Order (Specify)    Oxygen Therapy- Nasal Cannula; Titrate 1-6 LPM Per SpO2; 90 - 95%    ECG 12 Lead ED Triage Standing Order; SOA    Insert Peripheral IV    CBC & Differential    Green Top (Gel)    Lavender Top    Gold Top - SST    Light Blue Top       Medications Given in the Emergency Department:  Medications   sodium chloride 0.9 % flush 10 mL (has no administration in time range)   ondansetron (ZOFRAN) injection 4 mg (4 mg Intravenous Given 8/20/24 1020)   sodium chloride 0.9 % bolus 1,000 mL (1,000 mL Intravenous New Bag 8/20/24 1030)   iopamidol (ISOVUE-370) 76 % injection 100 mL (100 mL Intravenous Given 8/20/24 1125)                Labs:    Lab Results (last 24 hours)       Procedure Component Value Units Date/Time    CBC & Differential [[id number]]  (Abnormal) Collected: 08/20/24 0946    Specimen: Blood from Arm, Right Updated: 08/20/24 0957    Narrative:      The following orders were created for panel order CBC & Differential.  Procedure                               Abnormality         Status                     ---------                                -----------         ------                     CBC Auto Differential[[id number]]        Abnormal            Final result                 Please view results for these tests on the individual orders.    Comprehensive Metabolic Panel [[id number]]  (Abnormal) Collected: 08/20/24 0946    Specimen: Blood from Arm, Right Updated: 08/20/24 1018     Glucose 112 mg/dL      BUN 14 mg/dL      Creatinine 0.92 mg/dL      Sodium 136 mmol/L      Potassium 3.6 mmol/L      Chloride 97 mmol/L      CO2 19.3 mmol/L      Calcium 10.2 mg/dL      Total Protein 7.6 g/dL      Albumin 4.5 g/dL      ALT (SGPT) 53 U/L      AST (SGOT) 34 U/L      Alkaline Phosphatase 94 U/L      Total Bilirubin 0.3 mg/dL      Globulin 3.1 gm/dL      A/G Ratio 1.5 g/dL      BUN/Creatinine Ratio 15.2     Anion Gap 19.7 mmol/L      eGFR 82.9 mL/min/1.73     Narrative:      GFR Normal >60  Chronic Kidney Disease <60  Kidney Failure <15      BNP [[id number]]  (Normal) Collected: 08/20/24 0946    Specimen: Blood from Arm, Right Updated: 08/20/24 1016     proBNP <36.0 pg/mL     Narrative:      This assay is used as an aid in the diagnosis of individuals suspected of having heart failure. It can be used as an aid in the diagnosis of acute decompensated heart failure (ADHF) in patients presenting with signs and symptoms of ADHF to the emergency department (ED). In addition, NT-proBNP of <300 pg/mL indicates ADHF is not likely.    Age Range Result Interpretation  NT-proBNP Concentration (pg/mL:      <50             Positive            >450                   Gray                 300-450                    Negative             <300    50-75           Positive            >900                  Gray                300-900                  Negative            <300      >75             Positive            >1800                  Gray                300-1800                  Negative            <300    Single High Sensitivity Troponin T [[id number]]  (Normal) Collected:  08/20/24 0946    Specimen: Blood from Arm, Right Updated: 08/20/24 1018     HS Troponin T <6 ng/L     Narrative:      High Sensitive Troponin T Reference Range:  <14.0 ng/L- Negative Female for AMI  <22.0 ng/L- Negative Male for AMI  >=14 - Abnormal Female indicating possible myocardial injury.  >=22 - Abnormal Male indicating possible myocardial injury.   Clinicians would have to utilize clinical acumen, EKG, Troponin, and serial changes to determine if it is an Acute Myocardial Infarction or myocardial injury due to an underlying chronic condition.         CBC Auto Differential [[id number]]  (Abnormal) Collected: 08/20/24 0946    Specimen: Blood from Arm, Right Updated: 08/20/24 0957     WBC 12.42 10*3/mm3      RBC 4.84 10*6/mm3      Hemoglobin 14.4 g/dL      Hematocrit 42.6 %      MCV 88.0 fL      MCH 29.8 pg      MCHC 33.8 g/dL      RDW 13.3 %      RDW-SD 42.5 fl      MPV 9.4 fL      Platelets 354 10*3/mm3      Neutrophil % 72.0 %      Lymphocyte % 19.2 %      Monocyte % 6.0 %      Eosinophil % 1.9 %      Basophil % 0.7 %      Immature Grans % 0.2 %      Neutrophils, Absolute 8.94 10*3/mm3      Lymphocytes, Absolute 2.39 10*3/mm3      Monocytes, Absolute 0.74 10*3/mm3      Eosinophils, Absolute 0.23 10*3/mm3      Basophils, Absolute 0.09 10*3/mm3      Immature Grans, Absolute 0.03 10*3/mm3      nRBC 0.0 /100 WBC     Lipase [[id number]]  (Normal) Collected: 08/20/24 0946    Specimen: Blood from Arm, Right Updated: 08/20/24 1018     Lipase 58 U/L     D-dimer, Quantitative [[id number]]  (Abnormal) Collected: 08/20/24 0946    Specimen: Blood from Arm, Right Updated: 08/20/24 1025     D-Dimer, Quantitative 0.87 MCGFEU/mL     Narrative:      According to the assay 's published package insert, a normal (<0.50 MCGFEU/mL) D-dimer result in conjunction with a non-high clinical probability assessment, excludes deep vein thrombosis (DVT) and pulmonary embolism (PE) with high sensitivity.    D-dimer values increase  "with age and this can make VTE exclusion of an older population difficult. To address this, the [employer] College [employer], based on best available evidence and recent guidelines, recommends that clinicians use age-adjusted D-dimer thresholds in patients greater than 50 years of age with: a) a low probability of PE who do not meet all Pulmonary Embolism Rule Out Criteria, or b) in those with intermediate probability of PE.   The formula for an age-adjusted D-dimer cut-off is \"age/100\".  For example, a 60 year old patient would have an age-adjusted cut-off of 0.60 MCGFEU/mL and an 80 year old 0.80 MCGFEU/mL.             Imaging:    CT Chest With Contrast Diagnostic    Result Date: 8/20/2024  CT CHEST W CONTRAST DIAGNOSTIC, CT ABDOMEN PELVIS W CONTRAST Date of Exam: 8/20/2024 11:10 AM EDT Indication: shortness of breath, tachycardia, 2 weeks post op. Comparison: 11/7/2023 and prior Technique: Axial CT images were obtained of the chest, abdomen and pelvis after the uneventful intravenous administration of iodinated contrast.  Reconstructed coronal and sagittal images were also obtained. Automated exposure control and iterative construction methods were used. FINDINGS: Chest: Mediastinum: Heart size is within normal limits. No suspicious pericardial effusion is noted. The aorta and origin of the great vessels is grossly unremarkable in appearance. The main pulmonary artery is normal in caliber pulmonary arterial system appears opacify unremarkably within the proximal portions. No suspicious hilar or mediastinal adenopathy noted. Some partially calcified hilar nodes noted on the left suggesting old granulomatous disease. The esophagus is grossly unremarkable in appearance. Limited imaging of the base of the neck is grossly unremarkable in appearance Lungs/pleura: Central airways are patent. Bandlike opacity at the left base noted. Small associated nodule at the posterior left costophrenic angle measuring 5 mm noted. " Findings not significantly changed dating back to 2021 compatible with a benign finding. No suspicious nodules or consolidation noted Soft Tissues/Bones: No acute osseous abnormality noted. Abdomen/Pelvis: No free air noted below the diaphragm. Organs: Patient is status post cholecystectomy. The liver, pancreas, left kidney, spleen and adrenal glands are unremarkable. Low-attenuation lesion right kidney again noted compatible with a cyst GI/Bowel: Postsurgical changes from a partial gastrectomy/gastric sleeve procedure noted. Stomach demonstrates no definite acute abnormality. Small bowel demonstrates no acute abnormality. The ileocecal valve and appendix are grossly unremarkable in appearance. Mild stranding noted within the mesentery. Some scattered small lymph nodes are noted within the upper abdomen and in the mesentery likely reactive. The ileocecal valve is unremarkable. Appendix is visualized and appears unremarkable. Colon is decompressed and otherwise unremarkable in its appearance. Pelvis: Surgical clip noted over the left adnexa. Findings may represent a displaced clip from cholecystectomy or postsurgical changes of the left adnexa. Left ovary appear unremarkable in appearance. There is mild fullness of the pelvic vascularity. The  uterus, right ovary and urinary bladder are grossly unremarkable in appearance. There is no suspicious pelvic adenopathy or fluid collection noted. Peritoneum/Retroperitoneum: Aorta is normal in caliber. There is no suspicious retroperitoneal adenopathy. Incidental note of a retroaortic left renal vein. Bones/Soft Tissues: Mild stranding noted anterior abdominal wall likely site of prior laparoscopic ports or insertion within the lower abdomen. No hernia noted through the anterior abdominal wall. No acute osseous abnormality is noted     1. No acute abnormality appreciated of the chest. 2. Postsurgical changes from interval gastric sleeve procedure. No definite acute  abnormality otherwise appreciated. Lymph nodes within the mesentery and upper abdomen are likely reactive 3. Decompression of the colon with prominent appearance of the wall of the colon is likely artifactual. Colitis thought to be less likely. Please correlate clinically. 4. Other findings as above Electronically Signed: Jovan Reynolds MD  8/20/2024 11:58 AM EDT  Workstation ID: [id number]    CT Abdomen Pelvis With Contrast    Result Date: 8/20/2024  CT CHEST W CONTRAST DIAGNOSTIC, CT ABDOMEN PELVIS W CONTRAST Date of Exam: 8/20/2024 11:10 AM EDT Indication: shortness of breath, tachycardia, 2 weeks post op. Comparison: 11/7/2023 and prior Technique: Axial CT images were obtained of the chest, abdomen and pelvis after the uneventful intravenous administration of iodinated contrast.  Reconstructed coronal and sagittal images were also obtained. Automated exposure control and iterative construction methods were used. FINDINGS: Chest: Mediastinum: Heart size is within normal limits. No suspicious pericardial effusion is noted. The aorta and origin of the great vessels is grossly unremarkable in appearance. The main pulmonary artery is normal in caliber pulmonary arterial system appears opacify unremarkably within the proximal portions. No suspicious hilar or mediastinal adenopathy noted. Some partially calcified hilar nodes noted on the left suggesting old granulomatous disease. The esophagus is grossly unremarkable in appearance. Limited imaging of the base of the neck is grossly unremarkable in appearance Lungs/pleura: Central airways are patent. Bandlike opacity at the left base noted. Small associated nodule at the posterior left costophrenic angle measuring 5 mm noted. Findings not significantly changed dating back to 2021 compatible with a benign finding. No suspicious nodules or consolidation noted Soft Tissues/Bones: No acute osseous abnormality noted. Abdomen/Pelvis: No free air noted below the diaphragm.  Organs: Patient is status post cholecystectomy. The liver, pancreas, left kidney, spleen and adrenal glands are unremarkable. Low-attenuation lesion right kidney again noted compatible with a cyst GI/Bowel: Postsurgical changes from a partial gastrectomy/gastric sleeve procedure noted. Stomach demonstrates no definite acute abnormality. Small bowel demonstrates no acute abnormality. The ileocecal valve and appendix are grossly unremarkable in appearance. Mild stranding noted within the mesentery. Some scattered small lymph nodes are noted within the upper abdomen and in the mesentery likely reactive. The ileocecal valve is unremarkable. Appendix is visualized and appears unremarkable. Colon is decompressed and otherwise unremarkable in its appearance. Pelvis: Surgical clip noted over the left adnexa. Findings may represent a displaced clip from cholecystectomy or postsurgical changes of the left adnexa. Left ovary appear unremarkable in appearance. There is mild fullness of the pelvic vascularity. The  uterus, right ovary and urinary bladder are grossly unremarkable in appearance. There is no suspicious pelvic adenopathy or fluid collection noted. Peritoneum/Retroperitoneum: Aorta is normal in caliber. There is no suspicious retroperitoneal adenopathy. Incidental note of a retroaortic left renal vein. Bones/Soft Tissues: Mild stranding noted anterior abdominal wall likely site of prior laparoscopic ports or insertion within the lower abdomen. No hernia noted through the anterior abdominal wall. No acute osseous abnormality is noted     1. No acute abnormality appreciated of the chest. 2. Postsurgical changes from interval gastric sleeve procedure. No definite acute abnormality otherwise appreciated. Lymph nodes within the mesentery and upper abdomen are likely reactive 3. Decompression of the colon with prominent appearance of the wall of the colon is likely artifactual. Colitis thought to be less likely. Please  correlate clinically. 4. Other findings as above Electronically Signed: Jovan Reynolds MD  8/20/2024 11:58 AM EDT  Workstation ID: [id number]    XR Chest 1 View    Result Date: 8/20/2024  XR CHEST 1 VW Date of Exam: 8/20/2024 10:23 AM EDT Indication: SOA Triage Protocol Comparison: 5/21/2024 Findings: No focal pulmonary opacities. Pulmonary vascularity appears within normal limits. Heart size and mediastinal contour are within normal limits. No pneumothorax or pleural fluid identified.     Impression: No acute cardiopulmonary findings. Electronically Signed: Kumar Jones MD  8/20/2024 10:44 AM EDT  Workstation ID: [id number]         ED course:    Orthostatics: reviewed and positive. Fluid bolus ordered.     10:20 Orthostatic Blood Pressure DG   Orthostatic Blood Pressure   BP: 105/75   Heart Rate: 85   Patient Position: Lying   Symptoms: Dizzy; Weakness; Nausea     10:21 Orthostatic Blood Pressure DG   Orthostatic Blood Pressure   BP: 99/75   Heart Rate: 110   Patient Position: Sitting   Symptoms: Dizzy; Weakness; Nausea     10:22 Orthostatic Blood Pressure DG   Orthostatic Blood Pressure   BP: 99/74   Heart Rate: 115   Symptoms: Dizzy; Weakness; Nausea       1335 Patient rechecked I have personally reviewed all radiology findings, incidental and otherwise, with the patient and made patient aware of what needs to be followed up with PCP.    I have discussed with the patient the emergency department work-up including all test results, the differential diagnosis, the diagnosis given in the emergency department, and the absolute need for follow-up with the primary care physician.  I have discussed all prescriptions and treatments.  I have discussed the possible worsening of their condition, and also discussed criteria for return to the emergency department which includes but is not limited to worsening condition or lack of improvement of the current condition.  I have informed them that a normal work-up evaluation in the  emergency department and/or discharge from the emergency department, does not signify that no disease process is present, but simply that there is no emergent indication for admission.  All questions were answered at this time.  I informed them that significant pathology may still be present, but they may need further work-up, and that this further investigation should be undertaken by the primary care physician. She voiced his/her understanding.  Patient is agreeable to plan for discharge.    Discharge instructions include:  Follow-up with your PCP, get a toe APRN for reevaluation and recheck of blood pressure.    Push fluids at home.    Follow-up with your surgeon, Dr. Amador per post operative instructions    Increase your food intake.    Return Emergency Department for fever, vomiting, black or bloody stools, swelling in extremities, chest pain, any change worsening symptoms.          Differential Diagnosis and Discussion:  Dehydration, symptomatic anemia, cardiac arrhythmia, source of infection such as UTI or pneumonia, PE      Consultants/Shared Management Plan:    None    Social Determinants of Health:    Patient is independent, reliable, and has access to care.       MDM     Amount and/or Complexity of Data Reviewed  Clinical lab tests: reviewed and ordered  Tests in the radiology section of CPT®: reviewed and ordered  Tests in the medicine section of CPT®: reviewed and ordered    Risk of Complications, Morbidity, and/or Mortality  Presenting problems: moderate  Diagnostic procedures: moderate  Management options: low    Patient Progress  Patient progress: stable           Final diagnoses:   Orthostasis (resolved)   Weakness   Dyspnea, unspecified type           Sylvia Catalan, APRN  08/20/24 5995

## 2024-08-20 NOTE — TELEPHONE ENCOUNTER
I returned patients call and she was actually at [facility]. She was feeling light headed and not well. I told her to keep us updated on what they do or if they find out anything.

## 2024-08-20 NOTE — DISCHARGE INSTRUCTIONS
Follow-up with your PCP, get a toe APRN for reevaluation and recheck of blood pressure.    Push fluids at home.    Follow-up with your surgeon, Dr. Amador per post operative instructions    Increase your food intake.    Return Emergency Department for fever, vomiting, black or bloody stools, swelling in extremities, chest pain, any change worsening symptoms.

## 2024-08-20 NOTE — Clinical Note
[facility] EMERGENCY ROOM  [address] [place] [address]  Phone: [phone]  Fax: [phone]    Gin Alexandra was seen and treated in our emergency department on 8/20/2024.  She may return to work on 08/21/2024.         Thank you for choosing [facility].    Sylvia Catalan, APRN

## 2024-08-20 NOTE — ED TRIAGE NOTES
Pt reports gastric sleeve x 2 weeks ago - C.o nausea/vomiting/dizziness/fatigue/SOA/L shoulder pain.

## 2024-08-20 NOTE — TELEPHONE ENCOUNTER
Spoke to patient. She stated that she was dehydrated and was given fluids while at the ER. She stated that she feels some better. I explained to her that if anything changes after hours to proceed to the nearest ER. Patient voiced understanding.

## 2024-08-26 ENCOUNTER — OFFICE VISIT (OUTPATIENT)
Dept: FAMILY MEDICINE CLINIC | Age: 39
End: 2024-08-26
Payer: COMMERCIAL

## 2024-08-26 ENCOUNTER — LAB (OUTPATIENT)
Dept: LAB | Facility: HOSPITAL | Age: 39
End: 2024-08-26
Payer: COMMERCIAL

## 2024-08-26 VITALS
HEIGHT: 63 IN | DIASTOLIC BLOOD PRESSURE: 81 MMHG | OXYGEN SATURATION: 97 % | BODY MASS INDEX: 34.38 KG/M2 | WEIGHT: 194 LBS | SYSTOLIC BLOOD PRESSURE: 115 MMHG | TEMPERATURE: 98.5 F | HEART RATE: 78 BPM

## 2024-08-26 DIAGNOSIS — E86.0 DEHYDRATION: ICD-10-CM

## 2024-08-26 DIAGNOSIS — D72.829 LEUKOCYTOSIS, UNSPECIFIED TYPE: Primary | ICD-10-CM

## 2024-08-26 DIAGNOSIS — Z98.84 HISTORY OF BARIATRIC SURGERY: ICD-10-CM

## 2024-08-26 DIAGNOSIS — D72.829 LEUKOCYTOSIS, UNSPECIFIED TYPE: ICD-10-CM

## 2024-08-26 LAB
BASOPHILS # BLD AUTO: 0.02 10*3/MM3 (ref 0–0.2)
BASOPHILS NFR BLD AUTO: 0.4 % (ref 0–1.5)
DEPRECATED RDW RBC AUTO: 46.3 FL (ref 37–54)
EOSINOPHIL # BLD AUTO: 0.17 10*3/MM3 (ref 0–0.4)
EOSINOPHIL NFR BLD AUTO: 3 % (ref 0.3–6.2)
ERYTHROCYTE [DISTWIDTH] IN BLOOD BY AUTOMATED COUNT: 13.9 % (ref 12.3–15.4)
HCT VFR BLD AUTO: 39.8 % (ref 34–46.6)
HGB BLD-MCNC: 13.2 G/DL (ref 12–15.9)
IMM GRANULOCYTES # BLD AUTO: 0.01 10*3/MM3 (ref 0–0.05)
IMM GRANULOCYTES NFR BLD AUTO: 0.2 % (ref 0–0.5)
LYMPHOCYTES # BLD AUTO: 1.94 10*3/MM3 (ref 0.7–3.1)
LYMPHOCYTES NFR BLD AUTO: 34 % (ref 19.6–45.3)
MCH RBC QN AUTO: 30.1 PG (ref 26.6–33)
MCHC RBC AUTO-ENTMCNC: 33.2 G/DL (ref 31.5–35.7)
MCV RBC AUTO: 90.7 FL (ref 79–97)
MONOCYTES # BLD AUTO: 0.48 10*3/MM3 (ref 0.1–0.9)
MONOCYTES NFR BLD AUTO: 8.4 % (ref 5–12)
NEUTROPHILS NFR BLD AUTO: 3.09 10*3/MM3 (ref 1.7–7)
NEUTROPHILS NFR BLD AUTO: 54 % (ref 42.7–76)
PLATELET # BLD AUTO: 273 10*3/MM3 (ref 140–450)
PMV BLD AUTO: 9.1 FL (ref 6–12)
RBC # BLD AUTO: 4.39 10*6/MM3 (ref 3.77–5.28)
WBC NRBC COR # BLD AUTO: 5.71 10*3/MM3 (ref 3.4–10.8)

## 2024-08-26 PROCEDURE — 36415 COLL VENOUS BLD VENIPUNCTURE: CPT

## 2024-08-26 PROCEDURE — 99214 OFFICE O/P EST MOD 30 MIN: CPT | Performed by: NURSE PRACTITIONER

## 2024-08-26 PROCEDURE — 80048 BASIC METABOLIC PNL TOTAL CA: CPT

## 2024-08-26 PROCEDURE — 85025 COMPLETE CBC W/AUTO DIFF WBC: CPT

## 2024-08-26 RX ORDER — ONDANSETRON 4 MG/1
4 TABLET, ORALLY DISINTEGRATING ORAL EVERY 8 HOURS PRN
Qty: 20 TABLET | Refills: 0 | Status: SHIPPED | OUTPATIENT
Start: 2024-08-26

## 2024-08-26 NOTE — PROGRESS NOTES
"Chief Complaint  Fatigue ([place] [facility] 8/20/24)    Subjective          Gin Alexandra presents to [facility] for ED follow-up.  She was actually at [facility] ER working whenever she felt very fatigued, lightheaded.  She was signed into the ER.  She did have gastric sleeve on 8/5/2024 with no complications.  This was actually her first day back to work.  She had not been tolerating diet well and was told she was dehydrated.  She was orthostatic, had ketones in her urine, kidney function was normal.  White blood cell count was slightly elevated as well.  Patient states she has been tolerating diet much better this week and feels better.  Has a follow-up with a surgeon soon.  Has been taking vitamins as recommended.  Patient denies any fever, chills, vomiting, diarrhea, shortness of air or chest pain.  Has had some nausea.  Requesting refill of Zofran.      Objective   Vital Signs:   Vitals:    08/26/24 0917   BP: 115/81   BP Location: Right arm   Patient Position: Sitting   Cuff Size: Large Adult   Pulse: 78   Temp: 98.5 °F (36.9 °C)   TempSrc: Oral   SpO2: 97%   Weight: 88 kg (194 lb)   Height: 160 cm (62.99\")       Body mass index is 34.37 kg/m².         Physical Exam  Vitals reviewed.   Constitutional:       General: She is not in acute distress.     Appearance: She is well-developed. She is obese.   HENT:      Head: Normocephalic and atraumatic.   Eyes:      Conjunctiva/sclera: Conjunctivae normal.      Pupils: Pupils are equal, round, and reactive to light.   Cardiovascular:      Rate and Rhythm: Normal rate and regular rhythm.      Heart sounds: Normal heart sounds. No murmur heard.  Pulmonary:      Effort: Pulmonary effort is normal. No respiratory distress.      Breath sounds: Normal breath sounds.   Skin:     General: Skin is warm and dry.   Neurological:      Mental Status: She is alert and oriented to person, place, and time.   Psychiatric:         Mood and Affect: Mood and affect " normal.         Behavior: Behavior normal.         Thought Content: Thought content normal.         Judgment: Judgment normal.            Lab Results   Component Value Date    GLUCOSE 112 (H) 08/20/2024    BUN 14 08/20/2024    CREATININE 0.92 08/20/2024    EGFR 82.9 08/20/2024    BCR 15.2 08/20/2024    K 3.6 08/20/2024    CO2 19.3 (L) 08/20/2024    CALCIUM 10.2 08/20/2024    ALBUMIN 4.5 08/20/2024    BILITOT 0.3 08/20/2024    AST 34 (H) 08/20/2024    ALT 53 (H) 08/20/2024     Lab Results   Component Value Date    HGBA1C 5.20 05/21/2024     Lab Results   Component Value Date    WBC 12.42 (H) 08/20/2024    HGB 14.4 08/20/2024    HCT 42.6 08/20/2024    MCV 88.0 08/20/2024     08/20/2024     Lab Results   Component Value Date    CHOL 146 05/21/2024    CHOL 180 11/06/2023    CHOL 184 11/15/2021    CHLPL 138 01/18/2019     Lab Results   Component Value Date    TRIG 484 (H) 05/21/2024    TRIG 257 (H) 11/06/2023    TRIG 264 (H) 11/15/2021     Lab Results   Component Value Date    HDL 25 (L) 05/21/2024    HDL 38 (L) 11/06/2023    HDL 32 (L) 11/15/2021     Lab Results   Component Value Date    LDL 49 05/21/2024    LDL 98 11/06/2023     (H) 11/15/2021     Lab Results   Component Value Date    TSH 3.320 06/21/2024              Assessment and Plan    Assessment & Plan  Leukocytosis, unspecified type  Will notify patient of results and treat accordingly.  Dehydration  Increase fluid intake.  Recommended liquid IV and water.  History of bariatric surgery  Follow-up with surgeon as scheduled.  Zofran as needed for nausea.    Orders Placed This Encounter   Procedures    CBC Auto Differential    Basic Metabolic Panel     New Medications Ordered This Visit   Medications    ondansetron ODT (ZOFRAN-ODT) 4 MG disintegrating tablet     Sig: Place 1 tablet on the tongue Every 8 (Eight) Hours As Needed for Nausea.     Dispense:  20 tablet     Refill:  0         Patient to notify office with any acute concerns or issues.   Patient verbalizes understanding, agrees with plan of care and has no further questions upon discharge.    Please note that portions of this note were completed with a voice recognition program.      Follow Up    No follow-ups on file.  Patient was given instructions and counseling regarding her condition or for health maintenance advice. Please see specific information pulled into the AVS if appropriate.     Medications Discontinued During This Encounter   Medication Reason    Botox 200 units reconstituted solution Historical Med - Therapy completed    traMADol (ULTRAM) 50 MG tablet Historical Med - Therapy completed    ondansetron ODT (ZOFRAN-ODT) 4 MG disintegrating tablet Reorder

## 2024-08-26 NOTE — PROGRESS NOTES
Transitional Care Follow Up Visit  Subjective     Gin Alexandra is a 38 y.o. female who presents for a transitional care management visit.    Within 48 business hours after discharge our office contacted her via telephone to coordinate her care and needs.      I reviewed and discussed the details of that call along with the discharge summary, hospital problems, inpatient lab results, inpatient diagnostic studies, and consultation reports with Gin.     Current outpatient and discharge medications have been reconciled for the patient.  Reviewed by: LUCIA Quiroz          8/6/2024     3:41 PM   Date of TCM Phone Call   [facility]   Date of Admission 8/5/2024   Date of Discharge 8/6/2024   Discharge Disposition Home or Self Care     Risk for Readmission (LACE) No data recorded    History of Present Illness   Course During Hospital Stay:  Gin is here today for transition of care appointment following admission to *** from *** to *** for diagnosis of ***.    She presented to the ED initially with complaint of ***.  ***    Today, ***     {Common H&P Review Areas:52828}    Review of Systems      Current Outpatient Medications:     albuterol sulfate  (90 Base) MCG/ACT inhaler, Inhale 2 puffs Every 4 (Four) Hours As Needed for Shortness of Air., Disp: 20.1 g, Rfl: 0    DULoxetine (CYMBALTA) 30 MG capsule, Take 1 capsule by mouth Daily. Take along with cymbalta 60mg daily to equal 90mg daily. (Patient taking differently: Take 1 capsule by mouth Every Night. Take along with cymbalta 60mg daily to equal 90mg daily.), Disp: 90 capsule, Rfl: 1    DULoxetine (CYMBALTA) 60 MG capsule, Take 1 capsule by mouth Daily. Take along with cymbalta 30mg daily to equal 90mg daily. (Patient taking differently: Take 1 capsule by mouth Every Night. Take along with cymbalta 30mg daily to equal 90mg daily.), Disp: 90 capsule, Rfl: 1    esomeprazole (nexIUM) 40 MG capsule, Take 1 capsule by mouth Every Morning Before  "Breakfast. (Patient taking differently: Take 1 capsule by mouth Every Night.), Disp: 90 capsule, Rfl: 0    famotidine (Pepcid) 40 MG tablet, Take 1 tablet by mouth Daily., Disp: 90 tablet, Rfl: 1    folic acid-vit B6-vit B12 (FOLBEE) 2.5-25-1 MG tablet tablet, Take 1 tablet by mouth Daily., Disp: 40 tablet, Rfl: 0    meclizine (ANTIVERT) 25 MG tablet, Take 1 tablet by mouth 3 (Three) Times a Day As Needed for Dizziness., Disp: 30 tablet, Rfl: 1    Nurtec 75 MG dispersible tablet, TAKE ONE TABLET BY MOUTH EVERY DAY AS NEEDED FOR MIGRAINE, Disp: , Rfl:     ondansetron ODT (ZOFRAN-ODT) 4 MG disintegrating tablet, Place 1 tablet on the tongue Every 8 (Eight) Hours As Needed for Nausea., Disp: 20 tablet, Rfl: 0    tiZANidine (ZANAFLEX) 4 MG tablet, Take 1 tablet by mouth Every 8 (Eight) Hours As Needed for Muscle Spasms., Disp: 60 tablet, Rfl: 0    topiramate (Topamax) 25 MG tablet, Take 1 tablet by mouth 2 (Two) Times a Day as directed for headache prevention, Disp: 60 tablet, Rfl: 0    Botox 200 units reconstituted solution, , Disp: , Rfl:     traMADol (ULTRAM) 50 MG tablet, Take 1 tablet by mouth Every 6 (Six) Hours As Needed for Moderate Pain. (Patient not taking: Reported on 8/26/2024), Disp: 12 tablet, Rfl: 0  There are no discontinued medications.      Objective   Vitals:    08/26/24 0917   BP: 115/81   BP Location: Right arm   Patient Position: Sitting   Cuff Size: Large Adult   Pulse: 78   Temp: 98.5 °F (36.9 °C)   TempSrc: Oral   SpO2: 97%   Weight: 88 kg (194 lb)   Height: 160 cm (62.99\")     Body mass index is 34.37 kg/m².       Physical Exam    Lab Results   Component Value Date    GLUCOSE 112 (H) 08/20/2024    BUN 14 08/20/2024    CREATININE 0.92 08/20/2024    EGFR 82.9 08/20/2024    BCR 15.2 08/20/2024    K 3.6 08/20/2024    CO2 19.3 (L) 08/20/2024    CALCIUM 10.2 08/20/2024    ALBUMIN 4.5 08/20/2024    BILITOT 0.3 08/20/2024    AST 34 (H) 08/20/2024    ALT 53 (H) 08/20/2024       Lab Results   Component " Value Date    CHOL 146 05/21/2024    CHLPL 138 01/18/2019    TRIG 484 (H) 05/21/2024    HDL 25 (L) 05/21/2024    LDL 49 05/21/2024       Lab Results   Component Value Date    WBC 12.42 (H) 08/20/2024    HGB 14.4 08/20/2024    HCT 42.6 08/20/2024    MCV 88.0 08/20/2024     08/20/2024         Assessment & Plan   Assessment & Plan

## 2024-08-27 LAB
ANION GAP SERPL CALCULATED.3IONS-SCNC: 16 MMOL/L (ref 5–15)
BUN SERPL-MCNC: 8 MG/DL (ref 6–20)
BUN/CREAT SERPL: 11.8 (ref 7–25)
CALCIUM SPEC-SCNC: 10.2 MG/DL (ref 8.6–10.5)
CHLORIDE SERPL-SCNC: 97 MMOL/L (ref 98–107)
CO2 SERPL-SCNC: 25 MMOL/L (ref 22–29)
CREAT SERPL-MCNC: 0.68 MG/DL (ref 0.57–1)
EGFRCR SERPLBLD CKD-EPI 2021: 115.9 ML/MIN/1.73
GLUCOSE SERPL-MCNC: 79 MG/DL (ref 65–99)
POTASSIUM SERPL-SCNC: 4.1 MMOL/L (ref 3.5–5.2)
SODIUM SERPL-SCNC: 138 MMOL/L (ref 136–145)

## 2024-08-28 ENCOUNTER — READMISSION MANAGEMENT (OUTPATIENT)
Dept: CALL CENTER | Facility: HOSPITAL | Age: 39
End: 2024-08-28
Payer: COMMERCIAL

## 2024-08-28 NOTE — OUTREACH NOTE
General Surgery Week 3 Survey      Flowsheet Row Responses   [facility], [place], operated by [facility] patient discharged from? [facility]   Does the patient have one of the following disease processes/diagnoses(primary or secondary)? General Surgery  [er-8/20]   Week 3 attempt successful? Yes   Call start time 1309   Call end time 1310   Discharge diagnosis Sleeve gastrectomy LAP this visit   Meds reviewed with patient/caregiver? Yes   Does the patient have all medications related to this admission filled (includes all antibiotics, pain medications, etc.) N/A   Is the patient taking all medications as directed (includes completed medication regime)? Yes   Does the patient have a follow up appointment scheduled with their surgeon? Yes   Has the patient kept scheduled appointments due by today? Yes   Has home health visited the patient within 72 hours of discharge? N/A   Psychosocial issues? No   Did the patient receive a copy of their discharge instructions? Yes   Nursing interventions Reviewed instructions with patient   What is the patient's perception of their health status since discharge? Improving   Is the patient/caregiver able to teach back the hierarchy of who to call/visit for symptoms/problems? PCP, Specialist, Home health nurse, Urgent Care, ED, 911 Yes   Week 3 call completed? Yes   Graduated Yes   Graduated/Revoked comments  reports Pt is now improving. site looks fine no issues.   Call end time 1310            ARACELI LUND - Registered Nurse

## 2024-09-04 ENCOUNTER — OFFICE VISIT (OUTPATIENT)
Dept: BARIATRICS/WEIGHT MGMT | Facility: CLINIC | Age: 39
End: 2024-09-04
Payer: COMMERCIAL

## 2024-09-04 VITALS
HEART RATE: 84 BPM | WEIGHT: 187 LBS | DIASTOLIC BLOOD PRESSURE: 80 MMHG | TEMPERATURE: 98.4 F | BODY MASS INDEX: 33.13 KG/M2 | SYSTOLIC BLOOD PRESSURE: 118 MMHG | HEIGHT: 63 IN

## 2024-09-04 DIAGNOSIS — Z98.84 S/P LAPAROSCOPIC SLEEVE GASTRECTOMY: ICD-10-CM

## 2024-09-04 DIAGNOSIS — G47.33 OSA (OBSTRUCTIVE SLEEP APNEA): ICD-10-CM

## 2024-09-04 DIAGNOSIS — K21.9 GASTROESOPHAGEAL REFLUX DISEASE, UNSPECIFIED WHETHER ESOPHAGITIS PRESENT: ICD-10-CM

## 2024-09-04 DIAGNOSIS — E66.9 OBESITY, CLASS I, BMI 30-34.9: Primary | ICD-10-CM

## 2024-09-04 PROBLEM — E66.811 OBESITY, CLASS I, BMI 30-34.9: Status: ACTIVE | Noted: 2024-05-21

## 2024-09-04 PROBLEM — R11.0 NAUSEA: Status: RESOLVED | Noted: 2023-10-17 | Resolved: 2024-09-04

## 2024-09-04 PROBLEM — E66.812 OBESITY, CLASS II, BMI 35-39.9: Status: RESOLVED | Noted: 2024-05-21 | Resolved: 2024-09-04

## 2024-09-04 PROBLEM — Z01.818 PRE-OP EVALUATION: Status: RESOLVED | Noted: 2024-05-21 | Resolved: 2024-09-04

## 2024-09-04 PROBLEM — K44.9 PARAESOPHAGEAL HERNIA: Status: RESOLVED | Noted: 2024-05-21 | Resolved: 2024-09-04

## 2024-09-04 PROCEDURE — 99024 POSTOP FOLLOW-UP VISIT: CPT | Performed by: NURSE PRACTITIONER

## 2024-09-04 RX ORDER — METOCLOPRAMIDE 10 MG/1
10 TABLET ORAL
Qty: 16 TABLET | Refills: 0 | Status: SHIPPED | OUTPATIENT
Start: 2024-09-04

## 2024-09-04 NOTE — PROGRESS NOTES
[facility]  [address]  [facility] [id number] [phone]  [address]  [facility] [id number] [phone]  Dept: [phone]  9/4/2024      Gin Alexandra.  [id number]  [id number]  1985  female      Chief Complaint   Patient presents with    Post-op     1 month post op sleeve       BH Post-Op Bariatric Surgery:   Gin Alexandra is status post Laparoscopic Sleeve procedure, performed on 8/5/24     HPI:     Today's weight is 84.8 kg (187 lb) pounds, today's BMI is Body mass index is 33.34 kg/m²., she has a loss of 10 pounds since the last visit and her weight loss since surgery is 25 pounds. The patient reports a decreased portion size and loss of appetite.    Gin Alexandra denies nausea, vomiting, reflux and reports nausea and vomiting with protein shakes. She was drinking the [id number].     She reports eating once daily. She may eat twice. She feels that protein shakes and clear protein drinks are too sweet. She has been doing some of the unsweetened protein powder but not regularly     Diet and Exercise: Diet history reviewed and discussed with the patient. Weight loss/gains to date discussed with the patient. The patient states they are eating 30 grams of protein per day. She reports eating 1 meals per day, a typical portion size of 1/2 cup, eating 1-2 snacks per day, drinking 5-6 or more 8-oz. glasses of water per day, no carbonated beverage consumption and exercising regularly.     Supplements: bariatric specific MTV with iron and calcium.     Review of Systems   Constitutional:  Positive for appetite change and fatigue. Negative for unexpected weight change.   HENT: Negative.     Eyes: Negative.    Respiratory: Negative.     Cardiovascular: Negative.  Negative for leg swelling.   Gastrointestinal:  Negative for abdominal distention, abdominal pain, constipation, diarrhea, nausea and vomiting.   Genitourinary:   Negative for difficulty urinating, frequency and urgency.   Musculoskeletal:  Negative for back pain.   Skin: Negative.    Psychiatric/Behavioral: Negative.     All other systems reviewed and are negative.      Patient Active Problem List   Diagnosis    Depression with anxiety    IBS (irritable bowel syndrome)    Fibromyalgia    Chronic midline thoracic back pain    Chronic tension-type headache, intractable    Fatigue    Hyperlipidemia    Gastroesophageal reflux disease    Bilateral arm pain    Occipital neuralgia    Obesity, Class I, BMI 30-34.9    Dietary counseling    STEPHANIA (obstructive sleep apnea)    Asthma    Fatty liver    S/P laparoscopic sleeve gastrectomy       Past Medical History:   Diagnosis Date    Abnormal EKG     cardiac clearance note in epic    Allergic 01/01/1995    Allergic rhinitis     Anxiety     Asthma     Cholelithiasis 2005    Gall bladder was removed    Chronic fatigue, unspecified     Constipation, unspecified     Depression     Started when i was 14    Fatty liver 05/21/2024    Fibromyalgia     Fibromyalgia, primary 2019    GERD without esophagitis     Headache     HL (hearing loss)     right ear less    Hyperlipidemia     IBS (irritable bowel syndrome)     diarrhea and constipation    Migraine     Obesity, unspecified     Occipital neuralgia     STEPHANIA (obstructive sleep apnea) 05/21/2024    no machine    Pain in right hip     Paraesophageal hernia 05/21/2024    Pituitary tumor     PONV (postoperative nausea and vomiting) 2006    Severe nausea with vomiting for several hours after    Tinnitus        The following portions of the patient's history were reviewed and updated as appropriate: allergies, current medications, past family history, past medical history, past social history, past surgical history, and problem list.    Vitals:    09/04/24 1544   BP: 118/80   Pulse: 84   Temp: 98.4 °F (36.9 °C)       Physical Exam  Vitals and nursing note reviewed.   Constitutional:       Appearance:  She is well-developed.   Neck:      Thyroid: No thyromegaly.   Cardiovascular:      Rate and Rhythm: Normal rate.   Pulmonary:      Effort: Pulmonary effort is normal. No respiratory distress.   Abdominal:      Palpations: Abdomen is soft.   Musculoskeletal:         General: No tenderness.   Skin:     General: Skin is warm and dry.   Neurological:      Mental Status: She is alert.   Psychiatric:         Behavior: Behavior normal.         Assessment:   Post-op, the patient is having a hard time getting all of her fluids and protein in. She works varried shifts each week and isn't often hungry. She feels fatigued but reports that this isn't a big change. .     Encounter Diagnoses   Name Primary?    Obesity, Class I, BMI 30-34.9 Yes    S/P laparoscopic sleeve gastrectomy     Gastroesophageal reflux disease, unspecified whether esophagitis present     STEPHANIA (obstructive sleep apnea)        Plan:   Patient denies decreased urine output or dizziness but she is likely a bit on the dry side, possibly mildly dehydrated. She was encouraged to increase fluid intake. Will start reglan for a couple of weeks to help make it a bit easier to get fluid in. Advised to eat 4-5 times daily, small portions if not able to supplement protein intake with liquids to avoid protein malnutrition. She prefers not to go in for outpatient IV fluids at this time and appears hemodynamically stable. Will trend CMP, prealbumin, and vitamin levels today  Encouraged patient to be sure to get plenty of lean protein per day through small frequent meals all with a protein source.   Activity restrictions: none.   Recommended patient be sure to get at least 70 grams of protein per day by eating small, frequent meals all with high lean protein choices. Be sure to limit/cut back on daily carbohydrate intake. Discussed with the patient the recommended amount of water per day to intake- half of body weight in ounces. Reviewed vitamin requirements. Be sure to do  routine exercise, 150 minutes per week minimum, including both cardio and strength training.     Instructions / Recommendations: dietary counseling recommended, recommended a daily protein intake of  grams, vitamin supplement(s) recommended, recommended exercising at least 150 minutes per week, behavior modifications recommended and instructed to call the office for concerns, questions, or problems.     The patient was instructed to follow up in 3 months .      Total time spent during this encounter today was 25 minutes

## 2024-09-04 NOTE — H&P (VIEW-ONLY)
[facility]ATRIC [facility] BARIATRIC SURGERY  [address]  [place] [facility] [id number] [phone]  [address]  [place] [id number] [phone]  Dept: [phone]  9/4/2024      Gin Alexandra.  [id number]  [id number]  1985  female      Chief Complaint   Patient presents with    Post-op     1 month post op sleeve       BH Post-Op Bariatric Surgery:   Gin Alexandra is status post Laparoscopic Sleeve procedure, performed on 8/5/24     HPI:     Today's weight is 84.8 kg (187 lb) pounds, today's BMI is Body mass index is 33.34 kg/m²., she has a loss of 10 pounds since the last visit and her weight loss since surgery is 25 pounds. The patient reports a decreased portion size and loss of appetite.    Gin Alexandra denies nausea, vomiting, reflux and reports nausea and vomiting with protein shakes. She was drinking the [id number].     She reports eating once daily. She may eat twice. She feels that protein shakes and clear protein drinks are too sweet. She has been doing some of the unsweetened protein powder but not regularly     Diet and Exercise: Diet history reviewed and discussed with the patient. Weight loss/gains to date discussed with the patient. The patient states they are eating 30 grams of protein per day. She reports eating 1 meals per day, a typical portion size of 1/2 cup, eating 1-2 snacks per day, drinking 5-6 or more 8-oz. glasses of water per day, no carbonated beverage consumption and exercising regularly.     Supplements: bariatric specific MTV with iron and calcium.     Review of Systems   Constitutional:  Positive for appetite change and fatigue. Negative for unexpected weight change.   HENT: Negative.     Eyes: Negative.    Respiratory: Negative.     Cardiovascular: Negative.  Negative for leg swelling.   Gastrointestinal:  Negative for abdominal distention, abdominal pain, constipation, diarrhea, nausea and vomiting.   Genitourinary:   Negative for difficulty urinating, frequency and urgency.   Musculoskeletal:  Negative for back pain.   Skin: Negative.    Psychiatric/Behavioral: Negative.     All other systems reviewed and are negative.      Patient Active Problem List   Diagnosis    Depression with anxiety    IBS (irritable bowel syndrome)    Fibromyalgia    Chronic midline thoracic back pain    Chronic tension-type headache, intractable    Fatigue    Hyperlipidemia    Gastroesophageal reflux disease    Bilateral arm pain    Occipital neuralgia    Obesity, Class I, BMI 30-34.9    Dietary counseling    STEPHANIA (obstructive sleep apnea)    Asthma    Fatty liver    S/P laparoscopic sleeve gastrectomy       Past Medical History:   Diagnosis Date    Abnormal EKG     cardiac clearance note in epic    Allergic 01/01/1995    Allergic rhinitis     Anxiety     Asthma     Cholelithiasis 2005    Gall bladder was removed    Chronic fatigue, unspecified     Constipation, unspecified     Depression     Started when i was 14    Fatty liver 05/21/2024    Fibromyalgia     Fibromyalgia, primary 2019    GERD without esophagitis     Headache     HL (hearing loss)     right ear less    Hyperlipidemia     IBS (irritable bowel syndrome)     diarrhea and constipation    Migraine     Obesity, unspecified     Occipital neuralgia     STEPHANIA (obstructive sleep apnea) 05/21/2024    no machine    Pain in right hip     Paraesophageal hernia 05/21/2024    Pituitary tumor     PONV (postoperative nausea and vomiting) 2006    Severe nausea with vomiting for several hours after    Tinnitus        The following portions of the patient's history were reviewed and updated as appropriate: allergies, current medications, past family history, past medical history, past social history, past surgical history, and problem list.    Vitals:    09/04/24 1544   BP: 118/80   Pulse: 84   Temp: 98.4 °F (36.9 °C)       Physical Exam  Vitals and nursing note reviewed.   Constitutional:       Appearance:  She is well-developed.   Neck:      Thyroid: No thyromegaly.   Cardiovascular:      Rate and Rhythm: Normal rate.   Pulmonary:      Effort: Pulmonary effort is normal. No respiratory distress.   Abdominal:      Palpations: Abdomen is soft.   Musculoskeletal:         General: No tenderness.   Skin:     General: Skin is warm and dry.   Neurological:      Mental Status: She is alert.   Psychiatric:         Behavior: Behavior normal.         Assessment:   Post-op, the patient is having a hard time getting all of her fluids and protein in. She works varried shifts each week and isn't often hungry. She feels fatigued but reports that this isn't a big change. .     Encounter Diagnoses   Name Primary?    Obesity, Class I, BMI 30-34.9 Yes    S/P laparoscopic sleeve gastrectomy     Gastroesophageal reflux disease, unspecified whether esophagitis present     STEPHANIA (obstructive sleep apnea)        Plan:   Patient denies decreased urine output or dizziness but she is likely a bit on the dry side, possibly mildly dehydrated. She was encouraged to increase fluid intake. Will start reglan for a couple of weeks to help make it a bit easier to get fluid in. Advised to eat 4-5 times daily, small portions if not able to supplement protein intake with liquids to avoid protein malnutrition. She prefers not to go in for outpatient IV fluids at this time and appears hemodynamically stable. Will trend CMP, prealbumin, and vitamin levels today  Encouraged patient to be sure to get plenty of lean protein per day through small frequent meals all with a protein source.   Activity restrictions: none.   Recommended patient be sure to get at least 70 grams of protein per day by eating small, frequent meals all with high lean protein choices. Be sure to limit/cut back on daily carbohydrate intake. Discussed with the patient the recommended amount of water per day to intake- half of body weight in ounces. Reviewed vitamin requirements. Be sure to do  routine exercise, 150 minutes per week minimum, including both cardio and strength training.     Instructions / Recommendations: dietary counseling recommended, recommended a daily protein intake of  grams, vitamin supplement(s) recommended, recommended exercising at least 150 minutes per week, behavior modifications recommended and instructed to call the office for concerns, questions, or problems.     The patient was instructed to follow up in 3 months .      Total time spent during this encounter today was 25 minutes

## 2024-09-05 ENCOUNTER — LAB (OUTPATIENT)
Dept: LAB | Facility: HOSPITAL | Age: 39
End: 2024-09-05
Payer: COMMERCIAL

## 2024-09-05 DIAGNOSIS — Z98.84 S/P LAPAROSCOPIC SLEEVE GASTRECTOMY: ICD-10-CM

## 2024-09-05 DIAGNOSIS — K21.9 GASTROESOPHAGEAL REFLUX DISEASE, UNSPECIFIED WHETHER ESOPHAGITIS PRESENT: ICD-10-CM

## 2024-09-05 DIAGNOSIS — E66.9 OBESITY, CLASS I, BMI 30-34.9: ICD-10-CM

## 2024-09-05 DIAGNOSIS — G47.33 OSA (OBSTRUCTIVE SLEEP APNEA): ICD-10-CM

## 2024-09-05 LAB
25(OH)D3 SERPL-MCNC: 50.9 NG/ML (ref 30–100)
ALBUMIN SERPL-MCNC: 4.6 G/DL (ref 3.5–5.2)
ALBUMIN/GLOB SERPL: 1.8 G/DL
ALP SERPL-CCNC: 101 U/L (ref 39–117)
ALT SERPL W P-5'-P-CCNC: 76 U/L (ref 1–33)
ANION GAP SERPL CALCULATED.3IONS-SCNC: 16 MMOL/L (ref 5–15)
AST SERPL-CCNC: 40 U/L (ref 1–32)
BILIRUB SERPL-MCNC: 0.4 MG/DL (ref 0–1.2)
BUN SERPL-MCNC: 9 MG/DL (ref 6–20)
BUN/CREAT SERPL: 11.3 (ref 7–25)
CALCIUM SPEC-SCNC: 10.4 MG/DL (ref 8.6–10.5)
CHLORIDE SERPL-SCNC: 99 MMOL/L (ref 98–107)
CO2 SERPL-SCNC: 24 MMOL/L (ref 22–29)
CREAT SERPL-MCNC: 0.8 MG/DL (ref 0.57–1)
EGFRCR SERPLBLD CKD-EPI 2021: 98.1 ML/MIN/1.73
FERRITIN SERPL-MCNC: 91 NG/ML (ref 13–150)
FOLATE SERPL-MCNC: >20 NG/ML (ref 4.78–24.2)
GLOBULIN UR ELPH-MCNC: 2.6 GM/DL
GLUCOSE SERPL-MCNC: 78 MG/DL (ref 65–99)
IRON 24H UR-MRATE: 29 MCG/DL (ref 37–145)
POTASSIUM SERPL-SCNC: 3.9 MMOL/L (ref 3.5–5.2)
PREALB SERPL-MCNC: 21.2 MG/DL (ref 20–40)
PROT SERPL-MCNC: 7.2 G/DL (ref 6–8.5)
SODIUM SERPL-SCNC: 139 MMOL/L (ref 136–145)

## 2024-09-05 PROCEDURE — 82306 VITAMIN D 25 HYDROXY: CPT

## 2024-09-05 PROCEDURE — 84425 ASSAY OF VITAMIN B-1: CPT

## 2024-09-05 PROCEDURE — 84134 ASSAY OF PREALBUMIN: CPT

## 2024-09-05 PROCEDURE — 36415 COLL VENOUS BLD VENIPUNCTURE: CPT

## 2024-09-05 PROCEDURE — 80053 COMPREHEN METABOLIC PANEL: CPT

## 2024-09-05 PROCEDURE — 83540 ASSAY OF IRON: CPT

## 2024-09-05 PROCEDURE — 82746 ASSAY OF FOLIC ACID SERUM: CPT

## 2024-09-05 PROCEDURE — 83921 ORGANIC ACID SINGLE QUANT: CPT

## 2024-09-05 PROCEDURE — 82728 ASSAY OF FERRITIN: CPT

## 2024-09-10 LAB — METHYLMALONATE SERPL-SCNC: 68 NMOL/L (ref 0–378)

## 2024-09-11 ENCOUNTER — PATIENT MESSAGE (OUTPATIENT)
Dept: BARIATRICS/WEIGHT MGMT | Facility: CLINIC | Age: 39
End: 2024-09-11
Payer: COMMERCIAL

## 2024-09-11 ENCOUNTER — HOSPITAL ENCOUNTER (OUTPATIENT)
Dept: INFUSION THERAPY | Facility: HOSPITAL | Age: 39
Discharge: HOME | End: 2024-09-11
Payer: COMMERCIAL

## 2024-09-11 DIAGNOSIS — Z98.84 S/P LAPAROSCOPIC SLEEVE GASTRECTOMY: ICD-10-CM

## 2024-09-11 DIAGNOSIS — Z91.89 AT RISK FOR DEHYDRATION DUE TO POOR FLUID INTAKE: Primary | ICD-10-CM

## 2024-09-11 LAB — VIT B1 BLD-SCNC: 159.4 NMOL/L (ref 66.5–200)

## 2024-09-11 RX ORDER — PROCHLORPERAZINE EDISYLATE 5 MG/ML
10 INJECTION INTRAMUSCULAR; INTRAVENOUS ONCE
OUTPATIENT
Start: 2024-09-11

## 2024-09-11 RX ORDER — SODIUM CHLORIDE, SODIUM LACTATE, POTASSIUM CHLORIDE, CALCIUM CHLORIDE 600; 310; 30; 20 MG/100ML; MG/100ML; MG/100ML; MG/100ML
1000 INJECTION, SOLUTION INTRAVENOUS ONCE
OUTPATIENT
Start: 2024-09-11

## 2024-09-16 ENCOUNTER — HOSPITAL ENCOUNTER (OUTPATIENT)
Dept: INFUSION THERAPY | Facility: HOSPITAL | Age: 39
Discharge: HOME | End: 2024-09-16
Payer: COMMERCIAL

## 2024-09-25 ENCOUNTER — PREP FOR SURGERY (OUTPATIENT)
Dept: OTHER | Facility: HOSPITAL | Age: 39
End: 2024-09-25
Payer: COMMERCIAL

## 2024-09-25 ENCOUNTER — TELEPHONE (OUTPATIENT)
Dept: BARIATRICS/WEIGHT MGMT | Facility: CLINIC | Age: 39
End: 2024-09-25
Payer: COMMERCIAL

## 2024-09-25 DIAGNOSIS — Z91.89 AT RISK FOR DEHYDRATION DUE TO POOR FLUID INTAKE: ICD-10-CM

## 2024-09-25 DIAGNOSIS — R13.10 DYSPHAGIA: Primary | ICD-10-CM

## 2024-09-25 DIAGNOSIS — Z98.84 S/P LAPAROSCOPIC SLEEVE GASTRECTOMY: ICD-10-CM

## 2024-09-25 DIAGNOSIS — Z91.89 AT RISK FOR DEHYDRATION DUE TO POOR FLUID INTAKE: Primary | ICD-10-CM

## 2024-09-25 RX ORDER — SODIUM CHLORIDE, SODIUM LACTATE, POTASSIUM CHLORIDE, CALCIUM CHLORIDE 600; 310; 30; 20 MG/100ML; MG/100ML; MG/100ML; MG/100ML
1000 INJECTION, SOLUTION INTRAVENOUS ONCE
OUTPATIENT
Start: 2024-09-25

## 2024-09-25 RX ORDER — SODIUM CHLORIDE, SODIUM LACTATE, POTASSIUM CHLORIDE, CALCIUM CHLORIDE 600; 310; 30; 20 MG/100ML; MG/100ML; MG/100ML; MG/100ML
30 INJECTION, SOLUTION INTRAVENOUS CONTINUOUS
Status: CANCELLED | OUTPATIENT
Start: 2024-09-25

## 2024-09-26 PROBLEM — R13.10 DYSPHAGIA: Status: ACTIVE | Noted: 2024-09-25

## 2024-09-27 ENCOUNTER — TELEPHONE (OUTPATIENT)
Dept: BARIATRICS/WEIGHT MGMT | Facility: CLINIC | Age: 39
End: 2024-09-27
Payer: COMMERCIAL

## 2024-09-27 ENCOUNTER — HOSPITAL ENCOUNTER (OUTPATIENT)
Facility: HOSPITAL | Age: 39
Setting detail: HOSPITAL OUTPATIENT SURGERY
Discharge: HOME | End: 2024-09-27
Attending: SURGERY | Admitting: SURGERY
Payer: COMMERCIAL

## 2024-09-27 ENCOUNTER — ANESTHESIA EVENT (OUTPATIENT)
Dept: GASTROENTEROLOGY | Facility: HOSPITAL | Age: 39
End: 2024-09-27
Payer: COMMERCIAL

## 2024-09-27 ENCOUNTER — HOSPITAL ENCOUNTER (OUTPATIENT)
Dept: INFUSION THERAPY | Facility: HOSPITAL | Age: 39
Discharge: HOME | End: 2024-09-27
Admitting: NURSE PRACTITIONER
Payer: COMMERCIAL

## 2024-09-27 ENCOUNTER — ANESTHESIA (OUTPATIENT)
Dept: GASTROENTEROLOGY | Facility: HOSPITAL | Age: 39
End: 2024-09-27
Payer: COMMERCIAL

## 2024-09-27 VITALS
SYSTOLIC BLOOD PRESSURE: 104 MMHG | DIASTOLIC BLOOD PRESSURE: 62 MMHG | HEART RATE: 79 BPM | OXYGEN SATURATION: 100 % | BODY MASS INDEX: 31.36 KG/M2 | HEIGHT: 63 IN | RESPIRATION RATE: 16 BRPM | WEIGHT: 177 LBS

## 2024-09-27 VITALS
SYSTOLIC BLOOD PRESSURE: 124 MMHG | DIASTOLIC BLOOD PRESSURE: 82 MMHG | HEART RATE: 86 BPM | TEMPERATURE: 96.8 F | OXYGEN SATURATION: 98 % | RESPIRATION RATE: 18 BRPM

## 2024-09-27 DIAGNOSIS — Z98.84 S/P LAPAROSCOPIC SLEEVE GASTRECTOMY: ICD-10-CM

## 2024-09-27 DIAGNOSIS — Z91.89 AT RISK FOR DEHYDRATION DUE TO POOR FLUID INTAKE: Primary | ICD-10-CM

## 2024-09-27 LAB
B-HCG UR QL: NEGATIVE
EXPIRATION DATE: NORMAL
INTERNAL NEGATIVE CONTROL: NEGATIVE
INTERNAL POSITIVE CONTROL: POSITIVE
Lab: NORMAL

## 2024-09-27 PROCEDURE — 25810000003 LACTATED RINGERS PER 1000 ML: Performed by: SURGERY

## 2024-09-27 PROCEDURE — 25010000002 PROCHLORPERAZINE 10 MG/2ML SOLUTION: Performed by: NURSE PRACTITIONER

## 2024-09-27 PROCEDURE — 25010000002 PROPOFOL 200 MG/20ML EMULSION: Performed by: NURSE ANESTHETIST, CERTIFIED REGISTERED

## 2024-09-27 PROCEDURE — 96374 THER/PROPH/DIAG INJ IV PUSH: CPT

## 2024-09-27 PROCEDURE — 96375 TX/PRO/DX INJ NEW DRUG ADDON: CPT

## 2024-09-27 PROCEDURE — 81025 URINE PREGNANCY TEST: CPT | Performed by: SURGERY

## 2024-09-27 PROCEDURE — 96360 HYDRATION IV INFUSION INIT: CPT

## 2024-09-27 PROCEDURE — 25010000002 DEXAMETHASONE SODIUM PHOSPHATE 100 MG/10ML SOLUTION 10 ML VIAL: Performed by: NURSE PRACTITIONER

## 2024-09-27 PROCEDURE — C1726 CATH, BAL DIL, NON-VASCULAR: HCPCS | Performed by: SURGERY

## 2024-09-27 PROCEDURE — 25010000002 ONDANSETRON PER 1 MG: Performed by: NURSE PRACTITIONER

## 2024-09-27 PROCEDURE — 25010000002 THIAMINE HCL 200 MG/2ML SOLUTION 2 ML VIAL: Performed by: NURSE PRACTITIONER

## 2024-09-27 PROCEDURE — 96365 THER/PROPH/DIAG IV INF INIT: CPT

## 2024-09-27 PROCEDURE — 25810000003 LACTATED RINGERS PER 1000 ML: Performed by: NURSE PRACTITIONER

## 2024-09-27 PROCEDURE — 43245 EGD DILATE STRICTURE: CPT | Performed by: SURGERY

## 2024-09-27 RX ORDER — SODIUM CHLORIDE, SODIUM LACTATE, POTASSIUM CHLORIDE, CALCIUM CHLORIDE 600; 310; 30; 20 MG/100ML; MG/100ML; MG/100ML; MG/100ML
30 INJECTION, SOLUTION INTRAVENOUS CONTINUOUS PRN
Status: DISCONTINUED | OUTPATIENT
Start: 2024-09-27 | End: 2024-09-27 | Stop reason: HOSPADM

## 2024-09-27 RX ORDER — LIDOCAINE HYDROCHLORIDE 20 MG/ML
INJECTION, SOLUTION INFILTRATION; PERINEURAL AS NEEDED
Status: DISCONTINUED | OUTPATIENT
Start: 2024-09-27 | End: 2024-09-27 | Stop reason: SURG

## 2024-09-27 RX ORDER — PROCHLORPERAZINE EDISYLATE 5 MG/ML
10 INJECTION INTRAMUSCULAR; INTRAVENOUS ONCE
OUTPATIENT
Start: 2024-09-27

## 2024-09-27 RX ORDER — SODIUM CHLORIDE, SODIUM LACTATE, POTASSIUM CHLORIDE, CALCIUM CHLORIDE 600; 310; 30; 20 MG/100ML; MG/100ML; MG/100ML; MG/100ML
1000 INJECTION, SOLUTION INTRAVENOUS ONCE
Status: COMPLETED | OUTPATIENT
Start: 2024-09-27 | End: 2024-09-27

## 2024-09-27 RX ORDER — PROPOFOL 10 MG/ML
INJECTION, EMULSION INTRAVENOUS AS NEEDED
Status: DISCONTINUED | OUTPATIENT
Start: 2024-09-27 | End: 2024-09-27 | Stop reason: SURG

## 2024-09-27 RX ORDER — PROCHLORPERAZINE EDISYLATE 5 MG/ML
10 INJECTION INTRAMUSCULAR; INTRAVENOUS ONCE
Status: COMPLETED | OUTPATIENT
Start: 2024-09-27 | End: 2024-09-27

## 2024-09-27 RX ORDER — SODIUM CHLORIDE, SODIUM LACTATE, POTASSIUM CHLORIDE, CALCIUM CHLORIDE 600; 310; 30; 20 MG/100ML; MG/100ML; MG/100ML; MG/100ML
1000 INJECTION, SOLUTION INTRAVENOUS ONCE
OUTPATIENT
Start: 2024-09-27

## 2024-09-27 RX ADMIN — SODIUM CHLORIDE, POTASSIUM CHLORIDE, SODIUM LACTATE AND CALCIUM CHLORIDE 1000 ML/HR: 600; 310; 30; 20 INJECTION, SOLUTION INTRAVENOUS at 09:30

## 2024-09-27 RX ADMIN — THIAMINE HYDROCHLORIDE: 100 INJECTION, SOLUTION INTRAMUSCULAR; INTRAVENOUS at 10:33

## 2024-09-27 RX ADMIN — PROPOFOL 100 MG: 10 INJECTION, EMULSION INTRAVENOUS at 14:06

## 2024-09-27 RX ADMIN — SODIUM CHLORIDE, POTASSIUM CHLORIDE, SODIUM LACTATE AND CALCIUM CHLORIDE 30 ML/HR: 600; 310; 30; 20 INJECTION, SOLUTION INTRAVENOUS at 13:45

## 2024-09-27 RX ADMIN — PROPOFOL 200 MG: 10 INJECTION, EMULSION INTRAVENOUS at 14:02

## 2024-09-27 RX ADMIN — LIDOCAINE HYDROCHLORIDE 100 MG: 20 INJECTION, SOLUTION INFILTRATION; PERINEURAL at 14:02

## 2024-09-27 RX ADMIN — DEXAMETHASONE SODIUM PHOSPHATE: 10 INJECTION, SOLUTION INTRAMUSCULAR; INTRAVENOUS at 09:41

## 2024-09-27 RX ADMIN — PROCHLORPERAZINE EDISYLATE 10 MG: 5 INJECTION INTRAMUSCULAR; INTRAVENOUS at 09:28

## 2024-09-27 NOTE — OP NOTE
Surgeon: Itz Amador Jr., M.D.    Preoperative Diagnosis: Dysphagia status post sleeve gastrectomy and paraesophageal repair    Postoperative Diagnosis: Same    Procedure Performed: Transoral esophagogastroduodenoscopy with 18-20 balloon dilatation of pylorus    Indications: 38-year-old female approximately 6-week status post sleeve gastrectomy and paraesophageal hernia repair for the past week has had difficulty keeping any p.o. down.    Procedure:     The procedure, indications, preparation and potential, patient were explained to the patient, who indicated understanding and signed the corresponding consent forms.  The patient was identified, taken to the endoscopy suite, and placed on the left side down decubitus position.  The patient underwent a MAC anesthesia and was appropriately monitored through the case by the anesthesia personnel using continuous pulse oximetry, blood pressure, and cardiac monitoring.  A bite block was placed.  After adequate IV sedation and using a transoral technique a lubed flexible endoscope was placed in the hypopharynx and advanced to the second portion of the duodenum.  The pylorus was mildly strictured and the scope had to be advanced with some pressure into the duodenum.  The scope was then withdrawn back into the gastric sleeve.  There was no stricture noted in the gastric sleeve unless dictated below.  No polyps or ulcers were seen unless dictated below.  The scope was then withdrawn back into the esophagus.  The Z line was regular and no erosive esophagitis seen unless dictated below.  Scope was then advanced back down into the antrum.  A 18-20 balloon catheter was then advanced across the pylorus and taken up in sequence and each level held for approximately 1 minute.  The catheter was deflated and removed.  The pylorus dilated up nicely.  The scope was then completely withdrawn after decompressing the stomach.  The patient tolerated the procedure well and left the  endoscopy suite in stable condition.    Sleeve was of normal size without stricture or dilatation.  The pylorus was very snug but opened up nicely after dilatation.  The Z-line was regular no erosive esophagitis noted.  The balloon was Back and pulled across the incisura without any difficulty.    Recommendations:     Follow-up in the office as scheduled

## 2024-09-27 NOTE — DISCHARGE INSTRUCTIONS
For the next 24 hours patient needs to be with a responsible adult.    For 24 hours DO NOT drive, operate machinery, appliances, drink alcohol, make important decisions or sign legal documents.    Start with a light or bland diet if you are feeling sick to your stomach otherwise advance to regular diet as tolerated.    Follow recommendations on procedure report if provided by your doctor.    Call Dr Amador for problems [phone].   Avoid use of NSAIDs, Ibuprofen, Advil, Aleve, Voltaren etc.  May use Tylenol/ Acetaminophen instead of NSAIDs for pain/fever as advised by Dr. Amador.    Problems may include but not limited to: large amounts of bleeding, trouble breathing, repeated vomiting, severe unrelieved pain, fever or chills.

## 2024-09-27 NOTE — INTERVAL H&P NOTE
Patient was doing well early on but this week started having difficulty with p.o. intake.  We will go ahead and proceed with upper endoscopy with dilatation of the pylorus.  The risks and benefits of the procedure were discussed with the patient in detail and all questions were answered.  Possibility of open, bleeding, infection, bowel injury, deep venous thrombosis, pulmonary embolism, incisional hernias, renal failure, myocardial infarction, respiratory and cardiac arrest and death were discussed. Consent will be signed and witnessed.  H&P reviewed. The patient was examined and there are no changes to the H&P.

## 2024-10-20 DIAGNOSIS — M54.41 ACUTE MIDLINE LOW BACK PAIN WITH BILATERAL SCIATICA: ICD-10-CM

## 2024-10-20 DIAGNOSIS — M54.42 ACUTE MIDLINE LOW BACK PAIN WITH BILATERAL SCIATICA: ICD-10-CM

## 2024-10-21 RX ORDER — ESOMEPRAZOLE MAGNESIUM 40 MG/1
40 CAPSULE, DELAYED RELEASE ORAL
Qty: 90 CAPSULE | Refills: 0 | Status: SHIPPED | OUTPATIENT
Start: 2024-10-21

## 2024-11-07 ENCOUNTER — OFFICE VISIT (OUTPATIENT)
Dept: FAMILY MEDICINE CLINIC | Age: 39
End: 2024-11-07
Payer: COMMERCIAL

## 2024-11-07 ENCOUNTER — HOSPITAL ENCOUNTER (OUTPATIENT)
Dept: GENERAL RADIOLOGY | Facility: HOSPITAL | Age: 39
Discharge: HOME | End: 2024-11-07
Admitting: NURSE PRACTITIONER
Payer: COMMERCIAL

## 2024-11-07 VITALS
HEART RATE: 77 BPM | DIASTOLIC BLOOD PRESSURE: 70 MMHG | BODY MASS INDEX: 27.82 KG/M2 | SYSTOLIC BLOOD PRESSURE: 108 MMHG | HEIGHT: 63 IN | TEMPERATURE: 98.7 F | WEIGHT: 157 LBS | OXYGEN SATURATION: 100 %

## 2024-11-07 DIAGNOSIS — M25.561 CHRONIC PAIN OF RIGHT KNEE: ICD-10-CM

## 2024-11-07 DIAGNOSIS — M25.532 LEFT WRIST PAIN: ICD-10-CM

## 2024-11-07 DIAGNOSIS — L70.9 ACNE, UNSPECIFIED ACNE TYPE: ICD-10-CM

## 2024-11-07 DIAGNOSIS — M25.532 LEFT WRIST PAIN: Primary | ICD-10-CM

## 2024-11-07 DIAGNOSIS — L65.9 HAIR LOSS: ICD-10-CM

## 2024-11-07 DIAGNOSIS — G89.29 CHRONIC PAIN OF RIGHT KNEE: ICD-10-CM

## 2024-11-07 PROCEDURE — 99214 OFFICE O/P EST MOD 30 MIN: CPT | Performed by: NURSE PRACTITIONER

## 2024-11-07 PROCEDURE — 73110 X-RAY EXAM OF WRIST: CPT

## 2024-11-07 PROCEDURE — 73562 X-RAY EXAM OF KNEE 3: CPT

## 2024-11-07 RX ORDER — CLINDAMYCIN PHOSPHATE 10 MG/G
GEL TOPICAL 2 TIMES DAILY
Qty: 60 G | Refills: 0 | Status: SHIPPED | OUTPATIENT
Start: 2024-11-07

## 2024-11-07 RX ORDER — TRETINOIN 0.5 MG/G
1 CREAM TOPICAL NIGHTLY
Qty: 45 G | Refills: 0 | Status: SHIPPED | OUTPATIENT
Start: 2024-11-07

## 2024-11-07 NOTE — PROGRESS NOTES
"Chief Complaint  Knee Pain (Right knee pain x 1 year) and Wrist Pain (Left wrist pain x 2 years)    Subjective          Gin Alexandra presents to [facility] c/o left wrist pain x 2 years. Hx of carpal tunnel syndrome. Also c/o right knee pain x 1 year. No known injury for either.     Pt had bariatric surgery in early August and has had significant weight loss. Has started to experience acne and hair loss.     Objective   Vital Signs:   Vitals:    11/07/24 1601   BP: 108/70   Pulse: 77   Temp: 98.7 °F (37.1 °C)   TempSrc: Oral   SpO2: 100%   Weight: 71.2 kg (157 lb)   Height: 160 cm (62.99\")       Body mass index is 27.82 kg/m².          Physical Exam  Constitutional:       General: She is not in acute distress.     Appearance: Normal appearance. She is not ill-appearing.   Pulmonary:      Effort: Pulmonary effort is normal. No respiratory distress.   Musculoskeletal:         General: Tenderness present. Normal range of motion.      Comments: Lateral wrist tenderness noted   Skin:     Comments: Acne noted to cheeks and chin   Neurological:      Mental Status: She is alert and oriented to person, place, and time.   Psychiatric:         Mood and Affect: Mood normal.         Behavior: Behavior normal.         Thought Content: Thought content normal.         Judgment: Judgment normal.                     Assessment and Plan    Assessment & Plan  Left wrist pain  Will notify patient of results and treat accordingly. Referral initiated. Tylenol/ibuprofen as needed.   Orders:    XR Wrist 3+ View Left; Future    Ambulatory Referral to Orthopedic Surgery    Chronic pain of right knee  See above.   Orders:    XR Knee 3 View Right; Future    Ambulatory Referral to Orthopedic Surgery    Acne, unspecified acne type  Retin-A and abx ointment. Will send to derm if needed.   Orders:    tretinoin (Retin-A) 0.05 % cream; Apply 1 Application topically to the appropriate area as directed Every Night.    " clindamycin (Clindamycin 1% external gel) 1 % gel; Apply topically to the appropriate area as directed 2 (Two) Times a Day.    Hair loss  Will send to derm if needed. Take vitamins as recommended.          Patient to notify office with any acute concerns or issues.  Patient verbalizes understanding, agrees with plan of care and has no further questions upon discharge.    Please note that portions of this note were completed with a voice recognition program.      Follow Up    Return if symptoms worsen or fail to improve.  Patient was given instructions and counseling regarding her condition or for health maintenance advice. Please see specific information pulled into the AVS if appropriate.     There are no discontinued medications.

## 2024-11-18 ENCOUNTER — HOSPITAL ENCOUNTER (EMERGENCY)
Facility: HOSPITAL | Age: 39
Discharge: HOME | End: 2024-11-18
Attending: EMERGENCY MEDICINE
Payer: COMMERCIAL

## 2024-11-18 VITALS
HEIGHT: 62 IN | DIASTOLIC BLOOD PRESSURE: 75 MMHG | OXYGEN SATURATION: 98 % | RESPIRATION RATE: 14 BRPM | TEMPERATURE: 98.1 F | SYSTOLIC BLOOD PRESSURE: 107 MMHG | HEART RATE: 79 BPM | BODY MASS INDEX: 28.8 KG/M2 | WEIGHT: 156.53 LBS

## 2024-11-18 DIAGNOSIS — H53.9 VISUAL AURA: ICD-10-CM

## 2024-11-18 DIAGNOSIS — H53.30 BINOCULAR VISUAL DISTURBANCE: Primary | ICD-10-CM

## 2024-11-18 PROCEDURE — 99282 EMERGENCY DEPT VISIT SF MDM: CPT

## 2024-11-18 RX ORDER — KETOROLAC TROMETHAMINE 30 MG/ML
60 INJECTION, SOLUTION INTRAMUSCULAR; INTRAVENOUS ONCE
Status: DISCONTINUED | OUTPATIENT
Start: 2024-11-18 | End: 2024-11-18 | Stop reason: HOSPADM

## 2024-11-18 NOTE — ED NOTES
20/10 in both eyes, but right eye was blurrier, she was wearing her glasses, the letters looked they were flickering

## 2024-11-18 NOTE — ED PROVIDER NOTES
Time: 4:07 AM EST  Date of encounter:  11/18/2024  Independent Historian/Clinical History and Information was obtained by:   Patient    History is limited by: N/A    Chief Complaint: Eye problem      History of Present Illness:  Patient is a 39 y.o. year old female who presents to the emergency department for evaluation of eye problem.  Patient states 30 minutes prior to arrival she was scrolling on her phone and had a white light in her right eye like if you looked at a bright light and you saw flash bulbs but then it was in her left eye as well when she had her eyes open or shut.  She had no pain.  She wears glasses but her vision was slightly blurry than bright light went away and she developed a headache in the back of her head that radiates up to the top of her head.  Patient does have a history of occipital neuralgia but normally it does not feel like it is behind her eyes.  No nausea or vomiting.  No photophobia.  Vision now is completely normal.  Patient rates her headache pain a 6 out of 10.  No numbness tingling or weakness.      Patient Care Team  Primary Care Provider: Rekha Kim APRN    Past Medical History:     Allergies   Allergen Reactions    Codeine Hives     Past Medical History:   Diagnosis Date    Abnormal EKG     cardiac clearance note in epic    Allergic 01/01/1995    Allergic rhinitis     Anxiety     Asthma     Cholelithiasis 2005    Gall bladder was removed    Chronic fatigue, unspecified     Constipation, unspecified     Depression     Started when i was 14    Fatty liver 05/21/2024    Fibromyalgia     Fibromyalgia, primary 2019    GERD without esophagitis     Headache     HL (hearing loss)     right ear less    Hyperlipidemia     IBS (irritable bowel syndrome)     diarrhea and constipation    Migraine     Obesity, unspecified     Occipital neuralgia     STEPHANIA (obstructive sleep apnea) 05/21/2024    no machine    Pain in right hip     Paraesophageal hernia 05/21/2024    Pituitary tumor      PONV (postoperative nausea and vomiting) 2006    Severe nausea with vomiting for several hours after    Tinnitus      Past Surgical History:   Procedure Laterality Date    BARIATRIC SURGERY  8/5/2024    Sleeve gastrectomy    CARPAL TUNNEL RELEASE      x3    COLONOSCOPY      COLONOSCOPY N/A 11/27/2023    Procedure: COLONOSCOPY;  Surgeon: Kumar Turner MD;  Location: [facility] ENDOSCOPY;  Service: Gastroenterology;  Laterality: N/A;  NORMAL COLONOSCOPY    CYST REMOVAL N/A 05/23/2024    Procedure: Excision of subcutaneous mass from abdominal wall;  Surgeon: Julio Warren MD;  Location: [facility] [place] [facility] [place];  Service: General;  Laterality: N/A;    ENDOSCOPY N/A 06/13/2022    Procedure: ESOPHAGOGASTRODUODENOSCOPY WITH BIOPSIES;  Surgeon: Kumar Turner MD;  Location: [facility] ENDOSCOPY;  Service: Gastroenterology;  Laterality: N/A;  NORMAL EGD    ENDOSCOPY N/A 11/27/2023    Procedure: ESOPHAGOGASTRODUODENOSCOPY WITH BIOPSIES;  Surgeon: Kumar Turner MD;  Location: [facility] ENDOSCOPY;  Service: Gastroenterology;  Laterality: N/A;  HIATAL HERNIA, GASTRITIS    ENDOSCOPY N/A 9/27/2024    Procedure: ESOPHAGOGASTRODUODENOSCOPY WITH Pyloric DILATATION (18-20mm);  Surgeon: Itz Amador Jr., MD;  Location: [facility] ENDOSCOPY;  Service: General;  Laterality: N/A;  Pre:dysphagia, history gastric sleeve  Post: same    GASTRIC SLEEVE LAPAROSCOPIC N/A 08/05/2024    Procedure: Sleeve gastrectomy LAPAROSCOPIC With Paraesophageal Hernia Repair;  Surgeon: Itz Amador Jr., MD;  Location: [facility] [place] [facility] [place];  Service: [employer] - [employer];  Laterality: N/A;    HERNIA REPAIR  8/5/2024    Hiatal hernia    LAPAROSCOPIC CHOLECYSTECTOMY      AT AGE 21   DETAILS/COMPLICATIONS?    LEEP  2012    TUBAL ABDOMINAL LIGATION       Family History   Problem Relation Age of Onset    Arthritis Mother     Depression Mother     Hyperlipidemia Mother     Cancer Mother         Non-small lung cancer    COPD Father     Depression Father      Heart disease Father     Hyperlipidemia Father     Hyperlipidemia Brother     Cancer Maternal Aunt     Heart disease Maternal Uncle     Ovarian cancer Maternal Grandmother     Cancer Maternal Grandmother     Heart attack Maternal Grandfather     Stroke Maternal Grandfather     Heart disease Maternal Grandfather     COPD Paternal Grandmother     Depression Paternal Grandmother     Lung cancer Paternal Grandfather     Asthma Paternal Grandfather     Cancer Paternal Grandfather         lung cancer    COPD Paternal Grandfather     Vision loss Paternal Grandfather     Colon cancer Neg Hx     Malig Hyperthermia Neg Hx        Home Medications:  Prior to Admission medications    Medication Sig Start Date End Date Taking? Authorizing Provider   albuterol sulfate  (90 Base) MCG/ACT inhaler Inhale 2 puffs Every 4 (Four) Hours As Needed for Shortness of Air. 8/13/24   Rekha Kim APRN   clindamycin (Clindamycin 1% external gel) 1 % gel Apply topically to the appropriate area as directed 2 (Two) Times a Day. 11/7/24   Rekha Kim APRN   DULoxetine (CYMBALTA) 30 MG capsule Take 1 capsule by mouth Daily. Take along with cymbalta 60mg daily to equal 90mg daily.  Patient taking differently: Take 1 capsule by mouth Every Night. Take along with cymbalta 60mg daily to equal 90mg daily. 6/21/24   Rekha Kim APRN   DULoxetine (CYMBALTA) 60 MG capsule Take 1 capsule by mouth Daily. Take along with cymbalta 30mg daily to equal 90mg daily.  Patient taking differently: Take 1 capsule by mouth Every Night. Take along with cymbalta 30mg daily to equal 90mg daily. 6/21/24   Rekha Kim APRN   esomeprazole (nexIUM) 40 MG capsule Take 1 capsule by mouth Every Morning Before Breakfast. 10/21/24   Rekha Kim APRN   famotidine (Pepcid) 40 MG tablet Take 1 tablet by mouth Daily. 5/30/24   Yudelka Morrow APRN   meclizine (ANTIVERT) 25 MG tablet Take 1 tablet by mouth 3 (Three) Times a Day As Needed for  Dizziness. 24   Rekha Kim APRN   Nurtec 75 MG dispersible tablet TAKE ONE TABLET BY MOUTH EVERY DAY AS NEEDED FOR MIGRAINE 22   Provider, MD Delisa   ondansetron ODT (ZOFRAN-ODT) 4 MG disintegrating tablet Place 1 tablet on the tongue Every 8 (Eight) Hours As Needed for Nausea. 24   Rekha Kim APRN   tiZANidine (ZANAFLEX) 4 MG tablet Take 1 tablet by mouth Every 8 (Eight) Hours As Needed for Muscle Spasms. 10/21/24   Rekha Kim APRN   topiramate (Topamax) 25 MG tablet Take 1 tablet by mouth 2 (Two) Times a Day as directed for headache prevention 24      tretinoin (Retin-A) 0.05 % cream Apply topically to the appropriate area as directed Every Night. 24   Rekha Kim APRN        Social History:   Social History     Tobacco Use    Smoking status: Former     Current packs/day: 0.00     Average packs/day: 2.0 packs/day for 16.0 years (32.0 ttl pk-yrs)     Types: Cigarettes     Start date: 1999     Quit date: 2015     Years since quittin.8     Passive exposure: Past    Smokeless tobacco: Never   Vaping Use    Vaping status: Never Used   Substance Use Topics    Alcohol use: Not Currently     Comment: Very seldom    Drug use: Never     Comment: delta 8 occasionally         Review of Systems:  Review of Systems   Constitutional:  Negative for chills and fever.   HENT:  Negative for congestion, ear pain and sore throat.    Eyes:  Positive for visual disturbance. Negative for photophobia, pain, discharge, redness and itching.   Respiratory:  Negative for cough, chest tightness and shortness of breath.    Cardiovascular:  Negative for chest pain.   Gastrointestinal:  Negative for abdominal pain, diarrhea, nausea and vomiting.   Genitourinary:  Negative for flank pain and hematuria.   Musculoskeletal:  Negative for back pain, joint swelling and neck pain.   Skin:  Negative for pallor.   Neurological:  Positive for headaches. Negative for tremors, seizures, syncope,  "speech difficulty, weakness, light-headedness and numbness.   Hematological: Negative.    Psychiatric/Behavioral: Negative.     All other systems reviewed and are negative.       Physical Exam:  /75 (Patient Position: Sitting)   Pulse 79   Temp 98.1 °F (36.7 °C) (Oral)   Resp 14   Ht 157.5 cm (62\")   Wt 71 kg (156 lb 8.4 oz)   LMP 10/28/2024 (Approximate)   SpO2 98%   BMI 28.63 kg/m²     Physical Exam  Vitals and nursing note reviewed.   Constitutional:       General: She is not in acute distress.     Appearance: Normal appearance. She is not toxic-appearing.   HENT:      Head: Normocephalic and atraumatic.      Right Ear: Tympanic membrane, ear canal and external ear normal.      Left Ear: Tympanic membrane, ear canal and external ear normal.      Nose: Nose normal.      Mouth/Throat:      Mouth: Mucous membranes are moist.   Eyes:      General: Lids are normal. Vision grossly intact. Gaze aligned appropriately. No visual field deficit or scleral icterus.        Right eye: No discharge.         Left eye: No discharge.      Extraocular Movements: Extraocular movements intact.      Conjunctiva/sclera: Conjunctivae normal.      Pupils: Pupils are equal, round, and reactive to light.      Funduscopic exam:     Right eye: Red reflex present.         Left eye: Red reflex present.     Slit lamp exam:     Right eye: Anterior chamber quiet.      Left eye: Anterior chamber quiet.      Visual Fields: Right eye visual fields normal and left eye visual fields normal.   Cardiovascular:      Rate and Rhythm: Normal rate and regular rhythm.      Heart sounds: Normal heart sounds.   Pulmonary:      Effort: Pulmonary effort is normal. No respiratory distress.      Breath sounds: Normal breath sounds.   Abdominal:      Tenderness: There is no abdominal tenderness.   Musculoskeletal:         General: Normal range of motion.      Cervical back: Normal range of motion and neck supple. No tenderness.   Skin:     General: " Skin is warm and dry.   Neurological:      General: No focal deficit present.      Mental Status: She is alert and oriented to person, place, and time.      Cranial Nerves: No cranial nerve deficit.      Sensory: No sensory deficit.      Motor: No weakness.   Psychiatric:         Mood and Affect: Mood normal.         Behavior: Behavior normal.                Medical Decision Making:      Comorbidities that affect care:    Migraines, 5, Asthma, Obesity    External Notes reviewed:    Previous Clinic Note: Patient's last visit was to [facility] where she was seen for left wrist pain and had a negative x-ray      The following orders were placed and all results were independently analyzed by me:  Orders Placed This Encounter   Procedures    Visual acuity screening       Medications Given in the Emergency Department:  Medications   ketorolac (TORADOL) injection 60 mg (60 mg Intramuscular Not Given 11/18/24 0432)        ED Course:         Labs:    Lab Results (last 24 hours)       ** No results found for the last 24 hours. **             Imaging:    No Radiology Exams Resulted Within Past 24 Hours      Differential Diagnosis and Discussion:    Eye Pain/Blurred Vision: Differential diagnosis includes but is not limited to dacryocystitis, hordeolum, chalazion, periorbital cellulitis, cavernous sinus thrombosis, blepharitis, and glaucoma.        MDM  Number of Diagnoses or Management Options  Binocular visual disturbance  Visual aura  Diagnosis management comments: I have explained the patient´s condition, diagnoses and treatment plan based on the information available to me at this time. I have answered questions and addressed any concerns. The patient has a good  understanding of the patient´s diagnosis, condition, and treatment plan as can be expected at this point. The vital signs have been stable. The patient´s condition is stable and appropriate for discharge from the emergency department.      The patient will  pursue further outpatient evaluation with the primary care physician or other designated or consulting physician as outlined in the discharge instructions. They are agreeable to this plan of care and follow-up instructions have been explained in detail. The patient has received these instructions in written format and have expressed an understanding of the discharge instructions. The patient is aware that any significant change in condition or worsening of symptoms should prompt an immediate return to this or the closest emergency department or call to 911.       Amount and/or Complexity of Data Reviewed  Tests in the medicine section of CPT®: ordered    Risk of Complications, Morbidity, and/or Mortality  Presenting problems: low  Management options: low    Patient Progress  Patient progress: stable             Patient Care Considerations:    CT HEAD: I considered ordering a noncontrast CT of the head, however after discussion with the attending emergency department is getting not needed      Consultants/Shared Management Plan:    I have discussed the case with dr snow  who states that the patient can be safely discharged with close follow up.    Social Determinants of Health:    Patient is independent, reliable, and has access to care.       Disposition and Care Coordination:    Discharged: The patient is suitable and stable for discharge with no need for consideration of admission.    I have explained the patient´s condition, diagnoses and treatment plan based on the information available to me at this time. I have answered questions and addressed any concerns. The patient has a good  understanding of the patient´s diagnosis, condition, and treatment plan as can be expected at this point. The vital signs have been stable. The patient´s condition is stable and appropriate for discharge from the emergency department.      The patient will pursue further outpatient evaluation with the primary care physician or  other designated or consulting physician as outlined in the discharge instructions. They are agreeable to this plan of care and follow-up instructions have been explained in detail. The patient has received these instructions in written format and has expressed an understanding of the discharge instructions. The patient is aware that any significant change in condition or worsening of symptoms should prompt an immediate return to this or the closest emergency department or call to 911.    Final diagnoses:   Binocular visual disturbance   Visual aura        ED Disposition       ED Disposition   Discharge    Condition   Stable    Comment   --               This medical record created using voice recognition software.             Donna Smallwood, APRN  11/18/24 0541

## 2024-11-18 NOTE — DISCHARGE INSTRUCTIONS
Your symptoms sound like a visual aura.  Most bad or dangerous things do not occur in both eyes and would not go away without any intervention    Follow-up with your PCP.    Drink plenty of fluids.    Alternate Tylenol or Motrin.    Follow-up with ophthalmology as needed if symptoms continue

## 2024-11-19 NOTE — ED PROVIDER NOTES
"SHARED VISIT NOTE:    Patient is 39 y.o. year old female that presents to the ED for evaluation of eye problem.     Physical Exam    ED Course:    /75 (Patient Position: Sitting)   Pulse 79   Temp 98.1 °F (36.7 °C) (Oral)   Resp 14   Ht 157.5 cm (62\")   Wt 71 kg (156 lb 8.4 oz)   LMP 10/28/2024 (Approximate)   SpO2 98%   BMI 28.63 kg/m²   Results for orders placed or performed during the hospital encounter of 09/27/24   POC Pregnancy, Urine    Collection Time: 09/27/24  1:24 PM    Specimen: Urine   Result Value Ref Range    HCG, Urine, QL Negative Negative    Lot Number #[id number]     Internal Positive Control Positive Positive, Passed    Internal Negative Control Negative Negative, Passed    Expiration Date 01-      Medications - No data to display  XR Wrist 3+ View Left    Result Date: 11/11/2024  Narrative: XR WRIST 3+ VW LEFT, XR KNEE 3 VW RIGHT Date of Exam: 11/7/2024 4:34 PM EST Indication: pain wrist knee pain. Comparison: None available. Findings: Knee: Bone mineral density is normal. No fractures or dislocations. Alignment anatomic. No joint effusion. No joint space narrowing. Wrist: Bone mineral density is normal. No fracture or dislocation. No joint space narrowing. No aggressive osseous lesion. No erosion, periosteal reaction or chondrocalcinosis.     Impression: Impression: Negative knee and wrist radiographs. Electronically Signed: Karina Del Rosario MD  11/11/2024 12:43 PM EST  Workstation ID: [id number]    XR Knee 3 View Right    Result Date: 11/11/2024  Narrative: XR WRIST 3+ VW LEFT, XR KNEE 3 VW RIGHT Date of Exam: 11/7/2024 4:34 PM EST Indication: pain wrist knee pain. Comparison: None available. Findings: Knee: Bone mineral density is normal. No fractures or dislocations. Alignment anatomic. No joint effusion. No joint space narrowing. Wrist: Bone mineral density is normal. No fracture or dislocation. No joint space narrowing. No aggressive osseous lesion. No erosion, periosteal " reaction or chondrocalcinosis.     Impression: Impression: Negative knee and wrist radiographs. Electronically Signed: Karina Del Rosario MD  11/11/2024 12:43 PM EST  Workstation ID: [id number]     MDM:    Procedures              SHARED VISIT ATTESTATION:    This visit was performed by both myself and an APC.  I performed the substantive portion of the medical decision making. The management plan was made or approved by me, and I take responsibility for patient management.           Augustin Kumar MD  06:42 EST  11/19/24         Augustin Kumar MD  11/19/24 0642

## 2024-12-05 NOTE — TELEPHONE ENCOUNTER
Checked on patient. Patient resting with side rails up and call bell within reach. Respirations even and unlabored. No distress visualized. Care ongoing.   Incoming call from pt requesting cardiac clearance for lipoma removal on 5/23/24 and is requesting for clearance to be uploaded to epic and her mychart . Please advice

## 2024-12-17 DIAGNOSIS — M54.41 ACUTE MIDLINE LOW BACK PAIN WITH BILATERAL SCIATICA: ICD-10-CM

## 2024-12-17 DIAGNOSIS — L70.9 ACNE, UNSPECIFIED ACNE TYPE: ICD-10-CM

## 2024-12-17 DIAGNOSIS — M54.42 ACUTE MIDLINE LOW BACK PAIN WITH BILATERAL SCIATICA: ICD-10-CM

## 2024-12-19 RX ORDER — CLINDAMYCIN PHOSPHATE 10 MG/G
1 GEL TOPICAL 2 TIMES DAILY
Qty: 60 G | Refills: 0 | Status: SHIPPED | OUTPATIENT
Start: 2024-12-19

## 2024-12-19 RX ORDER — TRETINOIN 0.5 MG/G
CREAM TOPICAL NIGHTLY
Qty: 45 G | Refills: 0 | Status: SHIPPED | OUTPATIENT
Start: 2024-12-19

## 2024-12-26 ENCOUNTER — OFFICE VISIT (OUTPATIENT)
Dept: SLEEP MEDICINE | Facility: HOSPITAL | Age: 39
End: 2024-12-26
Payer: COMMERCIAL

## 2024-12-26 VITALS
DIASTOLIC BLOOD PRESSURE: 78 MMHG | OXYGEN SATURATION: 99 % | HEART RATE: 82 BPM | BODY MASS INDEX: 26.52 KG/M2 | SYSTOLIC BLOOD PRESSURE: 109 MMHG | WEIGHT: 144.1 LBS | HEIGHT: 62 IN

## 2024-12-26 DIAGNOSIS — R63.4 WEIGHT LOSS: ICD-10-CM

## 2024-12-26 DIAGNOSIS — G47.19 EXCESSIVE DAYTIME SLEEPINESS: ICD-10-CM

## 2024-12-26 DIAGNOSIS — G47.33 OSA (OBSTRUCTIVE SLEEP APNEA): Primary | ICD-10-CM

## 2024-12-26 PROCEDURE — G0463 HOSPITAL OUTPT CLINIC VISIT: HCPCS

## 2024-12-26 RX ORDER — ONABOTULINUMTOXINA 200 [USP'U]/1
INJECTION, POWDER, LYOPHILIZED, FOR SOLUTION INTRADERMAL; INTRAMUSCULAR
COMMUNITY

## 2024-12-26 RX ORDER — DULOXETIN HYDROCHLORIDE 30 MG/1
30 CAPSULE, DELAYED RELEASE ORAL DAILY
Qty: 90 CAPSULE | Refills: 1 | Status: SHIPPED | OUTPATIENT
Start: 2024-12-26

## 2024-12-26 RX ORDER — TOPIRAMATE 25 MG/1
25 TABLET, FILM COATED ORAL 2 TIMES DAILY
Qty: 60 TABLET | Refills: 0 | Status: SHIPPED | OUTPATIENT
Start: 2024-12-26
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
## 2025-01-20 RX ORDER — RIMEGEPANT SULFATE 75 MG/75MG
75 TABLET, ORALLY DISINTEGRATING ORAL DAILY PRN
Qty: 15 TABLET | Refills: 0 | Status: SHIPPED | OUTPATIENT
Start: 2025-01-20

## 2025-01-22 ENCOUNTER — PRIOR AUTHORIZATION (OUTPATIENT)
Dept: FAMILY MEDICINE CLINIC | Age: 40
End: 2025-01-22
Payer: COMMERCIAL

## 2025-01-24 ENCOUNTER — TRANSCRIBE ORDERS (OUTPATIENT)
Dept: ADMINISTRATIVE | Facility: HOSPITAL | Age: 40
End: 2025-01-24
Payer: COMMERCIAL

## 2025-01-24 DIAGNOSIS — S62.163A: ICD-10-CM

## 2025-01-24 DIAGNOSIS — M25.532 LEFT WRIST PAIN: Primary | ICD-10-CM

## 2025-01-30 ENCOUNTER — SPECIALTY PHARMACY (OUTPATIENT)
Dept: PHARMACY | Facility: TELEHEALTH | Age: 40
End: 2025-01-30
Payer: COMMERCIAL

## 2025-01-30 PROBLEM — G43.909 MIGRAINE: Status: ACTIVE | Noted: 2025-01-30

## 2025-01-31 ENCOUNTER — SPECIALTY PHARMACY (OUTPATIENT)
Dept: PHARMACY | Facility: TELEHEALTH | Age: 40
End: 2025-01-31
Payer: COMMERCIAL

## 2025-02-03 ENCOUNTER — SPECIALTY PHARMACY (OUTPATIENT)
Dept: PHARMACY | Facility: TELEHEALTH | Age: 40
End: 2025-02-03
Payer: COMMERCIAL

## 2025-02-03 NOTE — PROGRESS NOTES
Specialty Pharmacy Patient Management Program  Initial Assessment     Gin Alexandra is a 39 y.o. female with migraines and enrolled in the Patient Management program offered by [facility]. An initial outreach was conducted, including assessment of therapy appropriateness and specialty medication education for Ubrelvy. The patient was introduced to services offered by [facility], including: regular assessments, refill coordination, curbside pick-up or mail order delivery options, prior authorization maintenance, and financial assistance programs as applicable. The patient was also provided with contact information for the pharmacy team.     Insurance Coverage & Financial Support  Billing [employer] insurance and a  coupon card    Relevant Past Medical History and Comorbidities  Relevant medical history and concomitant health conditions were discussed with the patient. The patient's chart has been reviewed for relevant past medical history and comorbid health conditions and updated as necessary.   Past Medical History:   Diagnosis Date    Abnormal EKG     cardiac clearance note in epic    Allergic 01/01/1995    Allergic rhinitis     Anxiety     Asthma     Cholelithiasis 2005    Gall bladder was removed    Chronic fatigue, unspecified     Constipation, unspecified     Depression     Started when i was 14    Fatty liver 05/21/2024    Fibromyalgia     Fibromyalgia, primary 2019    GERD without esophagitis     Headache     HL (hearing loss)     right ear less    Hyperlipidemia     IBS (irritable bowel syndrome)     diarrhea and constipation    Insomnia     Migraine     Obesity, unspecified     Occipital neuralgia     STEPHANIA (obstructive sleep apnea) 05/21/2024    no machine    Pain in right hip     Paraesophageal hernia 05/21/2024    Periodic limb movement     Pituitary tumor     PONV (postoperative nausea and vomiting) 2006    Severe nausea with vomiting for  several hours after    Tinnitus      Social History     Socioeconomic History    Marital status:     Number of children: 2   Tobacco Use    Smoking status: Former     Current packs/day: 0.00     Average packs/day: 2.0 packs/day for 16.0 years (32.0 ttl pk-yrs)     Types: Cigarettes     Start date: 1/1/1999     Quit date: 1/1/2015     Years since quitting: 10.0     Passive exposure: Past    Smokeless tobacco: Never   Vaping Use    Vaping status: Never Used   Substance and Sexual Activity    Alcohol use: Not Currently     Comment: Very seldom    Drug use: Never     Comment: delta 8 occasionally    Sexual activity: Yes     Partners: Male     Birth control/protection: Surgical     Problem list reviewed by Van Doshi RPH on 2/3/2025 at 10:38 AM    Allergies  Known allergies and reactions were discussed with the patient. The patient's chart has been reviewed for allergy information and updated as necessary.   Codeine  Allergies reviewed by Van Doshi RPH on 2/3/2025 at 10:38 AM    Current Medication List  This medication list has been reviewed with the patient and evaluated for any interactions or necessary modifications/recommendations, and updated to include all prescription medications, OTC medications, and supplements the patient is currently taking. This list reflects what is contained in the patient's profile, which has also been marked as reviewed to communicate to other providers it is the most up to date version of the patient's current medication therapy.     Current Outpatient Medications:     albuterol sulfate  (90 Base) MCG/ACT inhaler, Inhale 2 puffs Every 4 (Four) Hours As Needed for Shortness of Air. (Patient taking differently: Inhale 2 puffs As Needed for Shortness of Air.), Disp: 20.1 g, Rfl: 0    DULoxetine (CYMBALTA) 30 MG capsule, Take 1 capsule by mouth Daily. Take along with cymbalta 60mg daily to equal 90mg daily., Disp: 90 capsule, Rfl: 1    DULoxetine (CYMBALTA) 60 MG  capsule, Take 1 capsule by mouth Daily. Take along with cymbalta 30mg daily to equal 90mg daily. (Patient taking differently: Take 1 capsule by mouth Every Night. Take along with cymbalta 30mg daily to equal 90mg daily.), Disp: 90 capsule, Rfl: 1    esomeprazole (nexIUM) 40 MG capsule, Take 1 capsule by mouth Every Morning Before Breakfast. (Patient taking differently: Take 1 capsule by mouth As Needed.), Disp: 90 capsule, Rfl: 0    famotidine (Pepcid) 40 MG tablet, Take 1 tablet by mouth Daily. (Patient taking differently: Take 1 tablet by mouth As Needed for Indigestion or Heartburn.), Disp: 90 tablet, Rfl: 1    meclizine (ANTIVERT) 25 MG tablet, Take 1 tablet by mouth 3 (Three) Times a Day As Needed for Dizziness. (Patient taking differently: Take 1 tablet by mouth As Needed for Dizziness or Nausea.), Disp: 30 tablet, Rfl: 1    OnabotulinumtoxinA (Botox) 200 units reconstituted solution, inject 200 units into  the head, neck, and facial  muscles for chronic migraines dispose of waste, Disp: , Rfl:     ondansetron ODT (ZOFRAN-ODT) 4 MG disintegrating tablet, Place 1 tablet on the tongue Every 8 (Eight) Hours As Needed for Nausea. (Patient taking differently: Place 1 tablet on the tongue As Needed for Nausea.), Disp: 20 tablet, Rfl: 0    tiZANidine (ZANAFLEX) 4 MG tablet, Take 1 tablet by mouth Every 8 (Eight) Hours As Needed for Muscle Spasms., Disp: 60 tablet, Rfl: 0    topiramate (TOPAMAX) 25 MG tablet, Take 1 tablet by mouth 2 (Two) Times a Day as directed for headache prevention, Disp: 60 tablet, Rfl: 0    ubrogepant (UBRELVY) 100 MG tablet, Take 1 tablet by mouth at onset of migraine. May repeat in 2 hours if needed. Max daily amount: 2 tablets., Disp: 20 tablet, Rfl: 5  Medicines reviewed by Van Doshi RPH on 2/3/2025 at 10:38 AM    Drug Interactions  none     Relevant Laboratory Values  Lab Results   Component Value Date    GLUCOSE 78 09/05/2024    CALCIUM 10.4 09/05/2024     09/05/2024    K 3.9  09/05/2024    CO2 24.0 09/05/2024    CL 99 09/05/2024    BUN 9 09/05/2024    CREATININE 0.80 09/05/2024    BCR 11.3 09/05/2024    ANIONGAP 16.0 (H) 09/05/2024     Lab Results   Component Value Date    WBC 5.71 08/26/2024    HGB 13.2 08/26/2024    HCT 39.8 08/26/2024    MCV 90.7 08/26/2024     08/26/2024    INR 0.95 10/19/2023     Lab Value Review  The above lab values have been reviewed; the following specialty medication(s) dose adjustment(s) are recommended: none.    Initial Education Provided for Specialty Medication  The patient has been provided with the following education and any applicable administration techniques (i.e. self-injection) have been demonstrated for the therapies indicated. All questions and concerns have been addressed prior to the patient receiving the medication, and the patient has verbalized understanding of the education and any materials provided. Additional patient education shall be provided and documented upon request by the patient, provider or payer.      Ubrelvy (Ubrogepant)  Medication Expectations   Why am I taking this medication? You are taking this medication to treat acute migraines.   What should I expect while on this medication? You should expect to see a decrease in the frequency and severity of your migraines.   How does the medication work? Ubrelvy is a monoclonal antibody that binds to calcitonin gene-related peptide (CGRP) and blocks its binding to the receptor decreasing the severity of migraines.   How long will I be on this medication for? The amount of time you will be on this medication will be determined by your doctor and your response to the medication.    How do I take this medication? Take as directed on your prescription label.  Reviewed plan for Ubrelvy 100mg (1 tablet) PO daily prn; may repeat x 1 in 2 hours, if needed. Max dosage = 200mg/24 hours.    What are some possible side effects? Potential side effects including, but not limited to nausea  and somnolence. Pt verbalized understanding.   What happens if I miss a dose? This is taken as needed for migraines.     Medication Safety   What are things I should warn my doctor immediately about? Allergic reaction: Itching or hives, swelling in your face or hands, swelling or tingling in your mouth or throat, chest tightness, trouble breathing     What are things that I should be cautious of? Tell your doctor if you are pregnant or breastfeeding, or if you have kidney disease or liver disease.   What are some medications that can interact with this one? Concomitant use of strong CY inhibitors, check with your pharmacist or provider before starting any new medications, avoid grapefruit juice.     Medication Storage/Handling   How should I handle this medication? Keep this medication out of reach of pets/children.   How does this medication need to be stored? Store at a controlled room temperature between 20 and 25 degrees C (68 and 77 degrees F), with excursions permitted between 15 and 30 degrees C (59 and 86 degrees F) away from direct sunlight and moisture.   How should I dispose of this medication? There should not be a need to dispose of this medication unless your provider decides to change the dose or therapy. If that is the case, take to your local police station for proper disposal. Some pharmacies also have take-back bins for medication drop-off.      Resources/Support   How can I remind myself to take this medication? This is taken as needed for migraines.   Is financial support available?  Yes, [employer] can provide co-pay cards if you have commercial insurance or patient assistance if you have Medicare or no insurance.    Which vaccines are recommended for me? Talk to your doctor about these vaccines: Flu, Coronavirus (COVID-19), Pneumococcal (pneumonia), Tdap, Hepatitis B, Zoster (shingles)        Adherence, Self-Administration, and Current Therapy Problems  Adherence related to  the patient's specialty therapy was discussed with the patient. The Adherence segment of this outreach has been reviewed and updated.          Additional Barriers to Patient Self-Administration: None  Methods for Supporting Patient Self-Administration: None    Open Medication Therapy Problems  No medication therapy recommendations to display    Goals of Therapy   Goals Addressed Today        Specialty Pharmacy General Goal      Reduced the severity and duration of migraine headaches by 50%              Reassessment Plan & Follow-Up  Medication Therapy Changes: Start Ubrelvy 100mg at the onset of migraine. May repeat in 2 hours if needed. Max of 2 tablets in 24 hours.   Additional Plans, Therapy Recommendations, or Therapy Problems to Be Addressed: none   Pharmacist to perform regular reassessments no more than (6) months from the previous assessment.  Welcome information and patient satisfaction survey to be sent by retail team with patient's initial fill.  Care Coordinator to set up future refill outreaches, coordinate prescription delivery, and escalate clinical questions to pharmacist.     Attestation  I attest the patient was actively involved in and has agreed to the above plan of care. I attest that the initiated specialty medication(s) are appropriate for the patient based on my assessment. If the prescribed therapy is at any point deemed not appropriate based on the current or future assessments, a consultation will be initiated with the patient's specialty care provider to determine the best course of action. The revised plan of therapy will be documented along with any reassessments and/or additional patient education provided.     Electronically signed by Van Doshi RPH, 02/03/25, 10:39 AM EST.

## 2025-02-10 ENCOUNTER — HOSPITAL ENCOUNTER (OUTPATIENT)
Dept: MRI IMAGING | Facility: HOSPITAL | Age: 40
Discharge: HOME | End: 2025-02-10
Admitting: INTERNAL MEDICINE
Payer: COMMERCIAL

## 2025-02-10 DIAGNOSIS — M25.532 LEFT WRIST PAIN: ICD-10-CM

## 2025-02-10 DIAGNOSIS — S62.163A: ICD-10-CM

## 2025-02-10 PROCEDURE — 73221 MRI JOINT UPR EXTREM W/O DYE: CPT

## 2025-02-28 ENCOUNTER — SPECIALTY PHARMACY (OUTPATIENT)
Dept: PHARMACY | Facility: TELEHEALTH | Age: 40
End: 2025-02-28
Payer: COMMERCIAL

## 2025-02-28 NOTE — PROGRESS NOTES
Specialty Pharmacy Patient Management Program  MyChart Refill Outreach       Gin was contacted today regarding refills of their medication(s).    MyChart Questionnaire Responses      Flowsheet Row Questionnaire Series Submission from 2/28/2025 in Initial Department with Shady, Generic Provider   Changes to allergies? No    Changes to medications? No    New conditions or infections since last clinic visit No    Unplanned office visit, urgent care, ED, or hospital admission in the last 4 weeks  No    How does patient/caregiver feel medication is working? Very good    Financial problems or insurance changes  No    Since the previous refill, were any specialty medication doses or scheduled injections missed or delayed?  No    Delivery address Home    Doses left of specialty medications 3    Copay verified? Yes    Delivery Date Selection 03/05/25             Refill Questions      Flowsheet Row Most Recent Value   Does this patient require a clinical escalation to a pharmacist? No            Delivery Questions      Flowsheet Row Most Recent Value   Delivery method [employer]   Delivery address verified with patient/caregiver? Yes   Other address preferred N/A   Number of medications in delivery 1   Medication(s) being filled and delivered Ubrogepant (UBRELVY)   Copay verified? Yes   Copay amount $0   Copay form of payment No copayment ($0)   Signature Required No                   Follow-up: 21 day(s)     Maricruz Pink, Pharmacy Technician  2/28/2025  12:45 EST

## 2025-03-06 ENCOUNTER — OFFICE VISIT (OUTPATIENT)
Dept: FAMILY MEDICINE CLINIC | Age: 40
End: 2025-03-06
Payer: COMMERCIAL

## 2025-03-06 VITALS
SYSTOLIC BLOOD PRESSURE: 96 MMHG | TEMPERATURE: 98.8 F | OXYGEN SATURATION: 96 % | WEIGHT: 132.8 LBS | HEART RATE: 102 BPM | DIASTOLIC BLOOD PRESSURE: 59 MMHG | BODY MASS INDEX: 24.44 KG/M2 | HEIGHT: 62 IN

## 2025-03-06 DIAGNOSIS — H53.9 VISION CHANGES: ICD-10-CM

## 2025-03-06 DIAGNOSIS — Z13.6 ENCOUNTER FOR SCREENING FOR CARDIOVASCULAR DISORDERS: ICD-10-CM

## 2025-03-06 DIAGNOSIS — R76.8 POSITIVE ANA (ANTINUCLEAR ANTIBODY): ICD-10-CM

## 2025-03-06 DIAGNOSIS — K64.4 EXTERNAL HEMORRHOID: ICD-10-CM

## 2025-03-06 DIAGNOSIS — H91.93 BILATERAL HEARING LOSS, UNSPECIFIED HEARING LOSS TYPE: ICD-10-CM

## 2025-03-06 DIAGNOSIS — H93.13 TINNITUS OF BOTH EARS: ICD-10-CM

## 2025-03-06 DIAGNOSIS — D35.2 PITUITARY ADENOMA: Primary | ICD-10-CM

## 2025-03-06 PROCEDURE — 99214 OFFICE O/P EST MOD 30 MIN: CPT | Performed by: NURSE PRACTITIONER

## 2025-03-06 NOTE — PROGRESS NOTES
"Chief Complaint  Eye Problem (Which causes dizziness for about a year), Hemorrhoids, and Tinnitus    Subjective          Gin Alexandra presents to [facility] for an acute visit.  Patient has multiple concerns.  Patient states that her vision will shake horizontally 1-2 times a day and has for approximately a year.  She is talked to her eye doctor about this and was not given an answer.  Patient does have history of pituitary tumor.  Cannot recall when she last had an MRI.    Patient states she has had external hemorrhoids flare intermittently over the years and she is ready to have them removed.  States that she will have bright red bleeding at times.    Patient is also reporting ringing in bilateral ears which has continuously worsened.  States she feels as if she has hearing loss as well.      Objective   Vital Signs:   Vitals:    03/06/25 1249   BP: 96/59   BP Location: Right arm   Patient Position: Sitting   Pulse: 102   Temp: 98.8 °F (37.1 °C)   TempSrc: Oral   SpO2: 96%   Weight: 60.2 kg (132 lb 12.8 oz)   Height: 157.5 cm (62.01\")       Body mass index is 24.28 kg/m².  BMI is within normal parameters. No other follow-up for BMI required.       Physical Exam  Vitals reviewed.   Constitutional:       General: She is not in acute distress.     Appearance: She is well-developed.   HENT:      Head: Normocephalic and atraumatic.      Right Ear: Ear canal and external ear normal.      Left Ear: Ear canal and external ear normal.      Ears:      Comments: Scar tissue to both TMs  Eyes:      Extraocular Movements: Extraocular movements intact.      Conjunctiva/sclera: Conjunctivae normal.      Pupils: Pupils are equal, round, and reactive to light.   Neck:      Vascular: No carotid bruit.   Cardiovascular:      Rate and Rhythm: Normal rate and regular rhythm.      Heart sounds: Normal heart sounds. No murmur heard.  Pulmonary:      Effort: Pulmonary effort is normal. No respiratory " distress.      Breath sounds: Normal breath sounds.   Skin:     General: Skin is warm and dry.   Neurological:      Mental Status: She is alert and oriented to person, place, and time.   Psychiatric:         Mood and Affect: Mood and affect normal.         Behavior: Behavior normal.         Thought Content: Thought content normal.         Judgment: Judgment normal.                        Assessment and Plan    Assessment & Plan  Pituitary adenoma  Will notify patient of results and treat accordingly.  Will send back to endocrinology if needed.  Orders:    MRI Pituitary With & Without Contrast; Future    Comprehensive Metabolic Panel; Future    PTH, Intact; Future    Vision changes  If MRI not helpful, patient to discuss with optometry and possibly discuss with ophthalmology.  Go to ER with sudden vision loss.       Bilateral hearing loss, unspecified hearing loss type  Referral initiated.  Orders:    Ambulatory Referral to Audiology    Tinnitus of both ears  See above  Orders:    Ambulatory Referral to Audiology    External hemorrhoid  Referral initiated.  Increase water and fiber intake.  Orders:    Ambulatory Referral to General Surgery    Positive LETICIA (antinuclear antibody)  History of positive LETICIA.  States she never went to rheumatology due to insurance.  Orders:    CBC Auto Differential; Future    TSH Rfx On Abnormal To Free T4; Future    LETICIA by IFA, Reflex 9-biomarkers profile; Future    Encounter for screening for cardiovascular disorders  Will notify patient of results and treat accordingly.  Orders:    Lipid Panel; Future             Patient to notify office with any acute concerns or issues.  Patient verbalizes understanding, agrees with plan of care and has no further questions upon discharge.    Please note that portions of this note were completed with a voice recognition program.      Follow Up    Return in about 4 months (around 7/6/2025) for Annual physical.  Patient was given instructions and  counseling regarding her condition or for health maintenance advice. Please see specific information pulled into the AVS if appropriate.       Current Outpatient Medications:     albuterol sulfate  (90 Base) MCG/ACT inhaler, Inhale 2 puffs Every 4 (Four) Hours As Needed for Shortness of Air. (Patient taking differently: Inhale 2 puffs As Needed for Shortness of Air.), Disp: 20.1 g, Rfl: 0    DULoxetine (CYMBALTA) 30 MG capsule, Take 1 capsule by mouth Daily. Take along with cymbalta 60mg daily to equal 90mg daily., Disp: 90 capsule, Rfl: 1    DULoxetine (CYMBALTA) 60 MG capsule, Take 1 capsule by mouth Daily. Take along with cymbalta 30mg daily to equal 90mg daily. (Patient taking differently: Take 1 capsule by mouth Every Night. Take along with cymbalta 30mg daily to equal 90mg daily.), Disp: 90 capsule, Rfl: 1    esomeprazole (nexIUM) 40 MG capsule, Take 1 capsule by mouth Every Morning Before Breakfast., Disp: 90 capsule, Rfl: 0    famotidine (Pepcid) 40 MG tablet, Take 1 tablet by mouth Daily., Disp: 90 tablet, Rfl: 1    meclizine (ANTIVERT) 25 MG tablet, Take 1 tablet by mouth 3 (Three) Times a Day As Needed for Dizziness. (Patient taking differently: Take 1 tablet by mouth As Needed for Dizziness or Nausea.), Disp: 30 tablet, Rfl: 1    OnabotulinumtoxinA (Botox) 200 units reconstituted solution, inject 200 units into  the head, neck, and facial  muscles for chronic migraines dispose of waste, Disp: , Rfl:     ondansetron ODT (ZOFRAN-ODT) 4 MG disintegrating tablet, Place 1 tablet on the tongue Every 8 (Eight) Hours As Needed for Nausea. (Patient taking differently: Place 1 tablet on the tongue As Needed for Nausea.), Disp: 20 tablet, Rfl: 0    tiZANidine (ZANAFLEX) 4 MG tablet, Take 1 tablet by mouth Every 8 (Eight) Hours As Needed for Muscle Spasms., Disp: 60 tablet, Rfl: 0    ubrogepant (UBRELVY) 100 MG tablet, Take 1 tablet by mouth at onset of migraine. May repeat in 2 hours if needed. Max daily  amount: 2 tablets., Disp: 20 tablet, Rfl: 5  Medications Discontinued During This Encounter   Medication Reason    topiramate (TOPAMAX) 25 MG tablet Historical Med - Therapy completed

## 2025-03-06 NOTE — ASSESSMENT & PLAN NOTE
Referral initiated.  Increase water and fiber intake.  Orders:    Ambulatory Referral to General Surgery

## 2025-03-07 ENCOUNTER — PATIENT OUTREACH (OUTPATIENT)
Dept: CASE MANAGEMENT | Facility: OTHER | Age: 40
End: 2025-03-07
Payer: COMMERCIAL

## 2025-03-07 ENCOUNTER — LAB (OUTPATIENT)
Dept: LAB | Facility: HOSPITAL | Age: 40
End: 2025-03-07
Payer: COMMERCIAL

## 2025-03-07 DIAGNOSIS — R76.8 POSITIVE ANA (ANTINUCLEAR ANTIBODY): ICD-10-CM

## 2025-03-07 NOTE — OUTREACH NOTE
AMBULATORY CASE MANAGEMENT NOTE    Names and Relationships of Patient/Support Persons: Contact: Gin Alexandra; Relationship: Self -   Patient Outreach    Call to patient and ECM explained. She denies and concerns and declines for now.      ANDREW SHABAZZ  Ambulatory Case Management    3/7/2025, 12:16 EST

## 2025-03-10 DIAGNOSIS — M79.7 FIBROMYALGIA: ICD-10-CM

## 2025-03-10 RX ORDER — ESOMEPRAZOLE MAGNESIUM 40 MG/1
40 CAPSULE, DELAYED RELEASE ORAL
Qty: 90 CAPSULE | Refills: 0 | Status: SHIPPED | OUTPATIENT
Start: 2025-03-10

## 2025-03-10 RX ORDER — DULOXETIN HYDROCHLORIDE 60 MG/1
60 CAPSULE, DELAYED RELEASE ORAL DAILY
Qty: 90 CAPSULE | Refills: 1 | Status: SHIPPED | OUTPATIENT
Start: 2025-03-10

## 2025-03-13 RX ORDER — FAMOTIDINE 40 MG/1
40 TABLET, FILM COATED ORAL DAILY
Qty: 90 TABLET | Refills: 1 | OUTPATIENT
Start: 2025-03-13

## 2025-03-24 ENCOUNTER — TELEPHONE (OUTPATIENT)
Dept: SURGERY | Facility: CLINIC | Age: 40
End: 2025-03-24

## 2025-03-24 DIAGNOSIS — M54.41 ACUTE MIDLINE LOW BACK PAIN WITH BILATERAL SCIATICA: ICD-10-CM

## 2025-03-24 DIAGNOSIS — M54.42 ACUTE MIDLINE LOW BACK PAIN WITH BILATERAL SCIATICA: ICD-10-CM

## 2025-03-24 NOTE — TELEPHONE ENCOUNTER
Caller: Gin Alexandra    Relationship:  Self    Best call back number: [phone]      PATIENT CALLED REQUESTING TO CANCEL SAME DAY APPT.    Did the patient call AFTER the start time of their scheduled appointment?  []YES  [x]NO    Was the patient's appointment rescheduled? [x]YES  []NO    Any additional information: PATIENT IS SICK.

## 2025-03-26 ENCOUNTER — HOSPITAL ENCOUNTER (OUTPATIENT)
Dept: MRI IMAGING | Facility: HOSPITAL | Age: 40
Discharge: HOME | End: 2025-03-26
Admitting: NURSE PRACTITIONER
Payer: COMMERCIAL

## 2025-03-26 DIAGNOSIS — D35.2 PITUITARY ADENOMA: ICD-10-CM

## 2025-03-26 PROCEDURE — 70553 MRI BRAIN STEM W/O & W/DYE: CPT

## 2025-03-26 PROCEDURE — 25510000002 GADOBENATE DIMEGLUMINE 529 MG/ML SOLUTION: Performed by: NURSE PRACTITIONER

## 2025-03-26 PROCEDURE — A9577 INJ MULTIHANCE: HCPCS | Performed by: NURSE PRACTITIONER

## 2025-03-26 RX ADMIN — GADOBENATE DIMEGLUMINE 12 ML: 529 INJECTION, SOLUTION INTRAVENOUS at 16:54

## 2025-04-13 ENCOUNTER — PATIENT MESSAGE (OUTPATIENT)
Dept: FAMILY MEDICINE CLINIC | Age: 40
End: 2025-04-13
Payer: COMMERCIAL

## 2025-04-14 ENCOUNTER — HOSPITAL ENCOUNTER (OUTPATIENT)
Dept: SLEEP MEDICINE | Facility: HOSPITAL | Age: 40
Discharge: HOME | End: 2025-04-14
Admitting: STUDENT IN AN ORGANIZED HEALTH CARE EDUCATION/TRAINING PROGRAM
Payer: COMMERCIAL

## 2025-04-14 ENCOUNTER — OFFICE VISIT (OUTPATIENT)
Dept: SURGERY | Facility: CLINIC | Age: 40
End: 2025-04-14
Payer: COMMERCIAL

## 2025-04-14 VITALS
SYSTOLIC BLOOD PRESSURE: 113 MMHG | HEIGHT: 62 IN | BODY MASS INDEX: 24.18 KG/M2 | WEIGHT: 131.4 LBS | DIASTOLIC BLOOD PRESSURE: 80 MMHG | HEART RATE: 66 BPM

## 2025-04-14 DIAGNOSIS — K64.9 HEMORRHOIDS, UNSPECIFIED HEMORRHOID TYPE: Primary | ICD-10-CM

## 2025-04-14 DIAGNOSIS — G47.19 EXCESSIVE DAYTIME SLEEPINESS: ICD-10-CM

## 2025-04-14 DIAGNOSIS — G47.33 OSA (OBSTRUCTIVE SLEEP APNEA): ICD-10-CM

## 2025-04-14 DIAGNOSIS — R63.4 WEIGHT LOSS: ICD-10-CM

## 2025-04-14 PROCEDURE — 95810 POLYSOM 6/> YRS 4/> PARAM: CPT

## 2025-04-14 RX ORDER — HYDROCORTISONE 25 MG/G
1 CREAM TOPICAL 4 TIMES DAILY PRN
Qty: 28.35 G | Refills: 1 | Status: SHIPPED | OUTPATIENT
Start: 2025-04-14 | End: 2025-04-28

## 2025-04-14 RX ORDER — LIDOCAINE 50 MG/G
1 OINTMENT TOPICAL
Qty: 35.44 G | Refills: 1 | Status: SHIPPED | OUTPATIENT
Start: 2025-04-14

## 2025-04-14 RX ORDER — PRAMOXINE HYDROCHLORIDE 10 MG/G
AEROSOL, FOAM TOPICAL EVERY 4 HOURS PRN
Qty: 15 G | Refills: 1 | Status: SHIPPED | OUTPATIENT
Start: 2025-04-14 | End: 2025-04-28

## 2025-04-14 NOTE — PROGRESS NOTES
Chief Complaint:   External hemorrhoid    Primary Care Provider: Rekha Kim APRN    Referring Provider: Rekha Kim APRN    History of Present Illness  Gin Alexandra is a 39 y.o. female referred by LUCIA Quiroz for hemorrhoids.  The patient says she had a painful swollen knot by her anus a few weeks ago.  She is over-the-counter hemorrhoid medicines.  The area is better now.  She wants me to take a look and see whether there are any hemorrhoids present.  Patient is known to me as I removed a lipoma from her abdominal wall in May 2024.    Allergies: Codeine    Outpatient Medications Marked as Taking for the 4/14/25 encounter (Office Visit) with Julio Warren MD   Medication Sig Dispense Refill    DULoxetine (CYMBALTA) 30 MG capsule Take 1 capsule by mouth Daily. Take along with cymbalta 60mg daily to equal 90mg daily. 90 capsule 1    DULoxetine (CYMBALTA) 60 MG capsule Take 1 capsule by mouth Daily. Take along with cymbalta 30mg daily to equal 90mg daily. 90 capsule 1    esomeprazole (nexIUM) 40 MG capsule Take 1 capsule by mouth Every Morning Before Breakfast. 90 capsule 0    famotidine (Pepcid) 40 MG tablet Take 1 tablet by mouth Daily. 90 tablet 1    OnabotulinumtoxinA (Botox) 200 units reconstituted solution inject 200 units into  the head, neck, and facial  muscles for chronic migraines dispose of waste      tiZANidine (ZANAFLEX) 4 MG tablet Take 1 tablet by mouth Every 8 (Eight) Hours As Needed for Muscle Spasms. 60 tablet 0    ubrogepant (UBRELVY) 100 MG tablet Take 1 tablet by mouth at onset of migraine. May repeat in 2 hours if needed. Max daily amount: 2 tablets. 20 tablet 5       Past Medical History:    Abnormal EKG    cardiac clearance note in epic    Allergic    Allergic rhinitis    Anxiety    Arrhythmia    Asthma    Cholelithiasis    Gall bladder was removed    Chronic fatigue, unspecified    Constipation, unspecified    Depression    Started when i was 14    Fatty liver     Fibromyalgia    Fibromyalgia, primary    GERD without esophagitis    Headache    HL (hearing loss)    right ear less    Hyperlipidemia    IBS (irritable bowel syndrome)    diarrhea and constipation    Insomnia    Migraine    Obesity, unspecified    Occipital neuralgia    STEPHANIA (obstructive sleep apnea)    no machine    Pain in right hip    Paraesophageal hernia    Periodic limb movement    Pituitary tumor    PONV (postoperative nausea and vomiting)    Severe nausea with vomiting for several hours after    Shortness of breath    Tinnitus    Visual impairment        Past Surgical History:    BARIATRIC SURGERY    Sleeve gastrectomy    CARPAL TUNNEL RELEASE    x3    COLONOSCOPY    COLONOSCOPY    Procedure: COLONOSCOPY;  Surgeon: Kumar Turner MD;  Location: [facility];  Service: Gastroenterology;  Laterality: N/A;  NORMAL COLONOSCOPY    CYST REMOVAL    Procedure: Excision of subcutaneous mass from abdominal wall;  Surgeon: Julio Warren MD;  Location: [facility] [place] [facility];  Service: General;  Laterality: N/A;    ENDOSCOPY    Procedure: ESOPHAGOGASTRODUODENOSCOPY WITH BIOPSIES;  Surgeon: Kumar Turner MD;  Location: [facility] ENDOSCOPY;  Service: Gastroenterology;  Laterality: N/A;  NORMAL EGD    ENDOSCOPY    Procedure: ESOPHAGOGASTRODUODENOSCOPY WITH BIOPSIES;  Surgeon: Kumar Turner MD;  Location: [facility] ENDOSCOPY;  Service: Gastroenterology;  Laterality: N/A;  HIATAL HERNIA, GASTRITIS    ENDOSCOPY    Procedure: ESOPHAGOGASTRODUODENOSCOPY WITH Pyloric DILATATION (18-20mm);  Surgeon: Itz Amador Jr., MD;  Location: [facility] ENDOSCOPY;  Service: General;  Laterality: N/A;  Pre:dysphagia, history gastric sleeve  Post: same    GASTRIC SLEEVE LAPAROSCOPIC    Procedure: Sleeve gastrectomy LAPAROSCOPIC With Paraesophageal Hernia Repair;  Surgeon: Itz Amador Jr., MD;  Location: [facility] OR [facility];  Service: Robotics - [employer];  Laterality: N/A;    HERNIA REPAIR    Hiatal hernia    LAPAROSCOPIC  CHOLECYSTECTOMY    AT AGE 21   DETAILS/COMPLICATIONS?    LEEP    TUBAL ABDOMINAL LIGATION    UPPER GASTROINTESTINAL ENDOSCOPY       Family History:   Family History   Problem Relation Age of Onset    Arthritis Mother     Depression Mother     Hyperlipidemia Mother     Cancer Mother         NSCLC    Sleep apnea Mother     Insomnia Mother     Hypertension Mother     Early death Mother          from NSCLC at 56    COPD Father     Depression Father     Heart disease Father     Hyperlipidemia Father     Colon polyps Father     Hypertension Father     Hyperlipidemia Brother     Cancer Maternal Aunt     Heart disease Maternal Uncle     Ovarian cancer Maternal Grandmother     Cancer Maternal Grandmother         Widespread believed to have started in ovaries    Heart attack Maternal Grandfather     Stroke Maternal Grandfather     Heart disease Maternal Grandfather         Multiple heart attacks    COPD Paternal Grandmother     Depression Paternal Grandmother     Lung cancer Paternal Grandfather     Asthma Paternal Grandfather     Cancer Paternal Grandfather         lung cancer    COPD Paternal Grandfather     Vision loss Paternal Grandfather     Hypertension Brother     Heart disease Maternal Uncle         Multiple maternal uncles    Heart disease Paternal Uncle         Heart attack.  Alcoholic    Heart failure Maternal Uncle     Colon cancer Neg Hx     Malig Hyperthermia Neg Hx         Social History:  Social History     Tobacco Use    Smoking status: Former     Current packs/day: 0.00     Average packs/day: 2.0 packs/day for 17.0 years (34.0 ttl pk-yrs)     Types: Cigarettes     Start date: 1998     Quit date: 2015     Years since quitting: 10.2     Passive exposure: Past    Smokeless tobacco: Never   Substance Use Topics    Alcohol use: Never     Comment: Very seldom       Objective     Vital Signs:  /80 (BP Location: Right arm, Patient Position: Sitting, Cuff Size: Adult)   Pulse 66   Ht 157.5 cm  "(62\")   Wt 59.6 kg (131 lb 6.4 oz)   BMI 24.03 kg/m²   Respiratory:  breathing not labored, respiratory effort appears normal  Cardiovascular:  heart regular rate  Anal: External anus is normal  Jalyn BAUMANN present in room as a chaperone      Assessment:  Hemorrhoids    Plan:  No acute or chronic issue that needs addressed today.  Will provide the following prescriptions for patient to have on hand should the hemorrhoid return.    -     lidocaine (XYLOCAINE) 5 % ointment; Apply 1 Application topically to the appropriate area as directed Every 2 (Two) Hours As Needed for Mild Pain.  Dispense: 30 g; Refill: 1  -     Pramoxine HCl, Perianal, 1 % foam; Insert  into the rectum Every 4 (Four) Hours As Needed for Hemorrhoids for up to 14 days.  Dispense: 15 g; Refill: 1  -     hydrocortisone 2.5 % cream; Apply 1 Application topically to the appropriate area as directed 4 (Four) Times a Day As Needed (hemorrhoids) for up to 14 days.  Dispense: 20 g; Refill: 1        Julio Warren MD  04/14/2025    Electronically signed by Julio Warren MD, 04/14/25, 2:19 PM EDT.        "

## 2025-04-14 NOTE — LETTER
April 14, 2025     LUCIA Quiroz  [address]  [address]  [facility] [address]    Patient: Gin Alexandra   YOB: 1985   Date of Visit: 4/14/2025       Jacek Da Silva.    Thank you for referring Gin Alexandra to me for evaluation.  I saw the patient in my office today.  Please see the assessment and plan section at the bottom of my note included below for feedback.  Feel free to call me on my cell phone at [phone] with any questions.  Thank you.    Sincerely,  Julio Warren        CC: No Recipients    Julio Warren MD  04/14/25 1420  Sign when Signing Visit  Chief Complaint:   External hemorrhoid    Primary Care Provider: Rekha Kim APRN    Referring Provider: Rekha Kim APRN    History of Present Illness  Gin Alexandra is a 39 y.o. female referred by LUCIA Quiroz for hemorrhoids.  The patient says she had a painful swollen knot by her anus a few weeks ago.  She is over-the-counter hemorrhoid medicines.  The area is better now.  She wants me to take a look and see whether there are any hemorrhoids present.  Patient is known to me as I removed a lipoma from her abdominal wall in May 2024.    Allergies: Codeine    Outpatient Medications Marked as Taking for the 4/14/25 encounter (Office Visit) with Julio Warren MD   Medication Sig Dispense Refill   • DULoxetine (CYMBALTA) 30 MG capsule Take 1 capsule by mouth Daily. Take along with cymbalta 60mg daily to equal 90mg daily. 90 capsule 1   • DULoxetine (CYMBALTA) 60 MG capsule Take 1 capsule by mouth Daily. Take along with cymbalta 30mg daily to equal 90mg daily. 90 capsule 1   • esomeprazole (nexIUM) 40 MG capsule Take 1 capsule by mouth Every Morning Before Breakfast. 90 capsule 0   • famotidine (Pepcid) 40 MG tablet Take 1 tablet by mouth Daily. 90 tablet 1   • OnabotulinumtoxinA (Botox) 200 units reconstituted solution inject 200 units into  the head, neck, and facial  muscles for chronic migraines dispose of waste      • tiZANidine (ZANAFLEX) 4 MG tablet Take 1 tablet by mouth Every 8 (Eight) Hours As Needed for Muscle Spasms. 60 tablet 0   • ubrogepant (UBRELVY) 100 MG tablet Take 1 tablet by mouth at onset of migraine. May repeat in 2 hours if needed. Max daily amount: 2 tablets. 20 tablet 5       Past Medical History:   • Abnormal EKG    cardiac clearance note in epic   • Allergic   • Allergic rhinitis   • Anxiety   • Arrhythmia   • Asthma   • Cholelithiasis    Gall bladder was removed   • Chronic fatigue, unspecified   • Constipation, unspecified   • Depression    Started when i was 14   • Fatty liver   • Fibromyalgia   • Fibromyalgia, primary   • GERD without esophagitis   • Headache   • HL (hearing loss)    right ear less   • Hyperlipidemia   • IBS (irritable bowel syndrome)    diarrhea and constipation   • Insomnia   • Migraine   • Obesity, unspecified   • Occipital neuralgia   • STEPHANIA (obstructive sleep apnea)    no machine   • Pain in right hip   • Paraesophageal hernia   • Periodic limb movement   • Pituitary tumor   • PONV (postoperative nausea and vomiting)    Severe nausea with vomiting for several hours after   • Shortness of breath   • Tinnitus   • Visual impairment        Past Surgical History:   • BARIATRIC SURGERY    Sleeve gastrectomy   • CARPAL TUNNEL RELEASE    x3   • COLONOSCOPY   • COLONOSCOPY    Procedure: COLONOSCOPY;  Surgeon: Kumar Turner MD;  Location: [facility] ENDOSCOPY;  Service: Gastroenterology;  Laterality: N/A;  NORMAL COLONOSCOPY   • CYST REMOVAL    Procedure: Excision of subcutaneous mass from abdominal wall;  Surgeon: Julio Warren MD;  Location: [facility];  Service: General;  Laterality: N/A;   • ENDOSCOPY    Procedure: ESOPHAGOGASTRODUODENOSCOPY WITH BIOPSIES;  Surgeon: Kumar Turner MD;  Location: [facility] ENDOSCOPY;  Service: Gastroenterology;  Laterality: N/A;  NORMAL EGD   • ENDOSCOPY    Procedure: ESOPHAGOGASTRODUODENOSCOPY WITH BIOPSIES;  Surgeon: Kumar Turner  MD Juan C;  Location: [facility] ENDOSCOPY;  Service: Gastroenterology;  Laterality: N/A;  HIATAL HERNIA, GASTRITIS   • ENDOSCOPY    Procedure: ESOPHAGOGASTRODUODENOSCOPY WITH Pyloric DILATATION (18-20mm);  Surgeon: Itz Amador Jr., MD;  Location: [facility] ENDOSCOPY;  Service: General;  Laterality: N/A;  Pre:dysphagia, history gastric sleeve  Post: same   • GASTRIC SLEEVE LAPAROSCOPIC    Procedure: Sleeve gastrectomy LAPAROSCOPIC With Paraesophageal Hernia Repair;  Surgeon: Itz Amador Jr., MD;  Location: [facility] OR [facility];  Service: Robotics - [employer];  Laterality: N/A;   • HERNIA REPAIR    Hiatal hernia   • LAPAROSCOPIC CHOLECYSTECTOMY    AT AGE 21   DETAILS/COMPLICATIONS?   • LEEP   • TUBAL ABDOMINAL LIGATION   • UPPER GASTROINTESTINAL ENDOSCOPY       Family History:   Family History   Problem Relation Age of Onset   • Arthritis Mother    • Depression Mother    • Hyperlipidemia Mother    • Cancer Mother         NSCLC   • Sleep apnea Mother    • Insomnia Mother    • Hypertension Mother    • Early death Mother          from NSCLC at 56   • COPD Father    • Depression Father    • Heart disease Father    • Hyperlipidemia Father    • Colon polyps Father    • Hypertension Father    • Hyperlipidemia Brother    • Cancer Maternal Aunt    • Heart disease Maternal Uncle    • Ovarian cancer Maternal Grandmother    • Cancer Maternal Grandmother         Widespread believed to have started in ovaries   • Heart attack Maternal Grandfather    • Stroke Maternal Grandfather    • Heart disease Maternal Grandfather         Multiple heart attacks   • COPD Paternal Grandmother    • Depression Paternal Grandmother    • Lung cancer Paternal Grandfather    • Asthma Paternal Grandfather    • Cancer Paternal Grandfather         lung cancer   • COPD Paternal Grandfather    • Vision loss Paternal Grandfather    • Hypertension Brother    • Heart disease Maternal Uncle         Multiple maternal uncles   • Heart disease Paternal Uncle  "        Heart attack.  Alcoholic   • Heart failure Maternal Uncle    • Colon cancer Neg Hx    • Malig Hyperthermia Neg Hx         Social History:  Social History     Tobacco Use   • Smoking status: Former     Current packs/day: 0.00     Average packs/day: 2.0 packs/day for 17.0 years (34.0 ttl pk-yrs)     Types: Cigarettes     Start date: 1/1/1998     Quit date: 2/1/2015     Years since quitting: 10.2     Passive exposure: Past   • Smokeless tobacco: Never   Substance Use Topics   • Alcohol use: Never     Comment: Very seldom       Objective    Vital Signs:  /80 (BP Location: Right arm, Patient Position: Sitting, Cuff Size: Adult)   Pulse 66   Ht 157.5 cm (62\")   Wt 59.6 kg (131 lb 6.4 oz)   BMI 24.03 kg/m²   Respiratory:  breathing not labored, respiratory effort appears normal  Cardiovascular:  heart regular rate  Anal: External anus is normal  Jalyn BAUMANN present in room as a chaperone      Assessment:  Hemorrhoids    Plan:  No acute or chronic issue that needs addressed today.  Will provide the following prescriptions for patient to have on hand should the hemorrhoid return.    -     lidocaine (XYLOCAINE) 5 % ointment; Apply 1 Application topically to the appropriate area as directed Every 2 (Two) Hours As Needed for Mild Pain.  Dispense: 30 g; Refill: 1  -     Pramoxine HCl, Perianal, 1 % foam; Insert  into the rectum Every 4 (Four) Hours As Needed for Hemorrhoids for up to 14 days.  Dispense: 15 g; Refill: 1  -     hydrocortisone 2.5 % cream; Apply 1 Application topically to the appropriate area as directed 4 (Four) Times a Day As Needed (hemorrhoids) for up to 14 days.  Dispense: 20 g; Refill: 1        Julio Warren MD  04/14/2025    Electronically signed by Julio Warren MD, 04/14/25, 2:19 PM EDT.        "

## 2025-04-25 ENCOUNTER — TELEPHONE (OUTPATIENT)
Dept: FAMILY MEDICINE CLINIC | Age: 40
End: 2025-04-25
Payer: COMMERCIAL

## 2025-04-25 NOTE — TELEPHONE ENCOUNTER
Caller: Gin Alexandra    Relationship: Self    Best call back number: [phone]     What is the best time to reach you: ANYTIME IN AFTERNOON    Who are you requesting to speak with (clinical staff, provider,  specific staff member): LUCIA VERDIN    What was the call regarding: MEDICAL CLEARANCE TO BECOME A SURROGATE

## 2025-04-28 ENCOUNTER — TELEPHONE (OUTPATIENT)
Dept: SLEEP MEDICINE | Facility: HOSPITAL | Age: 40
End: 2025-04-28
Payer: COMMERCIAL

## 2025-05-09 RX ORDER — DULOXETIN HYDROCHLORIDE 30 MG/1
30 CAPSULE, DELAYED RELEASE ORAL DAILY
Qty: 90 CAPSULE | Refills: 1 | Status: SHIPPED | OUTPATIENT
Start: 2025-05-09

## 2025-05-15 ENCOUNTER — PATIENT MESSAGE (OUTPATIENT)
Dept: FAMILY MEDICINE CLINIC | Age: 40
End: 2025-05-15
Payer: COMMERCIAL

## 2025-05-16 ENCOUNTER — TELEPHONE (OUTPATIENT)
Dept: FAMILY MEDICINE CLINIC | Age: 40
End: 2025-05-16
Payer: COMMERCIAL

## 2025-05-16 RX ORDER — DULOXETIN HYDROCHLORIDE 30 MG/1
30 CAPSULE, DELAYED RELEASE ORAL DAILY
Qty: 10 CAPSULE | Refills: 0 | Status: SHIPPED | OUTPATIENT
Start: 2025-05-16

## 2025-05-28 ENCOUNTER — LAB (OUTPATIENT)
Dept: LAB | Facility: HOSPITAL | Age: 40
End: 2025-05-28
Payer: COMMERCIAL

## 2025-05-28 DIAGNOSIS — D35.2 PITUITARY ADENOMA: ICD-10-CM

## 2025-05-28 DIAGNOSIS — Z13.6 ENCOUNTER FOR SCREENING FOR CARDIOVASCULAR DISORDERS: ICD-10-CM

## 2025-05-28 DIAGNOSIS — R76.8 POSITIVE ANA (ANTINUCLEAR ANTIBODY): ICD-10-CM

## 2025-05-28 LAB
ALBUMIN SERPL-MCNC: 4.3 G/DL (ref 3.5–5.2)
ALBUMIN/GLOB SERPL: 1.6 G/DL
ALP SERPL-CCNC: 85 U/L (ref 39–117)
ALT SERPL W P-5'-P-CCNC: 19 U/L (ref 1–33)
ANION GAP SERPL CALCULATED.3IONS-SCNC: 10.2 MMOL/L (ref 5–15)
AST SERPL-CCNC: 17 U/L (ref 1–32)
BASOPHILS # BLD AUTO: 0.03 10*3/MM3 (ref 0–0.2)
BASOPHILS NFR BLD AUTO: 0.7 % (ref 0–1.5)
BILIRUB SERPL-MCNC: 0.5 MG/DL (ref 0–1.2)
BUN SERPL-MCNC: 12 MG/DL (ref 6–20)
BUN/CREAT SERPL: 24 (ref 7–25)
CALCIUM SPEC-SCNC: 9.9 MG/DL (ref 8.6–10.5)
CHLORIDE SERPL-SCNC: 102 MMOL/L (ref 98–107)
CHOLEST SERPL-MCNC: 205 MG/DL (ref 0–200)
CO2 SERPL-SCNC: 26.8 MMOL/L (ref 22–29)
CREAT SERPL-MCNC: 0.5 MG/DL (ref 0.57–1)
DEPRECATED RDW RBC AUTO: 42 FL (ref 37–54)
EGFRCR SERPLBLD CKD-EPI 2021: 124.1 ML/MIN/1.73
EOSINOPHIL # BLD AUTO: 0.08 10*3/MM3 (ref 0–0.4)
EOSINOPHIL NFR BLD AUTO: 1.9 % (ref 0.3–6.2)
ERYTHROCYTE [DISTWIDTH] IN BLOOD BY AUTOMATED COUNT: 12.1 % (ref 12.3–15.4)
GLOBULIN UR ELPH-MCNC: 2.7 GM/DL
GLUCOSE SERPL-MCNC: 94 MG/DL (ref 65–99)
HCT VFR BLD AUTO: 42.7 % (ref 34–46.6)
HDLC SERPL-MCNC: 60 MG/DL (ref 40–60)
HGB BLD-MCNC: 13.9 G/DL (ref 12–15.9)
IMM GRANULOCYTES # BLD AUTO: 0 10*3/MM3 (ref 0–0.05)
IMM GRANULOCYTES NFR BLD AUTO: 0 % (ref 0–0.5)
LDLC SERPL CALC-MCNC: 123 MG/DL (ref 0–100)
LDLC/HDLC SERPL: 2.01 {RATIO}
LYMPHOCYTES # BLD AUTO: 1.92 10*3/MM3 (ref 0.7–3.1)
LYMPHOCYTES NFR BLD AUTO: 45.3 % (ref 19.6–45.3)
MCH RBC QN AUTO: 30.2 PG (ref 26.6–33)
MCHC RBC AUTO-ENTMCNC: 32.6 G/DL (ref 31.5–35.7)
MCV RBC AUTO: 92.6 FL (ref 79–97)
MONOCYTES # BLD AUTO: 0.32 10*3/MM3 (ref 0.1–0.9)
MONOCYTES NFR BLD AUTO: 7.5 % (ref 5–12)
NEUTROPHILS NFR BLD AUTO: 1.89 10*3/MM3 (ref 1.7–7)
NEUTROPHILS NFR BLD AUTO: 44.6 % (ref 42.7–76)
PLATELET # BLD AUTO: 225 10*3/MM3 (ref 140–450)
PMV BLD AUTO: 9.6 FL (ref 6–12)
POTASSIUM SERPL-SCNC: 3.9 MMOL/L (ref 3.5–5.2)
PROT SERPL-MCNC: 7 G/DL (ref 6–8.5)
PTH-INTACT SERPL-MCNC: 44.8 PG/ML (ref 15–65)
RBC # BLD AUTO: 4.61 10*6/MM3 (ref 3.77–5.28)
SODIUM SERPL-SCNC: 139 MMOL/L (ref 136–145)
TRIGL SERPL-MCNC: 123 MG/DL (ref 0–150)
TSH SERPL DL<=0.05 MIU/L-ACNC: 2.05 UIU/ML (ref 0.27–4.2)
VLDLC SERPL-MCNC: 22 MG/DL (ref 5–40)
WBC NRBC COR # BLD AUTO: 4.24 10*3/MM3 (ref 3.4–10.8)

## 2025-05-28 PROCEDURE — 80050 GENERAL HEALTH PANEL: CPT

## 2025-05-28 PROCEDURE — 83970 ASSAY OF PARATHORMONE: CPT

## 2025-05-28 PROCEDURE — 80061 LIPID PANEL: CPT

## 2025-05-28 PROCEDURE — 36415 COLL VENOUS BLD VENIPUNCTURE: CPT

## 2025-05-28 PROCEDURE — 86038 ANTINUCLEAR ANTIBODIES: CPT

## 2025-05-30 LAB
ANA SER QL IF: NEGATIVE
LABORATORY COMMENT REPORT: NORMAL

## 2025-06-02 ENCOUNTER — OFFICE VISIT (OUTPATIENT)
Dept: FAMILY MEDICINE CLINIC | Age: 40
End: 2025-06-02
Payer: COMMERCIAL

## 2025-06-02 VITALS
HEART RATE: 77 BPM | WEIGHT: 136.6 LBS | DIASTOLIC BLOOD PRESSURE: 72 MMHG | BODY MASS INDEX: 25.14 KG/M2 | SYSTOLIC BLOOD PRESSURE: 124 MMHG | TEMPERATURE: 98.2 F | HEIGHT: 62 IN | OXYGEN SATURATION: 100 %

## 2025-06-02 DIAGNOSIS — E78.5 HYPERLIPIDEMIA, UNSPECIFIED HYPERLIPIDEMIA TYPE: ICD-10-CM

## 2025-06-02 DIAGNOSIS — F32.A ANXIETY AND DEPRESSION: ICD-10-CM

## 2025-06-02 DIAGNOSIS — F41.9 ANXIETY AND DEPRESSION: ICD-10-CM

## 2025-06-02 DIAGNOSIS — Z00.00 ROUTINE GENERAL MEDICAL EXAMINATION AT A HEALTH CARE FACILITY: Primary | ICD-10-CM

## 2025-06-02 DIAGNOSIS — Z01.818 PREOPERATIVE CLEARANCE: ICD-10-CM

## 2025-06-02 DIAGNOSIS — M19.90 ARTHRITIS: ICD-10-CM

## 2025-06-02 PROBLEM — E66.811 OBESITY, CLASS I, BMI 30-34.9: Status: RESOLVED | Noted: 2024-05-21 | Resolved: 2025-06-02

## 2025-06-02 NOTE — PROGRESS NOTES
"Chief Complaint  Annual Exam (Surgical Clearance to become a surrogate)    Subjective          Gin Alexandra presents to [facility] for annual exam and for surgical clearance to become a surrogate.  Patient declines pneumonia and COVID-vaccine.  Up-to-date on routine blood work and Pap smear.  Patient has weaned off of her Cymbalta, Nexium and Pepcid.  She has talked with the psychology/psychiatry as required by surrogacy office.  Uses Botox for preventative migraine medication.  Has Ubrelvy on hand for abortive therapy.    Review of Systems   Constitutional:  Negative for fatigue.   HENT:  Negative for hearing loss and trouble swallowing.    Respiratory:  Negative for cough and shortness of breath.    Cardiovascular:  Negative for chest pain, palpitations and leg swelling.   Gastrointestinal:  Negative for abdominal pain, constipation, diarrhea, nausea and vomiting.   Genitourinary:  Negative for difficulty urinating.   Musculoskeletal:  Negative for arthralgias (Right 1st and 2nd digits) and myalgias.   Skin:  Negative for rash.   Neurological:  Negative for headaches.   Psychiatric/Behavioral:  Negative for dysphoric mood, sleep disturbance and suicidal ideas. The patient is not nervous/anxious.         Objective   Vital Signs:   Vitals:    06/02/25 1507   BP: 124/72   Pulse: 77   Temp: 98.2 °F (36.8 °C)   TempSrc: Oral   SpO2: 100%   Weight: 62 kg (136 lb 9.6 oz)   Height: 157.5 cm (62\")       Body mass index is 24.98 kg/m².   BMI is within normal parameters. No other follow-up for BMI required.    Physical Exam  Vitals reviewed.   Constitutional:       General: She is not in acute distress.     Appearance: Normal appearance. She is well-developed. She is not diaphoretic.   HENT:      Head: Normocephalic and atraumatic. Hair is normal.      Right Ear: Hearing and external ear normal. No decreased hearing noted. No drainage.      Left Ear: Hearing and external ear normal. No " decreased hearing noted.      Nose: Nose normal. No nasal deformity.      Mouth/Throat:      Mouth: Mucous membranes are moist.   Eyes:      General: Lids are normal.         Right eye: No discharge.         Left eye: No discharge.      Extraocular Movements: Extraocular movements intact.      Conjunctiva/sclera: Conjunctivae normal.      Pupils: Pupils are equal, round, and reactive to light.   Neck:      Thyroid: No thyromegaly.   Cardiovascular:      Rate and Rhythm: Normal rate and regular rhythm.      Pulses: Normal pulses.      Heart sounds: Normal heart sounds. No murmur heard.     No friction rub. No gallop.   Pulmonary:      Effort: Pulmonary effort is normal. No respiratory distress.      Breath sounds: Normal breath sounds. No wheezing or rales.   Chest:      Chest wall: No tenderness.   Abdominal:      General: Bowel sounds are normal. There is no distension.      Palpations: Abdomen is soft. There is no mass.      Tenderness: There is no abdominal tenderness. There is no guarding or rebound.      Hernia: No hernia is present.   Musculoskeletal:         General: No tenderness or deformity. Normal range of motion.      Cervical back: Normal range of motion and neck supple.   Lymphadenopathy:      Cervical: No cervical adenopathy.   Skin:     General: Skin is warm and dry.      Findings: No erythema or rash.   Neurological:      Mental Status: She is alert and oriented to person, place, and time.      Motor: No abnormal muscle tone.      Coordination: Coordination normal.   Psychiatric:         Mood and Affect: Mood normal.         Behavior: Behavior normal.         Thought Content: Thought content normal.         Judgment: Judgment normal.            Lab Results   Component Value Date    GLUCOSE 94 05/28/2025    BUN 12.0 05/28/2025    CREATININE 0.50 (L) 05/28/2025    EGFR 124.1 05/28/2025    BCR 24.0 05/28/2025    K 3.9 05/28/2025    CO2 26.8 05/28/2025    CALCIUM 9.9 05/28/2025    ALBUMIN 4.3 05/28/2025     BILITOT 0.5 05/28/2025    AST 17 05/28/2025    ALT 19 05/28/2025     Lab Results   Component Value Date    HGBA1C 5.20 05/21/2024     Lab Results   Component Value Date    WBC 4.24 05/28/2025    HGB 13.9 05/28/2025    HCT 42.7 05/28/2025    MCV 92.6 05/28/2025     05/28/2025     Lab Results   Component Value Date    CHOL 205 (H) 05/28/2025    CHOL 146 05/21/2024    CHOL 180 11/06/2023    CHLPL 138 01/18/2019     Lab Results   Component Value Date    TRIG 123 05/28/2025    TRIG 484 (H) 05/21/2024    TRIG 257 (H) 11/06/2023     Lab Results   Component Value Date    HDL 60 05/28/2025    HDL 25 (L) 05/21/2024    HDL 38 (L) 11/06/2023     Lab Results   Component Value Date     (H) 05/28/2025    LDL 49 05/21/2024    LDL 98 11/06/2023     Lab Results   Component Value Date    TSH 2.050 05/28/2025     Labs reviewed with patient during visit.                    Assessment and Plan    Assessment & Plan  Routine general medical examination at a health care facility  Counseled on routine dental and eye exams.  Declines pneumonia and COVID vaccines. Up-to-date on Pap and routine blood work.       Anxiety and depression   stable not on medications.  She is to notify office with any changes.         Preoperative clearance  Clearance for surrogacy from physical standpoint given.  Patient has undergone evaluation with their psychology/psychiatry.  Most recent x-ray and EKG/cardiac appointment reviewed.  Recent labs reviewed.       Arthritis  Tylenol as needed.  Heat in the mornings.       Hyperlipidemia, unspecified hyperlipidemia type    heart healthy diet, routine exercise.  Will continue to monitor.                Patient to notify office with any acute concerns or issues.  Patient verbalizes understanding, agrees with plan of care and has no further questions upon discharge.    Please note that portions of this note were completed with a voice recognition program.      Follow Up    Return in about 1 year (around  6/2/2026) for Annual physical.  Patient was given instructions and counseling regarding her condition or for health maintenance advice. Please see specific information pulled into the AVS if appropriate.       Current Outpatient Medications:     albuterol sulfate  (90 Base) MCG/ACT inhaler, Inhale 2 puffs Every 4 (Four) Hours As Needed for Shortness of Air., Disp: 20.1 g, Rfl: 0    meclizine (ANTIVERT) 25 MG tablet, Take 1 tablet by mouth 3 (Three) Times a Day As Needed for Dizziness., Disp: 30 tablet, Rfl: 1    OnabotulinumtoxinA (Botox) 200 units reconstituted solution, inject 200 units into  the head, neck, and facial  muscles for chronic migraines dispose of waste, Disp: , Rfl:     tiZANidine (ZANAFLEX) 4 MG tablet, Take 1 tablet by mouth Every 8 (Eight) Hours As Needed for Muscle Spasms., Disp: 60 tablet, Rfl: 0    ubrogepant (UBRELVY) 100 MG tablet, Take 1 tablet by mouth at onset of migraine. May repeat in 2 hours if needed. Max daily amount: 2 tablets., Disp: 20 tablet, Rfl: 5    Medications Discontinued During This Encounter   Medication Reason    famotidine (Pepcid) 40 MG tablet Historical Med - Therapy completed    ondansetron ODT (ZOFRAN-ODT) 4 MG disintegrating tablet Historical Med - Therapy completed    DULoxetine (CYMBALTA) 60 MG capsule Historical Med - Therapy completed    esomeprazole (nexIUM) 40 MG capsule Historical Med - Therapy completed    lidocaine (XYLOCAINE) 5 % ointment Historical Med - Therapy completed    DULoxetine (CYMBALTA) 30 MG capsule Historical Med - Therapy completed

## 2025-06-02 NOTE — LETTER
June 2, 2025    Gin Alexandra  [address] [address]      To whom it may concern;    Patient is medically cleared to physically to act as a surrogate.  Patient states that she has been through psychological evaluation through the program.  Please contact the office with any questions or concerns.            Thank you,            LUCIA Quiroz

## 2025-06-02 NOTE — PATIENT INSTRUCTIONS
"Fat and Cholesterol Restricted Diet  Getting too much fat and cholesterol in your diet may cause health problems. Following this diet helps keep your fat and cholesterol at normal levels. This can keep you from getting sick.  WHAT TYPES OF FAT SHOULD I CHOOSE?  Choose monosaturated and polyunsaturated fats. These are found in foods such as olive oil, canola oil, flaxseeds, walnuts, almonds, and seeds.  Eat more omega-3 fats. Good choices include salmon, mackerel, sardines, tuna, flaxseed oil, and ground flaxseeds.  Limit saturated fats. These are in animal products such as meats, butter, and cream. They can also be in plant products such as palm oil, palm kernel oil, and coconut oil.      Avoid foods with partially hydrogenated oils in them. These contain trans fats. Examples of foods that have trans fats are stick margarine, some tub margarines, cookies, crackers, and other baked goods.  WHAT GENERAL GUIDELINES DO I NEED TO FOLLOW?    Check food labels. Look for the words \"trans fat\" and \"saturated fat.\"  When preparing a meal:  Fill half of your plate with vegetables and green salads.  Fill one fourth of your plate with whole grains. Look for the word \"whole\" as the first word in the ingredient list.  Fill one fourth of your plate with lean protein foods.  Limit fruit to two servings a day. Choose fruit instead of juice.  Eat more foods with soluble fiber. Examples of foods with this type of fiber are apples, broccoli, carrots, beans, peas, and barley. Try to get 20-30 g (grams) of fiber per day.  Eat more home-cooked foods. Eat less at restaurants and buffets.  Limit or avoid alcohol.  Limit foods high in starch and sugar.  Limit fried foods.  Cook foods without frying them. Baking, boiling, grilling, and broiling are all great options.  Lose weight if you are overweight. Losing even a small amount of weight can help your overall health. It can also help prevent diseases such as diabetes and heart disease.  WHAT " [facility].  Need to request image of  US on disk for provider review      Also clarify whether or not patient is being seen through tele[facility] with [facility]    Need problem list and better history to abstract, med list and labs were all that accompanied referral   FOODS CAN I EAT?  Grains  Whole grains, such as whole wheat or whole grain breads, crackers, cereals, and pasta. Unsweetened oatmeal, bulgur, barley, quinoa, or brown rice. Corn or whole wheat flour tortillas.  Vegetables  Fresh or frozen vegetables (raw, steamed, roasted, or grilled). Green salads.  Fruits  All fresh, canned (in natural juice), or frozen fruits.  Meat and Other Protein Products  Ground beef (85% or leaner), grass-fed beef, or beef trimmed of fat. Skinless chicken or turkey. Ground chicken or turkey. Pork trimmed of fat. All fish and seafood. Eggs. Dried beans, peas, or lentils. Unsalted nuts or seeds. Unsalted canned or dry beans.  Dairy  Low-fat dairy products, such as skim or 1% milk, 2% or reduced-fat cheeses, low-fat ricotta or cottage cheese, or plain low-fat yogurt.  Fats and Oils  Tub margarines without trans fats. Light or reduced-fat mayonnaise and salad dressings. Avocado. Olive, canola, sesame, or safflower oils. Natural peanut or almond butter (choose ones without added sugar and oil).  The items listed above may not be a complete list of recommended foods or beverages. Contact your dietitian for more options.  WHAT FOODS ARE NOT RECOMMENDED?  Grains  White bread. White pasta. White rice. Cornbread. Bagels, pastries, and croissants. Crackers that contain trans fat.  Vegetables  White potatoes. Corn. Creamed or fried vegetables. Vegetables in a cheese sauce.  Fruits  Dried fruits. Canned fruit in light or heavy syrup. Fruit juice.  Meat and Other Protein Products  Fatty cuts of meat. Ribs, chicken wings, cardenas, sausage, bologna, salami, chitterlings, fatback, hot dogs, bratwurst, and packaged luncheon meats. Liver and organ meats.  Dairy  Whole or 2% milk, cream, half-and-half, and cream cheese. Whole milk cheeses. Whole-fat or sweetened yogurt. Full-fat cheeses. Nondairy creamers and whipped toppings. Processed cheese, cheese spreads, or cheese curds.  Sweets and Desserts  Corn syrup,  sugars, honey, and molasses. Candy. Jam and jelly. Syrup. Sweetened cereals. Cookies, pies, cakes, donuts, muffins, and ice cream.  Fats and Oils  Butter, stick margarine, lard, shortening, ghee, or cardenas fat. Coconut, palm kernel, or palm oils.  Beverages  Alcohol. Sweetened drinks (such as sodas, lemonade, and fruit drinks or punches).  The items listed above may not be a complete list of foods and beverages to avoid. Contact your dietitian for more information.  Document Released: 06/18/2013 Document Revised: 08/21/2015 Document Reviewed: 03/19/2015  [employer]® Patient Information ©2015 [employer]. This information is not intended to replace advice given to you by your health care provider. Make sure you discuss any questions you have with your health care provider.

## 2025-06-20 ENCOUNTER — TELEPHONE (OUTPATIENT)
Dept: FAMILY MEDICINE CLINIC | Age: 40
End: 2025-06-20
Payer: COMMERCIAL

## 2025-06-20 NOTE — TELEPHONE ENCOUNTER
Caller: Gin Alexandra SHITAL    Relationship: Self    Best call back number: [phone]     What form or medical record are you requesting:     LETTER FROM PROVIDER LAMBERT STATING IS IS OKAY FOR PATIENT TO COME OFF [facility].     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     How would you like to receive the form or medical records (pick-up, mail, fax):     Timeframe paperwork needed: ASAP    Additional notes:     CONTACT PATIENT WHEN COMPLETE.

## 2025-07-02 ENCOUNTER — OFFICE VISIT (OUTPATIENT)
Dept: BARIATRICS/WEIGHT MGMT | Facility: CLINIC | Age: 40
End: 2025-07-02
Payer: COMMERCIAL

## 2025-07-02 VITALS
WEIGHT: 136 LBS | HEART RATE: 72 BPM | BODY MASS INDEX: 24.1 KG/M2 | DIASTOLIC BLOOD PRESSURE: 73 MMHG | SYSTOLIC BLOOD PRESSURE: 127 MMHG | HEIGHT: 63 IN | TEMPERATURE: 97.8 F

## 2025-07-02 DIAGNOSIS — G47.33 OSA (OBSTRUCTIVE SLEEP APNEA): ICD-10-CM

## 2025-07-02 DIAGNOSIS — K21.9 GASTROESOPHAGEAL REFLUX DISEASE, UNSPECIFIED WHETHER ESOPHAGITIS PRESENT: ICD-10-CM

## 2025-07-02 DIAGNOSIS — Z98.84 S/P LAPAROSCOPIC SLEEVE GASTRECTOMY: Primary | ICD-10-CM

## 2025-07-02 NOTE — PROGRESS NOTES
[facility]  [address]  [facility] [address]  [phone]  [address]  [facility] [id number] [phone]  Dept: [phone]  7/2/2025      Gin Alexandra.  [id number]  [id number]  1985  female      Chief Complaint   Patient presents with    Follow-up     11 month follow up sleeve       BH Post-Op Bariatric Surgery:   Gin Alexandra is status post Laparoscopic Sleeve/PEH procedure, performed on 8/5/24 who has not been seen since 1mo post surgery.     HPI:       Today's weight is 61.7 kg (136 lb) pounds, today's BMI is Body mass index is 24.25 kg/m²., she has a loss of 51 pounds since the last visit and her weight loss since surgery is 76 pounds. The patient reports a decreased portion size and loss of appetite.    Gin Alexandra denies nausea, vomiting, reflux and reports that she is doing well     Diet and Exercise: Diet history reviewed and discussed with the patient. Weight loss/gains to date discussed with the patient. The patient states they are eating 60 grams of protein per day. She reports eating 1-2 meals per day, a typical portion size of 1/2 cup, eating 1-2 snacks per day, drinking 5-6 or more 8-oz. glasses of water per day, no carbonated beverage consumption and exercising regularly.     She is getting in at least 80-120oz of fluid per day.     She may have 1 meal and 2-3 small snacks between. She can eat 4-5oz of meat and around a 1/2 cup.     Supplements: barilife one a day with iron     Review of Systems   Constitutional:  Positive for appetite change. Negative for fatigue and unexpected weight change.   HENT: Negative.     Eyes: Negative.    Respiratory: Negative.     Cardiovascular: Negative.  Negative for leg swelling.   Gastrointestinal:  Negative for abdominal distention, abdominal pain, constipation, diarrhea, nausea and vomiting.   Genitourinary:  Negative for difficulty urinating, frequency and  urgency.   Musculoskeletal:  Negative for back pain.   Skin: Negative.    Psychiatric/Behavioral: Negative.     All other systems reviewed and are negative.      Patient Active Problem List   Diagnosis    Depression with anxiety    IBS (irritable bowel syndrome)    Fibromyalgia    Chronic midline thoracic back pain    Chronic tension-type headache, intractable    Fatigue    Hyperlipidemia    Gastroesophageal reflux disease    Bilateral arm pain    Occipital neuralgia    Dietary counseling    STEPHANIA (obstructive sleep apnea)    Asthma    Fatty liver    S/P laparoscopic sleeve gastrectomy    At risk for dehydration due to poor fluid intake    Dysphagia    Migraine    External hemorrhoid    Arthritis    Body mass index (BMI) 24.0-24.9, adult       Past Medical History:   Diagnosis Date    Abnormal EKG     cardiac clearance note in epic    Allergic 01/01/1995    Allergic rhinitis     Anxiety     Arrhythmia 11/2022    Asthma     Cholelithiasis 2005    Gall bladder was removed    Chronic fatigue, unspecified     Constipation, unspecified     Depression     Started when i was 14    Fatty liver 05/21/2024    Fibromyalgia     Fibromyalgia, primary 2019    GERD without esophagitis     Headache     HL (hearing loss)     right ear less    Hyperlipidemia     IBS (irritable bowel syndrome)     diarrhea and constipation    Insomnia     Migraine     Obesity, unspecified     Occipital neuralgia     STEPHANIA (obstructive sleep apnea) 05/21/2024    no machine    Pain in right hip     Paraesophageal hernia 05/21/2024    Periodic limb movement     Pituitary tumor     PONV (postoperative nausea and vomiting) 2006    Severe nausea with vomiting for several hours after    Shortness of breath     Tinnitus     Visual impairment 2024       The following portions of the patient's history were reviewed and updated as appropriate: allergies, current medications, past family history, past medical history, past social history, past surgical history, and  problem list.    Vitals:    07/02/25 1409   BP: 127/73   Pulse: 72   Temp: 97.8 °F (36.6 °C)       Physical Exam  Vitals and nursing note reviewed.   Constitutional:       Appearance: She is well-developed.   Neck:      Thyroid: No thyromegaly.   Cardiovascular:      Rate and Rhythm: Normal rate.   Pulmonary:      Effort: Pulmonary effort is normal. No respiratory distress.   Abdominal:      Palpations: Abdomen is soft.   Musculoskeletal:         General: No tenderness.   Skin:     General: Skin is warm and dry.   Neurological:      Mental Status: She is alert.   Psychiatric:         Behavior: Behavior normal.         Assessment:   Post-op, the patient is doing well.       Plan:     Encounter Diagnoses   Name Primary?    S/P laparoscopic sleeve gastrectomy Yes    Body mass index (BMI) 24.0-24.9, adult     STEPHANIA (obstructive sleep apnea)     Gastroesophageal reflux disease, unspecified whether esophagitis present    Will trend vitamin levels, prealbumin today    Encouraged patient to be sure to get plenty of lean protein per day through small frequent meals all with a protein source.   Activity restrictions: none.   Recommended patient be sure to get at least 70 grams of protein per day by eating small, frequent meals all with high lean protein choices. Be sure to limit/cut back on daily carbohydrate intake. Discussed with the patient the recommended amount of water per day to intake- half of body weight in ounces. Reviewed vitamin requirements. Be sure to do routine exercise, 150 minutes per week minimum, including both cardio and strength training.     Instructions / Recommendations: dietary counseling recommended, recommended a daily protein intake of  grams, vitamin supplement(s) recommended, recommended exercising at least 150 minutes per week, behavior modifications recommended and instructed to call the office for concerns, questions, or problems.     The patient was instructed to follow up in 6 months .     Total time spent during this encounter today was 30 minutes

## 2025-07-11 ENCOUNTER — LAB (OUTPATIENT)
Dept: LAB | Facility: HOSPITAL | Age: 40
End: 2025-07-11
Payer: COMMERCIAL

## 2025-07-11 DIAGNOSIS — Z98.84 S/P LAPAROSCOPIC SLEEVE GASTRECTOMY: ICD-10-CM

## 2025-07-11 DIAGNOSIS — G47.33 OSA (OBSTRUCTIVE SLEEP APNEA): ICD-10-CM

## 2025-07-11 DIAGNOSIS — K21.9 GASTROESOPHAGEAL REFLUX DISEASE, UNSPECIFIED WHETHER ESOPHAGITIS PRESENT: ICD-10-CM

## 2025-07-11 LAB
25(OH)D3 SERPL-MCNC: 21.8 NG/ML (ref 30–100)
FERRITIN SERPL-MCNC: 8.29 NG/ML (ref 13–150)
FOLATE SERPL-MCNC: 13.5 NG/ML (ref 4.78–24.2)
IRON 24H UR-MRATE: 44 MCG/DL (ref 37–145)
PREALB SERPL-MCNC: 22.7 MG/DL (ref 20–40)

## 2025-07-11 PROCEDURE — 36415 COLL VENOUS BLD VENIPUNCTURE: CPT

## 2025-07-11 PROCEDURE — 82728 ASSAY OF FERRITIN: CPT

## 2025-07-11 PROCEDURE — 84134 ASSAY OF PREALBUMIN: CPT

## 2025-07-11 PROCEDURE — 84425 ASSAY OF VITAMIN B-1: CPT

## 2025-07-11 PROCEDURE — 82306 VITAMIN D 25 HYDROXY: CPT

## 2025-07-11 PROCEDURE — 82746 ASSAY OF FOLIC ACID SERUM: CPT

## 2025-07-11 PROCEDURE — 83540 ASSAY OF IRON: CPT

## 2025-07-11 PROCEDURE — 83921 ORGANIC ACID SINGLE QUANT: CPT

## 2025-07-14 RX ORDER — ERGOCALCIFEROL 1.25 MG/1
50000 CAPSULE, LIQUID FILLED ORAL
Qty: 12 CAPSULE | Refills: 0 | Status: SHIPPED | OUTPATIENT
Start: 2025-07-14 | End: 2025-10-07

## 2025-07-14 RX ORDER — FERROUS SULFATE 325(65) MG
325 TABLET ORAL
Qty: 30 TABLET | Refills: 2 | Status: SHIPPED | OUTPATIENT
Start: 2025-07-14 | End: 2025-10-12

## 2025-07-15 LAB — VIT B1 BLD-SCNC: 113.5 NMOL/L (ref 66.5–200)

## 2025-07-16 LAB — METHYLMALONATE SERPL-SCNC: 293 NMOL/L (ref 0–378)

## 2025-07-22 ENCOUNTER — OFFICE VISIT (OUTPATIENT)
Dept: FAMILY MEDICINE CLINIC | Age: 40
End: 2025-07-22
Payer: COMMERCIAL

## 2025-07-22 ENCOUNTER — LAB (OUTPATIENT)
Dept: LAB | Facility: HOSPITAL | Age: 40
End: 2025-07-22
Payer: COMMERCIAL

## 2025-07-22 VITALS
BODY MASS INDEX: 24.66 KG/M2 | HEIGHT: 63 IN | OXYGEN SATURATION: 98 % | TEMPERATURE: 99 F | HEART RATE: 76 BPM | WEIGHT: 139.2 LBS | SYSTOLIC BLOOD PRESSURE: 120 MMHG | DIASTOLIC BLOOD PRESSURE: 83 MMHG

## 2025-07-22 DIAGNOSIS — H93.A9 PULSATILE TINNITUS: ICD-10-CM

## 2025-07-22 DIAGNOSIS — R42 EPISODIC LIGHTHEADEDNESS: Primary | ICD-10-CM

## 2025-07-22 DIAGNOSIS — R42 EPISODIC LIGHTHEADEDNESS: ICD-10-CM

## 2025-07-22 LAB
ALBUMIN SERPL-MCNC: 4.3 G/DL (ref 3.5–5.2)
ALBUMIN/GLOB SERPL: 1.6 G/DL
ALP SERPL-CCNC: 70 U/L (ref 39–117)
ALT SERPL W P-5'-P-CCNC: 13 U/L (ref 1–33)
ANION GAP SERPL CALCULATED.3IONS-SCNC: 9 MMOL/L (ref 5–15)
AST SERPL-CCNC: 13 U/L (ref 1–32)
BASOPHILS # BLD AUTO: 0.03 10*3/MM3 (ref 0–0.2)
BASOPHILS NFR BLD AUTO: 0.5 % (ref 0–1.5)
BILIRUB SERPL-MCNC: 0.3 MG/DL (ref 0–1.2)
BUN SERPL-MCNC: 7 MG/DL (ref 6–20)
BUN/CREAT SERPL: 9 (ref 7–25)
CALCIUM SPEC-SCNC: 9.9 MG/DL (ref 8.6–10.5)
CHLORIDE SERPL-SCNC: 103 MMOL/L (ref 98–107)
CO2 SERPL-SCNC: 27 MMOL/L (ref 22–29)
CREAT SERPL-MCNC: 0.78 MG/DL (ref 0.57–1)
DEPRECATED RDW RBC AUTO: 38.6 FL (ref 37–54)
EGFRCR SERPLBLD CKD-EPI 2021: 100.5 ML/MIN/1.73
EOSINOPHIL # BLD AUTO: 0.1 10*3/MM3 (ref 0–0.4)
EOSINOPHIL NFR BLD AUTO: 1.6 % (ref 0.3–6.2)
ERYTHROCYTE [DISTWIDTH] IN BLOOD BY AUTOMATED COUNT: 11.9 % (ref 12.3–15.4)
GLOBULIN UR ELPH-MCNC: 2.7 GM/DL
GLUCOSE SERPL-MCNC: 82 MG/DL (ref 65–99)
HCT VFR BLD AUTO: 35.5 % (ref 34–46.6)
HGB BLD-MCNC: 11.8 G/DL (ref 12–15.9)
IMM GRANULOCYTES # BLD AUTO: 0.01 10*3/MM3 (ref 0–0.05)
IMM GRANULOCYTES NFR BLD AUTO: 0.2 % (ref 0–0.5)
LYMPHOCYTES # BLD AUTO: 2.1 10*3/MM3 (ref 0.7–3.1)
LYMPHOCYTES NFR BLD AUTO: 33.7 % (ref 19.6–45.3)
MCH RBC QN AUTO: 29.4 PG (ref 26.6–33)
MCHC RBC AUTO-ENTMCNC: 33.2 G/DL (ref 31.5–35.7)
MCV RBC AUTO: 88.5 FL (ref 79–97)
MONOCYTES # BLD AUTO: 0.54 10*3/MM3 (ref 0.1–0.9)
MONOCYTES NFR BLD AUTO: 8.7 % (ref 5–12)
NEUTROPHILS NFR BLD AUTO: 3.45 10*3/MM3 (ref 1.7–7)
NEUTROPHILS NFR BLD AUTO: 55.3 % (ref 42.7–76)
NRBC BLD AUTO-RTO: 0 /100 WBC (ref 0–0.2)
PLATELET # BLD AUTO: 297 10*3/MM3 (ref 140–450)
PMV BLD AUTO: 10.9 FL (ref 6–12)
POTASSIUM SERPL-SCNC: 4.4 MMOL/L (ref 3.5–5.2)
PROT SERPL-MCNC: 7 G/DL (ref 6–8.5)
RBC # BLD AUTO: 4.01 10*6/MM3 (ref 3.77–5.28)
SODIUM SERPL-SCNC: 139 MMOL/L (ref 136–145)
WBC NRBC COR # BLD AUTO: 6.23 10*3/MM3 (ref 3.4–10.8)

## 2025-07-22 PROCEDURE — 36415 COLL VENOUS BLD VENIPUNCTURE: CPT

## 2025-07-22 PROCEDURE — 85025 COMPLETE CBC W/AUTO DIFF WBC: CPT

## 2025-07-22 PROCEDURE — 80053 COMPREHEN METABOLIC PANEL: CPT

## 2025-07-22 PROCEDURE — 99213 OFFICE O/P EST LOW 20 MIN: CPT

## 2025-07-23 DIAGNOSIS — E55.9 VITAMIN D DEFICIENCY: ICD-10-CM

## 2025-07-23 DIAGNOSIS — E61.1 IRON DEFICIENCY: Primary | ICD-10-CM

## 2025-07-25 ENCOUNTER — SPECIALTY PHARMACY (OUTPATIENT)
Dept: PHARMACY | Facility: TELEHEALTH | Age: 40
End: 2025-07-25
Payer: COMMERCIAL

## 2025-07-25 NOTE — PROGRESS NOTES
Specialty Pharmacy Patient Management Program  Reassessment     Gin Alexandra is a 39 y.o. female with migraines and enrolled in the Patient Management program offered by [facility]. A follow-up outreach was conducted, including assessment of continued therapy appropriateness, medication adherence, and side effect incidence and management for Ubrelvy.     Changes to Insurance Coverage or Financial Support  none    Relevant Past Medical History and Comorbidities  Relevant medical history and concomitant health conditions were discussed with the patient. The patient's chart has been reviewed for relevant past medical history and comorbid health conditions and updated as necessary.   Past Medical History:   Diagnosis Date    Abnormal EKG     cardiac clearance note in epic    Allergic 01/01/1995    Allergic rhinitis     Anxiety     Arrhythmia 11/2022    Asthma     Cholelithiasis 2005    Gall bladder was removed    Chronic fatigue, unspecified     Constipation, unspecified     Depression     Started when i was 14    Fatty liver 05/21/2024    Fibromyalgia     Fibromyalgia, primary 2019    GERD without esophagitis     Headache     HL (hearing loss)     right ear less    Hyperlipidemia     IBS (irritable bowel syndrome)     diarrhea and constipation    Insomnia     Migraine     Obesity, unspecified     Occipital neuralgia     STEPHANIA (obstructive sleep apnea) 05/21/2024    no machine    Pain in right hip     Paraesophageal hernia 05/21/2024    Periodic limb movement     Pituitary tumor     PONV (postoperative nausea and vomiting) 2006    Severe nausea with vomiting for several hours after    Shortness of breath     Tinnitus     Visual impairment 2024     Social History     Socioeconomic History    Marital status:     Number of children: 2   Tobacco Use    Smoking status: Former     Current packs/day: 0.00     Average packs/day: 2.0 packs/day for 17.1 years (34.2 ttl pk-yrs)     Types: Cigarettes      Start date: 1/1/1998     Quit date: 2/1/2015     Years since quitting: 10.4     Passive exposure: Past    Smokeless tobacco: Never   Vaping Use    Vaping status: Never Used   Substance and Sexual Activity    Alcohol use: Never     Comment: Very seldom    Drug use: Never     Comment: delta 8 occasionally    Sexual activity: Yes     Partners: Male     Birth control/protection: Tubal ligation     Problem list reviewed by Van Doshi RPH on 7/25/2025 at 11:17 AM    Allergies  Known allergies and reactions were discussed with the patient. The patient's chart has been reviewed for allergy information and updated as necessary.   Allergies   Allergen Reactions    Codeine Hives     Allergies reviewed by Van Doshi RPH on 7/25/2025 at 11:17 AM    Relevant Laboratory Values  Lab Results   Component Value Date    GLUCOSE 82 07/22/2025    CALCIUM 9.9 07/22/2025     07/22/2025    K 4.4 07/22/2025    CO2 27.0 07/22/2025     07/22/2025    BUN 7.0 07/22/2025    CREATININE 0.78 07/22/2025    BCR 9.0 07/22/2025    ANIONGAP 9.0 07/22/2025     Lab Results   Component Value Date    WBC 6.23 07/22/2025    HGB 11.8 (L) 07/22/2025    HCT 35.5 07/22/2025    MCV 88.5 07/22/2025     07/22/2025    INR 0.95 10/19/2023     Lab Value Review  The above lab values have been reviewed; the following specialty medication(s) dose adjustment(s) are recommended: none.    Current Medication List  This medication list has been reviewed with the patient and evaluated for any interactions or necessary modifications/recommendations, and updated to include all prescription medications, OTC medications, and supplements the patient is currently taking. This list reflects what is contained in the patient's profile, which has also been marked as reviewed to communicate to other providers it is the most up to date version of the patient's current medication therapy.     Current Outpatient Medications:     albuterol sulfate   (90 Base) MCG/ACT inhaler, Inhale 2 puffs Every 4 (Four) Hours As Needed for Shortness of Air., Disp: 20.1 g, Rfl: 0    ferrous sulfate 325 (65 FE) MG tablet, Take 1 tablet by mouth Daily With Breakfast, Disp: 30 tablet, Rfl: 2    meclizine (ANTIVERT) 25 MG tablet, Take 1 tablet by mouth 3 (Three) Times a Day As Needed for Dizziness., Disp: 30 tablet, Rfl: 1    tiZANidine (ZANAFLEX) 4 MG tablet, Take 1 tablet by mouth Every 8 (Eight) Hours As Needed for Muscle Spasms., Disp: 60 tablet, Rfl: 0    ubrogepant (UBRELVY) 100 MG tablet, Take 1 tablet by mouth at onset of migraine. May repeat in 2 hours if needed. Max daily amount: 2 tablets., Disp: 20 tablet, Rfl: 5    vitamin D (ERGOCALCIFEROL) 1.25 MG (74283 UT) capsule capsule, Take 1 capsule by mouth Every 7 (Seven) Days for 12 doses., Disp: 12 capsule, Rfl: 0  Medicines reviewed by Van Doshi [facility] on 7/25/2025 at 11:17 AM    Drug Interactions  none     Adverse Drug Reactions  Medication tolerability: Tolerating with no to minimal ADRs  Medication plan: Continue therapy with normal follow-up  Plan for ADR Management: none    Hospitalizations and Urgent Care Since Last Assessment  Hospitalizations or Admissions: none  ED Visits: none  Urgent Office Visits: none     Adherence, Self-Administration, and Current Therapy Problems  Adherence related to the patient's specialty therapy was discussed with the patient. The Adherence segment of this outreach has been reviewed and updated.     Adherence Questions  Linked Medication(s) Assessed: Ubrogepant (UBRELVY)  On average, how many doses/injections does the patient miss per month?: 0  What are the identified reasons for non-adherence or missed doses? : no problems identified  What is the estimated medication adherence level?: %  Based on the patient/caregiver response and refill history, does this patient require an MTP to track adherence improvements?: no    Additional Barriers to Patient Self-Administration:  none  Methods for Supporting Patient Self-Administration: none    Open Medication Therapy Problems  No medication therapy recommendations to display    Goals of Therapy  Goals related to the patient's specialty therapy were discussed with the patient. The Patient Goals segment of this outreach has been reviewed and updated.   Goals Addressed Today        Specialty Pharmacy General Goal      Reduced the severity and duration of migraine headaches by 50%              Progress Toward Meeting Patient-Identified Goals of Therapy: On Track  New Patient-Identified Goals, If Applicable:     Progress Toward Meeting Clinical Goals or Therapeutic Targets: On Track  New Clinical Goals or Therapeutic Targets, If Applicable:     Quality of Life Assessment   Quality of Life related to the patient's enrollment in the patient management program and services provided was discussed with the patient. The QOL segment of this outreach has been reviewed and updated.  Quality of Life Improvement Scale: 8-Moderately better    Reassessment Plan & Follow-Up  Medication Therapy Changes: none  Additional Plans, Therapy Recommendations, or Therapy Problems to Be Addressed: none   Pharmacist to perform regular reassessments no more than (6) months from the previous assessment.  Care Coordinator to set up future refill outreaches, coordinate prescription delivery, and escalate clinical questions to pharmacist.     Attestation  I attest the patient was actively involved in and has agreed to the above plan of care. I attest that the specialty medication(s) addressed above are appropriate for the patient based on my reassessment. If the prescribed therapy is at any point deemed not appropriate based on the current or future assessments, a consultation will be initiated with the patient's specialty care provider to determine the best course of action. The revised plan of therapy will be documented along with any required assessments and/or additional  patient education provided.     Electronically signed by Van Doshi RPH, 07/25/25, 11:18 AM EDT.

## 2025-07-28 DIAGNOSIS — H93.19 TINNITUS, UNSPECIFIED LATERALITY: Primary | ICD-10-CM

## 2025-07-29 NOTE — PROGRESS NOTES
Chief Complaint  Dizziness (Cold sweats, face gets tingly, tunnel vision has happened several times over last several weeks)    Subjective      Gin Alexandra is a 39 y.o. female who presents to [facility]     History of Present Illness  This is a 42-year-old female with a history of migraines, fibromyalgia, anemia, and mild sleep apnea presenting with presyncope and pulsatile tinnitus.     The patient has been experiencing intermittent episodes of near fainting, characterized by cold sweats, dizziness, tunnel vision, tingling in the face, weakness, imbalance, and lightheadedness. The most severe episode occurred on Sunday, 07/20/2025, while she was sitting on the couch talking to her  after picking up her kids from camp. She had worked a night shift the previous day and had eaten normally. These episodes typically last between 15 to 20 minutes and are accompanied by facial tingling and muffled hearing. She reports no shortness of breath during these episodes. She has not experienced any recent coughing or exposure to the sun. She has not taken meclizine for several months.     The patient has a history of positive LETICIA but recent tests have been negative. She has lost 85 pounds since her bariatric surgery in 08/2024. She has discontinued her antidepressant medication, Cymbalta, over the past few weeks. She has not experienced any recent migraines and feels better since stopping Cymbalta. She reports no swelling in her feet.     She recently visited an ear doctor due to ringing in her ears, which she describes as a whooshing sound that worsens during her near-fainting episodes. Her hearing is normal and she reports no vertigo. She experiences lightheadedness and imbalance, even when sitting down. She reports no palpitations or skipped heartbeats. She had not experienced any recent bowel movements or straining prior to episode.     The patient has a history of anemia and has  "recently started taking iron supplements.    She has a history of migraines since her teenage years and has seen a neurologist for this condition. She has received Botox treatment for occipital neuralgia, which has been beneficial. Her last Botox treatment was on 06/30/2025, marking her fourth or fifth session without any complications. She is also on Ubrelvy for migraines.    PAST SURGICAL HISTORY:  Bariatric surgery in 08/2024.         Objective   Vital Signs:   Vitals:    07/22/25 1648   BP: 120/83   BP Location: Right arm   Patient Position: Sitting   Pulse: 76   Temp: 99 °F (37.2 °C)   TempSrc: Oral   SpO2: 98%  Comment: on RA   Weight: 63.1 kg (139 lb 3.2 oz)   Height: 160 cm (63\")     Body mass index is 24.66 kg/m².    Wt Readings from Last 3 Encounters:   07/22/25 63.1 kg (139 lb 3.2 oz)   07/02/25 61.7 kg (136 lb)   06/02/25 62 kg (136 lb 9.6 oz)     BP Readings from Last 3 Encounters:   07/22/25 120/83   07/02/25 127/73   06/02/25 124/72       Health Maintenance   Topic Date Due    Pneumococcal Vaccine 0-49 (1 of 2 - PCV) Never done    COVID-19 Vaccine (3 - 2024-25 season) 12/02/2025 (Originally 9/1/2024)    INFLUENZA VACCINE  10/01/2025    LIPID PANEL  05/28/2026    ANNUAL PHYSICAL  06/02/2026    PAP SMEAR  06/07/2027    TDAP/TD VACCINES (2 - Td or Tdap) 05/21/2034    HEPATITIS C SCREENING  Completed       No Images in the past 120 days found..     Physical Exam     Physical Exam  Constitutional: No acute distress, vitals are within normal limits.  ENT: No ear abnormalities noted by audiologist, hearing is good, no vertigo.  Cardiovascular: Heart sounds normal, no murmurs, no palpitations, no irregularities noted.  Respiratory: No shortness of breath, lungs clear to auscultation.  Neurological: No focal neurological deficits, no dizziness at the time of exam.       Result Review :  The following data was reviewed by: LUCIA Muhammad on 07/22/2025:  Common labs          9/5/2024    15:06 5/28/2025 "    12:56 7/22/2025    17:39   Common Labs   Glucose 78  94  82    BUN 9  12.0  7.0    Creatinine 0.80  0.50  0.78    Sodium 139  139  139    Potassium 3.9  3.9  4.4    Chloride 99  102  103    Calcium 10.4  9.9  9.9    Albumin 4.6  4.3  4.3    Total Bilirubin 0.4  0.5  0.3    Alkaline Phosphatase 101  85  70    AST (SGOT) 40  17  13    ALT (SGPT) 76  19  13    WBC  4.24  6.23    Hemoglobin  13.9  11.8    Hematocrit  42.7  35.5    Platelets  225  297    Total Cholesterol  205     Triglycerides  123     HDL Cholesterol  60     LDL Cholesterol   123            Procedures          ASSESSMENT/PLAN  Diagnoses and all orders for this visit:    1. Episodic lightheadedness (Primary)  -     Comprehensive metabolic panel; Future  -     CBC w AUTO Differential; Future    2. Pulsatile tinnitus  -     CT Angiogram Head; Future  -     Ambulatory Referral to ENT (Otolaryngology)        Assessment & Plan  Initial Assessment:  Symptoms include lightheadedness, feeling warm, diaphoresis, nausea, blurred vision, and near fainting episodes. Occur when standing or seated upright, not lying flat. Blood pressure normal. No cardiac issues or palpitations. Normal kidney function and liver enzymes. History of anemia. Recently started iron supplements. Low vitamin D levels.     Differential Diagnosis:  - Presyncope: Lightheadedness, feeling warm, diaphoresis, nausea, blurred vision. Blood pressure normal. No cardiac issues or palpitations. CBC and CMP ordered.  - Pulsatile tinnitus: Exposure to loud noises, anxiety, underlying health conditions, abnormal blood flow. No hearing loss, ear or facial pain, focal neurological abnormalities, facial palsy, or dizziness. Infrequent and episodic tinnitus. MRI of brain <1 year ago, no tumors. CT angiogram of head ordered. ENT referral.  - Anemia: History of anemia. Recently started iron supplements. Low iron levels. CBC ordered.  - Migraines: History of migraines. Botox treatments for occipital  neuralgia. Last treatment 06/30/2025. Taking Ubrelvy for migraines.    ED Course:  07/22/2025: CBC and CMP ordered.    Final Assessment:  Symptoms include lightheadedness, feeling warm, diaphoresis, nausea, blurred vision, and near fainting episodes. Blood pressure normal. No cardiac issues or palpitations. Normal kidney function and liver enzymes. History of anemia. Recently started iron supplements. Low vitamin D levels. Discontinued vitamin D supplement. CBC and CMP ordered. Pulsatile tinnitus infrequent and episodic. MRI of brain <1 year ago, no tumors. CT angiogram of head ordered. ENT referral. History of migraines. Botox treatments for occipital neuralgia. Last treatment 06/30/2025. Taking Ubrelvy for migraines.    Clinical Impression:  - Presyncope  - Pulsatile tinnitus  - Anemia  - Migraines    Disposition:  Discharge: Home. Return precautions for worsening symptoms or new concerning symptoms.  Follow-Up: ENT referral. CT angiogram of head. CBC and CMP.    Patient Education: Discussed symptoms, potential causes, and diagnostic plan. Explained importance of follow-up with ENT and CT angiogram. Advised to monitor symptoms and return if they worsen.       BMI is within normal parameters. No other follow-up for BMI required.       Gin Alexandra  reports that she quit smoking about 10 years ago. Her smoking use included cigarettes. She started smoking about 27 years ago. She has a 34.2 pack-year smoking history. She has been exposed to tobacco smoke. She has never used smokeless tobacco. I    FOLLOW UP  Return if symptoms worsen or fail to improve, for Next scheduled follow up.  Patient was given instructions and counseling regarding her condition or for health maintenance advice. Please see specific information pulled into the AVS if appropriate.       Salma Wagoner, LUCIA  07/29/25  12:56 EDT    Patient or patient representative verbalized consent for the use of Ambient Listening during the visit with   LUCIA Muhammad for chart documentation. 7/29/2025  12:56 EDT

## 2025-08-01 RX ORDER — ERGOCALCIFEROL 1.25 MG/1
50000 CAPSULE, LIQUID FILLED ORAL
Qty: 12 CAPSULE | Refills: 0 | Status: SHIPPED | OUTPATIENT
Start: 2025-08-01 | End: 2025-10-18

## 2025-08-06 ENCOUNTER — HOSPITAL ENCOUNTER (OUTPATIENT)
Dept: CT IMAGING | Facility: HOSPITAL | Age: 40
Discharge: HOME | End: 2025-08-06
Payer: COMMERCIAL

## 2025-08-06 DIAGNOSIS — H93.A9 PULSATILE TINNITUS: ICD-10-CM

## 2025-08-06 PROCEDURE — 70496 CT ANGIOGRAPHY HEAD: CPT

## 2025-08-06 PROCEDURE — 25510000001 IOPAMIDOL PER 1 ML

## 2025-08-06 RX ORDER — IOPAMIDOL 755 MG/ML
100 INJECTION, SOLUTION INTRAVASCULAR
Status: COMPLETED | OUTPATIENT
Start: 2025-08-06 | End: 2025-08-06

## 2025-08-06 RX ADMIN — IOPAMIDOL 100 ML: 755 INJECTION, SOLUTION INTRAVENOUS at 11:34

## 2025-08-22 DIAGNOSIS — M54.41 ACUTE MIDLINE LOW BACK PAIN WITH BILATERAL SCIATICA: ICD-10-CM

## 2025-08-22 DIAGNOSIS — M54.42 ACUTE MIDLINE LOW BACK PAIN WITH BILATERAL SCIATICA: ICD-10-CM

## 2025-08-28 ENCOUNTER — LAB (OUTPATIENT)
Dept: LAB | Facility: HOSPITAL | Age: 40
End: 2025-08-28
Payer: COMMERCIAL

## 2025-08-28 DIAGNOSIS — E61.1 IRON DEFICIENCY: ICD-10-CM

## 2025-08-28 DIAGNOSIS — Z31.41 ENCOUNTER FOR FERTILITY TESTING: ICD-10-CM

## 2025-08-28 DIAGNOSIS — E55.9 VITAMIN D DEFICIENCY: ICD-10-CM

## 2025-08-28 LAB
ABO GROUP BLD: NORMAL
ALBUMIN SERPL-MCNC: 4.2 G/DL (ref 3.5–5.2)
ALBUMIN/GLOB SERPL: 1.7 G/DL
ALP SERPL-CCNC: 69 U/L (ref 39–117)
ALT SERPL W P-5'-P-CCNC: 13 U/L (ref 1–33)
ANION GAP SERPL CALCULATED.3IONS-SCNC: 12 MMOL/L (ref 5–15)
AST SERPL-CCNC: 15 U/L (ref 1–32)
BASOPHILS # BLD AUTO: 0.03 10*3/MM3 (ref 0–0.2)
BASOPHILS NFR BLD AUTO: 0.7 % (ref 0–1.5)
BILIRUB SERPL-MCNC: 0.4 MG/DL (ref 0–1.2)
BLD GP AB SCN SERPL QL: NEGATIVE
BUN SERPL-MCNC: 7 MG/DL (ref 6–20)
BUN/CREAT SERPL: 12.7 (ref 7–25)
CALCIUM SPEC-SCNC: 9.3 MG/DL (ref 8.6–10.5)
CHLORIDE SERPL-SCNC: 104 MMOL/L (ref 98–107)
CO2 SERPL-SCNC: 24 MMOL/L (ref 22–29)
CREAT SERPL-MCNC: 0.55 MG/DL (ref 0.57–1)
DEPRECATED RDW RBC AUTO: 40.6 FL (ref 37–54)
EGFRCR SERPLBLD CKD-EPI 2021: 121.2 ML/MIN/1.73
EOSINOPHIL # BLD AUTO: 0.08 10*3/MM3 (ref 0–0.4)
EOSINOPHIL NFR BLD AUTO: 1.9 % (ref 0.3–6.2)
ERYTHROCYTE [DISTWIDTH] IN BLOOD BY AUTOMATED COUNT: 12.7 % (ref 12.3–15.4)
FERRITIN SERPL-MCNC: 17.4 NG/ML (ref 13–150)
GLOBULIN UR ELPH-MCNC: 2.5 GM/DL
GLUCOSE SERPL-MCNC: 86 MG/DL (ref 65–99)
HBA1C MFR BLD: 4.9 % (ref 4.8–5.6)
HBV SURFACE AG SERPL QL IA: NORMAL
HCT VFR BLD AUTO: 38 % (ref 34–46.6)
HGB BLD-MCNC: 12.3 G/DL (ref 12–15.9)
IMM GRANULOCYTES # BLD AUTO: 0 10*3/MM3 (ref 0–0.05)
IMM GRANULOCYTES NFR BLD AUTO: 0 % (ref 0–0.5)
IRON 24H UR-MRATE: 65 MCG/DL (ref 37–145)
LYMPHOCYTES # BLD AUTO: 1.46 10*3/MM3 (ref 0.7–3.1)
LYMPHOCYTES NFR BLD AUTO: 33.9 % (ref 19.6–45.3)
MCH RBC QN AUTO: 28 PG (ref 26.6–33)
MCHC RBC AUTO-ENTMCNC: 32.4 G/DL (ref 31.5–35.7)
MCV RBC AUTO: 86.6 FL (ref 79–97)
MONOCYTES # BLD AUTO: 0.31 10*3/MM3 (ref 0.1–0.9)
MONOCYTES NFR BLD AUTO: 7.2 % (ref 5–12)
NEUTROPHILS NFR BLD AUTO: 2.43 10*3/MM3 (ref 1.7–7)
NEUTROPHILS NFR BLD AUTO: 56.3 % (ref 42.7–76)
PLATELET # BLD AUTO: 247 10*3/MM3 (ref 140–450)
PMV BLD AUTO: 9.3 FL (ref 6–12)
POTASSIUM SERPL-SCNC: 4.1 MMOL/L (ref 3.5–5.2)
PROT SERPL-MCNC: 6.7 G/DL (ref 6–8.5)
RBC # BLD AUTO: 4.39 10*6/MM3 (ref 3.77–5.28)
RH BLD: POSITIVE
SODIUM SERPL-SCNC: 140 MMOL/L (ref 136–145)
TSH SERPL DL<=0.05 MIU/L-ACNC: 1.36 UIU/ML (ref 0.27–4.2)
WBC NRBC COR # BLD AUTO: 4.31 10*3/MM3 (ref 3.4–10.8)

## 2025-08-28 PROCEDURE — 82306 VITAMIN D 25 HYDROXY: CPT

## 2025-08-28 PROCEDURE — 86592 SYPHILIS TEST NON-TREP QUAL: CPT

## 2025-08-28 PROCEDURE — 86787 VARICELLA-ZOSTER ANTIBODY: CPT

## 2025-08-28 PROCEDURE — 86901 BLOOD TYPING SEROLOGIC RH(D): CPT

## 2025-08-28 PROCEDURE — 86850 RBC ANTIBODY SCREEN: CPT

## 2025-08-28 PROCEDURE — 86762 RUBELLA ANTIBODY: CPT

## 2025-08-28 PROCEDURE — 83540 ASSAY OF IRON: CPT

## 2025-08-28 PROCEDURE — 87340 HEPATITIS B SURFACE AG IA: CPT

## 2025-08-28 PROCEDURE — 83036 HEMOGLOBIN GLYCOSYLATED A1C: CPT

## 2025-08-28 PROCEDURE — 36415 COLL VENOUS BLD VENIPUNCTURE: CPT

## 2025-08-28 PROCEDURE — 86900 BLOOD TYPING SEROLOGIC ABO: CPT

## 2025-08-28 PROCEDURE — G0432 EIA HIV-1/HIV-2 SCREEN: HCPCS

## 2025-08-28 PROCEDURE — 80050 GENERAL HEALTH PANEL: CPT

## 2025-08-28 PROCEDURE — 86803 HEPATITIS C AB TEST: CPT

## 2025-08-28 PROCEDURE — 82728 ASSAY OF FERRITIN: CPT

## 2025-08-29 LAB
25(OH)D3 SERPL-MCNC: 27.6 NG/ML (ref 30–100)
HCV AB SER QL: NORMAL
HIV 1+2 AB+HIV1 P24 AG SERPL QL IA: NORMAL
RPR SER QL: NON REACTIVE
VZV IGG SER QL IA: REACTIVE
VZV IGM SER QL IA: <0.91 INDEX (ref 0–0.9)

## 2025-08-30 LAB — RUBV IGG SERPL IA-ACNC: 5.29 INDEX

## (undated) DEVICE — ARM DRAPE

## (undated) DEVICE — LN SMPL CO2 SHTRM SD STREAM W/M LUER

## (undated) DEVICE — VIOLET BRAIDED (POLYGLACTIN 910), SYNTHETIC ABSORBABLE SUTURE: Brand: COATED VICRYL

## (undated) DEVICE — SOL IRR SALINE 0.9% 100ML STRL

## (undated) DEVICE — MAJOR-LF: Brand: MEDLINE INDUSTRIES, INC.

## (undated) DEVICE — DECANTER BAG 9": Brand: MEDLINE INDUSTRIES, INC.

## (undated) DEVICE — BLADELESS OBTURATOR: Brand: WECK VISTA

## (undated) DEVICE — Device

## (undated) DEVICE — GLV SURG SENSICARE POLYISPRN W/ALOE PF LF 6.5 GRN STRL

## (undated) DEVICE — MSK PROC CURAPLEX O2 2/ADAPT 7FT

## (undated) DEVICE — LAPAROVUE VISIBILITY SYSTEM LAPAROSCOPIC SOLUTIONS: Brand: LAPAROVUE

## (undated) DEVICE — SINGLE-USE BIOPSY FORCEPS: Brand: RADIAL JAW 4

## (undated) DEVICE — TROC BLADLES AIRSEAL/OPTI THRD 8X120MM 1P/U

## (undated) DEVICE — SUT MNCRYL PLS ANTIB UD 4/0 PS2 18IN

## (undated) DEVICE — NDL HYPO ECLPS SFTY 21G 1 1/2IN

## (undated) DEVICE — SOL IRRG H2O PL/BG 1000ML STRL

## (undated) DEVICE — MARKER,SKIN,WI/RULER AND LABELS: Brand: MEDLINE

## (undated) DEVICE — Device: Brand: DEFENDO AIR/WATER/SUCTION AND BIOPSY VALVE

## (undated) DEVICE — PATIENT RETURN ELECTRODE, SINGLE-USE, CONTACT QUALITY MONITORING, ADULT, WITH 9FT CORD, FOR PATIENTS WEIGING OVER 33LBS. (15KG): Brand: MEGADYNE

## (undated) DEVICE — SENSR O2 OXIMAX FNGR A/ 18IN NONSTR

## (undated) DEVICE — FRCP BX RADJAW4 NDL 2.8 240CM LG OG BX40

## (undated) DEVICE — SOL NACL 0.9PCT 1000ML

## (undated) DEVICE — GLV SURG SENSICARE PI MIC PF SZ8.5 LF STRL

## (undated) DEVICE — SOL IRR NACL 0.9PCT BT 1000ML

## (undated) DEVICE — TUBING, SUCTION, 1/4" X 10', STRAIGHT: Brand: MEDLINE

## (undated) DEVICE — ADAPT CLN BIOGUARD AIR/H2O DISP

## (undated) DEVICE — SOLIDIFIER LIQLOC PLS 1500CC BT

## (undated) DEVICE — SUT VIC 3/0 SH 27IN J416H

## (undated) DEVICE — TROC STANDARDTROCAR FOR/TITANSGS 19MM DISP STRL

## (undated) DEVICE — ST TBG AIRSEAL BIF FLTR W/ACT/CHARCOAL/FLTR

## (undated) DEVICE — CONN JET HYDRA H20 AUXILIARY DISP

## (undated) DEVICE — GLV SURG BIOGEL LTX PF 7 1/2

## (undated) DEVICE — ESOPHAGEAL BALLOON DILATATION CATHETER: Brand: CRE FIXED WIRE

## (undated) DEVICE — BLCK/BITE BLOX WO/DENTL/RIM W/STRAP 54F

## (undated) DEVICE — VESSEL SEALER EXTEND: Brand: ENDOWRIST

## (undated) DEVICE — LINER SURG CANSTR SXN S/RIGD 1500CC

## (undated) DEVICE — PENCL SMOKE/EVAC MEGADYNE TELESCP 10FT

## (undated) DEVICE — OSC ROBOT BARIATRIC: Brand: MEDLINE INDUSTRIES, INC.

## (undated) DEVICE — SEAL

## (undated) DEVICE — SUT SILK 0 SH 30IN K834H

## (undated) DEVICE — ADHS SKIN PREMIERPRO EXOFIN TOPICAL HI/VISC .5ML

## (undated) DEVICE — 500ML,PRESSURE INFUSER W/STOPCOCK: Brand: MEDLINE

## (undated) DEVICE — GOWN,REINFORCE,POLY,SIRUS,BREATH SLV,XLG: Brand: MEDLINE

## (undated) DEVICE — KT ORCA ORCAPOD DISP STRL

## (undated) DEVICE — DEV INFL CRE STERIFLATE 60CC DISP

## (undated) DEVICE — LAPAROSCOPIC DISSECTOR: Brand: DEROYAL

## (undated) DEVICE — BALN BOUGIE STD/TPR 38F

## (undated) DEVICE — GOWN,NON-REINFORCED,SIRUS,SET IN SLV,XL: Brand: MEDLINE

## (undated) DEVICE — CONN TBG Y 5 IN 1 LF STRL

## (undated) DEVICE — APL DUPLOSPRAYER MIS 40CM

## (undated) DEVICE — SUT VIC 0 CT1 27IN DYED J340H

## (undated) DEVICE — SUT MNCRYL 4/0 PS2 18 IN

## (undated) DEVICE — EGD OR ERCP KIT: Brand: MEDLINE INDUSTRIES, INC.

## (undated) DEVICE — GLV SURG SENSICARE PI PF LF 8.5 GRN STRL

## (undated) DEVICE — BLCK/BITE BLOX W/DENTL/RIM W/STRAP 54F

## (undated) DEVICE — GLV SURG SENSICARE PI MIC PF SZ6 LF STRL

## (undated) DEVICE — INTENDED FOR TISSUE SEPARATION, AND OTHER PROCEDURES THAT REQUIRE A SHARP SURGICAL BLADE TO PUNCTURE OR CUT.: Brand: BARD-PARKER ® CARBON RIB-BACK BLADES